# Patient Record
Sex: MALE | Race: WHITE | NOT HISPANIC OR LATINO | Employment: OTHER | URBAN - METROPOLITAN AREA
[De-identification: names, ages, dates, MRNs, and addresses within clinical notes are randomized per-mention and may not be internally consistent; named-entity substitution may affect disease eponyms.]

---

## 2023-03-23 ENCOUNTER — HOSPITAL ENCOUNTER (OUTPATIENT)
Dept: DATA CONVERSION | Facility: HOSPITAL | Age: 58
End: 2023-03-23
Attending: UROLOGY | Admitting: UROLOGY

## 2023-03-23 DIAGNOSIS — S37.10XA UNSPECIFIED INJURY OF URETER, INITIAL ENCOUNTER: ICD-10-CM

## 2023-03-23 DIAGNOSIS — Z46.6 ENCOUNTER FOR FITTING AND ADJUSTMENT OF URINARY DEVICE: ICD-10-CM

## 2023-03-30 ENCOUNTER — HOSPITAL ENCOUNTER (OUTPATIENT)
Dept: DATA CONVERSION | Facility: HOSPITAL | Age: 58
End: 2023-03-30
Attending: RADIOLOGY | Admitting: RADIOLOGY

## 2023-03-30 DIAGNOSIS — K50.90 CROHN'S DISEASE, UNSPECIFIED, WITHOUT COMPLICATIONS (MULTI): ICD-10-CM

## 2023-03-30 DIAGNOSIS — K57.92 DIVERTICULITIS OF INTESTINE, PART UNSPECIFIED, WITHOUT PERFORATION OR ABSCESS WITHOUT BLEEDING: ICD-10-CM

## 2023-03-30 DIAGNOSIS — N18.1 CHRONIC KIDNEY DISEASE, STAGE 1: ICD-10-CM

## 2023-03-30 DIAGNOSIS — D63.1 ANEMIA IN CHRONIC KIDNEY DISEASE (CODE): ICD-10-CM

## 2023-03-30 DIAGNOSIS — Z12.11 ENCOUNTER FOR SCREENING FOR MALIGNANT NEOPLASM OF COLON: ICD-10-CM

## 2023-03-30 DIAGNOSIS — S37.10XA UNSPECIFIED INJURY OF URETER, INITIAL ENCOUNTER: ICD-10-CM

## 2023-03-30 DIAGNOSIS — T83.022A: ICD-10-CM

## 2023-04-25 ENCOUNTER — HOSPITAL ENCOUNTER (OUTPATIENT)
Dept: DATA CONVERSION | Facility: HOSPITAL | Age: 58
End: 2023-04-25
Attending: RADIOLOGY | Admitting: RADIOLOGY

## 2023-04-25 DIAGNOSIS — N13.30 UNSPECIFIED HYDRONEPHROSIS: ICD-10-CM

## 2023-04-25 DIAGNOSIS — Z46.6 ENCOUNTER FOR FITTING AND ADJUSTMENT OF URINARY DEVICE: ICD-10-CM

## 2023-04-25 DIAGNOSIS — Z93.3 COLOSTOMY STATUS (MULTI): ICD-10-CM

## 2023-04-25 DIAGNOSIS — N32.1 VESICOINTESTINAL FISTULA: ICD-10-CM

## 2023-04-25 LAB
INR IN PPP BY COAGULATION ASSAY: 1 (ref 0.9–1.1)
PROTHROMBIN TIME (PT) IN PPP BY COAGULATION ASSAY: 11.5 SEC (ref 9.8–13.4)

## 2023-05-31 LAB
ANION GAP IN SER/PLAS: 13 MMOL/L (ref 10–20)
CALCIUM (MG/DL) IN SER/PLAS: 9.4 MG/DL (ref 8.6–10.3)
CARBON DIOXIDE, TOTAL (MMOL/L) IN SER/PLAS: 22 MMOL/L (ref 21–32)
CHLORIDE (MMOL/L) IN SER/PLAS: 108 MMOL/L (ref 98–107)
CREATININE (MG/DL) IN SER/PLAS: 1.38 MG/DL (ref 0.5–1.3)
GFR MALE: 60 ML/MIN/1.73M2
GLUCOSE (MG/DL) IN SER/PLAS: 91 MG/DL (ref 74–99)
POTASSIUM (MMOL/L) IN SER/PLAS: 3.9 MMOL/L (ref 3.5–5.3)
SODIUM (MMOL/L) IN SER/PLAS: 139 MMOL/L (ref 136–145)
UREA NITROGEN (MG/DL) IN SER/PLAS: 22 MG/DL (ref 6–23)

## 2023-06-08 LAB
COBALAMIN (VITAMIN B12) (PG/ML) IN SER/PLAS: 921 PG/ML (ref 211–911)
HEPATITIS B VIRUS CORE AB (PRESENCE) IN SER/PLAS BY IMM: NONREACTIVE
HEPATITIS B VIRUS SURFACE AB (MIU/ML) IN SERUM: <3.1 MIU/ML
HEPATITIS B VIRUS SURFACE AG PRESENCE IN SERUM: NONREACTIVE
HEPATITIS C VIRUS AB PRESENCE IN SERUM: NONREACTIVE

## 2023-06-11 LAB
NIL(NEG) CONTROL SPOT COUNT: NORMAL
PANEL A SPOT COUNT: 4
PANEL B SPOT COUNT: 1
POS CONTROL SPOT COUNT: NORMAL
T-SPOT. TB INTERPRETATION: NEGATIVE

## 2023-06-14 ENCOUNTER — HOSPITAL ENCOUNTER (OUTPATIENT)
Dept: DATA CONVERSION | Facility: HOSPITAL | Age: 58
End: 2023-06-14
Attending: INTERNAL MEDICINE | Admitting: INTERNAL MEDICINE

## 2023-06-14 DIAGNOSIS — K57.32 DIVERTICULITIS OF LARGE INTESTINE WITHOUT PERFORATION OR ABSCESS WITHOUT BLEEDING: ICD-10-CM

## 2023-06-14 DIAGNOSIS — K50.90 CROHN'S DISEASE, UNSPECIFIED, WITHOUT COMPLICATIONS (MULTI): ICD-10-CM

## 2023-06-14 DIAGNOSIS — K63.3 ULCER OF INTESTINE: ICD-10-CM

## 2023-06-14 DIAGNOSIS — Z12.11 ENCOUNTER FOR SCREENING FOR MALIGNANT NEOPLASM OF COLON: ICD-10-CM

## 2023-06-14 DIAGNOSIS — N18.9 CHRONIC KIDNEY DISEASE, UNSPECIFIED: ICD-10-CM

## 2023-06-14 DIAGNOSIS — D63.1 ANEMIA IN CHRONIC KIDNEY DISEASE (CODE): ICD-10-CM

## 2023-06-23 LAB
COMPLETE PATHOLOGY REPORT: NORMAL
CONVERTED CLINICAL DIAGNOSIS-HISTORY: NORMAL
CONVERTED FINAL DIAGNOSIS: NORMAL
CONVERTED FINAL REPORT PDF LINK TO COPY AND PASTE: NORMAL
CONVERTED GROSS DESCRIPTION: NORMAL

## 2023-06-30 ENCOUNTER — HOSPITAL ENCOUNTER (OUTPATIENT)
Dept: DATA CONVERSION | Facility: HOSPITAL | Age: 58
End: 2023-06-30
Attending: RADIOLOGY | Admitting: RADIOLOGY

## 2023-06-30 DIAGNOSIS — N13.30 UNSPECIFIED HYDRONEPHROSIS: ICD-10-CM

## 2023-06-30 DIAGNOSIS — Z93.3 COLOSTOMY STATUS (MULTI): ICD-10-CM

## 2023-06-30 DIAGNOSIS — N32.1 VESICOINTESTINAL FISTULA: ICD-10-CM

## 2023-08-03 ENCOUNTER — HOSPITAL ENCOUNTER (OUTPATIENT)
Dept: DATA CONVERSION | Facility: HOSPITAL | Age: 58
End: 2023-08-03
Attending: STUDENT IN AN ORGANIZED HEALTH CARE EDUCATION/TRAINING PROGRAM | Admitting: STUDENT IN AN ORGANIZED HEALTH CARE EDUCATION/TRAINING PROGRAM

## 2023-08-03 DIAGNOSIS — N32.1 VESICOINTESTINAL FISTULA: ICD-10-CM

## 2023-08-03 DIAGNOSIS — Z43.6 ENCOUNTER FOR ATTENTION TO OTHER ARTIFICIAL OPENINGS OF URINARY TRACT (MULTI): ICD-10-CM

## 2023-08-03 DIAGNOSIS — N13.30 UNSPECIFIED HYDRONEPHROSIS: ICD-10-CM

## 2023-08-25 ENCOUNTER — HOSPITAL ENCOUNTER (OUTPATIENT)
Dept: DATA CONVERSION | Facility: HOSPITAL | Age: 58
End: 2023-08-25
Attending: NURSE PRACTITIONER | Admitting: NURSE PRACTITIONER

## 2023-08-25 DIAGNOSIS — N13.30 UNSPECIFIED HYDRONEPHROSIS: ICD-10-CM

## 2023-08-25 DIAGNOSIS — N32.1 VESICOINTESTINAL FISTULA: ICD-10-CM

## 2023-09-07 VITALS — HEIGHT: 72 IN | BODY MASS INDEX: 27.14 KG/M2 | WEIGHT: 200.4 LBS

## 2023-09-08 VITALS — WEIGHT: 200.4 LBS | HEIGHT: 72 IN | BODY MASS INDEX: 27.14 KG/M2

## 2023-09-16 ENCOUNTER — HOSPITAL ENCOUNTER (OUTPATIENT)
Facility: HOSPITAL | Age: 58
Setting detail: OUTPATIENT SURGERY
End: 2023-09-16
Attending: UROLOGY | Admitting: UROLOGY

## 2023-09-26 PROBLEM — K57.32 DIVERTICULITIS, COLON: Status: ACTIVE | Noted: 2023-09-26

## 2023-09-26 PROBLEM — Z93.3 COLOSTOMY IN PLACE (MULTI): Status: ACTIVE | Noted: 2023-09-26

## 2023-09-26 PROBLEM — N18.1 STAGE 1 CHRONIC KIDNEY DISEASE: Status: ACTIVE | Noted: 2023-09-26

## 2023-09-26 PROBLEM — G47.00 INSOMNIA: Status: ACTIVE | Noted: 2023-09-26

## 2023-09-26 PROBLEM — S37.10XA URETER INJURY: Status: ACTIVE | Noted: 2023-09-26

## 2023-09-26 PROBLEM — K50.90 CROHN'S DISEASE (MULTI): Status: ACTIVE | Noted: 2023-09-26

## 2023-09-26 PROBLEM — N32.1 COLOVESICAL FISTULA: Status: ACTIVE | Noted: 2023-09-26

## 2023-09-26 PROBLEM — N32.89 BLADDER SPASMS: Status: ACTIVE | Noted: 2023-09-26

## 2023-09-26 PROBLEM — D64.9 ANEMIA: Status: ACTIVE | Noted: 2023-09-26

## 2023-09-26 PROBLEM — F41.9 ANXIETY DUE TO INVASIVE PROCEDURE: Status: ACTIVE | Noted: 2023-09-26

## 2023-09-26 RX ORDER — ACETAMINOPHEN 500 MG
2 TABLET ORAL EVERY 6 HOURS PRN
COMMUNITY
End: 2024-01-04 | Stop reason: WASHOUT

## 2023-09-26 RX ORDER — VANCOMYCIN HYDROCHLORIDE 125 MG/1
125 CAPSULE ORAL
COMMUNITY
End: 2023-10-05 | Stop reason: ALTCHOICE

## 2023-09-26 RX ORDER — OXYCODONE HYDROCHLORIDE 5 MG/1
5 TABLET ORAL SEE ADMIN INSTRUCTIONS
COMMUNITY
End: 2023-10-02 | Stop reason: SDUPTHER

## 2023-09-26 RX ORDER — LEVOFLOXACIN 750 MG/1
1 TABLET ORAL
COMMUNITY
Start: 2022-10-11 | End: 2023-10-05 | Stop reason: ALTCHOICE

## 2023-09-26 RX ORDER — DIAZEPAM 2 MG/1
2 TABLET ORAL AS NEEDED
COMMUNITY
Start: 2023-08-01 | End: 2023-10-02 | Stop reason: SDUPTHER

## 2023-09-26 RX ORDER — SULFAMETHOXAZOLE AND TRIMETHOPRIM 800; 160 MG/1; MG/1
1 TABLET ORAL 2 TIMES DAILY
Status: ON HOLD | COMMUNITY
Start: 2023-06-14 | End: 2023-10-25 | Stop reason: ALTCHOICE

## 2023-09-26 RX ORDER — OXYBUTYNIN CHLORIDE 10 MG/1
10 TABLET, EXTENDED RELEASE ORAL 2 TIMES DAILY
COMMUNITY
End: 2023-10-05 | Stop reason: SDUPTHER

## 2023-09-26 RX ORDER — ZINC OXIDE 20 G/100G
1 OINTMENT TOPICAL 3 TIMES DAILY
COMMUNITY
Start: 2022-12-02 | End: 2023-10-05 | Stop reason: HOSPADM

## 2023-09-29 VITALS — HEIGHT: 72 IN | BODY MASS INDEX: 32.55 KG/M2 | WEIGHT: 240.3 LBS

## 2023-09-29 VITALS — WEIGHT: 230.38 LBS | HEIGHT: 72 IN | BODY MASS INDEX: 31.2 KG/M2

## 2023-09-30 NOTE — H&P
History & Physical Reviewed:   I have reviewed the History and Physical dated:  08-Jun-2023   History and Physical reviewed and relevant findings noted. Patient examined to review pertinent physical  findings.: No significant changes   Home Medications Reviewed: no changes noted   Allergies Reviewed: no changes noted       ERAS (Enhanced Recovery After Surgery):  ·  ERAS Patient: no     Consent:   COVID-19 Consent:  ·  COVID-19 Risk Consent Surgeon has reviewed key risks related to the risk of florentino COVID-19 and if they contract COVID-19 what the risks are.       Electronic Signatures:  Nick Yi)  (Signed 14-Jun-2023 11:31)   Authored: History & Physical Reviewed, ERAS, Consent,  Note Completion      Last Updated: 14-Jun-2023 11:31 by Nick Yi)

## 2023-10-02 ENCOUNTER — OFFICE VISIT (OUTPATIENT)
Dept: UROLOGY | Facility: CLINIC | Age: 58
End: 2023-10-02

## 2023-10-02 VITALS
HEART RATE: 71 BPM | TEMPERATURE: 97.7 F | DIASTOLIC BLOOD PRESSURE: 75 MMHG | HEIGHT: 72 IN | WEIGHT: 250 LBS | RESPIRATION RATE: 16 BRPM | BODY MASS INDEX: 33.86 KG/M2 | SYSTOLIC BLOOD PRESSURE: 123 MMHG

## 2023-10-02 DIAGNOSIS — N32.1 COLOVESICAL FISTULA: Primary | ICD-10-CM

## 2023-10-02 DIAGNOSIS — F41.9 ANXIETY DUE TO INVASIVE PROCEDURE: ICD-10-CM

## 2023-10-02 PROCEDURE — 99212 OFFICE O/P EST SF 10 MIN: CPT | Performed by: NURSE PRACTITIONER

## 2023-10-02 PROCEDURE — 3008F BODY MASS INDEX DOCD: CPT | Performed by: NURSE PRACTITIONER

## 2023-10-02 RX ORDER — OXYCODONE HYDROCHLORIDE 5 MG/1
5 TABLET ORAL SEE ADMIN INSTRUCTIONS
Qty: 12 TABLET | Refills: 0 | Status: SHIPPED | OUTPATIENT
Start: 2023-10-02 | End: 2023-10-02 | Stop reason: SDUPTHER

## 2023-10-02 RX ORDER — DIAZEPAM 2 MG/1
2 TABLET ORAL AS NEEDED
Qty: 1 TABLET | Refills: 0 | Status: SHIPPED | OUTPATIENT
Start: 2023-10-02 | End: 2023-12-28 | Stop reason: WASHOUT

## 2023-10-02 RX ORDER — OXYCODONE HYDROCHLORIDE 5 MG/1
5 TABLET ORAL SEE ADMIN INSTRUCTIONS
Qty: 12 TABLET | Refills: 0 | Status: SHIPPED | OUTPATIENT
Start: 2023-10-02 | End: 2023-11-17 | Stop reason: HOSPADM

## 2023-10-02 ASSESSMENT — PAIN SCALES - GENERAL: PAINLEVEL: 0-NO PAIN

## 2023-10-02 NOTE — LETTER
To Whom It May Concern,    I am writing in support of the Social Security Disability application submitted by my patient, Jose Thornton. Mr. Thornton has been under the care of the Urology team since December 2022 however his illness journey began when he came to one of our emergency departments on September 26, 2022, with constipation and was found to have advanced colitis of the sigmoid colon and a 2.4 fistulous abscess between the colon and the bladder. He required drainage of the abscess by Interventional Radiology and treatment of acute rental failure and C diff diarrhea. In the past year, he has required multiple hospital admissions, surgeries, procedures, and outpatient medical visits with members of our Urology and Colorectal surgery teams for complications from this fistula. He has had nephrostomy tubes as well as an indwelling bladder catheter since May 2023, both requiring regular replacement to prevent infection. He is now scheduled for a major surgery to finally correct these issues on October 11, 2023. Given the physical sequelae alone, I am confident in my assessment that Mr. Thornton has been unable to work during this period of time and as such should qualify for disability benefits at least until such time as he has recovered from his upcoming surgery and is deemed by his medical team as capable of returning to work.    Sincerely,          UGO Xavier

## 2023-10-02 NOTE — PROGRESS NOTES
John presents today for a urine culture prior to surgery 10/11/23. Unfortunately he was not aware that this would require a catheter change. He did not premedicate, was hoping to avoid another cath change prior to surgery.     He will reschedule for tomorrow  Will coordinate ride with spouse  Prescriptions sent for diazepam ,oxycodone

## 2023-10-03 ENCOUNTER — HOSPITAL ENCOUNTER (OUTPATIENT)
Dept: RADIOLOGY | Facility: HOSPITAL | Age: 58
Discharge: HOME | End: 2023-10-03

## 2023-10-03 ENCOUNTER — OFFICE VISIT (OUTPATIENT)
Dept: UROLOGY | Facility: CLINIC | Age: 58
End: 2023-10-03

## 2023-10-03 DIAGNOSIS — K50.90 CROHN'S DISEASE, UNSPECIFIED, WITHOUT COMPLICATIONS (MULTI): ICD-10-CM

## 2023-10-03 DIAGNOSIS — N32.1 COLOVESICAL FISTULA: Primary | ICD-10-CM

## 2023-10-03 PROCEDURE — 74183 MRI ABD W/O CNTR FLWD CNTR: CPT | Performed by: RADIOLOGY

## 2023-10-03 PROCEDURE — 74183 MRI ABD W/O CNTR FLWD CNTR: CPT

## 2023-10-03 PROCEDURE — 99214 OFFICE O/P EST MOD 30 MIN: CPT | Performed by: NURSE PRACTITIONER

## 2023-10-03 PROCEDURE — 2550000001 HC RX 255 CONTRASTS: Performed by: INTERNAL MEDICINE

## 2023-10-03 PROCEDURE — 72197 MRI PELVIS W/O & W/DYE: CPT

## 2023-10-03 PROCEDURE — 72197 MRI PELVIS W/O & W/DYE: CPT | Performed by: RADIOLOGY

## 2023-10-03 PROCEDURE — 3008F BODY MASS INDEX DOCD: CPT | Performed by: NURSE PRACTITIONER

## 2023-10-03 PROCEDURE — A9575 INJ GADOTERATE MEGLUMI 0.1ML: HCPCS | Performed by: INTERNAL MEDICINE

## 2023-10-03 RX ORDER — APPLE JUICE
250 LIQUID (ML) ORAL DAILY
Qty: 1250 ML | Refills: 0 | Status: SHIPPED | OUTPATIENT
Start: 2023-10-06 | End: 2023-10-11

## 2023-10-03 RX ORDER — POLYETHYLENE GLYCOL 3350, SODIUM CHLORIDE, SODIUM BICARBONATE, POTASSIUM CHLORIDE 420; 11.2; 5.72; 1.48 G/4L; G/4L; G/4L; G/4L
4000 POWDER, FOR SOLUTION ORAL ONCE
Status: CANCELLED | OUTPATIENT
Start: 2023-10-03 | End: 2023-10-03

## 2023-10-03 RX ORDER — POLYETHYLENE GLYCOL 3350, SODIUM SULFATE ANHYDROUS, SODIUM BICARBONATE, SODIUM CHLORIDE, POTASSIUM CHLORIDE 236; 22.74; 6.74; 5.86; 2.97 G/4L; G/4L; G/4L; G/4L; G/4L
4000 POWDER, FOR SOLUTION ORAL ONCE
Qty: 4000 ML | Refills: 0 | Status: SHIPPED | OUTPATIENT
Start: 2023-10-03 | End: 2023-10-03

## 2023-10-03 RX ORDER — GADOTERATE MEGLUMINE 376.9 MG/ML
23 INJECTION INTRAVENOUS
Status: COMPLETED | OUTPATIENT
Start: 2023-10-03 | End: 2023-10-03

## 2023-10-03 RX ADMIN — GADOTERATE MEGLUMINE 23 ML: 376.9 INJECTION INTRAVENOUS at 11:05

## 2023-10-03 NOTE — RESULT ENCOUNTER NOTE
Please call me to discuss how you are doing on steroids, and your upcoming surgery.  MRI as expected.

## 2023-10-05 ENCOUNTER — PRE-ADMISSION TESTING (OUTPATIENT)
Dept: PREADMISSION TESTING | Facility: HOSPITAL | Age: 58
End: 2023-10-05

## 2023-10-05 VITALS
HEART RATE: 93 BPM | OXYGEN SATURATION: 96 % | DIASTOLIC BLOOD PRESSURE: 76 MMHG | TEMPERATURE: 98.6 F | SYSTOLIC BLOOD PRESSURE: 115 MMHG | BODY MASS INDEX: 34.31 KG/M2 | WEIGHT: 253.31 LBS | HEIGHT: 72 IN

## 2023-10-05 DIAGNOSIS — Z41.9 SURGERY, ELECTIVE: Primary | ICD-10-CM

## 2023-10-05 LAB
ABO GROUP (TYPE) IN BLOOD: NORMAL
ALBUMIN SERPL BCP-MCNC: 3.9 G/DL (ref 3.4–5)
ALP SERPL-CCNC: 72 U/L (ref 33–120)
ALT SERPL W P-5'-P-CCNC: 20 U/L (ref 10–52)
ANION GAP SERPL CALC-SCNC: 19 MMOL/L (ref 10–20)
ANTIBODY SCREEN: NORMAL
APPEARANCE UR: ABNORMAL
APTT PPP: 30 SECONDS (ref 27–38)
AST SERPL W P-5'-P-CCNC: 21 U/L (ref 9–39)
BACTERIA #/AREA URNS AUTO: ABNORMAL /HPF
BILIRUB SERPL-MCNC: 0.2 MG/DL (ref 0–1.2)
BILIRUB UR STRIP.AUTO-MCNC: NEGATIVE MG/DL
BUN SERPL-MCNC: 20 MG/DL (ref 6–23)
CALCIUM SERPL-MCNC: 9 MG/DL (ref 8.6–10.6)
CHLORIDE SERPL-SCNC: 102 MMOL/L (ref 98–107)
CO2 SERPL-SCNC: 19 MMOL/L (ref 21–32)
COLOR UR: ABNORMAL
CREAT SERPL-MCNC: 1.74 MG/DL (ref 0.5–1.3)
ERYTHROCYTE [DISTWIDTH] IN BLOOD BY AUTOMATED COUNT: 15.5 % (ref 11.5–14.5)
GFR SERPL CREATININE-BSD FRML MDRD: 46 ML/MIN/1.73M*2
GLUCOSE SERPL-MCNC: 102 MG/DL (ref 74–99)
GLUCOSE UR STRIP.AUTO-MCNC: NEGATIVE MG/DL
HCT VFR BLD AUTO: 42.8 % (ref 41–52)
HGB BLD-MCNC: 13.4 G/DL (ref 13.5–17.5)
INR PPP: 1 (ref 0.9–1.1)
KETONES UR STRIP.AUTO-MCNC: NEGATIVE MG/DL
LEUKOCYTE ESTERASE UR QL STRIP.AUTO: ABNORMAL
MCH RBC QN AUTO: 27.6 PG (ref 26–34)
MCHC RBC AUTO-ENTMCNC: 31.3 G/DL (ref 32–36)
MCV RBC AUTO: 88 FL (ref 80–100)
NITRITE UR QL STRIP.AUTO: POSITIVE
NRBC BLD-RTO: 0 /100 WBCS (ref 0–0)
PH UR STRIP.AUTO: 7 [PH]
PLATELET # BLD AUTO: 423 X10*3/UL (ref 150–450)
PMV BLD AUTO: 8.7 FL (ref 7.5–11.5)
POTASSIUM SERPL-SCNC: 4 MMOL/L (ref 3.5–5.3)
PROT SERPL-MCNC: 7.3 G/DL (ref 6.4–8.2)
PROT UR STRIP.AUTO-MCNC: ABNORMAL MG/DL
PROTHROMBIN TIME: 11.3 SECONDS (ref 9.8–12.8)
RBC # BLD AUTO: 4.85 X10*6/UL (ref 4.5–5.9)
RBC # UR STRIP.AUTO: ABNORMAL /UL
RBC #/AREA URNS AUTO: >20 /HPF
RH FACTOR (ANTIGEN D): NORMAL
SODIUM SERPL-SCNC: 136 MMOL/L (ref 136–145)
SP GR UR STRIP.AUTO: 1.02
SQUAMOUS #/AREA URNS AUTO: ABNORMAL /HPF
UROBILINOGEN UR STRIP.AUTO-MCNC: <2 MG/DL
WBC # BLD AUTO: 9.7 X10*3/UL (ref 4.4–11.3)
WBC #/AREA URNS AUTO: >50 /HPF
WBC CLUMPS #/AREA URNS AUTO: ABNORMAL /HPF

## 2023-10-05 PROCEDURE — 86900 BLOOD TYPING SEROLOGIC ABO: CPT

## 2023-10-05 PROCEDURE — 85027 COMPLETE CBC AUTOMATED: CPT

## 2023-10-05 PROCEDURE — 87186 SC STD MICRODIL/AGAR DIL: CPT

## 2023-10-05 PROCEDURE — 85610 PROTHROMBIN TIME: CPT

## 2023-10-05 PROCEDURE — 99244 OFF/OP CNSLTJ NEW/EST MOD 40: CPT | Performed by: ANESTHESIOLOGY

## 2023-10-05 PROCEDURE — 80053 COMPREHEN METABOLIC PANEL: CPT

## 2023-10-05 PROCEDURE — 81001 URINALYSIS AUTO W/SCOPE: CPT

## 2023-10-05 PROCEDURE — 36415 COLL VENOUS BLD VENIPUNCTURE: CPT

## 2023-10-05 RX ORDER — PREDNISOLONE SODIUM PHOSPHATE 10 MG/1
10 TABLET, ORALLY DISINTEGRATING ORAL DAILY
Status: ON HOLD | COMMUNITY
End: 2023-10-25 | Stop reason: ALTCHOICE

## 2023-10-05 ASSESSMENT — DUKE ACTIVITY SCORE INDEX (DASI)
CAN YOU WALK INDOORS, SUCH AS AROUND YOUR HOUSE: YES
DASI METS SCORE: 5.6
CAN YOU TAKE CARE OF YOURSELF (EAT, DRESS, BATHE, OR USE TOILET): YES
CAN YOU DO HEAVY WORK AROUND THE HOUSE LIKE SCRUBBING FLOORS OR LIFTING AND MOVING HEAVY FURNITURE: NO
CAN YOU PARTICIPATE IN MODERATE RECREATIONAL ACTIVITIES LIKE GOLF, BOWLING, DANCING, DOUBLES TENNIS OR THROWING A BASEBALL OR FOOTBALL: NO
CAN YOU HAVE SEXUAL RELATIONS: NO
CAN YOU DO YARD WORK LIKE RAKING LEAVES, WEEDING OR PUSHING A MOWER: YES
CAN YOU DO MODERATE WORK AROUND THE HOUSE LIKE VACUUMING, SWEEPING FLOORS OR CARRYING GROCERIES: YES
CAN YOU RUN A SHORT DISTANCE: NO
CAN YOU DO LIGHT WORK AROUND THE HOUSE LIKE DUSTING OR WASHING DISHES: YES
CAN YOU WALK A BLOCK OR TWO ON LEVEL GROUND: YES
TOTAL_SCORE: 23.45
CAN YOU PARTICIPATE IN STRENOUS SPORTS LIKE SWIMMING, SINGLES TENNIS, FOOTBALL, BASKETBALL, OR SKIING: NO
CAN YOU CLIMB A FLIGHT OF STAIRS OR WALK UP A HILL: YES

## 2023-10-05 NOTE — CPM/PAT H&P
CPM/PAT Evaluation       Name: Jose Thornton (Jose Thornton)  /Age: 1965/57 y.o.     In-Person       Chief Complaint: 57 yrs , M , presented to PAT clinic prior to scheduled Bilateral ureteral reimplant, rectovesical fistula repair, possible illeal conduit, possible muscle flap with dr Andersen on 10/11     HPI  57 yrs M , with Hx of Crohn's disease , perianal fistula , colovesical fistula  s/p anterior proctosigmoidectomy, colovesicular fistula takedown, end colostomy creation (22, Dr. Rodriguez) patient has a colostomy bag  , fistula between the bladder and rectal stump s/p indwelling catheter, as well as bilateral nephrostomy tubes and end colostomy     Past Medical History:   Diagnosis Date    Crohn's disease (CMS/HCC)     Diverticulosis     GERD (gastroesophageal reflux disease)     GI (gastrointestinal bleed)     Urinary tract infection        Past Surgical History:   Procedure Laterality Date    CT GUIDED PERCUTANEOUS PERITONEAL OR RETROPERITONEAL FLUID COLLECTION DRAINAGE  2022    CT GUIDED PERCUTANEOUS PERITONEAL OR RETROPERITONEAL FLUID COLLECTION DRAINAGE 2022 [facility] [place] [facility]    IR NEPHROSTOMY TUBE EXCHANGE  2023    IR NEPHROSTOMY TUBE EXCHANGE [facility]    IR NEPHROSTOMY TUBE EXCHANGE  2023    IR NEPHROSTOMY TUBE EXCHANGE 2023 [facility]    IR NEPHROSTOMY TUBE EXCHANGE  3/23/2023    IR NEPHROSTOMY TUBE EXCHANGE [facility]    IR NEPHROSTOMY TUBE EXCHANGE  3/23/2023    IR NEPHROSTOMY TUBE EXCHANGE [facility]    IR NEPHROSTOMY TUBE EXCHANGE  3/30/2023    IR NEPHROSTOMY TUBE EXCHANGE [facility]    IR NEPHROSTOMY TUBE EXCHANGE  2023    IR NEPHROSTOMY TUBE EXCHANGE [facility]    IR NEPHROSTOMY TUBE EXCHANGE  2023    IR NEPHROSTOMY TUBE EXCHANGE 2023 [facility]    IR NEPHROSTOMY TUBE EXCHANGE  2023    IR NEPHROSTOMY TUBE EXCHANGE 2023 [facility]    IR NEPHROSTOMY TUBE EXCHANGE  2023    IR NEPHROSTOMY TUBE EXCHANGE  8/25/2023 [facility]    IR NEPHROSTOMY TUBE EXCHANGE  8/25/2023    IR NEPHROSTOMY TUBE EXCHANGE 8/25/2023 [facility]           No family history on file.    No Known Allergies    Prior to Admission medications    Medication Sig Start Date End Date Taking? Authorizing Provider   acetaminophen (Tylenol) 500 mg tablet Take 2 tablets (1,000 mg) by mouth every 6 hours if needed.    Historical Provider, MD   diazePAM (Valium) 2 mg tablet Take 1 tablet (2 mg) by mouth if needed for anxiety. TAKE 1 TABLET BY MOUTH AN HOUR PRIOR TO CATHETER CHANGE. 10/2/23   TRAE Taylor   inFLIXimab (Remicade) 100 mg injection INFUSE 500MG PER PROTOCOL ON WEEKS 0,2, 6 AND THEN EVERY 8 WEEKS AS MAINTENANCE. 7/13/23 1/13/24  Nick Yi MD   nut.tx.comp. immune systm,reg (Impact Advanced Recovery) 0.1 gram-1.12 kcal/mL liquid Take 250 mL by mouth once daily for 5 days. Do not start before October 6, 2023. 10/6/23 10/11/23  TRAE Taylor   oxybutynin XL (Ditropan-XL) 10 mg 24 hr tablet TAKE 1 TABLET BY MOUTH TWO TIMES A DAY 4/3/23 4/2/24  Tomas Andersen MD   oxyCODONE (Roxicodone) 5 mg immediate release tablet Take 1 tablet (5 mg) by mouth see administration instructions. TAKE 1 TABLET BY MOUTH 1 HOUR PRIOR TO CATHETER CHANGE 10/2/23   TRAE Taylor   polyethylene glycol-electrolytes (Golytely) 420 gram solution Take 4,000 mL by mouth 1 time for 1 dose. 10/3/23 10/3/23  TRAE Taylor   prednisoLONE ODT (OrapRED ODT) 10 mg disintegrating tablet Take 1 tablet (10 mg) by mouth once daily.    Historical Provider, MD   sulfamethoxazole-trimethoprim (Bactrim DS) 800-160 mg tablet Take 1 tablet by mouth 2 times a day. 6/14/23   Historical Provider, MD   diazePAM (Valium) 2 mg tablet Take 1 tablet (2 mg) by mouth if needed.  TAKE 1 TABLET BY MOUTH AN HOUR PRIOR TO CATHETER CHANGE. 8/1/23 10/2/23  Historical Provider, MD   levoFLOXacin (Levaquin) 750 mg tablet Take 1 tablet (750 mg) by  mouth every other day. 10/11/22 10/5/23  Historical Provider, MD   oxybutynin XL (Ditropan-XL) 10 mg 24 hr tablet Take 1 tablet (10 mg) by mouth 2 times a day.  10/5/23  Historical Provider, MD   oxybutynin XL (Ditropan-XL) 10 mg 24 hr tablet TAKE 1 TABLET BY MOUTH TWO TIMES A DAY 3/8/23 10/5/23  Tomas Andersen MD   oxyCODONE (Roxicodone) 5 mg immediate release tablet Take 1 tablet (5 mg) by mouth see administration instructions.  TAKE 1 TABLET BY MOUTH 1 HOUR PRIOR TO CATHETER CHANGE  10/2/23  Historical Provider, MD   oxyCODONE (Roxicodone) 5 mg immediate release tablet Take 1 tablet (5 mg) by mouth see administration instructions.  TAKE 1 TABLET BY MOUTH 1 HOUR PRIOR TO CATHETER CHANGE 10/2/23 10/2/23  Dhiraj Baldwin, APRN-CNP   phenazopyridine 99.5 mg tablet Take 2 tablets (199 mg) by mouth 3 times a day after meals. As needed  10/5/23  Historical Provider, MD   vancomycin (Vancocin) 125 mg capsule Take 1 capsule (125 mg) by mouth.  10/5/23  Historical Provider, MD   zinc oxide 20 % ointment Apply 1 Application topically 3 times a day.  Apply topically to affected area 3 times a day 12/2/22 10/5/23  Historical Provider, MD        [unfilled]    Review of Systems   All other systems reviewed and are negative.     Physical Exam  Cardiovascular:      Rate and Rhythm: Normal rate and regular rhythm.   Pulmonary:      Effort: Pulmonary effort is normal.      Breath sounds: Normal breath sounds.   Musculoskeletal:      Cervical back: Normal range of motion.   Neurological:      Mental Status: He is alert.          PAT AIRWAY:   Airway:     Mallampati::  II    TM distance::  >3 FB    Neck ROM::  Full  normal    +ve beard     Visit Vitals  /76   Pulse 93   Temp 37 °C (98.6 °F) (Oral)       DASI Risk Score      Flowsheet Row Most Recent Value   DASI SCORE 23.45   METS Score (Will be calculated only when all the questions are answered) 5.6          Caprini DVT Assessment    No data to display       Modified  Frailty Index    No data to display       CHADS2 Stroke Risk         N/A 3 - 100%: High Risk   2 - 3%: Medium Risk   0 - 2%: Low Risk     Last Change: N/A          This score determines the patient's risk of having a stroke if the patient has atrial fibrillation.        This score is not applicable to this patient. Components are not calculated.          Revised Cardiac Risk Index    No data to display       Apfel Simplified Score    No data to display       Risk Analysis Index Results This Encounter    No data found in the last 1 encounters.       Stop Bang Score      Flowsheet Row Most Recent Value   Do you snore loudly? 0   Do you often feel tired or fatigued after your sleep? 0   Has anyone ever observed you stop breathing in your sleep? 0   Do you have or are you being treated for high blood pressure? 0   Recent BMI (Calculated) 33.9   Is BMI greater than 35 kg/m2? 0=No   Age older than 50 years old? 1=Yes   Is your neck circumference greater than 17 inches (Male) or 16 inches (Female)? 1            Assessment and Plan:   57 yrs M , with Hx of Crohn's disease , perianal fistula , colovesical fistula  s/p anterior proctosigmoidectomy, colovesicular fistula takedown, end colostomy creation (12/20/22, Dr. Rodriguez) .  Continue to have a fistula between the bladder and rectal stump s/p indwelling catheter, as well as bilateral nephrostomy tubes and end colostomy .     CVS : denies any HTN , MI , CHF , arrhythmias   MET = 5.6   RS:  none   Renal : colovesical fistula s/p b/l nephrostomy tubes and  andrade catheter .  KADE 2/2 contras vs hydronephrosis   GI/Liver: Hx of Crohn's disease , perianal fistula s/p sigmoidectomy with colostomy . On/Off Lower GI bleeding   CNS: denies Hx of CVA/TIA . +ve Hx for anxiety on PRN valium with nephrostomy / catheter changes   Endocrine : on prednisolone 10 mg OD for Rx Crohn's disease   Hematology: Agrees to blood transfusion   Anesthesia : denies  Hx of self/Family complication from  anesthesia     Social Hx:   - Smoking: Never   - Alcohol: occasional   - Drug : uses PRN Marijuana  for pain       Assessment:   MET 5.6   RCRI: 1 point /6 % 30 day risk of death , MI or cardiac arrest   ARISCAT: 41 points / intermediate risk ( 13.3%) risk of in hospital post operative pulmonary complication       Case was discussed with Dr Jericho Zaragoza MD   PGY-3/CA-2

## 2023-10-05 NOTE — PREPROCEDURE INSTRUCTIONS
Medication List            Accurate as of October 5, 2023  2:24 PM. Always use your most recent med list.                acetaminophen 500 mg tablet  Commonly known as: Tylenol  Medication Adjustments for Surgery: Take morning of surgery with sip of water, no other fluids     diazePAM 2 mg tablet  Commonly known as: Valium  Take 1 tablet (2 mg) by mouth if needed for anxiety. TAKE 1 TABLET BY MOUTH AN HOUR PRIOR TO CATHETER CHANGE.  Medication Adjustments for Surgery: Continue until night before surgery     Impact Advanced Recovery 0.1 gram-1.12 kcal/mL liquid  Generic drug: nut.tx.comp. immune systm,reg  Take 250 mL by mouth once daily for 5 days. Do not start before October 6, 2023.  Start taking on: October 6, 2023  Notes to patient: As instructed by surgery team      oxybutynin XL 10 mg 24 hr tablet  Commonly known as: Ditropan-XL  TAKE 1 TABLET BY MOUTH TWO TIMES A DAY  Medication Adjustments for Surgery: Continue until night before surgery     oxyCODONE 5 mg immediate release tablet  Commonly known as: Roxicodone  Take 1 tablet (5 mg) by mouth see administration instructions. TAKE 1 TABLET BY MOUTH 1 HOUR PRIOR TO CATHETER CHANGE  Notes to patient: Only take PRN , can take day of surgery      prednisoLONE ODT 10 mg disintegrating tablet  Commonly known as: OrapRED ODT  Medication Adjustments for Surgery: Take morning of surgery with sip of water, no other fluids     Remicade 100 mg injection  Generic drug: inFLIXimab  INFUSE 500MG PER PROTOCOL ON WEEKS 0,2, 6 AND THEN EVERY 8 WEEKS AS MAINTENANCE.  Medication Adjustments for Surgery: Other (Comment)  Notes to patient: Has not started yet .      sulfamethoxazole-trimethoprim 800-160 mg tablet  Commonly known as: Bactrim DS  Notes to patient: Continue as prescribed by surgery team                               NPO Instructions:    Do not eat any food after midnight the night before your surgery/procedure.  You may have clear liquids until TWO hours before  surgery/procedure. This includes water, black tea/coffee, (no milk or cream) apple juice and electrolyte drinks (Gatorade).  Please follow surgery instruction in regards to bowel preparation     Additional Instructions:

## 2023-10-06 LAB — BACTERIA UR CULT: ABNORMAL

## 2023-10-09 ENCOUNTER — TELEPHONE (OUTPATIENT)
Dept: UROLOGY | Facility: CLINIC | Age: 58
End: 2023-10-09

## 2023-10-09 RX ORDER — HEPARIN SODIUM 5000 [USP'U]/ML
5000 INJECTION, SOLUTION INTRAVENOUS; SUBCUTANEOUS ONCE
Status: CANCELLED | OUTPATIENT
Start: 2023-10-11

## 2023-10-09 NOTE — TELEPHONE ENCOUNTER
Discussed with patient. More severe bladders spasms with leakage around catheter, no UTI symptoms.

## 2023-10-10 ENCOUNTER — TELEPHONE (OUTPATIENT)
Dept: GASTROENTEROLOGY | Facility: CLINIC | Age: 58
End: 2023-10-10

## 2023-10-10 ENCOUNTER — PHARMACY VISIT (OUTPATIENT)
Dept: PHARMACY | Facility: CLINIC | Age: 58
End: 2023-10-10

## 2023-10-10 ENCOUNTER — PREP FOR PROCEDURE (OUTPATIENT)
Dept: UROLOGY | Facility: CLINIC | Age: 58
End: 2023-10-10

## 2023-10-10 DIAGNOSIS — N39.0 UTI (URINARY TRACT INFECTION) DUE TO ENTEROCOCCUS: Primary | ICD-10-CM

## 2023-10-10 DIAGNOSIS — B95.2 UTI (URINARY TRACT INFECTION) DUE TO ENTEROCOCCUS: Primary | ICD-10-CM

## 2023-10-10 DIAGNOSIS — N13.5 URETERAL STRICTURE: Primary | ICD-10-CM

## 2023-10-10 LAB — BACTERIA UR CULT: ABNORMAL

## 2023-10-10 RX ORDER — NITROFURANTOIN 25; 75 MG/1; MG/1
100 CAPSULE ORAL 2 TIMES DAILY
Qty: 14 CAPSULE | Refills: 0 | Status: SHIPPED | OUTPATIENT
Start: 2023-10-10 | End: 2023-10-12 | Stop reason: ALTCHOICE

## 2023-10-10 NOTE — PROGRESS NOTES
Source of specimen unclear; has nephrostomy tubes and indwelling Schultz. Likely colonization. Will treat given plan for surgery, high colony count.

## 2023-10-10 NOTE — TELEPHONE ENCOUNTER
Patient called and left message stating he was returning your call. He can be reached at [phone].  Thank you.

## 2023-10-11 ENCOUNTER — OFFICE VISIT (OUTPATIENT)
Dept: UROLOGY | Facility: CLINIC | Age: 58
End: 2023-10-11

## 2023-10-11 ENCOUNTER — LAB (OUTPATIENT)
Dept: LAB | Facility: LAB | Age: 58
End: 2023-10-11

## 2023-10-11 DIAGNOSIS — R79.89 ELEVATED SERUM CREATININE: ICD-10-CM

## 2023-10-11 DIAGNOSIS — N32.89 BLADDER SPASMS: ICD-10-CM

## 2023-10-11 DIAGNOSIS — N31.9 NEUROGENIC BLADDER: ICD-10-CM

## 2023-10-11 DIAGNOSIS — N32.1 COLOVESICAL FISTULA: Primary | ICD-10-CM

## 2023-10-11 LAB
ANION GAP SERPL CALC-SCNC: 15 MMOL/L (ref 10–20)
BUN SERPL-MCNC: 20 MG/DL (ref 6–23)
CALCIUM SERPL-MCNC: 8.9 MG/DL (ref 8.6–10.3)
CHLORIDE SERPL-SCNC: 105 MMOL/L (ref 98–107)
CO2 SERPL-SCNC: 24 MMOL/L (ref 21–32)
CREAT SERPL-MCNC: 1.6 MG/DL (ref 0.5–1.3)
GFR SERPL CREATININE-BSD FRML MDRD: 51 ML/MIN/1.73M*2
GLUCOSE SERPL-MCNC: 86 MG/DL (ref 74–99)
POTASSIUM SERPL-SCNC: 4.5 MMOL/L (ref 3.5–5.3)
SODIUM SERPL-SCNC: 139 MMOL/L (ref 136–145)

## 2023-10-11 PROCEDURE — 99215 OFFICE O/P EST HI 40 MIN: CPT | Performed by: NURSE PRACTITIONER

## 2023-10-11 PROCEDURE — 80048 BASIC METABOLIC PNL TOTAL CA: CPT

## 2023-10-11 PROCEDURE — 3008F BODY MASS INDEX DOCD: CPT | Performed by: NURSE PRACTITIONER

## 2023-10-11 PROCEDURE — 1036F TOBACCO NON-USER: CPT | Performed by: NURSE PRACTITIONER

## 2023-10-11 PROCEDURE — 36415 COLL VENOUS BLD VENIPUNCTURE: CPT

## 2023-10-11 ASSESSMENT — ENCOUNTER SYMPTOMS
HEMATURIA: 0
CHILLS: 1
APPETITE CHANGE: 0
FEVER: 0
FEVER: 1
CONSTIPATION: 0
ACTIVITY CHANGE: 0
CHILLS: 0
FLANK PAIN: 1
LIGHT-HEADEDNESS: 0
ACTIVITY CHANGE: 0
DYSPHORIC MOOD: 0
ABDOMINAL PAIN: 0
ABDOMINAL PAIN: 0
CONSTIPATION: 0
HEMATURIA: 0
FLANK PAIN: 0
NAUSEA: 0
DYSPHORIC MOOD: 0

## 2023-10-11 NOTE — PROGRESS NOTES
"UROLOGIC FOLLOW-UP VISIT     PROBLEM LIST:  1. Colovesical fistula        2. Elevated serum creatinine  Basic metabolic panel      3. Bladder spasms  Measure post void residual           HISTORY OF PRESENT ILLNESS:   Jose Thornton is a 57 y.o. male with Crohn's disease and associated colovesical fistula s/p B nephrostomy and Schultz catheter placement. Scheduled fistula repair with Dr. Andersen cancelled due to positive urine culture.    INTERVAL HISTORY:  Returns today with concern for decreased urine output from Schultz  John is seen unaccompanied  Confirms starting abx as ordered yesterday  Notes increased leakage around catheter with bladder spasms  Nephrostomy output getting \"syrupy\"  Starting to have some back pain  Subjective intermittent fever/chills    PAST MEDICAL HISTORY:  Past Medical History:   Diagnosis Date    Crohn's disease (CMS/HCC)     Diverticulosis     GERD (gastroesophageal reflux disease)     GI (gastrointestinal bleed)     Urinary tract infection        PAST SURGICAL HISTORY:  Past Surgical History:   Procedure Laterality Date    CT GUIDED PERCUTANEOUS PERITONEAL OR RETROPERITONEAL FLUID COLLECTION DRAINAGE  9/28/2022    CT GUIDED PERCUTANEOUS PERITONEAL OR RETROPERITONEAL FLUID COLLECTION DRAINAGE [place]20[place] [facility] [place] [place]    IR NEPHROSTOMY TUBE EXCHANGE  1/31/2023    IR NEPHROSTOMY TUBE EXCHANGE 1/31/2023 [facility]    IR NEPHROSTOMY TUBE EXCHANGE  1/31/2023    IR NEPHROSTOMY TUBE EXCHANGE 1/31/2023 [facility]    IR NEPHROSTOMY TUBE EXCHANGE  3/23/2023    IR NEPHROSTOMY TUBE EXCHANGE [facility]    IR NEPHROSTOMY TUBE EXCHANGE  3/23/2023    IR NEPHROSTOMY TUBE EXCHANGE [facility]    IR NEPHROSTOMY TUBE EXCHANGE  3/30/2023    IR NEPHROSTOMY TUBE EXCHANGE [facility]    IR NEPHROSTOMY TUBE EXCHANGE  4/25/2023    IR NEPHROSTOMY TUBE EXCHANGE [facility]    IR NEPHROSTOMY TUBE EXCHANGE  6/30/2023    IR NEPHROSTOMY TUBE EXCHANGE 6/30/2023 [facility]    IR NEPHROSTOMY TUBE EXCHANGE  " 6/30/2023    IR NEPHROSTOMY TUBE EXCHANGE 6/30/2023 [facility]    IR NEPHROSTOMY TUBE EXCHANGE  8/25/2023    IR NEPHROSTOMY TUBE EXCHANGE 8/25/2023 [facility]    IR NEPHROSTOMY TUBE EXCHANGE  8/25/2023    IR NEPHROSTOMY TUBE EXCHANGE 8/25/2023 [facility]        ALLERGIES:   No Known Allergies     MEDICATIONS:   Current Outpatient Medications on File Prior to Visit   Medication Sig Dispense Refill    acetaminophen (Tylenol) 500 mg tablet Take 2 tablets (1,000 mg) by mouth every 6 hours if needed.      nitrofurantoin, macrocrystal-monohydrate, (Macrobid) 100 mg capsule Take 1 capsule (100 mg) by mouth 2 times a day for 7 days. 14 capsule 0    oxyCODONE (Roxicodone) 5 mg immediate release tablet Take 1 tablet (5 mg) by mouth see administration instructions. TAKE 1 TABLET BY MOUTH 1 HOUR PRIOR TO CATHETER CHANGE 12 tablet 0    diazePAM (Valium) 2 mg tablet Take 1 tablet (2 mg) by mouth if needed for anxiety. TAKE 1 TABLET BY MOUTH AN HOUR PRIOR TO CATHETER CHANGE. (Patient not taking: Reported on 10/11/2023) 1 tablet 0    inFLIXimab (Remicade) 100 mg injection INFUSE 500MG PER PROTOCOL ON WEEKS 0,2, 6 AND THEN EVERY 8 WEEKS AS MAINTENANCE. (Patient not taking: Reported on 10/11/2023) 5 each 6    nut.tx.comp. immune systm,reg (Impact Advanced Recovery) 0.1 gram-1.12 kcal/mL liquid Take 250 mL by mouth once daily for 5 days. Do not start before October 6, 2023. (Patient not taking: Reported on 10/11/2023) 1250 mL 0    oxybutynin XL (Ditropan-XL) 10 mg 24 hr tablet TAKE 1 TABLET BY MOUTH TWO TIMES A DAY (Patient not taking: Reported on 10/11/2023) 60 tablet 5    prednisoLONE ODT (OrapRED ODT) 10 mg disintegrating tablet Take 1 tablet (10 mg) by mouth once daily.      sulfamethoxazole-trimethoprim (Bactrim DS) 800-160 mg tablet Take 1 tablet by mouth 2 times a day.      [DISCONTINUED] levoFLOXacin (Levaquin) 750 mg tablet Take 1 tablet (750 mg) by mouth every other day.      [DISCONTINUED] oxybutynin XL (Ditropan-XL) 10 mg 24  hr tablet Take 1 tablet (10 mg) by mouth 2 times a day.      [DISCONTINUED] oxybutynin XL (Ditropan-XL) 10 mg 24 hr tablet TAKE 1 TABLET BY MOUTH TWO TIMES A DAY 60 tablet 5    [DISCONTINUED] phenazopyridine 99.5 mg tablet Take 2 tablets (199 mg) by mouth 3 times a day after meals. As needed      [DISCONTINUED] vancomycin (Vancocin) 125 mg capsule Take 1 capsule (125 mg) by mouth.      [DISCONTINUED] zinc oxide 20 % ointment Apply 1 Application topically 3 times a day.  Apply topically to affected area 3 times a day       No current facility-administered medications on file prior to visit.        SOCIAL HISTORY:   reports that he has never smoked. He has never used smokeless tobacco.   Social History     Socioeconomic History    Marital status:      Spouse name: Not on file    Number of children: Not on file    Years of education: Not on file    Highest education level: Not on file   Occupational History    Not on file   Tobacco Use    Smoking status: Never    Smokeless tobacco: Never   Substance and Sexual Activity    Alcohol use: Not on file    Drug use: Not on file    Sexual activity: Not on file   Other Topics Concern    Not on file   Social History Narrative    Not on file     Social Determinants of Health     Financial Resource Strain: Not on file   Food Insecurity: Not on file   Transportation Needs: Not on file   Physical Activity: Not on file   Stress: Not on file   Social Connections: Not on file   Intimate Partner Violence: Not on file   Housing Stability: Not on file       FAMILY HISTORY:  No family history on file.    REVIEW OF SYSTEMS:  Review of Systems   Constitutional:  Positive for chills and fever. Negative for activity change.   Gastrointestinal:  Negative for abdominal pain, constipation and nausea.   Genitourinary:  Positive for flank pain. Negative for hematuria.   Neurological:  Negative for light-headedness.   Psychiatric/Behavioral:  Negative for dysphoric mood.      PHYSICAL  "EXAM:  There were no vitals taken for this visit.  Constitutional: Patient appears well-developed and well-nourished. No distress.    Head: Normocephalic and atraumatic.    Neck: Normal range of motion.    Cardiovascular: Normal rate.    Pulmonary/Chest: Effort normal. No respiratory distress.   Abdominal: Colostomy with soft stool  : See below  Musculoskeletal: Normal range of motion.    Neurological: Alert and oriented to person, place, and time.  Psychiatric: Normal mood and affect. Behavior is normal. Thought content normal.      LABORATORY REVIEW:   Lab Results   Component Value Date    BUN 20 10/05/2023    CREATININE 1.74 (H) 10/05/2023    EGFR 46 (L) 10/05/2023     10/05/2023    K 4.0 10/05/2023     10/05/2023    CO2 19 (L) 10/05/2023    CALCIUM 9.0 10/05/2023      Lab Results   Component Value Date    WBC 9.7 10/05/2023    RBC 4.85 10/05/2023    HGB 13.4 (L) 10/05/2023    HCT 42.8 10/05/2023    MCV 88 10/05/2023    MCH 27.6 10/05/2023    MCHC 31.3 (L) 10/05/2023    RDW 15.5 (H) 10/05/2023     10/05/2023    MPV 8.7 10/05/2023        No results found for: \"PSA\"        Assessment:      1. Colovesical fistula        2. Elevated serum creatinine  Basic metabolic panel      3. Bladder spasms  Measure post void residual        Jose Thornton is a 57 y.o. male with Crohn's disease and associated colovesical fistula s/p B nephrostomy and Schultz catheter placement. Scheduled fistula repair with Dr. Andersen cancelled due to positive urine culture.    Scant brown urine in Schultz bag; some resistance to flushing, same amount drained as instilled. Bladder scan 0. L nephrostomy drainage with evidence of pyuria. Elevated Scr on labs last week trending up from baseline.     Plan:   Check BMP  Log output for next ~24 hrs  Flush catheter if having suprapubic pain/discomfort, minimal output  Will discuss additional steps with Dr. Andersen  Surgery now scheduled for 10/27; RTC post-op for follow up  Encouraged to " call in the interim with any questions, concerns

## 2023-10-11 NOTE — PROGRESS NOTES
UROLOGIC FOLLOW-UP VISIT     PROBLEM LIST:  1. Colovesical fistula  Urine culture    Urine culture           HISTORY OF PRESENT ILLNESS:   Jose Thornton is a 57 y.o. male with Crohn's disease and associated colovesical fistula s/p B nephrostomy and Schultz catheter placement. Scheduled for fistula repair 10/11/23 with Dr. Andersen.    INTERVAL HISTORY:  Returns today for catheter change with urine specimen for culture  John is seen unaccompanied  Feels prepared for surgery  Had labs and imaging earlier today  Asking about recovery period  Hoping to be able to return to work in the spring    PAST MEDICAL HISTORY:  Past Medical History:   Diagnosis Date    Crohn's disease (CMS/HCC)     Diverticulosis     GERD (gastroesophageal reflux disease)     GI (gastrointestinal bleed)     Urinary tract infection        PAST SURGICAL HISTORY:  Past Surgical History:   Procedure Laterality Date    CT GUIDED PERCUTANEOUS PERITONEAL OR RETROPERITONEAL FLUID COLLECTION DRAINAGE  9/28/2022    CT GUIDED PERCUTANEOUS PERITONEAL OR RETROPERITONEAL FLUID COLLECTION DRAINAGE 9/28/2022 [facility]    IR NEPHROSTOMY TUBE EXCHANGE  1/31/2023    IR NEPHROSTOMY TUBE EXCHANGE 1/31/2023 [facility]    IR NEPHROSTOMY TUBE EXCHANGE  1/31/2023    IR NEPHROSTOMY TUBE EXCHANGE 1/31/2023 [facility]    IR NEPHROSTOMY TUBE EXCHANGE  3/23/2023    IR NEPHROSTOMY TUBE EXCHANGE [facility]    IR NEPHROSTOMY TUBE EXCHANGE  3/23/2023    IR NEPHROSTOMY TUBE EXCHANGE [facility]    IR NEPHROSTOMY TUBE EXCHANGE  3/30/2023    IR NEPHROSTOMY TUBE EXCHANGE [facility]    IR NEPHROSTOMY TUBE EXCHANGE  4/25/2023    IR NEPHROSTOMY TUBE EXCHANGE [facility]    IR NEPHROSTOMY TUBE EXCHANGE  6/30/2023    IR NEPHROSTOMY TUBE EXCHANGE 6/30/2023 [facility]    IR NEPHROSTOMY TUBE EXCHANGE  6/30/2023    IR NEPHROSTOMY TUBE EXCHANGE 6/30/2023 [facility]    IR NEPHROSTOMY TUBE EXCHANGE  8/25/2023    IR NEPHROSTOMY TUBE EXCHANGE 8/25/2023 [facility]    IR  NEPHROSTOMY TUBE EXCHANGE  8/25/2023    IR NEPHROSTOMY TUBE EXCHANGE 8/25/2023 [facility]        ALLERGIES:   No Known Allergies     MEDICATIONS:   Current Outpatient Medications on File Prior to Visit   Medication Sig Dispense Refill    acetaminophen (Tylenol) 500 mg tablet Take 2 tablets (1,000 mg) by mouth every 6 hours if needed.      diazePAM (Valium) 2 mg tablet Take 1 tablet (2 mg) by mouth if needed for anxiety. TAKE 1 TABLET BY MOUTH AN HOUR PRIOR TO CATHETER CHANGE. 1 tablet 0    inFLIXimab (Remicade) 100 mg injection INFUSE 500MG PER PROTOCOL ON WEEKS 0,2, 6 AND THEN EVERY 8 WEEKS AS MAINTENANCE. 5 each 6    oxybutynin XL (Ditropan-XL) 10 mg 24 hr tablet TAKE 1 TABLET BY MOUTH TWO TIMES A DAY 60 tablet 5    oxyCODONE (Roxicodone) 5 mg immediate release tablet Take 1 tablet (5 mg) by mouth see administration instructions. TAKE 1 TABLET BY MOUTH 1 HOUR PRIOR TO CATHETER CHANGE 12 tablet 0    sulfamethoxazole-trimethoprim (Bactrim DS) 800-160 mg tablet Take 1 tablet by mouth 2 times a day.      [DISCONTINUED] levoFLOXacin (Levaquin) 750 mg tablet Take 1 tablet (750 mg) by mouth every other day.      [DISCONTINUED] oxybutynin XL (Ditropan-XL) 10 mg 24 hr tablet Take 1 tablet (10 mg) by mouth 2 times a day.      [DISCONTINUED] oxybutynin XL (Ditropan-XL) 10 mg 24 hr tablet TAKE 1 TABLET BY MOUTH TWO TIMES A DAY 60 tablet 5    [DISCONTINUED] phenazopyridine 99.5 mg tablet Take 2 tablets (199 mg) by mouth 3 times a day after meals. As needed      [DISCONTINUED] vancomycin (Vancocin) 125 mg capsule Take 1 capsule (125 mg) by mouth.      [DISCONTINUED] zinc oxide 20 % ointment Apply 1 Application topically 3 times a day.  Apply topically to affected area 3 times a day       No current facility-administered medications on file prior to visit.        SOCIAL HISTORY:      Social History     Socioeconomic History    Marital status:      Spouse name: Not on file    Number of children: Not on file    Years of  education: Not on file    Highest education level: Not on file   Occupational History    Not on file   Tobacco Use    Smoking status: Not on file    Smokeless tobacco: Not on file   Substance and Sexual Activity    Alcohol use: Not on file    Drug use: Not on file    Sexual activity: Not on file   Other Topics Concern    Not on file   Social History Narrative    Not on file     Social Determinants of Health     Financial Resource Strain: Not on file   Food Insecurity: Not on file   Transportation Needs: Not on file   Physical Activity: Not on file   Stress: Not on file   Social Connections: Not on file   Intimate Partner Violence: Not on file   Housing Stability: Not on file       FAMILY HISTORY:  No family history on file.    REVIEW OF SYSTEMS:  Review of Systems   Constitutional:  Negative for activity change, appetite change, chills and fever.   Gastrointestinal:  Negative for abdominal pain and constipation.   Genitourinary:  Negative for flank pain and hematuria.   Psychiatric/Behavioral:  Negative for dysphoric mood.      PHYSICAL EXAM:  There were no vitals taken for this visit.  Constitutional: Patient appears well-developed and well-nourished. No distress.    Head: Normocephalic and atraumatic.    Neck: Normal range of motion.    Cardiovascular: Normal rate.    Pulmonary/Chest: Effort normal. No respiratory distress.   Abdominal: Colostomy with soft stool  : Yellow drainage in B nephrostomy bags, yellow drainage in Schlutz bag  Musculoskeletal: Normal range of motion.    Neurological: Alert and oriented to person, place, and time.  Psychiatric: Normal mood and affect. Behavior is normal. Thought content normal.      LABORATORY REVIEW:   Lab Results   Component Value Date    BUN 20 10/05/2023    CREATININE 1.74 (H) 10/05/2023    EGFR 46 (L) 10/05/2023     10/05/2023    K 4.0 10/05/2023     10/05/2023    CO2 19 (L) 10/05/2023    CALCIUM 9.0 10/05/2023      Lab Results   Component Value Date    WBC  "9.7 10/05/2023    RBC 4.85 10/05/2023    HGB 13.4 (L) 10/05/2023    HCT 42.8 10/05/2023    MCV 88 10/05/2023    MCH 27.6 10/05/2023    MCHC 31.3 (L) 10/05/2023    RDW 15.5 (H) 10/05/2023     10/05/2023    MPV 8.7 10/05/2023        No results found for: \"PSA\"        Assessment:      1. Colovesical fistula  Urine culture    Urine culture        Jose Thornton is a 57 y.o. male with Crohn's disease and associated colovesical fistula s/p B nephrostomy and Schultz catheter placement. Scheduled for fistula repair 10/11/23 with Dr. Andersen.    Schultz catheter changed without difficulty; no return of urine. Obtained urine from R nephrostomy tube for culture. Currently on abx.     Plan:   Reviewed surgical prep for next week  Urine for culture; will treat as appropriate  RTC post-op for follow up  Encouraged to call in the interim with any questions, concerns      "

## 2023-10-12 ENCOUNTER — TELEPHONE (OUTPATIENT)
Dept: UROLOGY | Facility: CLINIC | Age: 58
End: 2023-10-12

## 2023-10-12 ENCOUNTER — PHARMACY VISIT (OUTPATIENT)
Dept: PHARMACY | Facility: CLINIC | Age: 58
End: 2023-10-12

## 2023-10-12 DIAGNOSIS — N32.1 COLOVESICAL FISTULA: ICD-10-CM

## 2023-10-12 DIAGNOSIS — N32.1 COLOVESICAL FISTULA: Primary | ICD-10-CM

## 2023-10-12 RX ORDER — AMOXICILLIN AND CLAVULANATE POTASSIUM 875; 125 MG/1; MG/1
1 TABLET, FILM COATED ORAL 2 TIMES DAILY
Qty: 14 TABLET | Refills: 0 | Status: SHIPPED | OUTPATIENT
Start: 2023-10-12 | End: 2023-11-17 | Stop reason: HOSPADM

## 2023-10-12 RX ORDER — AMOXICILLIN AND CLAVULANATE POTASSIUM 875; 125 MG/1; MG/1
1 TABLET, FILM COATED ORAL 2 TIMES DAILY
Qty: 14 TABLET | Refills: 0 | Status: SHIPPED | OUTPATIENT
Start: 2023-10-12 | End: 2023-10-12 | Stop reason: SDUPTHER

## 2023-10-12 NOTE — TELEPHONE ENCOUNTER
No answer, left voicemail to advise of improvement in creatinine and plan to change nitrofurantoin to Augmentin.

## 2023-10-17 ENCOUNTER — TELEPHONE (OUTPATIENT)
Dept: UROLOGY | Facility: CLINIC | Age: 58
End: 2023-10-17

## 2023-10-17 NOTE — TELEPHONE ENCOUNTER
Patient is calling with a complaint that the antibiotics do not seem to be helping his UTI. He is asking for a different prescription of antibiotics

## 2023-10-18 ENCOUNTER — HOSPITAL ENCOUNTER (OUTPATIENT)
Dept: RADIOLOGY | Facility: HOSPITAL | Age: 58
Discharge: HOME | End: 2023-10-18

## 2023-10-18 VITALS
TEMPERATURE: 98.1 F | RESPIRATION RATE: 16 BRPM | OXYGEN SATURATION: 96 % | DIASTOLIC BLOOD PRESSURE: 77 MMHG | HEART RATE: 74 BPM | SYSTOLIC BLOOD PRESSURE: 117 MMHG

## 2023-10-18 DIAGNOSIS — N32.1 COLOVESICAL FISTULA: Primary | ICD-10-CM

## 2023-10-18 DIAGNOSIS — N13.30 UNSPECIFIED HYDRONEPHROSIS: ICD-10-CM

## 2023-10-18 PROCEDURE — 50435 EXCHANGE NEPHROSTOMY CATH: CPT | Mod: 50,RT | Performed by: RADIOLOGY

## 2023-10-18 PROCEDURE — C1769 GUIDE WIRE: HCPCS

## 2023-10-18 PROCEDURE — 2720000007 HC OR 272 NO HCPCS

## 2023-10-18 PROCEDURE — 2550000001 HC RX 255 CONTRASTS: Performed by: RADIOLOGY

## 2023-10-18 PROCEDURE — C1729 CATH, DRAINAGE: HCPCS

## 2023-10-18 PROCEDURE — 50435 EXCHANGE NEPHROSTOMY CATH: CPT | Mod: RIGHT SIDE | Performed by: RADIOLOGY

## 2023-10-18 PROCEDURE — 2500000004 HC RX 250 GENERAL PHARMACY W/ HCPCS (ALT 636 FOR OP/ED): Performed by: STUDENT IN AN ORGANIZED HEALTH CARE EDUCATION/TRAINING PROGRAM

## 2023-10-18 PROCEDURE — 2500000004 HC RX 250 GENERAL PHARMACY W/ HCPCS (ALT 636 FOR OP/ED): Performed by: RADIOLOGY

## 2023-10-18 PROCEDURE — 99152 MOD SED SAME PHYS/QHP 5/>YRS: CPT

## 2023-10-18 RX ORDER — FENTANYL CITRATE 50 UG/ML
INJECTION, SOLUTION INTRAMUSCULAR; INTRAVENOUS AS NEEDED
Status: COMPLETED | OUTPATIENT
Start: 2023-10-18 | End: 2023-10-18

## 2023-10-18 RX ORDER — CEFTRIAXONE 2 G/50ML
2 INJECTION, SOLUTION INTRAVENOUS ONCE
Status: COMPLETED | OUTPATIENT
Start: 2023-10-18 | End: 2023-10-18

## 2023-10-18 RX ORDER — MIDAZOLAM HYDROCHLORIDE 1 MG/ML
INJECTION INTRAMUSCULAR; INTRAVENOUS AS NEEDED
Status: COMPLETED | OUTPATIENT
Start: 2023-10-18 | End: 2023-10-18

## 2023-10-18 RX ADMIN — FENTANYL CITRATE 50 MCG: 50 INJECTION, SOLUTION INTRAMUSCULAR; INTRAVENOUS at 09:15

## 2023-10-18 RX ADMIN — MIDAZOLAM HYDROCHLORIDE 1 MG: 1 INJECTION, SOLUTION INTRAMUSCULAR; INTRAVENOUS at 09:15

## 2023-10-18 RX ADMIN — IOHEXOL 50 ML: 350 INJECTION, SOLUTION INTRAVENOUS at 09:41

## 2023-10-18 RX ADMIN — MIDAZOLAM HYDROCHLORIDE 1 MG: 1 INJECTION, SOLUTION INTRAMUSCULAR; INTRAVENOUS at 09:20

## 2023-10-18 RX ADMIN — CEFTRIAXONE SODIUM 2 G: 2 INJECTION, SOLUTION INTRAVENOUS at 09:15

## 2023-10-18 RX ADMIN — FENTANYL CITRATE 50 MCG: 50 INJECTION, SOLUTION INTRAMUSCULAR; INTRAVENOUS at 09:20

## 2023-10-18 NOTE — Clinical Note
Bilateral neph tubes exchanged. Dressings intact. 2mg versed, 100mcg fentanyl given total. VSS t/o procedure. Pt to RPCU, report to RPCU RN.

## 2023-10-18 NOTE — POST-PROCEDURE NOTE
Interventional Radiology Post-Procedure Note    Bilateral Nephrostomy Tube Exchange      Indication for procedure: The encounter diagnosis was Unspecified hydronephrosis.    Pre-Procedure Verification and Time Out:  · Procedure Location procedure area   · HUDDLE - Pre-procedure Verification completed   · TIME OUT - Final Verification completed immediately prior to procedure start   · DEBRIEF completed     General Information:  Date/Time of Procedure: 10/18/23 at 9:32 AM  Indication(s)/Pre - Procedure Diagnoses: Colovesical fistula  Post-Procedure Diagnosis: Colovesical fistula  Procedure Name: Bilateral Nephrostomy Tube Exchange  Findings: See PACS  Procedure performed by: Jos Beaulieu MD   Assistant(s): Dr. Onesimo Reyes MD  Estimated Blood Loss (mL): none  Specimen: No  Informed Consent: written consent obtained    Procedure Details:    Technically successful and uncomplicated bilateral nephrostomy tube exchange.  Please see PACS for full procedural details.    Patient Tolerance: good  Complications: None    Prep:  Ultrasound Guided Insertion: Yes  Large Drape, Hand Hygiene, Surgical Cap, Surgical Mask, Sterile Gown, Sterile Gloves, Glasses, and Scrubs  Patient Position: Prone  Site Prep: chlorhexidine, draped, usual sterile procedure followed    Anesthesia/Medications:  Procedural Sedation:  Fentanyl: 100 mcg  Versed: 2 mg  1% Lidocaine: 0 mL    Anu-procedural Medications:  2 g ceftriaxone IV    Attending Attestation:  I was present for the entire procedure    Jos Beaulieu MD, PGY-5  Interventional Radiology  IR pager: [phone]    NON-Urgent on call weekends and after hours weekdays (5pm - 5am) IR pager: [phone]  Urgent & emergent on call weekends and after hours weekdays (5pm-7am) IR pager: [phone]

## 2023-10-18 NOTE — PRE-PROCEDURE NOTE
INTERVENTIONAL RADIOLOGY PRE-PROCEDURE NOTE    Jose Thornton is a 57 y.o. male who presents to the interventional radiology department for colovesical fistula s/p bilateral nephrostomy tube placement.    Procedure: Bilateral Nephrostomy tube exchange    Indication for procedure: The encounter diagnosis was Unspecified hydronephrosis.    Past Medical History:   Diagnosis Date    Crohn's disease (CMS/HCC)     Diverticulosis     GERD (gastroesophageal reflux disease)     GI (gastrointestinal bleed)     Urinary tract infection       Past Surgical History:   Procedure Laterality Date    CT GUIDED PERCUTANEOUS PERITONEAL OR RETROPERITONEAL FLUID COLLECTION DRAINAGE  9/28/2022    CT GUIDED PERCUTANEOUS PERITONEAL OR RETROPERITONEAL FLUID COLLECTION DRAINAGE 9/28/2022 [facility]    IR NEPHROSTOMY TUBE EXCHANGE  1/31/2023    IR NEPHROSTOMY TUBE EXCHANGE 1/31/2023 [facility]    IR NEPHROSTOMY TUBE EXCHANGE  1/31/2023    IR NEPHROSTOMY TUBE EXCHANGE 1/31/2023 [facility]    IR NEPHROSTOMY TUBE EXCHANGE  3/23/2023    IR NEPHROSTOMY TUBE EXCHANGE [facility]    IR NEPHROSTOMY TUBE EXCHANGE  3/23/2023    IR NEPHROSTOMY TUBE EXCHANGE [facility]    IR NEPHROSTOMY TUBE EXCHANGE  3/30/2023    IR NEPHROSTOMY TUBE EXCHANGE [facility]    IR NEPHROSTOMY TUBE EXCHANGE  4/25/2023    IR NEPHROSTOMY TUBE EXCHANGE [facility]    IR NEPHROSTOMY TUBE EXCHANGE  6/30/2023    IR NEPHROSTOMY TUBE EXCHANGE 6/30/2023 [facility]    IR NEPHROSTOMY TUBE EXCHANGE  6/30/2023    IR NEPHROSTOMY TUBE EXCHANGE 6/30/2023 [facility]    IR NEPHROSTOMY TUBE EXCHANGE  8/25/2023    IR NEPHROSTOMY TUBE EXCHANGE 8/25/2023 [facility]    IR NEPHROSTOMY TUBE EXCHANGE  8/25/2023    IR NEPHROSTOMY TUBE EXCHANGE 8/25/2023 [facility]       Relevant Labs:   Lab Results   Component Value Date    CREATININE 1.60 (H) 10/11/2023    EGFR 51 (L) 10/11/2023    INR 1.0 10/05/2023    PROTIME 11.3 10/05/2023       Planned Sedation/Anesthesia: Moderate    Directed  physical examination:    General Appearance: Normal appearance, behavior, cognition and NAD  Heart: Heart regular rate and rhythm  Lungs: No increased work of breathing  Abdomen: soft and nontender  Psych exam: oriented to time, place and person      Current Outpatient Medications:     amoxicillin-pot clavulanate (Augmentin) 875-125 mg tablet, Take 1 tablet (875 mg) by mouth 2 times a day for 7 days., Disp: 14 tablet, Rfl: 0    oxyCODONE (Roxicodone) 5 mg immediate release tablet, Take 1 tablet (5 mg) by mouth see administration instructions. TAKE 1 TABLET BY MOUTH 1 HOUR PRIOR TO CATHETER CHANGE, Disp: 12 tablet, Rfl: 0    sulfamethoxazole-trimethoprim (Bactrim DS) 800-160 mg tablet, Take 1 tablet by mouth 2 times a day., Disp: , Rfl:     acetaminophen (Tylenol) 500 mg tablet, Take 2 tablets (1,000 mg) by mouth every 6 hours if needed., Disp: , Rfl:     diazePAM (Valium) 2 mg tablet, Take 1 tablet (2 mg) by mouth if needed for anxiety. TAKE 1 TABLET BY MOUTH AN HOUR PRIOR TO CATHETER CHANGE. (Patient not taking: Reported on 10/11/2023), Disp: 1 tablet, Rfl: 0    inFLIXimab (Remicade) 100 mg injection, INFUSE 500MG PER PROTOCOL ON WEEKS 0,2, 6 AND THEN EVERY 8 WEEKS AS MAINTENANCE. (Patient not taking: Reported on 10/11/2023), Disp: 5 each, Rfl: 6    oxybutynin XL (Ditropan-XL) 10 mg 24 hr tablet, TAKE 1 TABLET BY MOUTH TWO TIMES A DAY (Patient not taking: Reported on 10/11/2023), Disp: 60 tablet, Rfl: 5    prednisoLONE ODT (OrapRED ODT) 10 mg disintegrating tablet, Take 1 tablet (10 mg) by mouth once daily., Disp: , Rfl:      Mallampati: III (soft and hard palate and base of uvula visible)    ASA Score: ASA 2 - Patient with mild systemic disease with no functional limitations    Benefits, risks and alternatives of procedure and planned sedation have been discussed with the patient and/or their representative. All questions answered and they agree to proceed.     Jos Beaulieu MD, PGY-5  Vascular & Interventional  Radiology  IR pager: [phone]    NON-Urgent on call weekends and after hours weekdays (5pm - 5am) IR pager: [phone]  Urgent & emergent on call weekends and after hours weekdays (5pm-7am) IR pager: [phone]

## 2023-10-19 ENCOUNTER — PHARMACY VISIT (OUTPATIENT)
Dept: PHARMACY | Facility: CLINIC | Age: 58
End: 2023-10-19

## 2023-10-19 PROCEDURE — RXMED WILLOW AMBULATORY MEDICATION CHARGE

## 2023-10-20 DIAGNOSIS — R31.9 URINARY TRACT INFECTION WITH HEMATURIA, SITE UNSPECIFIED: Primary | ICD-10-CM

## 2023-10-20 DIAGNOSIS — N39.0 URINARY TRACT INFECTION WITH HEMATURIA, SITE UNSPECIFIED: Primary | ICD-10-CM

## 2023-10-20 RX ORDER — AMOXICILLIN AND CLAVULANATE POTASSIUM 875; 125 MG/1; MG/1
1 TABLET, FILM COATED ORAL 2 TIMES DAILY
Qty: 14 TABLET | Refills: 0 | Status: ON HOLD | OUTPATIENT
Start: 2023-10-20 | End: 2023-10-25 | Stop reason: ALTCHOICE

## 2023-10-24 ENCOUNTER — HOSPITAL ENCOUNTER (INPATIENT)
Facility: HOSPITAL | Age: 58
LOS: 24 days | Discharge: HOME | DRG: 353 | End: 2023-11-17
Attending: UROLOGY | Admitting: UROLOGY

## 2023-10-24 DIAGNOSIS — N17.9 AKI (ACUTE KIDNEY INJURY) (CMS-HCC): ICD-10-CM

## 2023-10-24 DIAGNOSIS — Z98.890 PONV (POSTOPERATIVE NAUSEA AND VOMITING): ICD-10-CM

## 2023-10-24 DIAGNOSIS — N32.1 COLOVESICAL FISTULA: Primary | ICD-10-CM

## 2023-10-24 DIAGNOSIS — R11.2 PONV (POSTOPERATIVE NAUSEA AND VOMITING): ICD-10-CM

## 2023-10-24 DIAGNOSIS — S37.10XD URETER INJURY, SUBSEQUENT ENCOUNTER: ICD-10-CM

## 2023-10-24 DIAGNOSIS — N32.89 BLADDER SPASMS: ICD-10-CM

## 2023-10-24 LAB
ALBUMIN SERPL BCP-MCNC: 3.8 G/DL (ref 3.4–5)
ALP SERPL-CCNC: 73 U/L (ref 33–120)
ALT SERPL W P-5'-P-CCNC: 17 U/L (ref 10–52)
ANION GAP SERPL CALC-SCNC: 15 MMOL/L (ref 10–20)
AST SERPL W P-5'-P-CCNC: 17 U/L (ref 9–39)
BASOPHILS # BLD AUTO: 0.06 X10*3/UL (ref 0–0.1)
BASOPHILS NFR BLD AUTO: 0.7 %
BILIRUB SERPL-MCNC: 0.2 MG/DL (ref 0–1.2)
BUN SERPL-MCNC: 17 MG/DL (ref 6–23)
CALCIUM SERPL-MCNC: 9.3 MG/DL (ref 8.6–10.6)
CHLORIDE SERPL-SCNC: 106 MMOL/L (ref 98–107)
CO2 SERPL-SCNC: 25 MMOL/L (ref 21–32)
CREAT SERPL-MCNC: 1.98 MG/DL (ref 0.5–1.3)
EOSINOPHIL # BLD AUTO: 0.35 X10*3/UL (ref 0–0.7)
EOSINOPHIL NFR BLD AUTO: 4 %
ERYTHROCYTE [DISTWIDTH] IN BLOOD BY AUTOMATED COUNT: 14.8 % (ref 11.5–14.5)
GFR SERPL CREATININE-BSD FRML MDRD: 39 ML/MIN/1.73M*2
GLUCOSE SERPL-MCNC: 96 MG/DL (ref 74–99)
HCT VFR BLD AUTO: 38.8 % (ref 41–52)
HGB BLD-MCNC: 12.7 G/DL (ref 13.5–17.5)
IMM GRANULOCYTES # BLD AUTO: 0.03 X10*3/UL (ref 0–0.7)
IMM GRANULOCYTES NFR BLD AUTO: 0.3 % (ref 0–0.9)
LYMPHOCYTES # BLD AUTO: 1.49 X10*3/UL (ref 1.2–4.8)
LYMPHOCYTES NFR BLD AUTO: 17.1 %
MCH RBC QN AUTO: 27.7 PG (ref 26–34)
MCHC RBC AUTO-ENTMCNC: 32.7 G/DL (ref 32–36)
MCV RBC AUTO: 85 FL (ref 80–100)
MONOCYTES # BLD AUTO: 0.57 X10*3/UL (ref 0.1–1)
MONOCYTES NFR BLD AUTO: 6.5 %
NEUTROPHILS # BLD AUTO: 6.21 X10*3/UL (ref 1.2–7.7)
NEUTROPHILS NFR BLD AUTO: 71.4 %
NRBC BLD-RTO: 0 /100 WBCS (ref 0–0)
PLATELET # BLD AUTO: 470 X10*3/UL (ref 150–450)
PMV BLD AUTO: 8.7 FL (ref 7.5–11.5)
POTASSIUM SERPL-SCNC: 4.5 MMOL/L (ref 3.5–5.3)
PROT SERPL-MCNC: 7.2 G/DL (ref 6.4–8.2)
RBC # BLD AUTO: 4.59 X10*6/UL (ref 4.5–5.9)
SODIUM SERPL-SCNC: 141 MMOL/L (ref 136–145)
WBC # BLD AUTO: 8.7 X10*3/UL (ref 4.4–11.3)

## 2023-10-24 PROCEDURE — 99285 EMERGENCY DEPT VISIT HI MDM: CPT | Performed by: EMERGENCY MEDICINE

## 2023-10-24 PROCEDURE — 85025 COMPLETE CBC W/AUTO DIFF WBC: CPT | Performed by: EMERGENCY MEDICINE

## 2023-10-24 PROCEDURE — 81003 URINALYSIS AUTO W/O SCOPE: CPT | Performed by: STUDENT IN AN ORGANIZED HEALTH CARE EDUCATION/TRAINING PROGRAM

## 2023-10-24 PROCEDURE — 80053 COMPREHEN METABOLIC PANEL: CPT | Performed by: EMERGENCY MEDICINE

## 2023-10-24 PROCEDURE — 85025 COMPLETE CBC W/AUTO DIFF WBC: CPT | Performed by: UROLOGY

## 2023-10-24 PROCEDURE — 1210000001 HC SEMI-PRIVATE ROOM DAILY

## 2023-10-24 PROCEDURE — 86901 BLOOD TYPING SEROLOGIC RH(D): CPT | Performed by: UROLOGY

## 2023-10-24 PROCEDURE — 99285 EMERGENCY DEPT VISIT HI MDM: CPT | Mod: 25 | Performed by: EMERGENCY MEDICINE

## 2023-10-24 PROCEDURE — 87086 URINE CULTURE/COLONY COUNT: CPT

## 2023-10-24 PROCEDURE — 80053 COMPREHEN METABOLIC PANEL: CPT | Performed by: UROLOGY

## 2023-10-24 PROCEDURE — 36415 COLL VENOUS BLD VENIPUNCTURE: CPT | Performed by: EMERGENCY MEDICINE

## 2023-10-24 RX ORDER — ACETAMINOPHEN 325 MG/1
650 TABLET ORAL EVERY 6 HOURS PRN
Status: DISCONTINUED | OUTPATIENT
Start: 2023-10-24 | End: 2023-10-27

## 2023-10-24 RX ORDER — CEFAZOLIN SODIUM 1 G/50ML
1 SOLUTION INTRAVENOUS EVERY 8 HOURS
Status: DISCONTINUED | OUTPATIENT
Start: 2023-10-24 | End: 2023-11-02

## 2023-10-24 RX ORDER — ONDANSETRON HYDROCHLORIDE 2 MG/ML
4 INJECTION, SOLUTION INTRAVENOUS EVERY 8 HOURS PRN
Status: DISCONTINUED | OUTPATIENT
Start: 2023-10-24 | End: 2023-11-03

## 2023-10-24 RX ORDER — ONDANSETRON 4 MG/1
4 TABLET, FILM COATED ORAL EVERY 8 HOURS PRN
Status: DISCONTINUED | OUTPATIENT
Start: 2023-10-24 | End: 2023-11-03

## 2023-10-24 RX ORDER — PREDNISOLONE SODIUM PHOSPHATE 10 MG/1
10 TABLET, ORALLY DISINTEGRATING ORAL DAILY
Status: DISCONTINUED | OUTPATIENT
Start: 2023-10-25 | End: 2023-10-25

## 2023-10-24 RX ORDER — HEPARIN SODIUM 5000 [USP'U]/ML
5000 INJECTION, SOLUTION INTRAVENOUS; SUBCUTANEOUS EVERY 8 HOURS
Status: DISCONTINUED | OUTPATIENT
Start: 2023-10-24 | End: 2023-10-27

## 2023-10-24 ASSESSMENT — PAIN DESCRIPTION - FREQUENCY: FREQUENCY: CONSTANT/CONTINUOUS

## 2023-10-24 ASSESSMENT — COLUMBIA-SUICIDE SEVERITY RATING SCALE - C-SSRS
1. IN THE PAST MONTH, HAVE YOU WISHED YOU WERE DEAD OR WISHED YOU COULD GO TO SLEEP AND NOT WAKE UP?: NO
2. HAVE YOU ACTUALLY HAD ANY THOUGHTS OF KILLING YOURSELF?: NO
6. HAVE YOU EVER DONE ANYTHING, STARTED TO DO ANYTHING, OR PREPARED TO DO ANYTHING TO END YOUR LIFE?: NO

## 2023-10-24 ASSESSMENT — PAIN DESCRIPTION - PAIN TYPE: TYPE: CHRONIC PAIN

## 2023-10-24 ASSESSMENT — LIFESTYLE VARIABLES
HAVE PEOPLE ANNOYED YOU BY CRITICIZING YOUR DRINKING: NO
REASON UNABLE TO ASSESS: NO
EVER FELT BAD OR GUILTY ABOUT YOUR DRINKING: NO
EVER HAD A DRINK FIRST THING IN THE MORNING TO STEADY YOUR NERVES TO GET RID OF A HANGOVER: NO
HAVE YOU EVER FELT YOU SHOULD CUT DOWN ON YOUR DRINKING: NO

## 2023-10-24 ASSESSMENT — PAIN SCALES - GENERAL: PAINLEVEL_OUTOF10: 6

## 2023-10-24 ASSESSMENT — PAIN - FUNCTIONAL ASSESSMENT: PAIN_FUNCTIONAL_ASSESSMENT: 0-10

## 2023-10-24 NOTE — LETTER
October 27, 2023     Patient: Jose Thornton   YOB: 1965   Date of Visit: 10/24/2023       To Whom It May Concern:    Jose Thornton was seen in my clinic on 10/24/2023 at . Please excuse Jose for his absence from work on this day to make the appointment.    If you have any questions or concerns, please don't hesitate to call.         Sincerely,         No name on file        CC:   No Recipients

## 2023-10-24 NOTE — Clinical Note
13fr x 20cm trialysis catheter placed RIJ. Patient received 1mg versed, 50mcg fentanyl IVP for sedation during case. No ectopy visualized during placement. New catheter aspirated, flushed, heparin loaded, capped, locked, sutured and secured with statlock and CHG tegaderm dressing. No visible bleeding or hematoma at RIJ site. VSS throughout procedure.

## 2023-10-25 LAB
ABO GROUP (TYPE) IN BLOOD: NORMAL
ANTIBODY SCREEN: NORMAL
APPEARANCE UR: ABNORMAL
BILIRUB UR STRIP.AUTO-MCNC: NEGATIVE MG/DL
COLOR UR: ABNORMAL
GLUCOSE UR STRIP.AUTO-MCNC: ABNORMAL MG/DL
KETONES UR STRIP.AUTO-MCNC: NEGATIVE MG/DL
LEUKOCYTE ESTERASE UR QL STRIP.AUTO: ABNORMAL
MUCOUS THREADS #/AREA URNS AUTO: ABNORMAL /LPF
NITRITE UR QL STRIP.AUTO: POSITIVE
PH UR STRIP.AUTO: 5 [PH]
PROT UR STRIP.AUTO-MCNC: ABNORMAL MG/DL
RBC # UR STRIP.AUTO: ABNORMAL /UL
RBC #/AREA URNS AUTO: >20 /HPF
RH FACTOR (ANTIGEN D): NORMAL
SP GR UR STRIP.AUTO: 1.02
UROBILINOGEN UR STRIP.AUTO-MCNC: 4 MG/DL
WBC #/AREA URNS AUTO: >50 /HPF

## 2023-10-25 PROCEDURE — 2500000004 HC RX 250 GENERAL PHARMACY W/ HCPCS (ALT 636 FOR OP/ED): Performed by: STUDENT IN AN ORGANIZED HEALTH CARE EDUCATION/TRAINING PROGRAM

## 2023-10-25 PROCEDURE — 2500000002 HC RX 250 W HCPCS SELF ADMINISTERED DRUGS (ALT 637 FOR MEDICARE OP, ALT 636 FOR OP/ED)

## 2023-10-25 PROCEDURE — 87184 SC STD DISK METHOD PER PLATE: CPT | Performed by: STUDENT IN AN ORGANIZED HEALTH CARE EDUCATION/TRAINING PROGRAM

## 2023-10-25 PROCEDURE — 99223 1ST HOSP IP/OBS HIGH 75: CPT | Performed by: UROLOGY

## 2023-10-25 PROCEDURE — 96372 THER/PROPH/DIAG INJ SC/IM: CPT | Performed by: STUDENT IN AN ORGANIZED HEALTH CARE EDUCATION/TRAINING PROGRAM

## 2023-10-25 PROCEDURE — 87186 SC STD MICRODIL/AGAR DIL: CPT | Performed by: STUDENT IN AN ORGANIZED HEALTH CARE EDUCATION/TRAINING PROGRAM

## 2023-10-25 PROCEDURE — 2500000004 HC RX 250 GENERAL PHARMACY W/ HCPCS (ALT 636 FOR OP/ED)

## 2023-10-25 PROCEDURE — 87086 URINE CULTURE/COLONY COUNT: CPT | Performed by: STUDENT IN AN ORGANIZED HEALTH CARE EDUCATION/TRAINING PROGRAM

## 2023-10-25 PROCEDURE — 2500000001 HC RX 250 WO HCPCS SELF ADMINISTERED DRUGS (ALT 637 FOR MEDICARE OP): Performed by: STUDENT IN AN ORGANIZED HEALTH CARE EDUCATION/TRAINING PROGRAM

## 2023-10-25 PROCEDURE — 2500000004 HC RX 250 GENERAL PHARMACY W/ HCPCS (ALT 636 FOR OP/ED): Performed by: UROLOGY

## 2023-10-25 PROCEDURE — 1170000001 HC PRIVATE ONCOLOGY ROOM DAILY

## 2023-10-25 RX ORDER — BISACODYL 5 MG
5 TABLET, DELAYED RELEASE (ENTERIC COATED) ORAL ONCE
Status: COMPLETED | OUTPATIENT
Start: 2023-10-26 | End: 2023-10-26

## 2023-10-25 RX ORDER — FLUCONAZOLE 2 MG/ML
400 INJECTION, SOLUTION INTRAVENOUS EVERY 24 HOURS
Status: DISCONTINUED | OUTPATIENT
Start: 2023-10-25 | End: 2023-10-29

## 2023-10-25 RX ORDER — FLUTICASONE PROPIONATE 50 MCG
2 SPRAY, SUSPENSION (ML) NASAL DAILY
Status: DISCONTINUED | OUTPATIENT
Start: 2023-10-25 | End: 2023-11-17 | Stop reason: HOSPADM

## 2023-10-25 RX ORDER — POLYETHYLENE GLYCOL 3350 17 G/17G
238 POWDER, FOR SOLUTION ORAL ONCE
Status: CANCELLED | OUTPATIENT
Start: 2023-10-26

## 2023-10-25 RX ORDER — POLYETHYLENE GLYCOL 3350, SODIUM CHLORIDE, SODIUM BICARBONATE, POTASSIUM CHLORIDE 420; 11.2; 5.72; 1.48 G/4L; G/4L; G/4L; G/4L
4000 POWDER, FOR SOLUTION ORAL ONCE
Status: DISCONTINUED | OUTPATIENT
Start: 2023-10-25 | End: 2023-10-25

## 2023-10-25 RX ORDER — BISACODYL 5 MG
5 TABLET, DELAYED RELEASE (ENTERIC COATED) ORAL ONCE
Status: CANCELLED | OUTPATIENT
Start: 2023-10-26

## 2023-10-25 RX ORDER — PHENAZOPYRIDINE HYDROCHLORIDE 95 MG/1
95 TABLET ORAL 3 TIMES DAILY PRN
COMMUNITY
End: 2023-11-17 | Stop reason: HOSPADM

## 2023-10-25 RX ORDER — SYRING-NEEDL,DISP,INSUL,0.3 ML 29 G X1/2"
296 SYRINGE, EMPTY DISPOSABLE MISCELLANEOUS ONCE
Status: DISCONTINUED | OUTPATIENT
Start: 2023-10-25 | End: 2023-10-25

## 2023-10-25 RX ORDER — OXYBUTYNIN CHLORIDE 5 MG/1
5 TABLET ORAL 3 TIMES DAILY
Status: DISCONTINUED | OUTPATIENT
Start: 2023-10-25 | End: 2023-10-31

## 2023-10-25 RX ORDER — POLYETHYLENE GLYCOL 3350 17 G/17G
238 POWDER, FOR SOLUTION ORAL ONCE
Status: COMPLETED | OUTPATIENT
Start: 2023-10-26 | End: 2023-10-26

## 2023-10-25 RX ORDER — TALC
3 POWDER (GRAM) TOPICAL NIGHTLY PRN
Status: DISCONTINUED | OUTPATIENT
Start: 2023-10-25 | End: 2023-10-26

## 2023-10-25 RX ADMIN — FLUTICASONE PROPIONATE 2 SPRAY: 50 SPRAY, METERED NASAL at 23:39

## 2023-10-25 RX ADMIN — Medication 3 MG: at 22:26

## 2023-10-25 RX ADMIN — HEPARIN SODIUM 5000 UNITS: 5000 INJECTION, SOLUTION INTRAVENOUS; SUBCUTANEOUS at 08:07

## 2023-10-25 RX ADMIN — CEFAZOLIN SODIUM 1 G: 1 INJECTION, SOLUTION INTRAVENOUS at 23:39

## 2023-10-25 RX ADMIN — OXYBUTYNIN CHLORIDE 5 MG: 5 TABLET ORAL at 14:29

## 2023-10-25 RX ADMIN — OXYBUTYNIN CHLORIDE 5 MG: 5 TABLET ORAL at 21:00

## 2023-10-25 RX ADMIN — CEFAZOLIN SODIUM 1 G: 1 INJECTION, SOLUTION INTRAVENOUS at 12:24

## 2023-10-25 RX ADMIN — CEFAZOLIN SODIUM 1 G: 1 INJECTION, SOLUTION INTRAVENOUS at 00:13

## 2023-10-25 RX ADMIN — FLUCONAZOLE 400 MG: 2 INJECTION, SOLUTION INTRAVENOUS at 14:29

## 2023-10-25 RX ADMIN — GENTAMICIN SULFATE 460 MG: 40 INJECTION, SOLUTION INTRAMUSCULAR; INTRAVENOUS at 13:30

## 2023-10-25 RX ADMIN — HEPARIN SODIUM 5000 UNITS: 5000 INJECTION, SOLUTION INTRAVENOUS; SUBCUTANEOUS at 14:29

## 2023-10-25 RX ADMIN — HEPARIN SODIUM 5000 UNITS: 5000 INJECTION, SOLUTION INTRAVENOUS; SUBCUTANEOUS at 00:21

## 2023-10-25 RX ADMIN — HEPARIN SODIUM 5000 UNITS: 5000 INJECTION, SOLUTION INTRAVENOUS; SUBCUTANEOUS at 22:25

## 2023-10-25 SDOH — SOCIAL STABILITY: SOCIAL INSECURITY: DOES ANYONE TRY TO KEEP YOU FROM HAVING/CONTACTING OTHER FRIENDS OR DOING THINGS OUTSIDE YOUR HOME?: NO

## 2023-10-25 SDOH — SOCIAL STABILITY: SOCIAL INSECURITY: HAS ANYONE EVER THREATENED TO HURT YOUR FAMILY OR YOUR PETS?: NO

## 2023-10-25 SDOH — SOCIAL STABILITY: SOCIAL INSECURITY: ARE YOU OR HAVE YOU BEEN THREATENED OR ABUSED PHYSICALLY, EMOTIONALLY, OR SEXUALLY BY ANYONE?: NO

## 2023-10-25 SDOH — SOCIAL STABILITY: SOCIAL INSECURITY: ABUSE: ADULT

## 2023-10-25 SDOH — SOCIAL STABILITY: SOCIAL INSECURITY: WERE YOU ABLE TO COMPLETE ALL THE BEHAVIORAL HEALTH SCREENINGS?: YES

## 2023-10-25 SDOH — SOCIAL STABILITY: SOCIAL INSECURITY: ARE THERE ANY APPARENT SIGNS OF INJURIES/BEHAVIORS THAT COULD BE RELATED TO ABUSE/NEGLECT?: NO

## 2023-10-25 SDOH — SOCIAL STABILITY: SOCIAL INSECURITY: HAVE YOU HAD THOUGHTS OF HARMING ANYONE ELSE?: NO

## 2023-10-25 SDOH — SOCIAL STABILITY: SOCIAL INSECURITY: DO YOU FEEL ANYONE HAS EXPLOITED OR TAKEN ADVANTAGE OF YOU FINANCIALLY OR OF YOUR PERSONAL PROPERTY?: NO

## 2023-10-25 SDOH — SOCIAL STABILITY: SOCIAL INSECURITY: DO YOU FEEL UNSAFE GOING BACK TO THE PLACE WHERE YOU ARE LIVING?: NO

## 2023-10-25 ASSESSMENT — ACTIVITIES OF DAILY LIVING (ADL)
ADEQUATE_TO_COMPLETE_ADL: YES
PATIENT'S MEMORY ADEQUATE TO SAFELY COMPLETE DAILY ACTIVITIES?: YES
WALKS IN HOME: INDEPENDENT
GROOMING: INDEPENDENT
LACK_OF_TRANSPORTATION: NO
HEARING - LEFT EAR: FUNCTIONAL
HEARING - RIGHT EAR: FUNCTIONAL
JUDGMENT_ADEQUATE_SAFELY_COMPLETE_DAILY_ACTIVITIES: YES
TOILETING: INDEPENDENT
BATHING: INDEPENDENT
DRESSING YOURSELF: INDEPENDENT
FEEDING YOURSELF: INDEPENDENT

## 2023-10-25 ASSESSMENT — COGNITIVE AND FUNCTIONAL STATUS - GENERAL
MOBILITY SCORE: 24
MOBILITY SCORE: 24
DAILY ACTIVITIY SCORE: 24
PATIENT BASELINE BEDBOUND: NO
MOBILITY SCORE: 24
DAILY ACTIVITIY SCORE: 24
DAILY ACTIVITIY SCORE: 24

## 2023-10-25 ASSESSMENT — PAIN - FUNCTIONAL ASSESSMENT
PAIN_FUNCTIONAL_ASSESSMENT: 0-10
PAIN_FUNCTIONAL_ASSESSMENT: 0-10

## 2023-10-25 ASSESSMENT — LIFESTYLE VARIABLES
AUDIT-C TOTAL SCORE: 1
HOW OFTEN DO YOU HAVE 6 OR MORE DRINKS ON ONE OCCASION: NEVER
HOW OFTEN DO YOU HAVE A DRINK CONTAINING ALCOHOL: MONTHLY OR LESS
SKIP TO QUESTIONS 9-10: 1
AUDIT-C TOTAL SCORE: 1
HOW MANY STANDARD DRINKS CONTAINING ALCOHOL DO YOU HAVE ON A TYPICAL DAY: 1 OR 2

## 2023-10-25 ASSESSMENT — PATIENT HEALTH QUESTIONNAIRE - PHQ9
SUM OF ALL RESPONSES TO PHQ9 QUESTIONS 1 & 2: 3
1. LITTLE INTEREST OR PLEASURE IN DOING THINGS: NEARLY EVERY DAY
2. FEELING DOWN, DEPRESSED OR HOPELESS: NOT AT ALL

## 2023-10-25 ASSESSMENT — PAIN SCALES - GENERAL
PAINLEVEL_OUTOF10: 6
PAINLEVEL_OUTOF10: 5 - MODERATE PAIN
PAINLEVEL_OUTOF10: 3

## 2023-10-25 NOTE — PROGRESS NOTES
Pharmacy Medication History Review    Jose Thornton is a 57 y.o. male admitted for Colovesical fistula. Pharmacy reviewed the patient's [id number] medications and allergies for accuracy.    The list below reflects the updated PTA list. Please review each medication in order reconciliation for additional clarification and justification.  Medications Prior to Admission   Medication Sig    acetaminophen (Tylenol) 500 mg tablet Take 2 tablets (1,000 mg) by mouth every 6 hours if needed.    oxybutynin XL (Ditropan-XL) 10 mg 24 hr tablet TAKE 1 TABLET BY MOUTH TWO TIMES A DAY    oxyCODONE (Roxicodone) 5 mg immediate release tablet Take 1 tablet (5 mg) by mouth see administration instructions. TAKE 1 TABLET BY MOUTH 1 HOUR PRIOR TO CATHETER CHANGE (Patient taking differently: Take 1 tablet (5 mg) by mouth if needed.     phenazopyridine (Urinary Pain Relief) 95 mg tablet Take 1 tablet (95 mg) by mouth 3 times a day as needed for bladder spasms.        The list below reflects the updated allergy list. Please review each documented allergy for additional clarification and justification.  Allergies  Reviewed by Thong Manuel CPhT on 10/25/2023   No Known Allergies       Sources:   Patient interviewed about current medications taking, good historian  Surescripts Pharmacy fill history reviewed   office visit from 10/11/23 medication list reviewed    Below are additional concerns with the patient's PTA list.  None    ---------------------------------  Thong Manuel CPhT  Transitions of Care Technician  Medication reconciliation complete  Please reach out via [employer] Secure Chat for questions,   or if no response call [employer] or [employer].   Meds Ambulatory and Retail Services

## 2023-10-25 NOTE — ED TRIAGE NOTES
Baseline history of Crohn's disease complicated by perianal fistula, colovesical fistula s/p bilateral nephrostomy tube placement. Enters ED per physician recommendation for IV antibiotic administration prior to planned urological surgery. Endorses chronic costovertebral tenderness and flank pain with reported wyatt/ blood tinged urine output.

## 2023-10-25 NOTE — CARE PLAN
Problem: Pain  Goal: Takes deep breaths with improved pain control throughout the shift  Outcome: Progressing  Goal: Turns in bed with improved pain control throughout the shift  Outcome: Progressing     Problem: Fall/Injury  Goal: Not fall by end of shift  Outcome: Progressing  Goal: Be free from injury by end of the shift  Outcome: Progressing  Goal: Verbalize understanding of personal risk factors for fall in the hospital  Outcome: Progressing  Goal: Verbalize understanding of risk factor reduction measures to prevent injury from fall in the home  Outcome: Progressing  Goal: Pace activities to prevent fatigue by end of the shift  Outcome: Progressing

## 2023-10-25 NOTE — PROGRESS NOTES
10/25/23 7320   Discharge Planning   Living Arrangements Spouse/significant other   Support Systems Spouse/significant other   Assistance Needed Patient is independent, does not use assistive devices.   Type of Residence Private residence   Number of Stairs to Enter Residence 4   Do you have animals or pets at home? Yes   Type of Animals or Pets 3 cats   Who is requesting discharge planning? Provider   Home or Post Acute Services In home services   Type of Home Care Services Home nursing visits   Patient expects to be discharged to: Home with HC   Does the patient need discharge transport arranged? No     Patient lives in a house with is wife. Patient is independent with adls, does not use assistive devices. Patient denies falls, feels safe at home. Wife is primary support/helper. ADOD next week.

## 2023-10-25 NOTE — ED PROVIDER NOTES
HPI   Chief Complaint   Patient presents with    Post-op Problem       HPI  57 yrs M with past medical history significant for Crohn's disease, proctosigmoidectomy, rectureteral fistula S/P indwelling catheter, bilateral nephrostomy tubes as listed below presents to the emergency department as a preadmit to urology for Bilateral ureteral reimplant for IV antibiotics.  Patient endorses chronic costovertebral tenderness, UTI, bladder spasms, hematuria.  Does not endorse acute issues currently.  Denies any chest pain, abdominal pain, flank pain, peripheral edema.     Patient had scheduled Bilateral ureteral reimplant, rectovesical fistula repair, possible illeal conduit, possible muscle flap with dr Andersen on 10/11; however, patient's procedure was rescheduled due to UTI.  Patient was informed that he should arrive 3 days before the procedure for IV antibiotics.    Past medical history also includes Crohn's disease , perianal fistula , colovesical fistula  s/p anterior proctosigmoidectomy, colovesicular fistula takedown, end colostomy creation (12/20/22, Dr. Rodriguez) patient has a colostomy bag  , fistula between the bladder and rectal stump s/p indwelling catheter, as well as bilateral nephrostomy tubes and end colostomy.                    Musa Coma Scale Score: 15                  Patient History   Past Medical History:   Diagnosis Date    Crohn's disease (CMS/HCC)     Diverticulosis     GERD (gastroesophageal reflux disease)     GI (gastrointestinal bleed)     Urinary tract infection      Past Surgical History:   Procedure Laterality Date    CT GUIDED PERCUTANEOUS PERITONEAL OR RETROPERITONEAL FLUID COLLECTION DRAINAGE  9/28/2022    CT GUIDED PERCUTANEOUS PERITONEAL OR RETROPERITONEAL FLUID COLLECTION DRAINAGE 9/28/2022 [facility] INPATIENT LEGACY    IR NEPHROSTOMY TUBE EXCHANGE  1/31/2023    IR NEPHROSTOMY TUBE EXCHANGE 1/31/2023 DOCTOR OFFICE LEGACY    IR NEPHROSTOMY TUBE EXCHANGE  1/31/2023    IR NEPHROSTOMY TUBE  EXCHANGE 1/31/2023 DOCTOR OFFICE LEGACY    IR NEPHROSTOMY TUBE EXCHANGE  3/23/2023    IR NEPHROSTOMY TUBE EXCHANGE [facility]    IR NEPHROSTOMY TUBE EXCHANGE  3/23/2023    IR NEPHROSTOMY TUBE EXCHANGE [facility]    IR NEPHROSTOMY TUBE EXCHANGE  3/30/2023    IR NEPHROSTOMY TUBE EXCHANGE [facility]    IR NEPHROSTOMY TUBE EXCHANGE  4/25/2023    IR NEPHROSTOMY TUBE EXCHANGE [facility]    IR NEPHROSTOMY TUBE EXCHANGE  6/30/2023    IR NEPHROSTOMY TUBE EXCHANGE 6/30/2023 [facility]    IR NEPHROSTOMY TUBE EXCHANGE  6/30/2023    IR NEPHROSTOMY TUBE EXCHANGE 6/30/2023 [facility]    IR NEPHROSTOMY TUBE EXCHANGE  8/25/2023    IR NEPHROSTOMY TUBE EXCHANGE 8/25/2023 [facility]    IR NEPHROSTOMY TUBE EXCHANGE  8/25/2023    IR NEPHROSTOMY TUBE EXCHANGE 8/25/2023 [facility]    IR NEPHROSTOMY TUBE EXCHANGE  10/18/2023    IR NEPHROSTOMY TUBE EXCHANGE 10/18/2023 Onesimo Reyes MD [facility]     No family history on file.  Social History     Tobacco Use    Smoking status: Never    Smokeless tobacco: Never   Substance Use Topics    Alcohol use: Not Currently    Drug use: Never       Physical Exam   ED Triage Vitals [10/24/23 2157]   Temp Heart Rate Resp BP   37.1 °C (98.8 °F) 84 19 136/82      SpO2 Temp Source Heart Rate Source Patient Position   95 % Temporal -- Sitting      BP Location FiO2 (%)     Left arm --       Physical Exam  Constitutional:       Appearance: Normal appearance.   HENT:      Head: Normocephalic.      Right Ear: External ear normal.      Left Ear: External ear normal.      Nose: Nose normal.      Mouth/Throat:      Pharynx: Oropharynx is clear.   Eyes:      Extraocular Movements: Extraocular movements intact.   Cardiovascular:      Rate and Rhythm: Normal rate and regular rhythm.      Heart sounds: Normal heart sounds.   Pulmonary:      Effort: Pulmonary effort is normal.      Breath sounds: Normal breath sounds.   Abdominal:      Palpations: Abdomen is soft.      Tenderness: There is no abdominal tenderness. There is  no guarding.      Comments: Nephrostomy bag shows possible hematuria.  colostomy bag shows no gross blood.  Tubes clean dry intact   Musculoskeletal:      Right lower leg: No edema.      Left lower leg: No edema.   Neurological:      Mental Status: He is alert and oriented to person, place, and time. Mental status is at baseline.         ED Course & MDM   Diagnoses as of 10/24/23 5367   Colovesical fistula       Medical Decision Making  57 yrs M with past medical history significant for Crohn's disease, proctosigmoidectomy, rectureteral fistula S/P indwelling catheter, bilateral nephrostomy tubes as listed below presents to the emergency department as a preadmit to urology for Bilateral ureteral reimplant for IV antibiotics.  Vitals within normal limits.  Exam unremarkable.  Tubes intact, dry, clean.  No gross blood present in colostomy bag.  Small hematuria visible in nephrostomy bag. Urine culture collected and Ancef started. Patient admitted to urology.     Procedure  Procedures     Kirsten Hall MD  10/24/23 3633        Attending Note:    I saw and evaluated the patient.  I personally obtained the key and critical portions of the history and physical exam or was physically present for key and critical portions performed by the resident/fellow/SHALINI.  I reviewed the resident/fellow/SHALINI’s documentation and discussed the patient with the resident/fellow/SHALINI.  I agree with the resident/fellow/SHALINI’s medical decision making as documented in the resident/fellow/SHALINI’s note with the exception/addition of the following:         HPI:   Jose Thornton is a 57 year old male with history of Crohn's disease, CKD, diverticulitis, perianal and colovesical fistula (s/p exploratory laparotomy, anterior proctosigmoidectomy, colovesical fistula takedown and end colostomy 12/2022) and hydronephrosis s/p bilateral nephrostomy tube placement (most recently exchanged 10/18/23) presenting to the ED for evaluation.  The patient had scheduled  bilateral ureteral re-implant, rectovesicle fistula repair, possible ileal conduit and possible muscle flap creation planned for 10/23, however, due to concern for UTI, the procedure was re-scheduled. Urine cultures from 10/5/23 demonstrated >100K CFU of E coli, resistant to ampicillin, ciprofloxacin, and Bactrim. The patient reports that he has been on outpatient PO antibiotics but this has not cleared his infection.  He was instructed to present to the ED for admission for IV antibiotics. The patient denies recent fevers, chills, night sweats, abdominal pain, nausea, vomiting, changes in ostomy output, or changes in nephrostomy/Schultz catheter output.     Additional History Obtained from: See HPI     Physical Exam:  General: Grossly well-developed, awake, alert, NAD    Head: Normocephalic, no overt external signs of trauma    ENT: Mucus membranes moist, nares patent    Neck: Supple, trachea midline    Neurologic: A&Ox4, FS, TM, EOMI, PERRL, STANLEY x 4 with grossly symmetric strength, SILT grossly intact BUE and BLE    Cardiovascular: RRR, no MRG, pulses grossly symmetric    Respiratory: CTA B/L, no wheeze, rales or rhonchi    Abdomen: Soft, not appreciably tender, no evident rebound/guarding, no pulsatile masses palpable. Negative Peng’s sign.  No pain to palpation at McBurney’s point.  Well healing ventral surgical scar, no erythema, induration or increased warmth.  Percutanous nephrostomy tube site non-tender to palpation draining transparent yellow urine, no CVAT.  Ostomy site appears pink and viable, no bloody discharge.    MSK: No gross bony deformities in BUE and BLE    Skin/Integumentary: Warm and dry    Psychiatric: Calm and cooperative. Normal mood and affect.        Differential Diagnoses Considered:  See MDM below    Chronic Medical Conditions Significantly Affecting Care:  See MDM below    External Records Reviewed:  I reviewed recent and relevant outside records as noted in HPI above and MDM below.      Independent Interpretation of Studies:    Laboratory/Imaging Studies:  Laboratory results personally reviewed and independently interpreted:  CBC without significant anemia, thrombocytopenia or leukocytosis. Hb 12.7.   Metabolic panel without HAGMA. Creatinine 1.98 (prior 1.60 10/11/23), otherwise no significant electrolyte anomalies.  UA/culture pending      Social Determinants of Health Significantly Affecting Care:  See HPI/MDM    Prescription Drug Consideration:  See MDM    Diagnostic testing considered:  See MDM and relevant sections      Medical Decision Making/Course:  57 year old male with history of Crohn's disease, CKD, diverticulitis, perianal and colovesical fistula (s/p exploratory laparotomy, anterior proctosigmoidectomy, colovesical fistula takedown and end colostomy 12/2022) and hydronephrosis s/p bilateral nephrostomy tube placement (most recently exchanged 10/18/23) presenting to the ED for evaluation. The patient was afebrile, non-toxic and HDS.  He is well appearing and denies fever or abdominal pain.  He denies obvious signs/symptoms of systemic infectious process/sepsis at this time.  We discussed case with urology who recommended administration of Ancef and admission to their service.  He remained hemodynamically stable during ED shift without signs of decompensation. An opportunity to ask questions was provided and all were addressed at that time.  The patient verbalized understanding and was in agreement with plan.     Escalation of Care:  Appropriate for: Admission    Discussion of Management with Other Providers:  We discussed the patient/results with: Urology          Jelani Rich MD  10/24/23 4061

## 2023-10-25 NOTE — PROGRESS NOTES
TCC Note:    Patient continues on IV antibiotics, Ecoli UTI. OR Friday for uretharal reimplant vs possible haynes conduit. Patient will be here through next week. May need HC. Will continue to follow for discharge needs.     Gracie Wesley RN, TCC

## 2023-10-25 NOTE — H&P
History Of Present Illness  Jose Thornton is a 57 y.o. male w/ Hx Crohn's disease complicated by colovesical fistula s/p bilateral nephrostomy tube placement + indwelling andrade catheter who was previously scheduled for fistula repair with Dr. Andersen on 10/11/23 but whose procedure was rescheduled due to a positive urine culture. He presents for admission in anticipation of 10/27/23 fistula repair, bilateral reimplant, and possible ileal conduit creation.    He declines any current symptoms or changes in the appearance of his urine recently. No fevers, chills, nausea, vomiting, hematuria, cloudy urine, suprapubic pain, flank pain, or abdominal pain.     Past Medical History  He has a past medical history of Crohn's disease (CMS/[facility]), Diverticulosis, GERD (gastroesophageal reflux disease), GI (gastrointestinal bleed), and Urinary tract infection.    Surgical History  He has a past surgical history that includes IR nephrostomy tube exchange (1/31/2023); IR nephrostomy tube exchange (1/31/2023); CT guided percutaneous peritoneal or retroperitoneal fluid collection drainage (9/28/2022); IR nephrostomy tube exchange (3/23/2023); IR nephrostomy tube exchange (3/23/2023); IR nephrostomy tube exchange (3/30/2023); IR nephrostomy tube exchange (4/25/2023); IR nephrostomy tube exchange (6/30/2023); IR nephrostomy tube exchange (6/30/2023); IR nephrostomy tube exchange (8/25/2023); IR nephrostomy tube exchange (8/25/2023); and IR nephrostomy tube exchange (10/18/2023).     Social History  He reports that he has never smoked. He has never used smokeless tobacco. He reports that he does not currently use alcohol. He reports that he does not use drugs.    Family History  No family history on file.     Allergies  Patient has no known allergies.    Review of Systems:  As noted in HPI     Physical Exam  Constitutional: awake and alert, resting comfortably in bed in no acute distress  Head/neck: normocephalic / atraumatic  Eyes:  EOMI, sclerae anicteric  ENT: moist mucous membranes  Pulm: Breathing comfortably on room air  CV: Regular rate  Abd: Soft, nontender, nondistended. Colostomy pink, well pouched.  : Bilateral PCNTs with yellow, slightly pink tinged urine and andrade in place with clear yellow urine. Mild bilateral CVA tenderness. PCNT insertion sites without erythema or drainage.  Extremities: No lower extremity edema  Psych: Appropriate mood and behavior      Last Recorded Vitals  Blood pressure 136/82, pulse 84, temperature 37.1 °C (98.8 °F), temperature source Temporal, resp. rate 19, height 1.829 m (6'), weight 113 kg (250 lb), SpO2 95 %.    Relevant Results    Results for orders placed or performed during the hospital encounter of 10/24/23 (from the past 24 hour(s))   CBC and Auto Differential   Result Value Ref Range    WBC 8.7 4.4 - 11.3 x10*3/uL    nRBC 0.0 0.0 - 0.0 /100 WBCs    RBC 4.59 4.50 - 5.90 x10*6/uL    Hemoglobin 12.7 (L) 13.5 - 17.5 g/dL    Hematocrit 38.8 (L) 41.0 - 52.0 %    MCV 85 80 - 100 fL    MCH 27.7 26.0 - 34.0 pg    MCHC 32.7 32.0 - 36.0 g/dL    RDW 14.8 (H) 11.5 - 14.5 %    Platelets 470 (H) 150 - 450 x10*3/uL    MPV 8.7 7.5 - 11.5 fL    Neutrophils % 71.4 40.0 - 80.0 %    Immature Granulocytes %, Automated 0.3 0.0 - 0.9 %    Lymphocytes % 17.1 13.0 - 44.0 %    Monocytes % 6.5 2.0 - 10.0 %    Eosinophils % 4.0 0.0 - 6.0 %    Basophils % 0.7 0.0 - 2.0 %    Neutrophils Absolute 6.21 1.20 - 7.70 x10*3/uL    Immature Granulocytes Absolute, Automated 0.03 0.00 - 0.70 x10*3/uL    Lymphocytes Absolute 1.49 1.20 - 4.80 x10*3/uL    Monocytes Absolute 0.57 0.10 - 1.00 x10*3/uL    Eosinophils Absolute 0.35 0.00 - 0.70 x10*3/uL    Basophils Absolute 0.06 0.00 - 0.10 x10*3/uL   Comprehensive metabolic panel   Result Value Ref Range    Glucose 96 74 - 99 mg/dL    Sodium 141 136 - 145 mmol/L    Potassium 4.5 3.5 - 5.3 mmol/L    Chloride 106 98 - 107 mmol/L    Bicarbonate 25 21 - 32 mmol/L    Anion Gap 15 10 - 20 mmol/L     Urea Nitrogen 17 6 - 23 mg/dL    Creatinine 1.98 (H) 0.50 - 1.30 mg/dL    eGFR 39 (L) >60 mL/min/1.73m*2    Calcium 9.3 8.6 - 10.6 mg/dL    Albumin 3.8 3.4 - 5.0 g/dL    Alkaline Phosphatase 73 33 - 120 U/L    Total Protein 7.2 6.4 - 8.2 g/dL    AST 17 9 - 39 U/L    Bilirubin, Total 0.2 0.0 - 1.2 mg/dL    ALT 17 10 - 52 U/L       IR nephrostomy tube exchange    Result Date: 10/18/2023  Interpreted By:  Onesimo Reyes and Guirguis James STUDY: IR NEPHROSTOMY TUBE EXCHANGE;  10/18/2023 9:50 am   INDICATION: Unspecified hydronephrosis  NEPH TUBE EXCHANGE. 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   COMPARISON: Nephrostogram 08/25/2000   ACCESSION NUMBER(S): [id number]   ORDERING CLINICIAN: MILDRED HUMPHREY   TECHNIQUE: INTERVENTIONALIST(S): MD Jos Bautista MD   CONSENT: The patient/patient's POA/next of kin was informed of the nature of the proposed procedure. The purposes, alternatives, risks, and benefits were explained and discussed. All questions were answered and consent was obtained.   RADIATION EXPOSURE: Fluoroscopy time: 1.6 min Dose: 27.83 mGy Dose Area Product (DAP): 5260 mGy*cm^2   SEDATION: Moderate conscious IV sedation services (supervision of administration, induction, and maintenance) were provided by the physician performing the procedure with intravenous fentanyl 100mcg and versed 2mg. The physician was assisted by an independent trained observer, an interventional radiology nurse, in the continuous monitoring of patient level of consciousness and physiologic status.   MEDICATION/CONTRAST: 2 g of ceftriaxone administered intravenously   TIME OUT: A time out was performed immediately prior to procedure start with the interventional team, correctly identifying the patient name, date of birth, MRN, procedure, anatomy (including marking of site and side), patient position, procedure consent form, relevant laboratory and imaging test results, antibiotic  administration, safety precautions, and procedure-specific equipment needs.   COMPLICATIONS: No immediate adverse events identified.   FINDINGS: Maximum sterile barrier technique was implemented.   RIGHT KIDNEY: ANTEGRADE PYELOGRAM FINDINGS: Initial  fluoroscopic spot image demonstrates an intact existing right percutaneous nephrostomy drainage catheter. Half-strength contrast was injected through the existing catheter and demonstrated patency without leak. Opacification of the central renal pelvis was observed.   PROCEDURE: Appropriate lidocaine 1% local anesthesia was instilled in the subcutaneous soft tissues.   The existing nephrostomy tube was cut at the hub releasing the inner suture. A 035 Amplatz guidewire was inserted through the existing nephrostomy catheter to secure location utilizing intermittent fluoroscopic guidance. The existing nephrostomy catheter was removed over the wire. A new 10-Faroese x 25cm nephrostomy tube was subsequently inserted over the guidewire. The guidewire and nephrostomy inner stylet were removed. The self-forming the pigtail loop was formed in the central renal pelvis. Follow-up dilute contrast injection was performed to confirm optimal placement within the central renal pelvis. The external portions of the catheter were secured in place with sterile suture and dressings.   The patient tolerated the procedure without complication.   LEFT KIDNEY: ANTEGRADE PYELOGRAM FINDINGS: Initial  fluoroscopic spot image demonstrates an intact existing right percutaneous nephrostomy drainage catheter. Half-strength contrast was injected through the existing catheter and demonstrated patency without leak. Opacification of the central renal pelvis was observed.   PROCEDURE: Appropriate lidocaine 1% local anesthesia was instilled in the subcutaneous soft tissues.   The existing nephrostomy tube was cut at the hub releasing the inner suture. A 035 Amplatz guidewire was inserted through  the existing nephrostomy catheter to secure location utilizing intermittent fluoroscopic guidance. The existing nephrostomy catheter was removed over the wire. A new 10-Rwandan x 25cm nephrostomy tube was subsequently inserted over the guidewire. The guidewire and nephrostomy inner stylet were removed. The self-forming the pigtail loop was formed in the central renal pelvis. Follow-up dilute contrast injection was performed to confirm optimal placement within the central renal pelvis. The external portions of the catheter were secured in place with sterile suture and dressings.   The patient tolerated the procedure without complication.       1. Uncomplicated and technically successful bilateral percutaneous nephrostomy tube exchange - newly placed 10-Rwandan x 25cm self-forming pigtail drainage catheter placement bilaterally connected to external gravity drainage. The patient tolerated the procedure well without immediate complication.   I was present for and/or performed the critical portions of the procedure and immediately available throughout the entire procedure.   I personally reviewed the image(s) / study and resident interpretation. I agree with the findings as stated.   Performed and dictated at [facility].   MACRO: None.   Signed by: Onesimo Reyes 10/18/2023 11:30 AM Dictation workstation:   [id number]    MR enterography    Result Date: 10/3/2023  Interpreted By:  Wili Landers, STUDY: MR ENTEROGRAPHY;  10/3/2023 11:05 am   INDICATION: Signs/Symptoms: abdominal pain  K50.90: Crohn's disease.   COMPARISON: Pelvic MRI 06/01/2023, CT urogram 06/01/2023.   ACCESSION NUMBER(S): [id number]   ORDERING CLINICIAN: KEN CALDERON   TECHNIQUE: Multiplanar magnetic resonance images of the abdomen and pelvis were obtained including the following sequences: T2-weighted SSFSE, SSFP with and without fat saturation, 3D-T1w GRE pre and dynamically post contrast. 23 ml of  Gadolinium contrast agent Dotarem were administered intravenously without immediate complication.   FINDINGS: BOWEL: There is a persistent region of tract like enhancement extending inferior to region of tethered small bowel loops within the midline lower pelvis suspicious for persistent fistulous material fascicular tract. . Stable postsurgical changes from partial colectomy with end colostomy within the left lower quadrant..There is mild enhancement and wall thickening of multiple small bowel loops within the left mid abdomen as well as mild wall thickening and enhancement of the approximate distal 12 cm of colon remnant extending to the ostomy.   LIVER: No suspicious lesions.   BILE DUCTS: No biliary dilatation.   GALLBLADDER: No obvious cholelithiasis.   PANCREAS: Unremarkable.   SPLEEN: Unremarkable.   ADRENAL GLANDS: Unremarkable.   KIDNEYS: Atrophy and regions of scarring of the left kidney. Tiny subcentimeter bilateral cysts. Bilateral nephrostomy catheters are noted. No hydronephrosis detected.   BLADDER: Decompressed via Schultz catheterization and therefore not well assessed.   REPRODUCTIVE ORGANS: No obvious pelvic mass.   LYMPH NODES: No significant adenopathy.   ABDOMINAL VESSELS: No evidence of abdominal aortic aneurysm.   PERITONEUM AND RETROPERITONEUM: No significant ascites. Laxity along the anterior abdominal wall with wide mouth hernia.   BONES AND LOWER THORAX: No acute osseous abnormality. No acute abnormality of the imaged lower thorax.       1.  Persistent appearance of enhancing tract extending inferiorly from tethered small bowel loops within the lower pelvis indicative of likely persistent entero vesicular fistula. 2.  Mild wall thickening enhancement of multiple small bowel loops as well as the distal colon remnant immediately proximal to the left lower quadrant ostomy site indicative of active component of patient's reported inflammatory bowel disease.     MACRO: None   Signed by: Wili  Anshul 10/3/2023 11:49 AM Dictation workstation:   [id number]       Assessment/Plan   Principal Problem:    Colovesical fistula    Jose Thornton is a 57 y.o. male w/ Hx Crohn's disease complicated by colovesical fistula s/p bilateral nephrostomy tube placement + indwelling andrade catheter who presents for IV antibiotics in anticipation of 10/27/23 fistula repair, bilateral reimplant, and possible ileal conduit creation with Dr. Andersen.     Plan:   Neuro:  - Tylenol 650mg q6hr prn as needed for pain     CV:  - Maintain MAP>65  - Continue to monitor BP     Pulm:  - Maintain O2 >92%     GI/Diet:  - Regular Diet  - Zofran prn for nausea / vomiting     /Renal:  - Strict I/Os  - Surgery with Dr. Chiang on 10/27/23     Heme:  - Transfuse if Hgb < 7    ID:  - Repeat UA/Ucx   - Empiric ancef based on prior Ucx (E coli positive)     Endocrine:  - No acute or chronic issues     MSK:  - Encourage ambulation  - Appreciate PT / OT recs     DVT Ppx:  - SCDs  - SQH TID     Dispo:   - Admit to Urology, RNF    Discussed with chief: Dr. Lacy.     Naren Martinez MD  Urologic Surgery PGY-2  Adult Urology: [id number]    Pediatric Urology: [id number]

## 2023-10-26 ENCOUNTER — ANESTHESIA EVENT (OUTPATIENT)
Dept: OPERATING ROOM | Facility: HOSPITAL | Age: 58
DRG: 353 | End: 2023-10-26

## 2023-10-26 LAB — BACTERIA UR CULT: NORMAL

## 2023-10-26 PROCEDURE — 96372 THER/PROPH/DIAG INJ SC/IM: CPT | Performed by: STUDENT IN AN ORGANIZED HEALTH CARE EDUCATION/TRAINING PROGRAM

## 2023-10-26 PROCEDURE — 2500000001 HC RX 250 WO HCPCS SELF ADMINISTERED DRUGS (ALT 637 FOR MEDICARE OP): Performed by: STUDENT IN AN ORGANIZED HEALTH CARE EDUCATION/TRAINING PROGRAM

## 2023-10-26 PROCEDURE — 2500000004 HC RX 250 GENERAL PHARMACY W/ HCPCS (ALT 636 FOR OP/ED): Performed by: STUDENT IN AN ORGANIZED HEALTH CARE EDUCATION/TRAINING PROGRAM

## 2023-10-26 PROCEDURE — 2500000004 HC RX 250 GENERAL PHARMACY W/ HCPCS (ALT 636 FOR OP/ED)

## 2023-10-26 PROCEDURE — 1170000001 HC PRIVATE ONCOLOGY ROOM DAILY

## 2023-10-26 PROCEDURE — 2500000004 HC RX 250 GENERAL PHARMACY W/ HCPCS (ALT 636 FOR OP/ED): Performed by: UROLOGY

## 2023-10-26 RX ORDER — TALC
6 POWDER (GRAM) TOPICAL NIGHTLY PRN
Status: DISCONTINUED | OUTPATIENT
Start: 2023-10-26 | End: 2023-10-28

## 2023-10-26 RX ADMIN — OXYBUTYNIN CHLORIDE 5 MG: 5 TABLET ORAL at 08:44

## 2023-10-26 RX ADMIN — FLUTICASONE PROPIONATE 2 SPRAY: 50 SPRAY, METERED NASAL at 08:44

## 2023-10-26 RX ADMIN — Medication 6 MG: at 23:25

## 2023-10-26 RX ADMIN — BISACODYL 5 MG: 5 TABLET, COATED ORAL at 14:55

## 2023-10-26 RX ADMIN — CEFAZOLIN SODIUM 1 G: 1 INJECTION, SOLUTION INTRAVENOUS at 17:08

## 2023-10-26 RX ADMIN — OXYBUTYNIN CHLORIDE 5 MG: 5 TABLET ORAL at 20:57

## 2023-10-26 RX ADMIN — FLUCONAZOLE 400 MG: 2 INJECTION, SOLUTION INTRAVENOUS at 08:44

## 2023-10-26 RX ADMIN — CEFAZOLIN SODIUM 1 G: 1 INJECTION, SOLUTION INTRAVENOUS at 23:25

## 2023-10-26 RX ADMIN — POLYETHYLENE GLYCOL 3350 238 G: 17 POWDER, FOR SOLUTION ORAL at 14:56

## 2023-10-26 RX ADMIN — CEFAZOLIN SODIUM 1 G: 1 INJECTION, SOLUTION INTRAVENOUS at 08:44

## 2023-10-26 RX ADMIN — HEPARIN SODIUM 5000 UNITS: 5000 INJECTION, SOLUTION INTRAVENOUS; SUBCUTANEOUS at 23:26

## 2023-10-26 RX ADMIN — GENTAMICIN SULFATE 460 MG: 40 INJECTION, SOLUTION INTRAMUSCULAR; INTRAVENOUS at 15:06

## 2023-10-26 RX ADMIN — OXYBUTYNIN CHLORIDE 5 MG: 5 TABLET ORAL at 14:56

## 2023-10-26 RX ADMIN — HEPARIN SODIUM 5000 UNITS: 5000 INJECTION, SOLUTION INTRAVENOUS; SUBCUTANEOUS at 08:44

## 2023-10-26 RX ADMIN — BISACODYL 5 MG: 5 TABLET, COATED ORAL at 20:57

## 2023-10-26 ASSESSMENT — PAIN - FUNCTIONAL ASSESSMENT: PAIN_FUNCTIONAL_ASSESSMENT: 0-10

## 2023-10-26 ASSESSMENT — PAIN SCALES - GENERAL: PAINLEVEL_OUTOF10: 0 - NO PAIN

## 2023-10-26 NOTE — PROGRESS NOTES
TCC Note:      Patient will go to OR tomorrow for urethral reimplant vs ileal conduit.   ADOD 10/30-11/1 Home needs TBD.

## 2023-10-26 NOTE — PROGRESS NOTES
UROLOGY DAILY PROGRESS NOTE      Subjective   NAEO. Tolerating diet.        Objective   Physical Exam  Gen: NAD, alert  HEENT: normocephalic, atraumatic, MMM  Pulm: nonlabored breathing  CV: RRR  GI: soft, nondistended, nontender, colostomy L abdomen w/ healthy appearance, semiformed stool  : no suprapubic or CVA tenderness, Schutlz draining clear yellow, b/l PCNT draining yellow  MSK: STANLEY  Neuro: no focal deficits  Skin: WWP  Psych: appropriate mood and behavior      Last Recorded Vitals  Heart Rate:  [66-72]   Temp:  [36.2 °C (97.2 °F)-37.1 °C (98.8 °F)]   Resp:  [18]   BP: (111-129)/(60-78)   SpO2:  [93 %-97 %]   Intake/Output last 3 Shifts:  I/O last 3 completed shifts:  In: 650 (5.7 mL/kg) [P.O.:600; IV Piggyback:50]  Out: 1655 (14.6 mL/kg) [Urine:1655 (0.4 mL/kg/hr)]  Weight: 113.4 kg     Net IO Since Admission: -2,055 mL [10/26/23 0845]    Relevant Results  No results found for this or any previous visit (from the past 24 hour(s)).    IR nephrostomy tube exchange    Result Date: 10/18/2023  Interpreted By:  Onesimo Reyes and Guirguis James STUDY: IR NEPHROSTOMY TUBE EXCHANGE;  10/18/2023 9:50 am   INDICATION: Unspecified hydronephrosis  NEPH TUBE EXCHANGE. 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   COMPARISON: Nephrostogram 08/25/2000   ACCESSION NUMBER(S): [id number]   ORDERING CLINICIAN: MILDRED HUMPHREY   TECHNIQUE: INTERVENTIONALIST(S): MD Jos Bautista MD   CONSENT: The patient/patient's POA/next of kin was informed of the nature of the proposed procedure. The purposes, alternatives, risks, and benefits were explained and discussed. All questions were answered and consent was obtained.   RADIATION EXPOSURE: Fluoroscopy time: 1.6 min Dose: 27.83 mGy Dose Area Product (DAP): 5260 mGy*cm^2   SEDATION: Moderate conscious IV sedation services (supervision of administration, induction, and maintenance) were provided by the physician performing the procedure with  intravenous fentanyl 100mcg and versed 2mg. The physician was assisted by an independent trained observer, an interventional radiology nurse, in the continuous monitoring of patient level of consciousness and physiologic status.   MEDICATION/CONTRAST: 2 g of ceftriaxone administered intravenously   TIME OUT: A time out was performed immediately prior to procedure start with the interventional team, correctly identifying the patient name, date of birth, MRN, procedure, anatomy (including marking of site and side), patient position, procedure consent form, relevant laboratory and imaging test results, antibiotic administration, safety precautions, and procedure-specific equipment needs.   COMPLICATIONS: No immediate adverse events identified.   FINDINGS: Maximum sterile barrier technique was implemented.   RIGHT KIDNEY: ANTEGRADE PYELOGRAM FINDINGS: Initial  fluoroscopic spot image demonstrates an intact existing right percutaneous nephrostomy drainage catheter. Half-strength contrast was injected through the existing catheter and demonstrated patency without leak. Opacification of the central renal pelvis was observed.   PROCEDURE: Appropriate lidocaine 1% local anesthesia was instilled in the subcutaneous soft tissues.   The existing nephrostomy tube was cut at the hub releasing the inner suture. A 035 Amplatz guidewire was inserted through the existing nephrostomy catheter to secure location utilizing intermittent fluoroscopic guidance. The existing nephrostomy catheter was removed over the wire. A new 10-Uzbek x 25cm nephrostomy tube was subsequently inserted over the guidewire. The guidewire and nephrostomy inner stylet were removed. The self-forming the pigtail loop was formed in the central renal pelvis. Follow-up dilute contrast injection was performed to confirm optimal placement within the central renal pelvis. The external portions of the catheter were secured in place with sterile suture and  dressings.   The patient tolerated the procedure without complication.   LEFT KIDNEY: ANTEGRADE PYELOGRAM FINDINGS: Initial  fluoroscopic spot image demonstrates an intact existing right percutaneous nephrostomy drainage catheter. Half-strength contrast was injected through the existing catheter and demonstrated patency without leak. Opacification of the central renal pelvis was observed.   PROCEDURE: Appropriate lidocaine 1% local anesthesia was instilled in the subcutaneous soft tissues.   The existing nephrostomy tube was cut at the hub releasing the inner suture. A 035 Amplatz guidewire was inserted through the existing nephrostomy catheter to secure location utilizing intermittent fluoroscopic guidance. The existing nephrostomy catheter was removed over the wire. A new 10-Polish x 25cm nephrostomy tube was subsequently inserted over the guidewire. The guidewire and nephrostomy inner stylet were removed. The self-forming the pigtail loop was formed in the central renal pelvis. Follow-up dilute contrast injection was performed to confirm optimal placement within the central renal pelvis. The external portions of the catheter were secured in place with sterile suture and dressings.   The patient tolerated the procedure without complication.       1. Uncomplicated and technically successful bilateral percutaneous nephrostomy tube exchange - newly placed 10-Polish x 25cm self-forming pigtail drainage catheter placement bilaterally connected to external gravity drainage. The patient tolerated the procedure well without immediate complication.   I was present for and/or performed the critical portions of the procedure and immediately available throughout the entire procedure.   I personally reviewed the image(s) / study and resident interpretation. I agree with the findings as stated.   Performed and dictated at [facility].   MACRO: None.   Signed by: Onesimo Reyes  10/18/2023 11:30 AM Dictation workstation:   [id number]    MR enterography    Result Date: 10/3/2023  Interpreted By:  Wili Landers, STUDY: MR ENTEROGRAPHY;  10/3/2023 11:05 am   INDICATION: Signs/Symptoms: abdominal pain  K50.90: Crohn's disease.   COMPARISON: Pelvic MRI 06/01/2023, CT urogram 06/01/2023.   ACCESSION NUMBER(S): [id number]   ORDERING CLINICIAN: KEN CALDERON   TECHNIQUE: Multiplanar magnetic resonance images of the abdomen and pelvis were obtained including the following sequences: T2-weighted SSFSE, SSFP with and without fat saturation, 3D-T1w GRE pre and dynamically post contrast. 23 ml of Gadolinium contrast agent Dotarem were administered intravenously without immediate complication.   FINDINGS: BOWEL: There is a persistent region of tract like enhancement extending inferior to region of tethered small bowel loops within the midline lower pelvis suspicious for persistent fistulous material fascicular tract. . Stable postsurgical changes from partial colectomy with end colostomy within the left lower quadrant..There is mild enhancement and wall thickening of multiple small bowel loops within the left mid abdomen as well as mild wall thickening and enhancement of the approximate distal 12 cm of colon remnant extending to the ostomy.   LIVER: No suspicious lesions.   BILE DUCTS: No biliary dilatation.   GALLBLADDER: No obvious cholelithiasis.   PANCREAS: Unremarkable.   SPLEEN: Unremarkable.   ADRENAL GLANDS: Unremarkable.   KIDNEYS: Atrophy and regions of scarring of the left kidney. Tiny subcentimeter bilateral cysts. Bilateral nephrostomy catheters are noted. No hydronephrosis detected.   BLADDER: Decompressed via Schultz catheterization and therefore not well assessed.   REPRODUCTIVE ORGANS: No obvious pelvic mass.   LYMPH NODES: No significant adenopathy.   ABDOMINAL VESSELS: No evidence of abdominal aortic aneurysm.   PERITONEUM AND RETROPERITONEUM: No significant ascites. Laxity along the  anterior abdominal wall with wide mouth hernia.   BONES AND LOWER THORAX: No acute osseous abnormality. No acute abnormality of the imaged lower thorax.       1.  Persistent appearance of enhancing tract extending inferiorly from tethered small bowel loops within the lower pelvis indicative of likely persistent entero vesicular fistula. 2.  Mild wall thickening enhancement of multiple small bowel loops as well as the distal colon remnant immediately proximal to the left lower quadrant ostomy site indicative of active component of patient's reported inflammatory bowel disease.     MACRO: None   Signed by: Wili Landers 10/3/2023 11:49 AM Dictation workstation:   [id number]    Susceptibility data from last 90 days.  Collected Specimen Info Organism Amoxicillin/Clavulanate Ampicillin Ampicillin/Sulbactam Cefazolin Cefazolin (uncomplicated UTIs only) Ciprofloxacin Gentamicin Nitrofurantoin Piperacillin/Tazobactam Trimethoprim/Sulfamethoxazole   10/05/23 Urine from Nephrostomy Tube Escherichia coli S R I S S R S S S R       Assessment/Plan   Jose Thornton is a 57 y.o. male with PMH of Crohn's disease and colovesical fistula with on day 2 of admission in anticipation of colovesical fistula repair, b/l ureteral reimplant on 10/27/23 with Dr. Andersen and colorectal surgery.     Principal Problem:    Colovesical fistula      Neuro: continue PO analgesia PRN  Pulm: OOB as tolerated  CV: hemodynamically stable  FEN/GI: CLD diet, start bowel prep today, NPO at midnight  /Alpesh: maintain andrade and b/l nephrostomy tubes   Heme: H/H stable / no indication for transfusion  ID: afebrile, prophy antibiotics (Ancef/gent/fluc), f/u urine cultures  Endo: no additional glycemic requirements   MSK: OOB as tolerated  Prophylaxis: SQH / SCDs    Dispo: continue care on RNF     D/w attending Dr. Nathaly Ferrer MD  Urology PGY5  Pager: Adult [phone] / Peds [phone]

## 2023-10-27 ENCOUNTER — ANESTHESIA (OUTPATIENT)
Dept: OPERATING ROOM | Facility: HOSPITAL | Age: 58
DRG: 353 | End: 2023-10-27

## 2023-10-27 ENCOUNTER — APPOINTMENT (OUTPATIENT)
Dept: RADIOLOGY | Facility: HOSPITAL | Age: 58
DRG: 353 | End: 2023-10-27

## 2023-10-27 LAB
ANION GAP BLDA CALCULATED.4IONS-SCNC: 10 MMO/L (ref 10–25)
ANION GAP BLDA CALCULATED.4IONS-SCNC: 10 MMO/L (ref 10–25)
BASE EXCESS BLDA CALC-SCNC: -2.4 MMOL/L (ref -2–3)
BASE EXCESS BLDA CALC-SCNC: -3 MMOL/L (ref -2–3)
BODY TEMPERATURE: 37 DEGREES CELSIUS
BODY TEMPERATURE: 37 DEGREES CELSIUS
CA-I BLDA-SCNC: 1.15 MMOL/L (ref 1.1–1.33)
CA-I BLDA-SCNC: 1.18 MMOL/L (ref 1.1–1.33)
CHLORIDE BLDA-SCNC: 107 MMOL/L (ref 98–107)
CHLORIDE BLDA-SCNC: 107 MMOL/L (ref 98–107)
GLUCOSE BLDA-MCNC: 152 MG/DL (ref 74–99)
GLUCOSE BLDA-MCNC: 156 MG/DL (ref 74–99)
HCO3 BLDA-SCNC: 22.7 MMOL/L (ref 22–26)
HCO3 BLDA-SCNC: 23.2 MMOL/L (ref 22–26)
HCT VFR BLD EST: 38 % (ref 41–52)
HCT VFR BLD EST: 38 % (ref 41–52)
HGB BLDA-MCNC: 12.7 G/DL (ref 13.5–17.5)
HGB BLDA-MCNC: 12.7 G/DL (ref 13.5–17.5)
INHALED O2 CONCENTRATION: 50 %
INHALED O2 CONCENTRATION: 50 %
LACTATE BLDA-SCNC: 0.6 MMOL/L (ref 0.4–2)
LACTATE BLDA-SCNC: 1.1 MMOL/L (ref 0.4–2)
OXYHGB MFR BLDA: 96.3 % (ref 94–98)
OXYHGB MFR BLDA: 97 % (ref 94–98)
PCO2 BLDA: 42 MM HG (ref 38–42)
PCO2 BLDA: 42 MM HG (ref 38–42)
PH BLDA: 7.34 PH (ref 7.38–7.42)
PH BLDA: 7.35 PH (ref 7.38–7.42)
PO2 BLDA: 125 MM HG (ref 85–95)
PO2 BLDA: 126 MM HG (ref 85–95)
POTASSIUM BLDA-SCNC: 4.4 MMOL/L (ref 3.5–5.3)
POTASSIUM BLDA-SCNC: 4.6 MMOL/L (ref 3.5–5.3)
SAO2 % BLDA: 96 % (ref 94–100)
SAO2 % BLDA: 99 % (ref 94–100)
SODIUM BLDA-SCNC: 135 MMOL/L (ref 136–145)
SODIUM BLDA-SCNC: 136 MMOL/L (ref 136–145)

## 2023-10-27 PROCEDURE — 51040 INCISE & DRAIN BLADDER: CPT | Performed by: UROLOGY

## 2023-10-27 PROCEDURE — C1765 ADHESION BARRIER: HCPCS | Performed by: UROLOGY

## 2023-10-27 PROCEDURE — 7100000002 HC RECOVERY ROOM TIME - EACH INCREMENTAL 1 MINUTE: Performed by: UROLOGY

## 2023-10-27 PROCEDURE — 2500000001 HC RX 250 WO HCPCS SELF ADMINISTERED DRUGS (ALT 637 FOR MEDICARE OP): Performed by: STUDENT IN AN ORGANIZED HEALTH CARE EDUCATION/TRAINING PROGRAM

## 2023-10-27 PROCEDURE — 64450 NJX AA&/STRD OTHER PN/BRANCH: CPT | Performed by: STUDENT IN AN ORGANIZED HEALTH CARE EDUCATION/TRAINING PROGRAM

## 2023-10-27 PROCEDURE — 82805 BLOOD GASES W/O2 SATURATION: CPT

## 2023-10-27 PROCEDURE — 0WUF07Z SUPPLEMENT ABDOMINAL WALL WITH AUTOLOGOUS TISSUE SUBSTITUTE, OPEN APPROACH: ICD-10-PCS | Performed by: UROLOGY

## 2023-10-27 PROCEDURE — 15734 MUSCLE-SKIN GRAFT TRUNK: CPT | Performed by: UROLOGY

## 2023-10-27 PROCEDURE — 2720000007 HC OR 272 NO HCPCS: Performed by: UROLOGY

## 2023-10-27 PROCEDURE — 15273 SKIN SUB GRFT T/ARM/LG CHILD: CPT | Performed by: UROLOGY

## 2023-10-27 PROCEDURE — 2780000003 HC OR 278 NO HCPCS: Performed by: UROLOGY

## 2023-10-27 PROCEDURE — 2500000004 HC RX 250 GENERAL PHARMACY W/ HCPCS (ALT 636 FOR OP/ED)

## 2023-10-27 PROCEDURE — C2625 STENT, NON-COR, TEM W/DEL SY: HCPCS | Performed by: UROLOGY

## 2023-10-27 PROCEDURE — 7100000001 HC RECOVERY ROOM TIME - INITIAL BASE CHARGE: Performed by: UROLOGY

## 2023-10-27 PROCEDURE — 2500000004 HC RX 250 GENERAL PHARMACY W/ HCPCS (ALT 636 FOR OP/ED): Performed by: ANESTHESIOLOGY

## 2023-10-27 PROCEDURE — 3700000001 HC GENERAL ANESTHESIA TIME - INITIAL BASE CHARGE: Performed by: UROLOGY

## 2023-10-27 PROCEDURE — 2500000004 HC RX 250 GENERAL PHARMACY W/ HCPCS (ALT 636 FOR OP/ED): Performed by: STUDENT IN AN ORGANIZED HEALTH CARE EDUCATION/TRAINING PROGRAM

## 2023-10-27 PROCEDURE — 2500000005 HC RX 250 GENERAL PHARMACY W/O HCPCS

## 2023-10-27 PROCEDURE — 74018 RADEX ABDOMEN 1 VIEW: CPT | Performed by: STUDENT IN AN ORGANIZED HEALTH CARE EDUCATION/TRAINING PROGRAM

## 2023-10-27 PROCEDURE — 84295 ASSAY OF SERUM SODIUM: CPT

## 2023-10-27 PROCEDURE — 44700 SUSPEND BOWEL W/PROSTHESIS: CPT | Performed by: UROLOGY

## 2023-10-27 PROCEDURE — 1170000001 HC PRIVATE ONCOLOGY ROOM DAILY

## 2023-10-27 PROCEDURE — 76942 ECHO GUIDE FOR BIOPSY: CPT | Performed by: STUDENT IN AN ORGANIZED HEALTH CARE EDUCATION/TRAINING PROGRAM

## 2023-10-27 PROCEDURE — 96372 THER/PROPH/DIAG INJ SC/IM: CPT | Performed by: STUDENT IN AN ORGANIZED HEALTH CARE EDUCATION/TRAINING PROGRAM

## 2023-10-27 PROCEDURE — 0DJD0ZZ INSPECTION OF LOWER INTESTINAL TRACT, OPEN APPROACH: ICD-10-PCS | Performed by: COLON & RECTAL SURGERY

## 2023-10-27 PROCEDURE — 2500000001 HC RX 250 WO HCPCS SELF ADMINISTERED DRUGS (ALT 637 FOR MEDICARE OP): Performed by: UROLOGY

## 2023-10-27 PROCEDURE — 2500000004 HC RX 250 GENERAL PHARMACY W/ HCPCS (ALT 636 FOR OP/ED): Performed by: UROLOGY

## 2023-10-27 PROCEDURE — 83605 ASSAY OF LACTIC ACID: CPT

## 2023-10-27 PROCEDURE — P9045 ALBUMIN (HUMAN), 5%, 250 ML: HCPCS | Mod: JZ

## 2023-10-27 PROCEDURE — A4217 STERILE WATER/SALINE, 500 ML: HCPCS | Performed by: UROLOGY

## 2023-10-27 PROCEDURE — 99223 1ST HOSP IP/OBS HIGH 75: CPT | Performed by: STUDENT IN AN ORGANIZED HEALTH CARE EDUCATION/TRAINING PROGRAM

## 2023-10-27 PROCEDURE — 0T9B00Z DRAINAGE OF BLADDER WITH DRAINAGE DEVICE, OPEN APPROACH: ICD-10-PCS | Performed by: UROLOGY

## 2023-10-27 PROCEDURE — 0T1807A BYPASS BILATERAL URETERS TO ILEUM WITH AUTOLOGOUS TISSUE SUBSTITUTE, OPEN APPROACH: ICD-10-PCS | Performed by: UROLOGY

## 2023-10-27 PROCEDURE — 94760 N-INVAS EAR/PLS OXIMETRY 1: CPT

## 2023-10-27 PROCEDURE — A51040 PR INCISE/DRAIN BLADDER: Performed by: ANESTHESIOLOGY

## 2023-10-27 PROCEDURE — 50825 CONSTRUCT BOWEL BLADDER: CPT | Performed by: UROLOGY

## 2023-10-27 PROCEDURE — 3700000002 HC GENERAL ANESTHESIA TIME - EACH INCREMENTAL 1 MINUTE: Performed by: UROLOGY

## 2023-10-27 PROCEDURE — 0TUB07Z SUPPLEMENT BLADDER WITH AUTOLOGOUS TISSUE SUBSTITUTE, OPEN APPROACH: ICD-10-PCS | Performed by: UROLOGY

## 2023-10-27 PROCEDURE — 3600000004 HC OR TIME - INITIAL BASE CHARGE - PROCEDURE LEVEL FOUR: Performed by: UROLOGY

## 2023-10-27 PROCEDURE — 36620 INSERTION CATHETER ARTERY: CPT

## 2023-10-27 PROCEDURE — 74018 RADEX ABDOMEN 1 VIEW: CPT

## 2023-10-27 PROCEDURE — 3600000009 HC OR TIME - EACH INCREMENTAL 1 MINUTE - PROCEDURE LEVEL FOUR: Performed by: UROLOGY

## 2023-10-27 PROCEDURE — 0TJB8ZZ INSPECTION OF BLADDER, VIA NATURAL OR ARTIFICIAL OPENING ENDOSCOPIC: ICD-10-PCS | Performed by: UROLOGY

## 2023-10-27 PROCEDURE — 50820 CONSTRUCT BOWEL BLADDER: CPT | Performed by: UROLOGY

## 2023-10-27 DEVICE — IMPLANTABLE DEVICE: Type: IMPLANTABLE DEVICE | Site: ABDOMEN | Status: FUNCTIONAL

## 2023-10-27 RX ORDER — HYDROMORPHONE HYDROCHLORIDE 1 MG/ML
0.5 INJECTION, SOLUTION INTRAMUSCULAR; INTRAVENOUS; SUBCUTANEOUS EVERY 5 MIN PRN
Status: DISCONTINUED | OUTPATIENT
Start: 2023-10-27 | End: 2023-10-27 | Stop reason: HOSPADM

## 2023-10-27 RX ORDER — GENTAMICIN 40 MG/ML
INJECTION, SOLUTION INTRAMUSCULAR; INTRAVENOUS AS NEEDED
Status: DISCONTINUED | OUTPATIENT
Start: 2023-10-27 | End: 2023-10-27

## 2023-10-27 RX ORDER — DEXAMETHASONE SODIUM PHOSPHATE 4 MG/ML
INJECTION, SOLUTION INTRA-ARTICULAR; INTRALESIONAL; INTRAMUSCULAR; INTRAVENOUS; SOFT TISSUE AS NEEDED
Status: DISCONTINUED | OUTPATIENT
Start: 2023-10-27 | End: 2023-10-27

## 2023-10-27 RX ORDER — HYDROMORPHONE HYDROCHLORIDE 1 MG/ML
0.2 INJECTION, SOLUTION INTRAMUSCULAR; INTRAVENOUS; SUBCUTANEOUS EVERY 2 HOUR PRN
Status: DISCONTINUED | OUTPATIENT
Start: 2023-10-27 | End: 2023-11-03

## 2023-10-27 RX ORDER — HYDROMORPHONE HYDROCHLORIDE 1 MG/ML
INJECTION, SOLUTION INTRAMUSCULAR; INTRAVENOUS; SUBCUTANEOUS AS NEEDED
Status: DISCONTINUED | OUTPATIENT
Start: 2023-10-27 | End: 2023-10-27

## 2023-10-27 RX ORDER — SODIUM CHLORIDE 0.9 G/100ML
IRRIGANT IRRIGATION AS NEEDED
Status: DISCONTINUED | OUTPATIENT
Start: 2023-10-27 | End: 2023-10-27 | Stop reason: HOSPADM

## 2023-10-27 RX ORDER — OXYCODONE HYDROCHLORIDE 5 MG/1
5 TABLET ORAL EVERY 4 HOURS PRN
Status: DISCONTINUED | OUTPATIENT
Start: 2023-10-27 | End: 2023-10-27 | Stop reason: HOSPADM

## 2023-10-27 RX ORDER — WATER 1 ML/ML
IRRIGANT IRRIGATION AS NEEDED
Status: DISCONTINUED | OUTPATIENT
Start: 2023-10-27 | End: 2023-10-27 | Stop reason: HOSPADM

## 2023-10-27 RX ORDER — ACETAMINOPHEN 325 MG/1
975 TABLET ORAL EVERY 6 HOURS
Status: DISCONTINUED | OUTPATIENT
Start: 2023-10-27 | End: 2023-11-17 | Stop reason: HOSPADM

## 2023-10-27 RX ORDER — HEPARIN SODIUM 5000 [USP'U]/ML
5000 INJECTION, SOLUTION INTRAVENOUS; SUBCUTANEOUS EVERY 8 HOURS
Status: DISCONTINUED | OUTPATIENT
Start: 2023-10-27 | End: 2023-11-17 | Stop reason: HOSPADM

## 2023-10-27 RX ORDER — ONDANSETRON 4 MG/1
4 TABLET, ORALLY DISINTEGRATING ORAL EVERY 8 HOURS PRN
Status: DISCONTINUED | OUTPATIENT
Start: 2023-10-27 | End: 2023-11-03

## 2023-10-27 RX ORDER — SENNOSIDES 8.6 MG/1
2 TABLET ORAL 2 TIMES DAILY
Status: DISCONTINUED | OUTPATIENT
Start: 2023-10-27 | End: 2023-11-17 | Stop reason: HOSPADM

## 2023-10-27 RX ORDER — ONDANSETRON HYDROCHLORIDE 2 MG/ML
4 INJECTION, SOLUTION INTRAVENOUS EVERY 8 HOURS PRN
Status: DISCONTINUED | OUTPATIENT
Start: 2023-10-27 | End: 2023-11-03

## 2023-10-27 RX ORDER — NALOXONE HYDROCHLORIDE 0.4 MG/ML
0.2 INJECTION, SOLUTION INTRAMUSCULAR; INTRAVENOUS; SUBCUTANEOUS EVERY 5 MIN PRN
Status: DISCONTINUED | OUTPATIENT
Start: 2023-10-27 | End: 2023-11-17 | Stop reason: HOSPADM

## 2023-10-27 RX ORDER — HYDROMORPHONE HYDROCHLORIDE 1 MG/ML
0.2 INJECTION, SOLUTION INTRAMUSCULAR; INTRAVENOUS; SUBCUTANEOUS EVERY 5 MIN PRN
Status: DISCONTINUED | OUTPATIENT
Start: 2023-10-27 | End: 2023-10-27 | Stop reason: HOSPADM

## 2023-10-27 RX ORDER — FENTANYL CITRATE 50 UG/ML
INJECTION, SOLUTION INTRAMUSCULAR; INTRAVENOUS AS NEEDED
Status: DISCONTINUED | OUTPATIENT
Start: 2023-10-27 | End: 2023-10-27

## 2023-10-27 RX ORDER — METHOCARBAMOL 100 MG/ML
500 INJECTION, SOLUTION INTRAMUSCULAR; INTRAVENOUS ONCE
Status: COMPLETED | OUTPATIENT
Start: 2023-10-27 | End: 2023-10-27

## 2023-10-27 RX ORDER — MAGNESIUM SULFATE HEPTAHYDRATE 500 MG/ML
INJECTION, SOLUTION INTRAMUSCULAR; INTRAVENOUS AS NEEDED
Status: DISCONTINUED | OUTPATIENT
Start: 2023-10-27 | End: 2023-10-27

## 2023-10-27 RX ORDER — PROPOFOL 10 MG/ML
INJECTION, EMULSION INTRAVENOUS AS NEEDED
Status: DISCONTINUED | OUTPATIENT
Start: 2023-10-27 | End: 2023-10-27

## 2023-10-27 RX ORDER — OXYCODONE HYDROCHLORIDE 5 MG/1
5 TABLET ORAL EVERY 6 HOURS PRN
Status: DISCONTINUED | OUTPATIENT
Start: 2023-10-27 | End: 2023-10-31

## 2023-10-27 RX ORDER — SODIUM CHLORIDE 9 MG/ML
75 INJECTION, SOLUTION INTRAVENOUS CONTINUOUS
Status: DISCONTINUED | OUTPATIENT
Start: 2023-10-27 | End: 2023-11-01

## 2023-10-27 RX ORDER — SODIUM CHLORIDE, SODIUM LACTATE, POTASSIUM CHLORIDE, CALCIUM CHLORIDE 600; 310; 30; 20 MG/100ML; MG/100ML; MG/100ML; MG/100ML
100 INJECTION, SOLUTION INTRAVENOUS CONTINUOUS
Status: DISCONTINUED | OUTPATIENT
Start: 2023-10-27 | End: 2023-10-27 | Stop reason: HOSPADM

## 2023-10-27 RX ORDER — PHENYLEPHRINE HCL IN 0.9% NACL 0.4MG/10ML
SYRINGE (ML) INTRAVENOUS AS NEEDED
Status: DISCONTINUED | OUTPATIENT
Start: 2023-10-27 | End: 2023-10-27

## 2023-10-27 RX ORDER — HEPARIN SODIUM 5000 [USP'U]/ML
5000 INJECTION, SOLUTION INTRAVENOUS; SUBCUTANEOUS ONCE
Status: COMPLETED | OUTPATIENT
Start: 2023-10-27 | End: 2023-10-27

## 2023-10-27 RX ORDER — LIDOCAINE HYDROCHLORIDE 10 MG/ML
0.1 INJECTION, SOLUTION EPIDURAL; INFILTRATION; INTRACAUDAL; PERINEURAL ONCE
Status: DISCONTINUED | OUTPATIENT
Start: 2023-10-27 | End: 2023-10-27 | Stop reason: HOSPADM

## 2023-10-27 RX ORDER — ALBUMIN HUMAN 50 G/1000ML
SOLUTION INTRAVENOUS AS NEEDED
Status: DISCONTINUED | OUTPATIENT
Start: 2023-10-27 | End: 2023-10-27

## 2023-10-27 RX ORDER — LIDOCAINE HCL/PF 100 MG/5ML
SYRINGE (ML) INTRAVENOUS AS NEEDED
Status: DISCONTINUED | OUTPATIENT
Start: 2023-10-27 | End: 2023-10-27

## 2023-10-27 RX ORDER — METOCLOPRAMIDE HYDROCHLORIDE 5 MG/ML
10 INJECTION INTRAMUSCULAR; INTRAVENOUS EVERY 6 HOURS PRN
Status: DISCONTINUED | OUTPATIENT
Start: 2023-10-27 | End: 2023-11-03

## 2023-10-27 RX ORDER — OXYCODONE HYDROCHLORIDE 5 MG/1
10 TABLET ORAL EVERY 4 HOURS PRN
Status: DISCONTINUED | OUTPATIENT
Start: 2023-10-27 | End: 2023-10-31

## 2023-10-27 RX ORDER — ACETAMINOPHEN 325 MG/1
650 TABLET ORAL EVERY 4 HOURS PRN
Status: DISCONTINUED | OUTPATIENT
Start: 2023-10-27 | End: 2023-10-27 | Stop reason: HOSPADM

## 2023-10-27 RX ORDER — MIDAZOLAM HYDROCHLORIDE 1 MG/ML
INJECTION, SOLUTION INTRAMUSCULAR; INTRAVENOUS AS NEEDED
Status: DISCONTINUED | OUTPATIENT
Start: 2023-10-27 | End: 2023-10-27

## 2023-10-27 RX ORDER — NALOXONE HYDROCHLORIDE 0.4 MG/ML
0.2 INJECTION, SOLUTION INTRAMUSCULAR; INTRAVENOUS; SUBCUTANEOUS EVERY 5 MIN PRN
Status: DISCONTINUED | OUTPATIENT
Start: 2023-10-27 | End: 2023-11-03

## 2023-10-27 RX ORDER — ROCURONIUM BROMIDE 10 MG/ML
INJECTION, SOLUTION INTRAVENOUS AS NEEDED
Status: DISCONTINUED | OUTPATIENT
Start: 2023-10-27 | End: 2023-10-27

## 2023-10-27 RX ORDER — SODIUM CHLORIDE, SODIUM LACTATE, POTASSIUM CHLORIDE, CALCIUM CHLORIDE 600; 310; 30; 20 MG/100ML; MG/100ML; MG/100ML; MG/100ML
INJECTION, SOLUTION INTRAVENOUS CONTINUOUS PRN
Status: DISCONTINUED | OUTPATIENT
Start: 2023-10-27 | End: 2023-10-27

## 2023-10-27 RX ORDER — POLYETHYLENE GLYCOL 3350 17 G/17G
17 POWDER, FOR SOLUTION ORAL DAILY
Status: DISCONTINUED | OUTPATIENT
Start: 2023-10-27 | End: 2023-10-31

## 2023-10-27 RX ORDER — CEFAZOLIN 1 G/1
INJECTION, POWDER, FOR SOLUTION INTRAVENOUS AS NEEDED
Status: DISCONTINUED | OUTPATIENT
Start: 2023-10-27 | End: 2023-10-27

## 2023-10-27 RX ORDER — ROPIVACAINE IN 0.9% SOD CHL/PF 0.2 %
6 PLASTIC BAG, INJECTION (ML) EPIDURAL CONTINUOUS
Status: DISCONTINUED | OUTPATIENT
Start: 2023-10-27 | End: 2023-10-30

## 2023-10-27 RX ORDER — METOCLOPRAMIDE 10 MG/1
10 TABLET ORAL EVERY 6 HOURS PRN
Status: DISCONTINUED | OUTPATIENT
Start: 2023-10-27 | End: 2023-11-03

## 2023-10-27 RX ORDER — OXYCODONE HYDROCHLORIDE 5 MG/1
10 TABLET ORAL EVERY 4 HOURS PRN
Status: DISCONTINUED | OUTPATIENT
Start: 2023-10-27 | End: 2023-10-27 | Stop reason: HOSPADM

## 2023-10-27 RX ORDER — BISACODYL 5 MG
10 TABLET, DELAYED RELEASE (ENTERIC COATED) ORAL DAILY PRN
Status: DISCONTINUED | OUTPATIENT
Start: 2023-10-27 | End: 2023-11-17 | Stop reason: HOSPADM

## 2023-10-27 RX ADMIN — HYDROMORPHONE HYDROCHLORIDE 0.5 MG: 1 INJECTION, SOLUTION INTRAMUSCULAR; INTRAVENOUS; SUBCUTANEOUS at 17:00

## 2023-10-27 RX ADMIN — GENTAMICIN SULFATE 460 MG: 40 INJECTION, SOLUTION INTRAMUSCULAR; INTRAVENOUS at 15:06

## 2023-10-27 RX ADMIN — FLUCONAZOLE 400 MG: 2 INJECTION, SOLUTION INTRAVENOUS at 08:50

## 2023-10-27 RX ADMIN — MIDAZOLAM 2 MG: 1 INJECTION INTRAMUSCULAR; INTRAVENOUS at 07:45

## 2023-10-27 RX ADMIN — Medication 80 MCG: at 15:50

## 2023-10-27 RX ADMIN — ROCURONIUM BROMIDE 20 MG: 50 INJECTION, SOLUTION INTRAVENOUS at 09:14

## 2023-10-27 RX ADMIN — Medication 80 MCG: at 15:26

## 2023-10-27 RX ADMIN — Medication 80 MCG: at 11:35

## 2023-10-27 RX ADMIN — Medication 80 MCG: at 15:09

## 2023-10-27 RX ADMIN — Medication 80 MCG: at 15:06

## 2023-10-27 RX ADMIN — ROCURONIUM BROMIDE 70 MG: 50 INJECTION, SOLUTION INTRAVENOUS at 08:07

## 2023-10-27 RX ADMIN — PROPOFOL 20 MG: 10 INJECTION, EMULSION INTRAVENOUS at 16:07

## 2023-10-27 RX ADMIN — SODIUM CHLORIDE, POTASSIUM CHLORIDE, SODIUM LACTATE AND CALCIUM CHLORIDE: 600; 310; 30; 20 INJECTION, SOLUTION INTRAVENOUS at 07:59

## 2023-10-27 RX ADMIN — SUGAMMADEX 100 MG: 100 INJECTION, SOLUTION INTRAVENOUS at 16:06

## 2023-10-27 RX ADMIN — SUGAMMADEX 100 MG: 100 INJECTION, SOLUTION INTRAVENOUS at 16:11

## 2023-10-27 RX ADMIN — ROCURONIUM BROMIDE 20 MG: 50 INJECTION, SOLUTION INTRAVENOUS at 10:12

## 2023-10-27 RX ADMIN — FENTANYL CITRATE 50 MCG: 50 INJECTION, SOLUTION INTRAMUSCULAR; INTRAVENOUS at 08:06

## 2023-10-27 RX ADMIN — HYDROMORPHONE HYDROCHLORIDE 0.2 MG: 1 INJECTION, SOLUTION INTRAMUSCULAR; INTRAVENOUS; SUBCUTANEOUS at 14:15

## 2023-10-27 RX ADMIN — ROCURONIUM BROMIDE 10 MG: 50 INJECTION, SOLUTION INTRAVENOUS at 10:50

## 2023-10-27 RX ADMIN — ROCURONIUM BROMIDE 20 MG: 50 INJECTION, SOLUTION INTRAVENOUS at 15:21

## 2023-10-27 RX ADMIN — HYDROMORPHONE HYDROCHLORIDE 0.4 MG: 1 INJECTION, SOLUTION INTRAMUSCULAR; INTRAVENOUS; SUBCUTANEOUS at 16:31

## 2023-10-27 RX ADMIN — HYDROMORPHONE HYDROCHLORIDE 0.5 MG: 1 INJECTION, SOLUTION INTRAMUSCULAR; INTRAVENOUS; SUBCUTANEOUS at 17:45

## 2023-10-27 RX ADMIN — Medication 3 L/MIN: at 16:35

## 2023-10-27 RX ADMIN — ACETAMINOPHEN 975 MG: 325 TABLET ORAL at 20:30

## 2023-10-27 RX ADMIN — ONDANSETRON 4 MG: 2 INJECTION INTRAMUSCULAR; INTRAVENOUS at 15:28

## 2023-10-27 RX ADMIN — PROPOFOL 20 MG: 10 INJECTION, EMULSION INTRAVENOUS at 15:15

## 2023-10-27 RX ADMIN — PROPOFOL 20 MG: 10 INJECTION, EMULSION INTRAVENOUS at 15:54

## 2023-10-27 RX ADMIN — SODIUM CHLORIDE 100 ML/HR: 9 INJECTION, SOLUTION INTRAVENOUS at 19:00

## 2023-10-27 RX ADMIN — HYDROMORPHONE HYDROCHLORIDE 0.2 MG: 1 INJECTION, SOLUTION INTRAMUSCULAR; INTRAVENOUS; SUBCUTANEOUS at 22:01

## 2023-10-27 RX ADMIN — ROCURONIUM BROMIDE 20 MG: 50 INJECTION, SOLUTION INTRAVENOUS at 13:06

## 2023-10-27 RX ADMIN — HYDROMORPHONE HYDROCHLORIDE 0.2 MG: 1 INJECTION, SOLUTION INTRAMUSCULAR; INTRAVENOUS; SUBCUTANEOUS at 15:27

## 2023-10-27 RX ADMIN — OXYCODONE HYDROCHLORIDE 10 MG: 5 TABLET ORAL at 20:29

## 2023-10-27 RX ADMIN — ROCURONIUM BROMIDE 10 MG: 50 INJECTION, SOLUTION INTRAVENOUS at 10:35

## 2023-10-27 RX ADMIN — ROCURONIUM BROMIDE 20 MG: 50 INJECTION, SOLUTION INTRAVENOUS at 12:25

## 2023-10-27 RX ADMIN — CEFAZOLIN 2 G: 1 INJECTION, POWDER, FOR SOLUTION INTRAMUSCULAR; INTRAVENOUS at 12:48

## 2023-10-27 RX ADMIN — HEPARIN SODIUM 5000 UNITS: 5000 INJECTION, SOLUTION INTRAVENOUS; SUBCUTANEOUS at 06:49

## 2023-10-27 RX ADMIN — PROPOFOL 160 MG: 10 INJECTION, EMULSION INTRAVENOUS at 08:06

## 2023-10-27 RX ADMIN — LIDOCAINE HYDROCHLORIDE 100 MG: 20 INJECTION INTRAVENOUS at 08:06

## 2023-10-27 RX ADMIN — Medication 80 MCG: at 14:24

## 2023-10-27 RX ADMIN — PROPOFOL 10 MG: 10 INJECTION, EMULSION INTRAVENOUS at 15:51

## 2023-10-27 RX ADMIN — Medication 80 MCG: at 13:18

## 2023-10-27 RX ADMIN — OXYBUTYNIN CHLORIDE 5 MG: 5 TABLET ORAL at 20:30

## 2023-10-27 RX ADMIN — HYDROMORPHONE HYDROCHLORIDE 0.2 MG: 1 INJECTION, SOLUTION INTRAMUSCULAR; INTRAVENOUS; SUBCUTANEOUS at 14:52

## 2023-10-27 RX ADMIN — METHOCARBAMOL 500 MG: 1000 INJECTION, SOLUTION INTRAMUSCULAR; INTRAVENOUS at 17:55

## 2023-10-27 RX ADMIN — PROPOFOL 10 MG: 10 INJECTION, EMULSION INTRAVENOUS at 16:02

## 2023-10-27 RX ADMIN — Medication 80 MCG: at 14:08

## 2023-10-27 RX ADMIN — ROCURONIUM BROMIDE 20 MG: 50 INJECTION, SOLUTION INTRAVENOUS at 11:28

## 2023-10-27 RX ADMIN — DEXAMETHASONE SODIUM PHOSPHATE 4 MG: 4 INJECTION, SOLUTION INTRAMUSCULAR; INTRAVENOUS at 09:51

## 2023-10-27 RX ADMIN — HYDROMORPHONE HYDROCHLORIDE 0.5 MG: 1 INJECTION, SOLUTION INTRAMUSCULAR; INTRAVENOUS; SUBCUTANEOUS at 17:30

## 2023-10-27 RX ADMIN — ALBUMIN HUMAN 250 ML: 0.05 INJECTION, SOLUTION INTRAVENOUS at 14:26

## 2023-10-27 RX ADMIN — HYDROMORPHONE HYDROCHLORIDE 0.5 MG: 1 INJECTION, SOLUTION INTRAMUSCULAR; INTRAVENOUS; SUBCUTANEOUS at 17:40

## 2023-10-27 RX ADMIN — SUGAMMADEX 100 MG: 100 INJECTION, SOLUTION INTRAVENOUS at 16:07

## 2023-10-27 RX ADMIN — Medication 3 L/MIN: at 16:42

## 2023-10-27 RX ADMIN — STANDARDIZED SENNA CONCENTRATE 17.2 MG: 8.6 TABLET ORAL at 20:30

## 2023-10-27 RX ADMIN — ALBUMIN HUMAN 250 ML: 0.05 INJECTION, SOLUTION INTRAVENOUS at 12:48

## 2023-10-27 RX ADMIN — Medication 80 MCG: at 14:51

## 2023-10-27 RX ADMIN — PROPOFOL 20 MG: 10 INJECTION, EMULSION INTRAVENOUS at 10:35

## 2023-10-27 RX ADMIN — CEFAZOLIN 2 G: 1 INJECTION, POWDER, FOR SOLUTION INTRAMUSCULAR; INTRAVENOUS at 08:49

## 2023-10-27 RX ADMIN — ROCURONIUM BROMIDE 20 MG: 50 INJECTION, SOLUTION INTRAVENOUS at 14:03

## 2023-10-27 RX ADMIN — SUGAMMADEX 100 MG: 100 INJECTION, SOLUTION INTRAVENOUS at 16:13

## 2023-10-27 RX ADMIN — Medication 80 MCG: at 15:31

## 2023-10-27 RX ADMIN — HEPARIN SODIUM 5000 UNITS: 5000 INJECTION INTRAVENOUS; SUBCUTANEOUS at 20:29

## 2023-10-27 RX ADMIN — FENTANYL CITRATE 50 MCG: 50 INJECTION, SOLUTION INTRAMUSCULAR; INTRAVENOUS at 09:03

## 2023-10-27 RX ADMIN — MAGNESIUM SULFATE HEPTAHYDRATE 1 G: 500 INJECTION, SOLUTION INTRAMUSCULAR; INTRAVENOUS at 14:59

## 2023-10-27 ASSESSMENT — PAIN SCALES - GENERAL
PAINLEVEL_OUTOF10: 7
PAINLEVEL_OUTOF10: 8
PAINLEVEL_OUTOF10: 9
PAINLEVEL_OUTOF10: 0 - NO PAIN
PAIN_LEVEL: 10
PAINLEVEL_OUTOF10: 10 - WORST POSSIBLE PAIN
PAINLEVEL_OUTOF10: 0 - NO PAIN
PAINLEVEL_OUTOF10: 9
PAINLEVEL_OUTOF10: 7
PAINLEVEL_OUTOF10: 8

## 2023-10-27 ASSESSMENT — COLUMBIA-SUICIDE SEVERITY RATING SCALE - C-SSRS
1. IN THE PAST MONTH, HAVE YOU WISHED YOU WERE DEAD OR WISHED YOU COULD GO TO SLEEP AND NOT WAKE UP?: NO
6. HAVE YOU EVER DONE ANYTHING, STARTED TO DO ANYTHING, OR PREPARED TO DO ANYTHING TO END YOUR LIFE?: NO
2. HAVE YOU ACTUALLY HAD ANY THOUGHTS OF KILLING YOURSELF?: NO

## 2023-10-27 ASSESSMENT — PAIN - FUNCTIONAL ASSESSMENT
PAIN_FUNCTIONAL_ASSESSMENT: 0-10

## 2023-10-27 NOTE — PROCEDURES
Peripheral Block    Patient location during procedure: pre-op  Start time: 10/27/2023 7:45 AM  End time: 10/27/2023 8:00 AM  Reason for block: at surgeon's request  Staffing  Performed: resident   Authorized by: Lynnette Ott MD    Performed by: Karyn Morgan DO  Preanesthetic Checklist  Completed: patient identified, IV checked, site marked, risks and benefits discussed, surgical consent, monitors and equipment checked, pre-op evaluation and timeout performed   Timeout performed at: 10/27/2023 7:38 AM  Peripheral Block  Patient position: laying flat  Prep: ChloraPrep  Patient monitoring: heart rate and continuous pulse ox  Block type: QL  Laterality: B/L  Injection technique: catheter  Guidance: ultrasound guided  Local infiltration: lidocaine  Needle  Needle type: Tuohy   Needle gauge: 26 G  Needle length: 8 cm  Needle localization: ultrasound guidance     image stored in chart  Assessment  Injection assessment: negative aspiration for heme, no paresthesia on injection, incremental injection and local visualized surrounding nerve on ultrasound  Additional Notes  QL catheters: informed consent obtained. risks and benefits discussed. ASA monitors placed, timeout performed. Pt positioned, prepped with chlorhexidine, draped with sterile towels. Ultrasound guidance used with visualization of the needle throughout duration of the procedure. Aspiration was negative. A total of 30 cc 0.5% ropivacaine, 100mcg epinephrine, and 4mg decadron injected between both sides. catheters threaded into space and secured. Patient tolerated procedure well.       Timeout by DANNI Jim

## 2023-10-27 NOTE — ANESTHESIA PREPROCEDURE EVALUATION
Patient: Jose Thornton    Procedure Information       Date/Time: 10/27/23 0715    Procedures:       Bilateral ureteral reimplant, rectovesical fistula repair, possible illeal conduit, possible muscle flap (Bilateral)      Exploration Laparotomy    Location: [facility] OR 03 / [facility] OR    Surgeons: Tomas Andersen MD            Relevant Problems   Anesthesia (within normal limits)      Cardiovascular (within normal limits)      Endocrine (within normal limits)      GI   (+) Crohn's disease (CMS/HCC)      /Renal   (+) Stage 1 chronic kidney disease      Neuro/Psych   (+) Anxiety due to invasive procedure      Pulmonary (within normal limits)      Hematology   (+) Anemia     Vitals:    10/27/23 0611   BP: 130/73   Pulse: 75   Resp: 17   Temp: 36.3 °C (97.3 °F)   SpO2: 97%       Past Surgical History:   Procedure Laterality Date    CT GUIDED PERCUTANEOUS PERITONEAL OR RETROPERITONEAL FLUID COLLECTION DRAINAGE  9/28/2022    CT GUIDED PERCUTANEOUS PERITONEAL OR RETROPERITONEAL FLUID COLLECTION DRAINAGE 9/28/2022 [facility] [place] [facility]    IR NEPHROSTOMY TUBE EXCHANGE  1/31/2023    IR NEPHROSTOMY TUBE EXCHANGE 1/31/2023 [facility]CTOR OFFICE [facility]GAC[facility]    IR NEPHROSTOMY TUBE EXCHANGE  1/31/2023    [facility]    [facility]ROSTOMY TUBE EXCHANGE  3/23/2023    IR NEPHROSTOMY TUBE EXCHANGE [facility]    IR NEPHROSTOMY TUBE EXCHANGE  3/23/2023    IR NEPHROSTOMY TUBE EXCHANGE [facility]    IR NEPHROSTOMY TUBE EXCHANGE  3/30/2023    IR NEPHROSTOMY TUBE EXCHANGE [facility]    IR NEPHROSTOMY TUBE EXCHANGE  4/25/2023    IR NEPHROSTOMY TUBE EXCHANGE [facility]    IR NEPHROSTOMY TUBE EXCHANGE  6/30/2023    IR NEPHROSTOMY TUBE EXCHANGE 6/30/2023 [facility]    IR NEPHROSTOMY TUBE EXCHANGE  6/30/2023    IR NEPHROSTOMY TUBE EXCHANGE 6/30/2023 [facility]    IR NEPHROSTOMY TUBE EXCHANGE  8/25/2023    IR NEPHROSTOMY TUBE EXCHANGE 8/25/2023 [facility]    IR NEPHROSTOMY TUBE EXCHANGE  8/25/2023    IR  NEPHROSTOMY TUBE EXCHANGE 8/25/2023 [facility] [place] [facility]    IR NEPHROSTOMY TUBE EXCHANGE  10/18/2023    IR NEPHROSTOMY TUBE EXCHANGE 10/18/2023 Onesimo Reyes MD [facility] [place] [facility]     Past Medical History:   Diagnosis Date    Crohn's disease (CMS/HCC)     Diverticulosis     GERD (gastroesophageal reflux disease)     GI (gastrointestinal bleed)     Urinary tract infection        Current Facility-Administered Medications:     acetaminophen (Tylenol) tablet 650 mg, 650 mg, oral, q6h PRN, Naren Martinez MD    ceFAZolin in dextrose (iso-os) (Ancef) IVPB 1 g, 1 g, intravenous, q8h, Tomas Andersen MD, Stopped at 10/26/23 2355    fluconazole in NaCl (iso-osm) (Diflucan) IVPB 400 mg, 400 mg, intravenous, q24h, Anel Ferrer MD, 400 mg at 10/26/23 0844    fluticasone (Flonase) nasal spray 2 spray, 2 spray, Each Nostril, Daily, Maggie Lerma MD, 2 spray at 10/26/23 0844    gentamicin (Garamycin) 460 mg in dextrose 5 % in water (D5W) 100 mL IV, 5 mg/kg (Adjusted), intravenous, q24h LAVINIA, Anel Ferrer MD, Stopped at 10/26/23 1606    heparin (porcine) injection 5,000 Units, 5,000 Units, subcutaneous, q8h, Naren Martinez MD, 5,000 Units at 10/26/23 2326    melatonin tablet 6 mg, 6 mg, oral, Nightly PRN, Naren Martinez MD, 6 mg at 10/26/23 2325    miscellaneous medical supply misc, , miscellaneous, Continuous, Naren Martinez MD    ondansetron (Zofran) tablet 4 mg, 4 mg, oral, q8h PRN **OR** ondansetron (Zofran) injection 4 mg, 4 mg, intravenous, q8h PRN, Naren Martinez MD    oxybutynin (Ditropan) tablet 5 mg, 5 mg, oral, TID, Maggie Lerma MD, 5 mg at 10/26/23 2057    Facility-Administered Medications Ordered in Other Encounters:     surgicel fibrillar 2 x 4 (Hemostat) pad  - Omnicell Override Pull, , , ,   Prior to Admission medications    Medication Sig Start Date End Date Taking? Authorizing Provider   acetaminophen (Tylenol) 500 mg tablet Take 2 tablets (1,000 mg) by mouth every 6 hours if needed.    Historical  Provider, MD   diazePAM (Valium) 2 mg tablet Take 1 tablet (2 mg) by mouth if needed for anxiety. TAKE 1 TABLET BY MOUTH AN HOUR PRIOR TO CATHETER CHANGE.  Patient not taking: Reported on 10/11/2023 10/2/23   TRAE Taylor   inFLIXimab (Remicade) 100 mg injection INFUSE 500MG PER PROTOCOL ON WEEKS 0,2, 6 AND THEN EVERY 8 WEEKS AS MAINTENANCE.  Patient not taking: Reported on 10/11/2023 7/13/23 1/13/24  Nick Yi MD   oxybutynin XL (Ditropan-XL) 10 mg 24 hr tablet TAKE 1 TABLET BY MOUTH TWO TIMES A DAY 4/3/23 4/2/24  Tomas Andersen MD   oxyCODONE (Roxicodone) 5 mg immediate release tablet Take 1 tablet (5 mg) by mouth see administration instructions. TAKE 1 TABLET BY MOUTH 1 HOUR PRIOR TO CATHETER CHANGE  Patient taking differently: Take 1 tablet (5 mg) by mouth if needed. TAKE 1 TABLET BY MOUTH 1 HOUR PRIOR TO CATHETER CHANGE 10/2/23   TRAE Taylor   phenazopyridine (Urinary Pain Relief) 95 mg tablet Take 1 tablet (95 mg) by mouth 3 times a day as needed for bladder spasms.    Historical Provider, MD   amoxicillin-pot clavulanate (Augmentin) 875-125 mg tablet Take 1 tablet (875 mg) by mouth 2 times a day for 7 days. 10/20/23 10/25/23  Emmanuel Moore MD   prednisoLONE ODT (OrapRED ODT) 10 mg disintegrating tablet Take 1 tablet (10 mg) by mouth once daily.  10/25/23  Historical Provider, MD   sulfamethoxazole-trimethoprim (Bactrim DS) 800-160 mg tablet Take 1 tablet by mouth 2 times a day. 6/14/23 10/25/23  Historical Provider, MD     No Known Allergies  Social History     Tobacco Use    Smoking status: Never    Smokeless tobacco: Never   Substance Use Topics    Alcohol use: Not Currently         Chemistry    Lab Results   Component Value Date/Time     10/24/2023 2216    K 4.5 10/24/2023 2216     10/24/2023 2216    CO2 25 10/24/2023 2216    BUN 17 10/24/2023 2216    CREATININE 1.98 (H) 10/24/2023 1747    Lab Results   Component Value Date/Time    CALCIUM 9.3  10/24/2023 2216    ALKPHOS 73 10/24/2023 2216    AST 17 10/24/2023 2216    ALT 17 10/24/2023 2216    BILITOT 0.2 10/24/2023 2216          Lab Results   Component Value Date/Time    WBC 8.7 10/24/2023 2216    HGB 12.7 (L) 10/24/2023 2216    HCT 38.8 (L) 10/24/2023 2216     (H) 10/24/2023 2216     Lab Results   Component Value Date/Time    PROTIME 11.3 10/05/2023 1447    INR 1.0 10/05/2023 1447       Clinical information reviewed:   Tobacco  Allergies  Meds   Med Hx  Surg Hx   Fam Hx  Soc Hx        NPO Detail:  NPO/Void Status  Carbonhydrate Drink Given Prior to Surgery? : N  Date of Last Liquid: 10/27/23  Time of Last Liquid: 0000  Date of Last Solid: 10/27/23  Time of Last Solid: 0000  Last Intake Type: Clear fluids         Physical Exam    Airway  Mallampati: II  TM distance: >3 FB  Neck ROM: full     Cardiovascular   Rhythm: regular     Dental - normal exam     Pulmonary - normal exam     Abdominal            Anesthesia Plan    ASA 3     general   (GETA, QL CBNB by APS; Arterial line; PIVx2)  intravenous induction   Postoperative administration of opioids is intended.  Trial extubation is planned.  Anesthetic plan and risks discussed with patient.  Use of blood products discussed with patient who consented to blood products.    Plan discussed with attending and resident.

## 2023-10-27 NOTE — OP NOTE
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]     Procedures  Exploratory laparotomy   Cystoscopy  Bilateral ileal ureter and bladder augment with right partial rectus muscle flap  Suprapubic tube insertion   Omental flap  Component separation with layered closure  Use of kerecis for abdominal wall reinforcement      Surgeons   Panel 1:     * Tomas Andersen - Primary  Panel 2:     * Warren Rodriguez - Primary    Resident/Fellow/Other Assistant:  Surgeon(s) and Role:  Panel 1:     * Sigifredo Mckeon MD - Resident - Assisting     * Anel Ferrer MD - Resident - Assisting    Procedure Summary  Anesthesia: General  ASA: III  Anesthesia Staff: Anesthesiologist: Benny Briseno DO  CRNA: FARTUN Perez-CRNA  Anesthesia Resident: Peggy Blandon MD  Estimated Blood Loss: 100mL  Intra-op Medications:   Medication Name Total Dose   sodium chloride 0.9 % irrigation solution 3,000 mL   surgical lubricant gel 1 Application   sterile water irrigation solution 3,000 mL   thrombin (Human)-fibrinogen-aprotinin-calcium (Tisseel) 10 mL 10 mL   fluconazole in NaCl (iso-osm) (Diflucan) IVPB 400 mg 400 mg              Anesthesia Record               Intraprocedure I/O Totals          Intake    lactated Ringer's 2700.00 mL    fluconazole in NaCl (iso-osm) (Diflucan) IVPB 400 mg 200.00 mL    Total Intake 2900 mL       Output    Urine 260 mL    Est. Blood Loss 100 mL    Total Output 360 mL       Net    Net Volume 2540 mL          Specimen: No specimens collected     Staff:   Circulator: Albert Cotto RN; Ronald Falk RN; Freida Lam RN  Relief Circulator: Kenyatta Coburn RN; Chanelle Whitfield RN  Relief Scrub: Lyn  Jim  Scrub Person: Bridgette Cox, DANNI         Drains and/or Catheters:   Closed/Suction Drain Right Abdomen Bulb 16 Fr. (Active)   Dressing Status Clean;Dry 10/27/23 1643       Closed/Suction Drain Left Abdomen Bulb 16 Fr. (Active)   Dressing Status Clean;Dry;Old drainage 10/27/23 1644   Drainage Appearance Bloody 10/27/23 1644   Status To bulb suction 10/27/23 1644   Sutures Removed Intact Yes 10/27/23 1644       Nephrostomy Left 10 Fr. (Active)   Site/Stoma Assessment Clean;Intact 10/27/23 0026   Dressing Status Clean;Dry 10/27/23 0611   Collection Container Standard drainage bag 10/25/23 2150   Output (mL) 250 mL 10/26/23 2328       Nephrostomy Right 10 Fr. (Active)   Site/Stoma Assessment Clean;Intact 10/27/23 0027   Dressing Status Clean;Dry 10/27/23 0611   Collection Container Standard drainage bag 10/25/23 2150   Output (mL) 50 mL 10/26/23 2328       Colostomy LUQ (Active)   Stomal Appliance Clean;Dry;Intact 10/27/23 1643   Site/Stoma Assessment Clean;Red;Dry 10/27/23 1643   Peristomal Assessment Intact;Clean 10/27/23 1643   Output (mL) 1 mL 10/26/23 1830       Urethral Catheter Non-latex 18 Fr. (Active)   Site Assessment Clean;Skin intact 10/27/23 1645   Collection Container Standard drainage bag 10/27/23 1645   Securement Method Securing device (Describe) 10/27/23 1645       Suprapubic Catheter Non-latex 20 Fr. (Active)       [REMOVED] Urethral Catheter Non-latex 16 Fr. (Removed)       Tourniquet Times:         Implants:  Implants       Type Name Action Serial No.      Mesh SURGICLOSE CM 7 X 20 Implanted [id number]              Findings: No large fistula seen in the bladder or urethra.     Indications: Jose Thornton is an 57 y.o. 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     The patient was seen in the preoperative area. The risks, benefits, complications, treatment options, non-operative alternatives, expected recovery and outcomes were discussed with the patient. The  possibilities of reaction to medication, pulmonary aspiration, injury to surrounding structures, bleeding, recurrent infection, the need for additional procedures, failure to diagnose a condition, and creating a complication requiring transfusion or operation were discussed with the patient. The patient concurred with the proposed plan, giving informed consent.  The site of surgery was properly noted/marked if necessary per policy. The patient has been actively warmed in preoperative area. Preoperative antibiotics have been ordered and given within 1 hours of incision. Venous thrombosis prophylaxis have been ordered including bilateral sequential compression devices and chemical prophylaxis    With Dr. Rodriguez the patients midline was opened along the prior scar with a scalpel and continued down to fascia with bovie cautery. A small defect was make in the fascia with a section of small bowel directly underlying. There was no clear area of injury of the bowel at was injured but the spot was tagged with a silk. The remained of the fascia was opened from 2 cm above the umbilicus to the pubic symphysis. A belfor retractor was placed. The tagged portion of the bowel was imbricated with inturrupted vicryl. Several adhesions were taken down sharply. There was one section of the small bowel that was adherent to the pelvis. This was removed with finger dissection. There was a raw surface of serosa which was over sewn with vicryl. A small hole was inspected under the bladder thought to lead to the rectal stump and was probed with a meghan. The bladder was filled with saline and there was no extravasation of irrigation our of the defect. This conclude Dr. Rodriguez's portion of the case.     With Dr. Andersen the retractor was replaced with a bookwalter. The bowel contents were packed superiorly and the posterior peritoneum was incised at the level of the pelvic inlet over the iliac vessels, exposing both ureters. This was more  difficult on the left due to increased fibrosis.  The ureters were carefully mobilized, preserving their blood supply. The ureters distal ureters were clipped, tagged and transected.     We then backfilled the patients andrade but was only able to instill about 50cc due to his poor compliance. We carefully dissected to  the bladder though a thick rind eventually reaching the bladder and creating a cystotomy. The bladder was inspected and we could not see an area suspicious for a fistula. A flexible cystoscope was used to inspected the urethra and prostate which were free from defects as well. The area behind the bladder was opened where we previously believed to the rectal stump but this was actually anterior to the stump. The tissue in the area appeared unhealthily and gray.     The bowel packing was removed, and attention was focused on isolating an appropriate ileal segment. The distal ileum was inspected and a 30 cm segment of ileum was chosen, 15-20cm from the ileocecal valve and marked with a silk suture. The longer segment was chosen to accommodate possible transition to a ileal conduit in the future if needed. With the aid the ileocolic and right colic arteries were identified. An avascular mesenteric window was opened on each side of the desired ileal segment using LigaSure to incise and ligate the mesentery on both sides, avoiding injury to the main intestinal vasculature.  Sufficient  mesenteric length was obtained to allow the distal end of the ileal conduit to reach the stomal site without tension. The isolated ileal segment was transected at its proximal and distal antimesenteric borders, using a stapler.     The continuity of the ileum was restored using a stapled technique as follows: The antimesenteric corners of the proximal and distal ileal segments were identified, and a small segment of tissue was resected off each end of the stapled suture lines. One limb of the  stapler was inserted into the  proximal and the other into the distal ileal segment with care taken not to injure the bowel mesentery. The ileal segments were rotated ensuring that the antimesenteric bowel walls faced each other prior to firing the stapler. Four small clamps were then placed on the ends of the transected bowel. The two clamps on the lines of the original bowel transection were held together, while the others were spread apart, creating a wide opening. A stapler was used to complete the functional end-to-end ileoileal anastomosis. The staple line was checked  confirming its integrity, and was additionally reinforced with interrupted 3-0 silk suture.    The left ureter was identified with its tagged suture and mobilized cephalad taking care not to injure the ureteral vessels. Both ureters were spatulated on the medial surface. The ileal segment was irrigated with sterile normal saline until the returns were clear. A 1cm seromuscular incision  Each ureteroileal anastomosis was completed using full-thickness 5-0 running PDS sutures. Additional vicryl sutures were placed in the crotch of the anastomosis initially to reinforce the anastomosis. The ureteroileal anastomosis was  completed over  stents.These were guided through the segment.  The left was anastamosed to the proximal end of the ileal segment while the right was an end to side anastomosis. After each anastomosis the ileum was irrigated and found to be watertight.      We moved our attention to developing a right rectum abdominus flap based on the right inferior epigastric artery. We dissected the muscle off the right peritoneum identifying the pedicle as it dove into the muscle. Approximately 2/3 of the muscle was dissected free, transected superior to the umbilicus using bipolar cautery and brought down to fill the space posterior to the bladder. A the time the muscle was interposed it appeared viable. The muscle was sutured to the deep cavity using 3-0 vicryl.      The distal ileum was spatulated 5 cm in order to augment the bladder. The most distal portion was sutured to the posterior bladder with PDS. Running vicryl was used to anastomose both sides of the bladder with the ilium. After the left side was complete a 20 fr silicone andrade was passed through the abdominal wall and brought through a small incision in the ilium and the balloon inflated. A chromic suture was used to secure the catheter to the ilium in the post operative period. The right side of the ilium was anastomosed to the bladder. The bladder was irrigated and was found to be water tight however the was a leak in the left ureteral anastomosis which was oversewn with vicryl.     At this time a peanut gauze was unaccounted for. An x ray was taken which did not show a clear retained object. It did show that the right  stent was no longer up in the kidney. We were able to palpate the stent across the ureteral anastomosis. We then freed up a portion of omentum and brought it over the bladder augment suturing it in place with vicryl. The omentum was mobilized based on the left gastroepigastroepiploic artery, and cephalad mobilization was done upto the greater curvature. This omental flap would now recah the pelvis and was used to prvide coverage on the naterior aspect of the bladder augment. We then freed up the peritoneum off the fascia to facilitate closure. During this step we found the lost peanut. Counts were correct.     The peritoneum was reapproximated with 3-0 interrupted vicryl. Kerecis surgiclose (7x20cm) was divided an sutured over the closed incision in the peritoneum. A second surgical drain was placed in this area. We then reapproximated fascia with inturrupted vicryl before closing with with looped PDS in a bidirectional running fashion. Subcutaneous tissue was closed with vicryl. Skin was stapled. The incision was covered with a prevena wound vac.     The patient tolerated the procedure  without difficulty and was taken to the recovery room in satisfactory condition.        Complications:  None; patient tolerated the procedure well.    Disposition: PACU - hemodynamically stable.  Condition: stable       Attending Attestation:     Tomas Andersen  Phone Number: [phone]

## 2023-10-27 NOTE — CONSULTS
Consults  Acute Pain Service    Jose Thornton is a 57 y.o. year old male patient presenting for bilateral ureteral reimplantation, rectovesicular fistula repair, exploratory laparotomy with Dr. Andersen 10/27/23. Acute pain consulted for postoperative pain management.    Past Medical History:   Diagnosis Date    Crohn's disease (CMS/HCC)     Diverticulosis     GERD (gastroesophageal reflux disease)     GI (gastrointestinal bleed)     Urinary tract infection      Past Surgical History:   Procedure Laterality Date    CT GUIDED PERCUTANEOUS PERITONEAL OR RETROPERITONEAL FLUID COLLECTION DRAINAGE  9/28/2022    CT GUIDED PERCUTANEOUS PERITONEAL OR RETROPERITONEAL FLUID COLLECTION DRAINAGE 9/28/2022 [facility] INPATIENT LEGACY    IR NEPHROSTOMY TUBE EXCHANGE  1/31/2023    IR NEPHROSTOMY TUBE EXCHANGE 1/31/2023 [facility]CTOR OFFICE LEGACY    IR NEPHROSTOMY TUBE EXCHANGE  1/31/2023    IR NEPHROSTOMY TUBE EXCHANGE 1/31/2023 DOCTOR OFFICE LEGACY    IR NEPHROSTOMY TUBE EXCHANGE  3/23/2023    IR NEPHROSTOMY TUBE EXCHANGE [facility]    IR NEPHROSTOMY TUBE EXCHANGE  3/23/2023    IR NEPHROSTOMY TUBE EXCHANGE [facility]    IR NEPHROSTOMY TUBE EXCHANGE  3/30/2023    IR NEPHROSTOMY TUBE EXCHANGE [facility]    IR NEPHROSTOMY TUBE EXCHANGE  4/25/2023    IR NEPHROSTOMY TUBE EXCHANGE [facility]    IR NEPHROSTOMY TUBE EXCHANGE  6/30/2023    IR NEPHROSTOMY TUBE EXCHANGE 6/30/2023 [facility]    IR NEPHROSTOMY TUBE EXCHANGE  6/30/2023    IR NEPHROSTOMY TUBE EXCHANGE 6/30/2023 [facility]    IR NEPHROSTOMY TUBE EXCHANGE  8/25/2023    IR NEPHROSTOMY TUBE EXCHANGE 8/25/2023 [facility]    IR NEPHROSTOMY TUBE EXCHANGE  8/25/2023    IR NEPHROSTOMY TUBE EXCHANGE 8/25/2023 [facility]    IR NEPHROSTOMY TUBE EXCHANGE  10/18/2023    IR NEPHROSTOMY TUBE EXCHANGE 10/18/2023 Onesimo Reyes MD [facility]     FamHx: HTN    Social History     Tobacco Use    Smoking status: Never    Smokeless tobacco: Never   Substance Use Topics    Alcohol use: Not Currently    Drug use:  Never     No Known Allergies      Review of Systems  Gen: No fatigue, anorexia, insomnia, fever.   Eyes: No vision loss, double vision, drainage, eye pain.   ENT: No pharyngitis, dry mouth, no hearing changes or ear discharge  Cardiac: No chest pain, palpitations, syncope, near syncope.   Pulmonary: No shortness of breath, cough, hemoptysis.   Heme/lymph: No swollen glands, fever, bleeding.   GI: No abdominal pain, change in bowel habits, melena, hematemesis, hematochezia, nausea, vomiting, diarrhea.   : No discharge, dysuria, frequency, urgency, hematuria.  Endo: No polyuria or weight loss.   Musculoskeletal: Negative for any pain or loss of ROM/weakness  Skin: No rashes or lesions  Neuro: Normal speech, no numbness or weakness. No gait difficulties  Review of systems is otherwise negative unless stated above or in history of present illness.    Physical Exam:  Constitutional:  no distress, alert and cooperative  Eyes: clear sclera  Head/Neck: No apparent injury, trachea midline  Respiratory/Thorax: Patent airways, thorax symmetric, breathing comfortably  Cardiovascular: no pitting edema  Gastrointestinal: Nondistended, colostomy, nephrostomy tube  Musculoskeletal: ROM intact  Extremities: no clubbing  Neurological: alert, lopez x4  Psychological: Appropriate affect    No results found for this or any previous visit (from the past 24 hour(s)).     Jose Thornton is a 57 y.o. year old male patient presenting for bilateral ureteral reimplantation, rectovesicular fistula repair, exploratory laparotomy with Dr. Andersen 10/27/23. Acute pain consulted for postoperative pain management.    PLAN  - Bilateral quadratus lumborum nerve blocks with catheters placed preoperatively on 10/27/23  - Ambit ball with Ropivacaine 0.2%/NaCl 0.9% 500mL, Rate 7cc/hr  - Ambit medication will not interfere with pain medication prescribed by the primary team.   - Please be aware of local anesthetic toxic dose and absorption variability  before considering lidocaine patches  - Acute pain service will follow while catheters in place  - Rest of pain management per primary team     Acute Pain Resident  pg [phone] ph [phone]

## 2023-10-27 NOTE — ANESTHESIA PROCEDURE NOTES
Peripheral IV  Date/Time: 10/27/2023 8:18 AM      Placement  Needle size: 18 G  Laterality: right  Location: hand  Site prep: alcohol  Technique: anatomical landmarks

## 2023-10-27 NOTE — ANESTHESIA PROCEDURE NOTES
Airway  Date/Time: 10/27/2023 8:10 AM  Urgency: elective    Airway not difficult    Staffing  Performed: resident   Authorized by: Benny Briseno DO    Performed by: Peggy Blandon MD  Patient location during procedure: OR    Indications and Patient Condition  Indications for airway management: anesthesia  Spontaneous Ventilation: absent  Sedation level: deep  Preoxygenated: yes  Patient position: sniffing  Mask difficulty assessment: 2 - vent by mask + OA or adjuvant +/- NMBA  Planned trial extubation    Final Airway Details  Final airway type: endotracheal airway      Successful airway: ETT  Cuffed: yes   Successful intubation technique: direct laryngoscopy  Facilitating devices/methods: intubating stylet  Endotracheal tube insertion site: oral  Blade: Anita  Blade size: #4  ETT size (mm): 7.5  Cormack-Lehane Classification: grade IIa - partial view of glottis  Placement verified by: chest auscultation and capnometry   Cuff volume (mL): 7  Measured from: teeth  ETT to teeth (cm): 22  Number of attempts at approach: 1    Additional Comments  Atraumatic  Two hand mask ventilation with oral airway

## 2023-10-27 NOTE — BRIEF OP NOTE
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]     Procedures    * Bilateral ureteral reimplant, rectovesical fistula repair    * Exploration Laparotomy    Surgeons   Panel 1:     * Tomas Andersen - Primary  Panel 2:     * Warren Rodriguez - Primary    Resident/Fellow/Other Assistant:  Surgeon(s) and Role:  Panel 1:     * Sigifredo Mckeon MD - Resident - Assisting     * Anel Ferrer MD - Resident - Assisting    Procedure Summary  Anesthesia: General  ASA: III  Anesthesia Staff: Anesthesiologist: Benny Briseno DO  CRNA: FARTUN Perez-CRNA  Anesthesia Resident: Peggy Blandon MD  Estimated Blood Loss: 100mL  Intra-op Medications:   Medication Name Total Dose   sodium chloride 0.9 % irrigation solution 3,000 mL   surgical lubricant gel 1 Application   sterile water irrigation solution 3,000 mL   thrombin (Human)-fibrinogen-aprotinin-calcium (Tisseel) 10 mL 10 mL   fluconazole in NaCl (iso-osm) (Diflucan) IVPB 400 mg 400 mg              Anesthesia Record               Intraprocedure I/O Totals          Intake    lactated Ringer's 2700.00 mL    fluconazole in NaCl (iso-osm) (Diflucan) IVPB 400 mg 200.00 mL    Total Intake 2900 mL       Output    Urine 260 mL    Est. Blood Loss 100 mL    Total Output 360 mL       Net    Net Volume 2540 mL          Specimen: No specimens collected     Staff:   Circulator: Albert Cotto RN; Ronald Falk RN; Freida Lam, DANNI  Relief Circulator: Kenyatta Coburn RN; Chanelle Whitfield RN  Relief Scrub: Lyn Fernandez  Scrub Person: Bridgette Cox RN          Findings: No clear fistula seen in the bladder or urethra. Negative leak test at end or procedures.     Complications:  None; patient tolerated the procedure well.     Disposition: PACU - hemodynamically  stable.  Condition: stable  Specimens Collected: No specimens collected  Attending Attestation:     Tomas Andersen  Phone Number: [phone]

## 2023-10-27 NOTE — ANESTHESIA POSTPROCEDURE EVALUATION
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    Anesthesia Post Evaluation    Patient location during evaluation: PACU  Patient participation: complete - patient participated  Level of consciousness: awake and alert  Pain score: 10  Pain management: inadequate  Airway patency: patent  Cardiovascular status: acceptable and blood pressure returned to baseline  Respiratory status: acceptable  Hydration status: acceptable  Comments: Postop pain in setting of QL CPNB, magnesium 1g IV/1 hr, periop opioids.  Will order Robaxin 500mg IV x1 (Lower dose in setting of CKD Stage 1).  Patient will probably need PCA        No notable events documented.

## 2023-10-27 NOTE — ANESTHESIA PROCEDURE NOTES
"  Arterial Line:    Date/Time: 10/27/2023 8:50 AM    Staffing  Performed: resident   Authorized by: Benny Briseno DO    Performed by: Peggy Blandon MD    An arterial line was placed. in the OR for the following indication(s): continuous blood pressure monitoring.    A 20 gauge (size) (length), Angiocath and 1.88\" (type) catheter was placed into the Left radial artery, secured by tape,   Seldinger technique not used.  Events:  patient tolerated procedure well with no complications.      Additional notes:  Ultrasound after two attempts            "

## 2023-10-27 NOTE — ANESTHESIA PROCEDURE NOTES
Peripheral IV  Date/Time: 10/27/2023 9:30 AM      Placement  Needle size: 18 G  Location: forearm  Site prep: alcohol  Technique: ultrasound guided  Attempts: 1

## 2023-10-28 LAB
ANION GAP SERPL CALC-SCNC: 14 MMOL/L (ref 10–20)
BACTERIA UR CULT: ABNORMAL
BACTERIA UR CULT: ABNORMAL
BUN SERPL-MCNC: 17 MG/DL (ref 6–23)
CALCIUM SERPL-MCNC: 8.7 MG/DL (ref 8.6–10.6)
CHLORIDE SERPL-SCNC: 107 MMOL/L (ref 98–107)
CO2 SERPL-SCNC: 22 MMOL/L (ref 21–32)
CREAT SERPL-MCNC: 1.55 MG/DL (ref 0.5–1.3)
ERYTHROCYTE [DISTWIDTH] IN BLOOD BY AUTOMATED COUNT: 15.5 % (ref 11.5–14.5)
GFR SERPL CREATININE-BSD FRML MDRD: 53 ML/MIN/1.73M*2
GLUCOSE SERPL-MCNC: 129 MG/DL (ref 74–99)
HCT VFR BLD AUTO: 36.5 % (ref 41–52)
HGB BLD-MCNC: 11 G/DL (ref 13.5–17.5)
MCH RBC QN AUTO: 27.1 PG (ref 26–34)
MCHC RBC AUTO-ENTMCNC: 30.1 G/DL (ref 32–36)
MCV RBC AUTO: 90 FL (ref 80–100)
NRBC BLD-RTO: 0 /100 WBCS (ref 0–0)
PLATELET # BLD AUTO: 385 X10*3/UL (ref 150–450)
PMV BLD AUTO: 9.2 FL (ref 7.5–11.5)
POTASSIUM SERPL-SCNC: 4.7 MMOL/L (ref 3.5–5.3)
RBC # BLD AUTO: 4.06 X10*6/UL (ref 4.5–5.9)
SODIUM SERPL-SCNC: 138 MMOL/L (ref 136–145)
WBC # BLD AUTO: 12.2 X10*3/UL (ref 4.4–11.3)

## 2023-10-28 PROCEDURE — 2500000004 HC RX 250 GENERAL PHARMACY W/ HCPCS (ALT 636 FOR OP/ED): Performed by: UROLOGY

## 2023-10-28 PROCEDURE — 2500000004 HC RX 250 GENERAL PHARMACY W/ HCPCS (ALT 636 FOR OP/ED): Performed by: STUDENT IN AN ORGANIZED HEALTH CARE EDUCATION/TRAINING PROGRAM

## 2023-10-28 PROCEDURE — 96372 THER/PROPH/DIAG INJ SC/IM: CPT | Performed by: STUDENT IN AN ORGANIZED HEALTH CARE EDUCATION/TRAINING PROGRAM

## 2023-10-28 PROCEDURE — 36415 COLL VENOUS BLD VENIPUNCTURE: CPT | Performed by: STUDENT IN AN ORGANIZED HEALTH CARE EDUCATION/TRAINING PROGRAM

## 2023-10-28 PROCEDURE — 2500000001 HC RX 250 WO HCPCS SELF ADMINISTERED DRUGS (ALT 637 FOR MEDICARE OP): Performed by: STUDENT IN AN ORGANIZED HEALTH CARE EDUCATION/TRAINING PROGRAM

## 2023-10-28 PROCEDURE — 2500000001 HC RX 250 WO HCPCS SELF ADMINISTERED DRUGS (ALT 637 FOR MEDICARE OP)

## 2023-10-28 PROCEDURE — 82374 ASSAY BLOOD CARBON DIOXIDE: CPT | Performed by: STUDENT IN AN ORGANIZED HEALTH CARE EDUCATION/TRAINING PROGRAM

## 2023-10-28 PROCEDURE — 85027 COMPLETE CBC AUTOMATED: CPT | Performed by: STUDENT IN AN ORGANIZED HEALTH CARE EDUCATION/TRAINING PROGRAM

## 2023-10-28 PROCEDURE — 2500000004 HC RX 250 GENERAL PHARMACY W/ HCPCS (ALT 636 FOR OP/ED)

## 2023-10-28 PROCEDURE — 1170000001 HC PRIVATE ONCOLOGY ROOM DAILY

## 2023-10-28 PROCEDURE — 99231 SBSQ HOSP IP/OBS SF/LOW 25: CPT

## 2023-10-28 RX ORDER — ACETAMINOPHEN 500 MG
10 TABLET ORAL NIGHTLY PRN
Status: DISCONTINUED | OUTPATIENT
Start: 2023-10-28 | End: 2023-11-17 | Stop reason: HOSPADM

## 2023-10-28 RX ADMIN — OXYCODONE HYDROCHLORIDE 10 MG: 5 TABLET ORAL at 10:13

## 2023-10-28 RX ADMIN — OXYCODONE HYDROCHLORIDE 10 MG: 5 TABLET ORAL at 05:19

## 2023-10-28 RX ADMIN — OXYBUTYNIN CHLORIDE 5 MG: 5 TABLET ORAL at 09:00

## 2023-10-28 RX ADMIN — HYDROMORPHONE HYDROCHLORIDE 0.2 MG: 1 INJECTION, SOLUTION INTRAMUSCULAR; INTRAVENOUS; SUBCUTANEOUS at 11:37

## 2023-10-28 RX ADMIN — OXYBUTYNIN CHLORIDE 5 MG: 5 TABLET ORAL at 20:50

## 2023-10-28 RX ADMIN — HEPARIN SODIUM 5000 UNITS: 5000 INJECTION INTRAVENOUS; SUBCUTANEOUS at 19:00

## 2023-10-28 RX ADMIN — STANDARDIZED SENNA CONCENTRATE 17.2 MG: 8.6 TABLET ORAL at 20:50

## 2023-10-28 RX ADMIN — CEFAZOLIN SODIUM 1 G: 1 INJECTION, SOLUTION INTRAVENOUS at 00:53

## 2023-10-28 RX ADMIN — GENTAMICIN SULFATE 460 MG: 40 INJECTION, SOLUTION INTRAMUSCULAR; INTRAVENOUS at 12:55

## 2023-10-28 RX ADMIN — HEPARIN SODIUM 5000 UNITS: 5000 INJECTION INTRAVENOUS; SUBCUTANEOUS at 10:11

## 2023-10-28 RX ADMIN — FLUCONAZOLE 400 MG: 2 INJECTION, SOLUTION INTRAVENOUS at 08:33

## 2023-10-28 RX ADMIN — OXYCODONE HYDROCHLORIDE 10 MG: 5 TABLET ORAL at 00:53

## 2023-10-28 RX ADMIN — CEFAZOLIN SODIUM 1 G: 1 INJECTION, SOLUTION INTRAVENOUS at 08:33

## 2023-10-28 RX ADMIN — CEFAZOLIN SODIUM 1 G: 1 INJECTION, SOLUTION INTRAVENOUS at 12:55

## 2023-10-28 RX ADMIN — SODIUM CHLORIDE 100 ML/HR: 9 INJECTION, SOLUTION INTRAVENOUS at 05:19

## 2023-10-28 RX ADMIN — OXYBUTYNIN CHLORIDE 5 MG: 5 TABLET ORAL at 16:23

## 2023-10-28 RX ADMIN — STANDARDIZED SENNA CONCENTRATE 17.2 MG: 8.6 TABLET ORAL at 08:32

## 2023-10-28 RX ADMIN — HYDROMORPHONE HYDROCHLORIDE 0.2 MG: 1 INJECTION, SOLUTION INTRAMUSCULAR; INTRAVENOUS; SUBCUTANEOUS at 23:49

## 2023-10-28 RX ADMIN — CEFAZOLIN SODIUM 1 G: 1 INJECTION, SOLUTION INTRAVENOUS at 20:50

## 2023-10-28 RX ADMIN — OXYCODONE HYDROCHLORIDE 10 MG: 5 TABLET ORAL at 20:50

## 2023-10-28 RX ADMIN — POLYETHYLENE GLYCOL 3350 17 G: 17 POWDER, FOR SOLUTION ORAL at 08:33

## 2023-10-28 RX ADMIN — Medication 10 MG: at 23:49

## 2023-10-28 RX ADMIN — HYDROMORPHONE HYDROCHLORIDE 0.2 MG: 1 INJECTION, SOLUTION INTRAMUSCULAR; INTRAVENOUS; SUBCUTANEOUS at 18:31

## 2023-10-28 RX ADMIN — ACETAMINOPHEN 975 MG: 325 TABLET ORAL at 08:32

## 2023-10-28 RX ADMIN — HEPARIN SODIUM 5000 UNITS: 5000 INJECTION INTRAVENOUS; SUBCUTANEOUS at 05:18

## 2023-10-28 RX ADMIN — SODIUM CHLORIDE 100 ML/HR: 9 INJECTION, SOLUTION INTRAVENOUS at 18:36

## 2023-10-28 RX ADMIN — HYDROMORPHONE HYDROCHLORIDE 0.2 MG: 1 INJECTION, SOLUTION INTRAMUSCULAR; INTRAVENOUS; SUBCUTANEOUS at 03:44

## 2023-10-28 RX ADMIN — OXYBUTYNIN CHLORIDE 5 MG: 5 TABLET ORAL at 08:32

## 2023-10-28 ASSESSMENT — PAIN SCALES - PAIN ASSESSMENT IN ADVANCED DEMENTIA (PAINAD)
BODYLANGUAGE: RELAXED
CONSOLABILITY: NO NEED TO CONSOLE
TOTALSCORE: 0
BREATHING: NORMAL
FACIALEXPRESSION: SMILING OR INEXPRESSIVE

## 2023-10-28 ASSESSMENT — PAIN SCALES - GENERAL
PAINLEVEL_OUTOF10: 7
PAINLEVEL_OUTOF10: 9
PAINLEVEL_OUTOF10: 8
PAINLEVEL_OUTOF10: 8
PAINLEVEL_OUTOF10: 9
PAINLEVEL_OUTOF10: 6
PAINLEVEL_OUTOF10: 9

## 2023-10-28 ASSESSMENT — PAIN SCALES - WONG BAKER: WONGBAKER_NUMERICALRESPONSE: HURTS LITTLE BIT

## 2023-10-28 NOTE — PROGRESS NOTES
"Jose Thornton is a 57 y.o. male on day 4 of admission presenting with Colovesical fistula.    Subjective   Patient doing well, just has some pain.  Otherwise no acute events overnights   No CP, SOB.     Objective     Neurological: Awake, alert, conversive  Respiratory/Thorax: even, unlabored  Genitourinary: voiding  Gastrointestinal: soft, ATTP, Incisions CDI no erythema. Drains with ss output. Ostomy pink, viable  Skin: warm, dry  Musculoskeletal: STANLEY  Eyes: non-icteric  Extremities: no edema   Psychological: appropriate mood/affect     Last Recorded Vitals  Blood pressure 100/63, pulse 75, temperature 36.1 °C (97 °F), temperature source Temporal, resp. rate 18, height 1.829 m (6' 0.01\"), weight 113 kg (249 lb 1.9 oz), SpO2 96 %.  Intake/Output last 3 Shifts:  I/O last 3 completed shifts:  In: 4317.3 (38.2 mL/kg) [I.V.:4117.3 (36.4 mL/kg); IV Piggyback:200]  Out: 2690 (23.8 mL/kg) [Urine:2465 (0.6 mL/kg/hr); Drains:125; Blood:100]  Weight: 113 kg              Assessment/Plan   Principal Problem:    Colovesical fistula    Jose Thornton is a 57 y.o. male w/ Hx Crohn's disease complicated by colovesical fistula s/p bilateral nephrostomy tube placement + indwelling andrade catheter who presents for IV antibiotics in anticipation of 10/27/23.   S/p Exploratory laparotomy   Cystoscopy  Bilateral ileal ureter and bladder augment with right partial rectus muscle flap  Suprapubic tube insertion   POD#1    Patient doing well.  Care per Urology  Will follow peripherally, please page or call with any questions     Josafat Otto, DO      "

## 2023-10-28 NOTE — PROGRESS NOTES
UROLOGY DAILY PROGRESS NOTE      Subjective   NAEO. Pain bothersome but improved with current regimen. Declines nausea or vomiting. No flatus or BM yet.        Objective   Physical Exam  Gen: NAD, alert  HEENT: normocephalic, atraumatic, MMM  Pulm: nonlabored breathing  CV: RRR  GI: soft, nondistended, moderately tender near incisions, colostomy L abdomen w/ healthy appearance, semiformed stool. Midline incision with preveena in place holding suction. Bilateral abdominal LINDSEY drains with serosanguinous output  : no suprapubic or CVA tenderness, Schultz draining clear yellow, SPT draining yellow with slight pink tinge, b/l PCNT capped  MSK: STANLEY  Neuro: no focal deficits  Skin: WWP  Psych: appropriate mood and behavior      Last Recorded Vitals  Heart Rate:  [71-84]   Temp:  [36 °C (96.8 °F)-36.1 °C (97 °F)]   Resp:  [10-20]   BP: (100-134)/(63-79)   SpO2:  [93 %-96 %]   Intake/Output last 3 Shifts:  I/O last 3 completed shifts:  In: 4317.3 (38.2 mL/kg) [I.V.:4117.3 (36.4 mL/kg); IV Piggyback:200]  Out: 2690 (23.8 mL/kg) [Urine:2465 (0.6 mL/kg/hr); Drains:125; Blood:100]  Weight: 113 kg     Net IO Since Admission: -2,472.67 mL [10/28/23 0930]    Relevant Results  Results for orders placed or performed during the hospital encounter of 10/24/23 (from the past 24 hour(s))   BLOOD GAS ARTERIAL FULL PANEL   Result Value Ref Range    POCT pH, Arterial 7.34 (L) 7.38 - 7.42 pH    POCT pCO2, Arterial 42 38 - 42 mm Hg    POCT pO2, Arterial 125 (H) 85 - 95 mm Hg    POCT SO2, Arterial 96 94 - 100 %    POCT Oxy Hemoglobin, Arterial 96.3 94.0 - 98.0 %    POCT Hematocrit Calculated, Arterial 38.0 (L) 41.0 - 52.0 %    POCT Sodium, Arterial 135 (L) 136 - 145 mmol/L    POCT Potassium, Arterial 4.4 3.5 - 5.3 mmol/L    POCT Chloride, Arterial 107 98 - 107 mmol/L    POCT Ionized Calcium, Arterial 1.18 1.10 - 1.33 mmol/L    POCT Glucose, Arterial 152 (H) 74 - 99 mg/dL    POCT Lactate, Arterial 0.6 0.4 - 2.0 mmol/L    POCT Base Excess,  Arterial -3.0 (L) -2.0 - 3.0 mmol/L    POCT HCO3 Calculated, Arterial 22.7 22.0 - 26.0 mmol/L    POCT Hemoglobin, Arterial 12.7 (L) 13.5 - 17.5 g/dL    POCT Anion Gap, Arterial 10 10 - 25 mmo/L    Patient Temperature 37.0 degrees Celsius    FiO2 50 %   BLOOD GAS ARTERIAL FULL PANEL   Result Value Ref Range    POCT pH, Arterial 7.35 (L) 7.38 - 7.42 pH    POCT pCO2, Arterial 42 38 - 42 mm Hg    POCT pO2, Arterial 126 (H) 85 - 95 mm Hg    POCT SO2, Arterial 99 94 - 100 %    POCT Oxy Hemoglobin, Arterial 97.0 94.0 - 98.0 %    POCT Hematocrit Calculated, Arterial 38.0 (L) 41.0 - 52.0 %    POCT Sodium, Arterial 136 136 - 145 mmol/L    POCT Potassium, Arterial 4.6 3.5 - 5.3 mmol/L    POCT Chloride, Arterial 107 98 - 107 mmol/L    POCT Ionized Calcium, Arterial 1.15 1.10 - 1.33 mmol/L    POCT Glucose, Arterial 156 (H) 74 - 99 mg/dL    POCT Lactate, Arterial 1.1 0.4 - 2.0 mmol/L    POCT Base Excess, Arterial -2.4 (L) -2.0 - 3.0 mmol/L    POCT HCO3 Calculated, Arterial 23.2 22.0 - 26.0 mmol/L    POCT Hemoglobin, Arterial 12.7 (L) 13.5 - 17.5 g/dL    POCT Anion Gap, Arterial 10 10 - 25 mmo/L    Patient Temperature 37.0 degrees Celsius    FiO2 50 %   CBC   Result Value Ref Range    WBC 12.2 (H) 4.4 - 11.3 x10*3/uL    nRBC 0.0 0.0 - 0.0 /100 WBCs    RBC 4.06 (L) 4.50 - 5.90 x10*6/uL    Hemoglobin 11.0 (L) 13.5 - 17.5 g/dL    Hematocrit 36.5 (L) 41.0 - 52.0 %    MCV 90 80 - 100 fL    MCH 27.1 26.0 - 34.0 pg    MCHC 30.1 (L) 32.0 - 36.0 g/dL    RDW 15.5 (H) 11.5 - 14.5 %    Platelets 385 150 - 450 x10*3/uL    MPV 9.2 7.5 - 11.5 fL   Basic metabolic panel   Result Value Ref Range    Glucose 129 (H) 74 - 99 mg/dL    Sodium 138 136 - 145 mmol/L    Potassium 4.7 3.5 - 5.3 mmol/L    Chloride 107 98 - 107 mmol/L    Bicarbonate 22 21 - 32 mmol/L    Anion Gap 14 10 - 20 mmol/L    Urea Nitrogen 17 6 - 23 mg/dL    Creatinine 1.55 (H) 0.50 - 1.30 mg/dL    eGFR 53 (L) >60 mL/min/1.73m*2    Calcium 8.7 8.6 - 10.6 mg/dL       IR nephrostomy  tube exchange    Result Date: 10/18/2023  Interpreted By:  Onesimo Reyes and Guirguis James STUDY: IR NEPHROSTOMY TUBE EXCHANGE;  10/18/2023 9:50 am   INDICATION: Unspecified hydronephrosis  NEPH TUBE EXCHANGE. 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   COMPARISON: Nephrostogram 08/25/2000   ACCESSION NUMBER(S): [id number]   ORDERING CLINICIAN: MILDRED HUMPHREY   TECHNIQUE: INTERVENTIONALIST(S): MD Jos Bautista MD   CONSENT: The patient/patient's POA/next of kin was informed of the nature of the proposed procedure. The purposes, alternatives, risks, and benefits were explained and discussed. All questions were answered and consent was obtained.   RADIATION EXPOSURE: Fluoroscopy time: 1.6 min Dose: 27.83 mGy Dose Area Product (DAP): 5260 mGy*cm^2   SEDATION: Moderate conscious IV sedation services (supervision of administration, induction, and maintenance) were provided by the physician performing the procedure with intravenous fentanyl 100mcg and versed 2mg. The physician was assisted by an independent trained observer, an interventional radiology nurse, in the continuous monitoring of patient level of consciousness and physiologic status.   MEDICATION/CONTRAST: 2 g of ceftriaxone administered intravenously   TIME OUT: A time out was performed immediately prior to procedure start with the interventional team, correctly identifying the patient name, date of birth, MRN, procedure, anatomy (including marking of site and side), patient position, procedure consent form, relevant laboratory and imaging test results, antibiotic administration, safety precautions, and procedure-specific equipment needs.   COMPLICATIONS: No immediate adverse events identified.   FINDINGS: Maximum sterile barrier technique was implemented.   RIGHT KIDNEY: ANTEGRADE PYELOGRAM FINDINGS: Initial  fluoroscopic spot image demonstrates an intact existing right percutaneous nephrostomy drainage  catheter. Half-strength contrast was injected through the existing catheter and demonstrated patency without leak. Opacification of the central renal pelvis was observed.   PROCEDURE: Appropriate lidocaine 1% local anesthesia was instilled in the subcutaneous soft tissues.   The existing nephrostomy tube was cut at the hub releasing the inner suture. A 035 Amplatz guidewire was inserted through the existing nephrostomy catheter to secure location utilizing intermittent fluoroscopic guidance. The existing nephrostomy catheter was removed over the wire. A new 10-Urdu x 25cm nephrostomy tube was subsequently inserted over the guidewire. The guidewire and nephrostomy inner stylet were removed. The self-forming the pigtail loop was formed in the central renal pelvis. Follow-up dilute contrast injection was performed to confirm optimal placement within the central renal pelvis. The external portions of the catheter were secured in place with sterile suture and dressings.   The patient tolerated the procedure without complication.   LEFT KIDNEY: ANTEGRADE PYELOGRAM FINDINGS: Initial  fluoroscopic spot image demonstrates an intact existing right percutaneous nephrostomy drainage catheter. Half-strength contrast was injected through the existing catheter and demonstrated patency without leak. Opacification of the central renal pelvis was observed.   PROCEDURE: Appropriate lidocaine 1% local anesthesia was instilled in the subcutaneous soft tissues.   The existing nephrostomy tube was cut at the hub releasing the inner suture. A 035 Amplatz guidewire was inserted through the existing nephrostomy catheter to secure location utilizing intermittent fluoroscopic guidance. The existing nephrostomy catheter was removed over the wire. A new 10-Urdu x 25cm nephrostomy tube was subsequently inserted over the guidewire. The guidewire and nephrostomy inner stylet were removed. The self-forming the pigtail loop was formed in the  central renal pelvis. Follow-up dilute contrast injection was performed to confirm optimal placement within the central renal pelvis. The external portions of the catheter were secured in place with sterile suture and dressings.   The patient tolerated the procedure without complication.       1. Uncomplicated and technically successful bilateral percutaneous nephrostomy tube exchange - newly placed 10-Bahraini x 25cm self-forming pigtail drainage catheter placement bilaterally connected to external gravity drainage. The patient tolerated the procedure well without immediate complication.   I was present for and/or performed the critical portions of the procedure and immediately available throughout the entire procedure.   I personally reviewed the image(s) / study and resident interpretation. I agree with the findings as stated.   Performed and dictated at [facility].   MACRO: None.   Signed by: Onesimo Reyes 10/18/2023 11:30 AM Dictation workstation:   [id number]    MR enterography    Result Date: 10/3/2023  Interpreted By:  Wili Landers, STUDY: MR ENTEROGRAPHY;  10/3/2023 11:05 am   INDICATION: Signs/Symptoms: abdominal pain  K50.90: Crohn's disease.   COMPARISON: Pelvic MRI 06/01/2023, CT urogram 06/01/2023.   ACCESSION NUMBER(S): [id number]   ORDERING CLINICIAN: KEN CALDERON   TECHNIQUE: Multiplanar magnetic resonance images of the abdomen and pelvis were obtained including the following sequences: T2-weighted SSFSE, SSFP with and without fat saturation, 3D-T1w GRE pre and dynamically post contrast. 23 ml of Gadolinium contrast agent Dotarem were administered intravenously without immediate complication.   FINDINGS: BOWEL: There is a persistent region of tract like enhancement extending inferior to region of tethered small bowel loops within the midline lower pelvis suspicious for persistent fistulous material fascicular tract. . Stable postsurgical changes from  partial colectomy with end colostomy within the left lower quadrant..There is mild enhancement and wall thickening of multiple small bowel loops within the left mid abdomen as well as mild wall thickening and enhancement of the approximate distal 12 cm of colon remnant extending to the ostomy.   LIVER: No suspicious lesions.   BILE DUCTS: No biliary dilatation.   GALLBLADDER: No obvious cholelithiasis.   PANCREAS: Unremarkable.   SPLEEN: Unremarkable.   ADRENAL GLANDS: Unremarkable.   KIDNEYS: Atrophy and regions of scarring of the left kidney. Tiny subcentimeter bilateral cysts. Bilateral nephrostomy catheters are noted. No hydronephrosis detected.   BLADDER: Decompressed via Schultz catheterization and therefore not well assessed.   REPRODUCTIVE ORGANS: No obvious pelvic mass.   LYMPH NODES: No significant adenopathy.   ABDOMINAL VESSELS: No evidence of abdominal aortic aneurysm.   PERITONEUM AND RETROPERITONEUM: No significant ascites. Laxity along the anterior abdominal wall with wide mouth hernia.   BONES AND LOWER THORAX: No acute osseous abnormality. No acute abnormality of the imaged lower thorax.       1.  Persistent appearance of enhancing tract extending inferiorly from tethered small bowel loops within the lower pelvis indicative of likely persistent entero vesicular fistula. 2.  Mild wall thickening enhancement of multiple small bowel loops as well as the distal colon remnant immediately proximal to the left lower quadrant ostomy site indicative of active component of patient's reported inflammatory bowel disease.     MACRO: None   Signed by: Wili Landers 10/3/2023 11:49 AM Dictation workstation:   [id number]    Susceptibility data from last 90 days.  Collected Specimen Info Organism Amoxicillin/Clavulanate Ampicillin Ampicillin/Sulbactam Aztreonam Cefazolin Cefazolin (uncomplicated UTIs only) Cefepime Ceftazidime Ciprofloxacin Gentamicin Meropenem Nitrofurantoin   10/25/23 Urine from Indwelling  (Andrade) Catheter Pseudomonas aeruginosa    R   S S R S     10/25/23 Urine from Nephrostomy Tube Pseudomonas aeruginosa    R   S S R S S    10/25/23 Urine from Nephrostomy Tube Gram Negative Bacilli               10/05/23 Urine from Nephrostomy Tube Escherichia coli S R I  S S   R S  S     Collected Specimen Info Organism Piperacillin/Tazobactam Tobramycin Trimethoprim/Sulfamethoxazole   10/25/23 Urine from Indwelling (Andrade) Catheter Pseudomonas aeruginosa S S    10/25/23 Urine from Nephrostomy Tube Pseudomonas aeruginosa I S    10/25/23 Urine from Nephrostomy Tube Gram Negative Bacilli      10/05/23 Urine from Nephrostomy Tube Escherichia coli S  R         Assessment/Plan   Jose Thornton is a 57 y.o. male with PMH of Crohn's disease and colovesical fistula who underwent colovesical fistula repair, b/l ureteral reimplant on 10/27/23 with Dr. Andersen and Dr. Rodriguez.     Principal Problem:    Colovesical fistula    Plan:  Neuro: continue tylenol, oxycodone, dilaudid, oxybutynin  Pulm: OOB as tolerated, IS 10x / hr  CV: hemodynamically stable  FEN/GI: NPO until ROBF (ok for sips with meds)  /Alpesh: maintain andrade, SPT, and b/l nephrostomy tubes   Heme: H/H stable / no indication for transfusion  ID: afebrile, prophy antibiotics (Ancef/gent/fluc), urine cultures with gentamicin sensitive Pseudomonas (in 2/3 cultures, 3rd culture yet to speciate)  Endo: no additional glycemic requirements   MSK: OOB as tolerated  Prophylaxis: SQH / SCDs    Dispo: continue care on RNF     D/w attending Dr. Nathaly Martinez MD  Urology PGY2  Pager: Adult [phone] / Peds [phone]

## 2023-10-28 NOTE — PROGRESS NOTES
Acute Pain Service    Jose Thornton is a 57 y.o. year old male patient presenting for bilateral ureteral reimplantation, rectovesicular fistula repair, exploratory laparotomy with Dr. Andersen 10/27/23     Postop Pain HPI -   Palliative: relieved with IV analgesics and regional local anesthetics  Provocative: movement  Quality:  burning and aching  Radiation:  none  Severity:  [ 8]/10  Timing: constant    24-HOUR OPIOID CONSUMPTION:  [Dilaudid 2.4 mg  Oxycodone 30 mg  ]    Scheduled medications  acetaminophen, 975 mg, oral, q6h  ceFAZolin, 1 g, intravenous, q8h  fluconazole, 400 mg, intravenous, q24h  fluticasone, 2 spray, Each Nostril, Daily  gentamicin, 5 mg/kg (Adjusted), intravenous, q24h LAVINIA  heparin (porcine), 5,000 Units, subcutaneous, q8h  oxybutynin, 5 mg, oral, TID  polyethylene glycol, 17 g, oral, Daily  sennosides, 2 tablet, oral, BID      Continuous medications  miscellaneous medical supply,   ropivacaine (PF) in NS cmpd, 6 mL/hr  sodium chloride 0.9%, 100 mL/hr, Last Rate: 100 mL/hr (10/28/23 0619)      PRN medications  PRN medications: bisacodyl, HYDROmorphone, melatonin, metoclopramide **OR** metoclopramide, naloxone, naloxone, naloxone, ondansetron **OR** ondansetron, ondansetron ODT **OR** ondansetron, oxyCODONE, oxyCODONE     Physical Exam:  Constitutional:  no distress, alert and cooperative  Eyes: clear sclera  Head/Neck: No apparent injury, trachea midline  Respiratory/Thorax: Patent airways, thorax symmetric, breathing comfortably  Cardiovascular: no pitting edema  Gastrointestinal: Nondistended  Musculoskeletal: ROM intact  Extremities: no clubbing  Neurological: alert, lopez x4  Psychological: Appropriate affect    Results for orders placed or performed during the hospital encounter of 10/24/23 (from the past 24 hour(s))   BLOOD GAS ARTERIAL FULL PANEL   Result Value Ref Range    POCT pH, Arterial 7.34 (L) 7.38 - 7.42 pH    POCT pCO2, Arterial 42 38 - 42 mm Hg    POCT pO2, Arterial 125 (H) 85 -  95 mm Hg    POCT SO2, Arterial 96 94 - 100 %    POCT Oxy Hemoglobin, Arterial 96.3 94.0 - 98.0 %    POCT Hematocrit Calculated, Arterial 38.0 (L) 41.0 - 52.0 %    POCT Sodium, Arterial 135 (L) 136 - 145 mmol/L    POCT Potassium, Arterial 4.4 3.5 - 5.3 mmol/L    POCT Chloride, Arterial 107 98 - 107 mmol/L    POCT Ionized Calcium, Arterial 1.18 1.10 - 1.33 mmol/L    POCT Glucose, Arterial 152 (H) 74 - 99 mg/dL    POCT Lactate, Arterial 0.6 0.4 - 2.0 mmol/L    POCT Base Excess, Arterial -3.0 (L) -2.0 - 3.0 mmol/L    POCT HCO3 Calculated, Arterial 22.7 22.0 - 26.0 mmol/L    POCT Hemoglobin, Arterial 12.7 (L) 13.5 - 17.5 g/dL    POCT Anion Gap, Arterial 10 10 - 25 mmo/L    Patient Temperature 37.0 degrees Celsius    FiO2 50 %   BLOOD GAS ARTERIAL FULL PANEL   Result Value Ref Range    POCT pH, Arterial 7.35 (L) 7.38 - 7.42 pH    POCT pCO2, Arterial 42 38 - 42 mm Hg    POCT pO2, Arterial 126 (H) 85 - 95 mm Hg    POCT SO2, Arterial 99 94 - 100 %    POCT Oxy Hemoglobin, Arterial 97.0 94.0 - 98.0 %    POCT Hematocrit Calculated, Arterial 38.0 (L) 41.0 - 52.0 %    POCT Sodium, Arterial 136 136 - 145 mmol/L    POCT Potassium, Arterial 4.6 3.5 - 5.3 mmol/L    POCT Chloride, Arterial 107 98 - 107 mmol/L    POCT Ionized Calcium, Arterial 1.15 1.10 - 1.33 mmol/L    POCT Glucose, Arterial 156 (H) 74 - 99 mg/dL    POCT Lactate, Arterial 1.1 0.4 - 2.0 mmol/L    POCT Base Excess, Arterial -2.4 (L) -2.0 - 3.0 mmol/L    POCT HCO3 Calculated, Arterial 23.2 22.0 - 26.0 mmol/L    POCT Hemoglobin, Arterial 12.7 (L) 13.5 - 17.5 g/dL    POCT Anion Gap, Arterial 10 10 - 25 mmo/L    Patient Temperature 37.0 degrees Celsius    FiO2 50 %   CBC   Result Value Ref Range    WBC 12.2 (H) 4.4 - 11.3 x10*3/uL    nRBC 0.0 0.0 - 0.0 /100 WBCs    RBC 4.06 (L) 4.50 - 5.90 x10*6/uL    Hemoglobin 11.0 (L) 13.5 - 17.5 g/dL    Hematocrit 36.5 (L) 41.0 - 52.0 %    MCV 90 80 - 100 fL    MCH 27.1 26.0 - 34.0 pg    MCHC 30.1 (L) 32.0 - 36.0 g/dL    RDW 15.5 (H)  11.5 - 14.5 %    Platelets 385 150 - 450 x10*3/uL    MPV 9.2 7.5 - 11.5 fL      Jose Thornton is a 57 y.o. year old male patient presenting for bilateral ureteral reimplantation, rectovesicular fistula repair, exploratory laparotomy with Dr. Andersen 10/27/23     Bilateral quadratus lumborum nerve blocks with catheters placed preoperatively on 10/27/23      PLAN  - Received bolus of 5 ml  0.5% ropivacaine to each catheter  - Continue Ambit ball with Ropivacaine 0.2%/NaCl 0.9% 500mL, Rate 7cc/hr  - Ambit medication will not interfere with pain medication prescribed by the primary team.   - Please be aware of local anesthetic toxic dose and absorption variability before considering lidocaine patches  - Acute pain service will follow while catheters in place  - Rest of pain management per primary team     Acute Pain Resident  pg [phone] ph [phone]

## 2023-10-29 LAB
ANION GAP SERPL CALC-SCNC: 13 MMOL/L (ref 10–20)
BUN SERPL-MCNC: 20 MG/DL (ref 6–23)
CALCIUM SERPL-MCNC: 8.4 MG/DL (ref 8.6–10.6)
CHLORIDE SERPL-SCNC: 108 MMOL/L (ref 98–107)
CO2 SERPL-SCNC: 23 MMOL/L (ref 21–32)
CREAT SERPL-MCNC: 1.66 MG/DL (ref 0.5–1.3)
ERYTHROCYTE [DISTWIDTH] IN BLOOD BY AUTOMATED COUNT: 15.9 % (ref 11.5–14.5)
GFR SERPL CREATININE-BSD FRML MDRD: 48 ML/MIN/1.73M*2
GLUCOSE SERPL-MCNC: 101 MG/DL (ref 74–99)
HCT VFR BLD AUTO: 35.1 % (ref 41–52)
HGB BLD-MCNC: 11 G/DL (ref 13.5–17.5)
MCH RBC QN AUTO: 27.6 PG (ref 26–34)
MCHC RBC AUTO-ENTMCNC: 31.3 G/DL (ref 32–36)
MCV RBC AUTO: 88 FL (ref 80–100)
NRBC BLD-RTO: 0 /100 WBCS (ref 0–0)
PLATELET # BLD AUTO: 329 X10*3/UL (ref 150–450)
PMV BLD AUTO: 8.8 FL (ref 7.5–11.5)
POTASSIUM SERPL-SCNC: 3.8 MMOL/L (ref 3.5–5.3)
RBC # BLD AUTO: 3.98 X10*6/UL (ref 4.5–5.9)
SODIUM SERPL-SCNC: 140 MMOL/L (ref 136–145)
WBC # BLD AUTO: 8.1 X10*3/UL (ref 4.4–11.3)

## 2023-10-29 PROCEDURE — 85027 COMPLETE CBC AUTOMATED: CPT

## 2023-10-29 PROCEDURE — 2500000001 HC RX 250 WO HCPCS SELF ADMINISTERED DRUGS (ALT 637 FOR MEDICARE OP)

## 2023-10-29 PROCEDURE — 96372 THER/PROPH/DIAG INJ SC/IM: CPT | Performed by: STUDENT IN AN ORGANIZED HEALTH CARE EDUCATION/TRAINING PROGRAM

## 2023-10-29 PROCEDURE — 2500000004 HC RX 250 GENERAL PHARMACY W/ HCPCS (ALT 636 FOR OP/ED): Performed by: UROLOGY

## 2023-10-29 PROCEDURE — 1170000001 HC PRIVATE ONCOLOGY ROOM DAILY

## 2023-10-29 PROCEDURE — 2500000004 HC RX 250 GENERAL PHARMACY W/ HCPCS (ALT 636 FOR OP/ED): Performed by: STUDENT IN AN ORGANIZED HEALTH CARE EDUCATION/TRAINING PROGRAM

## 2023-10-29 PROCEDURE — 99231 SBSQ HOSP IP/OBS SF/LOW 25: CPT

## 2023-10-29 PROCEDURE — 51702 INSERT TEMP BLADDER CATH: CPT

## 2023-10-29 PROCEDURE — 2500000001 HC RX 250 WO HCPCS SELF ADMINISTERED DRUGS (ALT 637 FOR MEDICARE OP): Performed by: STUDENT IN AN ORGANIZED HEALTH CARE EDUCATION/TRAINING PROGRAM

## 2023-10-29 PROCEDURE — 36415 COLL VENOUS BLD VENIPUNCTURE: CPT

## 2023-10-29 PROCEDURE — 2500000004 HC RX 250 GENERAL PHARMACY W/ HCPCS (ALT 636 FOR OP/ED)

## 2023-10-29 PROCEDURE — 80048 BASIC METABOLIC PNL TOTAL CA: CPT

## 2023-10-29 RX ADMIN — ACETAMINOPHEN 975 MG: 325 TABLET ORAL at 12:10

## 2023-10-29 RX ADMIN — CEFAZOLIN SODIUM 1 G: 1 INJECTION, SOLUTION INTRAVENOUS at 12:11

## 2023-10-29 RX ADMIN — OXYCODONE HYDROCHLORIDE 10 MG: 5 TABLET ORAL at 01:37

## 2023-10-29 RX ADMIN — ACETAMINOPHEN 975 MG: 325 TABLET ORAL at 07:51

## 2023-10-29 RX ADMIN — HEPARIN SODIUM 5000 UNITS: 5000 INJECTION INTRAVENOUS; SUBCUTANEOUS at 04:52

## 2023-10-29 RX ADMIN — ACETAMINOPHEN 975 MG: 325 TABLET ORAL at 18:21

## 2023-10-29 RX ADMIN — OXYBUTYNIN CHLORIDE 5 MG: 5 TABLET ORAL at 09:00

## 2023-10-29 RX ADMIN — OXYBUTYNIN CHLORIDE 5 MG: 5 TABLET ORAL at 14:50

## 2023-10-29 RX ADMIN — HYDROMORPHONE HYDROCHLORIDE 0.2 MG: 1 INJECTION, SOLUTION INTRAMUSCULAR; INTRAVENOUS; SUBCUTANEOUS at 18:22

## 2023-10-29 RX ADMIN — OXYBUTYNIN CHLORIDE 5 MG: 5 TABLET ORAL at 21:01

## 2023-10-29 RX ADMIN — CEFAZOLIN SODIUM 1 G: 1 INJECTION, SOLUTION INTRAVENOUS at 04:52

## 2023-10-29 RX ADMIN — HEPARIN SODIUM 5000 UNITS: 5000 INJECTION INTRAVENOUS; SUBCUTANEOUS at 11:04

## 2023-10-29 RX ADMIN — HEPARIN SODIUM 5000 UNITS: 5000 INJECTION INTRAVENOUS; SUBCUTANEOUS at 19:53

## 2023-10-29 RX ADMIN — CEFAZOLIN SODIUM 1 G: 1 INJECTION, SOLUTION INTRAVENOUS at 21:01

## 2023-10-29 RX ADMIN — OXYCODONE HYDROCHLORIDE 10 MG: 5 TABLET ORAL at 12:10

## 2023-10-29 RX ADMIN — Medication 10 MG: at 21:08

## 2023-10-29 RX ADMIN — ACETAMINOPHEN 975 MG: 325 TABLET ORAL at 01:37

## 2023-10-29 RX ADMIN — STANDARDIZED SENNA CONCENTRATE 17.2 MG: 8.6 TABLET ORAL at 09:00

## 2023-10-29 RX ADMIN — OXYCODONE HYDROCHLORIDE 10 MG: 5 TABLET ORAL at 16:04

## 2023-10-29 RX ADMIN — POLYETHYLENE GLYCOL 3350 17 G: 17 POWDER, FOR SOLUTION ORAL at 09:00

## 2023-10-29 RX ADMIN — OXYCODONE HYDROCHLORIDE 10 MG: 5 TABLET ORAL at 07:51

## 2023-10-29 RX ADMIN — HYDROMORPHONE HYDROCHLORIDE 0.2 MG: 1 INJECTION, SOLUTION INTRAMUSCULAR; INTRAVENOUS; SUBCUTANEOUS at 04:53

## 2023-10-29 RX ADMIN — SODIUM CHLORIDE 75 ML/HR: 9 INJECTION, SOLUTION INTRAVENOUS at 16:04

## 2023-10-29 RX ADMIN — STANDARDIZED SENNA CONCENTRATE 17.2 MG: 8.6 TABLET ORAL at 21:01

## 2023-10-29 RX ADMIN — METOCLOPRAMIDE 10 MG: 5 INJECTION, SOLUTION INTRAMUSCULAR; INTRAVENOUS at 14:01

## 2023-10-29 RX ADMIN — GENTAMICIN SULFATE 460 MG: 40 INJECTION, SOLUTION INTRAMUSCULAR; INTRAVENOUS at 11:04

## 2023-10-29 ASSESSMENT — COGNITIVE AND FUNCTIONAL STATUS - GENERAL
MOBILITY SCORE: 23
DAILY ACTIVITIY SCORE: 24
CLIMB 3 TO 5 STEPS WITH RAILING: A LITTLE

## 2023-10-29 ASSESSMENT — PAIN SCALES - GENERAL
PAINLEVEL_OUTOF10: 8
PAINLEVEL_OUTOF10: 9
PAINLEVEL_OUTOF10: 8
PAINLEVEL_OUTOF10: 8
PAINLEVEL_OUTOF10: 7
PAINLEVEL_OUTOF10: 9
PAINLEVEL_OUTOF10: 0 - NO PAIN

## 2023-10-29 NOTE — CONSULTS
Wound Care Consult     Visit Date: 10/29/2023      Patient Name: Jose Thornton         MRN: [id number]           YOB: 1965     Reason for Consult: Assessment of ostomy and assess need for any assistance.        Wound History: Jose Thornton is a 57 y.o. year old male patient presenting for bilateral ureteral reimplantation, rectovesicular fistula repair, exploratory laparotomy with Dr. Andersen 10/27/23      Pertinent Labs:   Albumin   Date Value Ref Range Status   10/24/2023 3.8 3.4 - 5.0 g/dL Final       Wound Assessment:  Wound 10/27/23 Incision Abdomen Lower;Medial (Active)   Site Assessment Unable to assess 10/29/23 1330   Drainage Description None 10/28/23 2041   Drainage Amount None 10/28/23 2041   Dressing Vacuum dressing 10/29/23 1330       Wound Team Summary Assessment: Patient supine in bed with wife at bedside. Wound vac to midline abdominal incision. Ostomy to left upper quadrant. One piece flat pouch in place. Patient states it has not been functioning. He has taken ducolax in the hopes it will start functioning soon. Patient states he is independent with pouch changes. However, he usually wears a convex pouch with a ring. He now has a flat without a ring. States he is having too much pain right now and since its not functioning, he prefers to wait. Ostomy teams plans to check on him tomorrow and change into a soft convex pouch with a ring if needed.      Wound Team Plan: Assess tomorrow and  change pouch if needed to a soft convex with a ring.      SUZANNE GERHARDT, RN, BSN, CWOCN  10/29/2023  2:11 PM

## 2023-10-29 NOTE — PROGRESS NOTES
Acute Pain Service    Jose Thornton is a 57 y.o. year old male patient presenting for bilateral ureteral reimplantation, rectovesicular fistula repair, exploratory laparotomy with Dr. Andersen 10/27/23    Postop Pain HPI -   Palliative: relieved with IV analgesics and regional local anesthetics  Provocative: movement  Quality:  burning and aching  Radiation:  none  Severity:  6/10  Timing: constant    24-HOUR OPIOID CONSUMPTION:  [ Dilaudid 0.8 mg  Oxycodone 40 mg]    Scheduled medications  acetaminophen, 975 mg, oral, q6h  ceFAZolin, 1 g, intravenous, q8h  fluticasone, 2 spray, Each Nostril, Daily  gentamicin, 5 mg/kg (Adjusted), intravenous, q24h LAVINIA  heparin (porcine), 5,000 Units, subcutaneous, q8h  oxybutynin, 5 mg, oral, TID  polyethylene glycol, 17 g, oral, Daily  sennosides, 2 tablet, oral, BID      Continuous medications  miscellaneous medical supply,   ropivacaine (PF) in NS cmpd, 6 mL/hr  sodium chloride 0.9%, 100 mL/hr, Last Rate: 100 mL/hr (10/29/23 0138)      PRN medications  PRN medications: bisacodyl, HYDROmorphone, melatonin, metoclopramide **OR** metoclopramide, naloxone, naloxone, naloxone, ondansetron **OR** ondansetron, ondansetron ODT **OR** ondansetron, oxyCODONE, oxyCODONE     Physical Exam:  Constitutional:  no distress, alert and cooperative  Eyes: clear sclera  Head/Neck: No apparent injury, trachea midline  Respiratory/Thorax: Patent airways, thorax symmetric, breathing comfortably  Cardiovascular: no pitting edema  Gastrointestinal: Nondistended  Musculoskeletal: ROM intact  Extremities: no clubbing  Neurological: alert, lopez x4  Psychological: Appropriate affect    Jose Thornton is a 57 y.o. year old male patient presenting for bilateral ureteral reimplantation, rectovesicular fistula repair, exploratory laparotomy with Dr. Andersen 10/27/23     Bilateral quadratus lumborum nerve blocks with catheters placed preoperatively on 10/27/23    PLAN  - Continue for another day, Ambit hiro with  Ropivacaine 0.2%/NaCl 0.9% 500mL, Rate 7cc/hr  - Ambit medication will not interfere with pain medication prescribed by the primary team.   - Please be aware of local anesthetic toxic dose and absorption variability before considering lidocaine patches  - Acute pain service will follow while catheters in place  - Rest of pain management per primary team     Acute Pain Resident  pg [phone] ph [phone]

## 2023-10-29 NOTE — CARE PLAN
The patient's goals for the shift include      The clinical goals for the shift include Patients pain will be controlled this shift      Problem: Fall/Injury  Goal: Be free from injury by end of the shift  Outcome: Progressing     Patient aox4. Patient ambulated the hallways x2 this shift using front wheel walker. Was medicated per MAR. Dressing to bilateral nephrostomy tubes remains intact. All drains and catheters empties and recorded. No flatus or output from ostomy.

## 2023-10-29 NOTE — PROGRESS NOTES
UROLOGY DAILY PROGRESS NOTE      Subjective   NAEO. Pain bothersome at times but tolerable. Declines nausea or vomiting. No flatus or BM yet. Walking in hallway. Chewing gum       Objective   Physical Exam  Gen: NAD, alert  HEENT: normocephalic, atraumatic, MMM  Pulm: nonlabored breathing  CV: RRR  GI: soft, nondistended, moderately tender near incisions, colostomy L abdomen w/ healthy appearance, some bowel sweat. Midline incision with preveena in place not holding suction - vac battery dead. Bilateral abdominal LINDSEY drains with serosanguinous output  : no suprapubic or CVA tenderness, Schultz draining clear yellow, SPT draining yellow with slight pink tinge, b/l PCNT capped  MSK: STANLEY  Neuro: no focal deficits  Skin: WWP  Psych: appropriate mood and behavior      Last Recorded Vitals  Heart Rate:  [65-75]   Temp:  [35.8 °C (96.5 °F)-36.5 °C (97.7 °F)]   Resp:  [16-18]   BP: (109-128)/(69-80)   SpO2:  [92 %-98 %]   Intake/Output last 3 Shifts:  I/O last 3 completed shifts:  In: 2787 (24.7 mL/kg) [I.V.:2787 (24.7 mL/kg)]  Out: 3560 (31.5 mL/kg) [Urine:3270 (0.8 mL/kg/hr); Drains:290]  Weight: 113 kg     Net IO Since Admission: -3,383 mL [10/29/23 0747]    Relevant Results  No results found for this or any previous visit (from the past 24 hour(s)).          Susceptibility data from last 90 days.  Collected Specimen Info Organism Amoxicillin/Clavulanate Ampicillin Ampicillin/Sulbactam Aztreonam Cefazolin Cefazolin (uncomplicated UTIs only) Cefepime Ceftazidime Ciprofloxacin Gentamicin Meropenem Nitrofurantoin   10/25/23 Urine from Indwelling (Schultz) Catheter Pseudomonas aeruginosa    R   S S R S     10/25/23 Urine from Nephrostomy Tube Pseudomonas aeruginosa    R   S S R S S    10/25/23 Urine from Nephrostomy Tube Gram Negative Bacilli               10/05/23 Urine from Nephrostomy Tube Escherichia coli S R I  S S   R S  S     Collected Specimen Info Organism Piperacillin/Tazobactam Tobramycin  Trimethoprim/Sulfamethoxazole   10/25/23 Urine from Indwelling (Andrade) Catheter Pseudomonas aeruginosa S S    10/25/23 Urine from Nephrostomy Tube Pseudomonas aeruginosa I S    10/25/23 Urine from Nephrostomy Tube Gram Negative Bacilli      10/05/23 Urine from Nephrostomy Tube Escherichia coli S  R       Assessment/Plan   Jose Thornton is a 57 y.o. male with PMH of Crohn's disease and colovesical fistula who underwent Exploratory laparotomy, MYA, Cystoscopy, Bilateral ileal ureter and bladder augment with right partial rectus muscle flap, and Suprapubic tube insertion  on 10/27/23 with Dr. Andersen and Dr. Rodriguez.     Principal Problem:    Colovesical fistula    Plan:  Neuro: continue tylenol, oxycodone, dilaudid, oxybutynin, continue ACS pain blocks  Pulm: OOB as tolerated, IS 10x / hr  CV: hemodynamically stable  FEN/GI: CLD   /Alpesh: maintain andrade, SPT, and b/l nephrostomy tubes; flush every day   Heme: H/H stable / no indication for transfusion  ID: afebrile, prophy antibiotics (Ancef/gent), discontinue fluc. urine cultures with gentamicin sensitive Pseudomonas (10 day course gentamicin for complicated UTI) (in 2/3 cultures, 3rd culture yet to speciate)  Endo: no additional glycemic requirements   MSK: OOB as tolerated  Prophylaxis: SQH / SCDs    Dispo: continue care on RNF     D/w attending Dr. Nathaly Siddiqi MD  Urology Resident (PGY-4)  Adult Pager [phone]  Pediatric Pager [phone]

## 2023-10-30 LAB
ANION GAP SERPL CALC-SCNC: 16 MMOL/L (ref 10–20)
BUN SERPL-MCNC: 14 MG/DL (ref 6–23)
CALCIUM SERPL-MCNC: 8.5 MG/DL (ref 8.6–10.6)
CHLORIDE SERPL-SCNC: 109 MMOL/L (ref 98–107)
CO2 SERPL-SCNC: 21 MMOL/L (ref 21–32)
CREAT SERPL-MCNC: 1.56 MG/DL (ref 0.5–1.3)
ERYTHROCYTE [DISTWIDTH] IN BLOOD BY AUTOMATED COUNT: 16.1 % (ref 11.5–14.5)
GFR SERPL CREATININE-BSD FRML MDRD: 52 ML/MIN/1.73M*2
GLUCOSE SERPL-MCNC: 99 MG/DL (ref 74–99)
HCT VFR BLD AUTO: 37.2 % (ref 41–52)
HGB BLD-MCNC: 11.3 G/DL (ref 13.5–17.5)
MCH RBC QN AUTO: 27.6 PG (ref 26–34)
MCHC RBC AUTO-ENTMCNC: 30.4 G/DL (ref 32–36)
MCV RBC AUTO: 91 FL (ref 80–100)
NRBC BLD-RTO: 0 /100 WBCS (ref 0–0)
PLATELET # BLD AUTO: 355 X10*3/UL (ref 150–450)
PMV BLD AUTO: 9.2 FL (ref 7.5–11.5)
POTASSIUM SERPL-SCNC: 4.1 MMOL/L (ref 3.5–5.3)
RBC # BLD AUTO: 4.1 X10*6/UL (ref 4.5–5.9)
SODIUM SERPL-SCNC: 142 MMOL/L (ref 136–145)
WBC # BLD AUTO: 7.3 X10*3/UL (ref 4.4–11.3)

## 2023-10-30 PROCEDURE — 2500000004 HC RX 250 GENERAL PHARMACY W/ HCPCS (ALT 636 FOR OP/ED): Performed by: UROLOGY

## 2023-10-30 PROCEDURE — 96372 THER/PROPH/DIAG INJ SC/IM: CPT | Performed by: STUDENT IN AN ORGANIZED HEALTH CARE EDUCATION/TRAINING PROGRAM

## 2023-10-30 PROCEDURE — 36415 COLL VENOUS BLD VENIPUNCTURE: CPT | Performed by: STUDENT IN AN ORGANIZED HEALTH CARE EDUCATION/TRAINING PROGRAM

## 2023-10-30 PROCEDURE — 2500000004 HC RX 250 GENERAL PHARMACY W/ HCPCS (ALT 636 FOR OP/ED): Performed by: STUDENT IN AN ORGANIZED HEALTH CARE EDUCATION/TRAINING PROGRAM

## 2023-10-30 PROCEDURE — 97116 GAIT TRAINING THERAPY: CPT | Mod: GP

## 2023-10-30 PROCEDURE — 80048 BASIC METABOLIC PNL TOTAL CA: CPT | Performed by: STUDENT IN AN ORGANIZED HEALTH CARE EDUCATION/TRAINING PROGRAM

## 2023-10-30 PROCEDURE — 99231 SBSQ HOSP IP/OBS SF/LOW 25: CPT

## 2023-10-30 PROCEDURE — 2500000001 HC RX 250 WO HCPCS SELF ADMINISTERED DRUGS (ALT 637 FOR MEDICARE OP): Performed by: STUDENT IN AN ORGANIZED HEALTH CARE EDUCATION/TRAINING PROGRAM

## 2023-10-30 PROCEDURE — 2500000005 HC RX 250 GENERAL PHARMACY W/O HCPCS

## 2023-10-30 PROCEDURE — 2500000004 HC RX 250 GENERAL PHARMACY W/ HCPCS (ALT 636 FOR OP/ED)

## 2023-10-30 PROCEDURE — 85027 COMPLETE CBC AUTOMATED: CPT | Performed by: STUDENT IN AN ORGANIZED HEALTH CARE EDUCATION/TRAINING PROGRAM

## 2023-10-30 PROCEDURE — S0119 ONDANSETRON 4 MG: HCPCS | Performed by: STUDENT IN AN ORGANIZED HEALTH CARE EDUCATION/TRAINING PROGRAM

## 2023-10-30 PROCEDURE — 1170000001 HC PRIVATE ONCOLOGY ROOM DAILY

## 2023-10-30 PROCEDURE — 97161 PT EVAL LOW COMPLEX 20 MIN: CPT | Mod: GP

## 2023-10-30 RX ORDER — ONDANSETRON HYDROCHLORIDE 2 MG/ML
4 INJECTION, SOLUTION INTRAVENOUS ONCE
Status: COMPLETED | OUTPATIENT
Start: 2023-10-30 | End: 2023-10-30

## 2023-10-30 RX ADMIN — OXYCODONE HYDROCHLORIDE 10 MG: 5 TABLET ORAL at 18:19

## 2023-10-30 RX ADMIN — HEPARIN SODIUM 5000 UNITS: 5000 INJECTION INTRAVENOUS; SUBCUTANEOUS at 03:21

## 2023-10-30 RX ADMIN — HEPARIN SODIUM 5000 UNITS: 5000 INJECTION INTRAVENOUS; SUBCUTANEOUS at 18:20

## 2023-10-30 RX ADMIN — GENTAMICIN SULFATE 460 MG: 40 INJECTION, SOLUTION INTRAMUSCULAR; INTRAVENOUS at 13:14

## 2023-10-30 RX ADMIN — SODIUM CHLORIDE 75 ML/HR: 9 INJECTION, SOLUTION INTRAVENOUS at 17:23

## 2023-10-30 RX ADMIN — OXYBUTYNIN CHLORIDE 5 MG: 5 TABLET ORAL at 21:31

## 2023-10-30 RX ADMIN — SODIUM CHLORIDE 75 ML/HR: 9 INJECTION, SOLUTION INTRAVENOUS at 05:30

## 2023-10-30 RX ADMIN — OXYBUTYNIN CHLORIDE 5 MG: 5 TABLET ORAL at 14:27

## 2023-10-30 RX ADMIN — HYDROMORPHONE HYDROCHLORIDE 0.2 MG: 1 INJECTION, SOLUTION INTRAMUSCULAR; INTRAVENOUS; SUBCUTANEOUS at 09:36

## 2023-10-30 RX ADMIN — CEFAZOLIN SODIUM 1 G: 1 INJECTION, SOLUTION INTRAVENOUS at 21:31

## 2023-10-30 RX ADMIN — STANDARDIZED SENNA CONCENTRATE 17.2 MG: 8.6 TABLET ORAL at 09:36

## 2023-10-30 RX ADMIN — HEPARIN SODIUM 5000 UNITS: 5000 INJECTION INTRAVENOUS; SUBCUTANEOUS at 11:46

## 2023-10-30 RX ADMIN — OXYCODONE HYDROCHLORIDE 10 MG: 5 TABLET ORAL at 05:30

## 2023-10-30 RX ADMIN — ONDANSETRON 4 MG: 2 INJECTION INTRAMUSCULAR; INTRAVENOUS at 05:29

## 2023-10-30 RX ADMIN — ONDANSETRON HYDROCHLORIDE 4 MG: 4 TABLET, FILM COATED ORAL at 00:54

## 2023-10-30 RX ADMIN — CEFAZOLIN SODIUM 1 G: 1 INJECTION, SOLUTION INTRAVENOUS at 14:24

## 2023-10-30 RX ADMIN — POLYETHYLENE GLYCOL 3350 17 G: 17 POWDER, FOR SOLUTION ORAL at 09:36

## 2023-10-30 RX ADMIN — CEFAZOLIN SODIUM 1 G: 1 INJECTION, SOLUTION INTRAVENOUS at 05:29

## 2023-10-30 RX ADMIN — OXYCODONE HYDROCHLORIDE 10 MG: 5 TABLET ORAL at 00:54

## 2023-10-30 RX ADMIN — ONDANSETRON HYDROCHLORIDE 4 MG: 4 TABLET, FILM COATED ORAL at 09:40

## 2023-10-30 RX ADMIN — ACETAMINOPHEN 975 MG: 325 TABLET ORAL at 19:00

## 2023-10-30 RX ADMIN — OXYBUTYNIN CHLORIDE 5 MG: 5 TABLET ORAL at 09:41

## 2023-10-30 RX ADMIN — STANDARDIZED SENNA CONCENTRATE 17.2 MG: 8.6 TABLET ORAL at 21:31

## 2023-10-30 ASSESSMENT — COGNITIVE AND FUNCTIONAL STATUS - GENERAL
TURNING FROM BACK TO SIDE WHILE IN FLAT BAD: A LITTLE
STANDING UP FROM CHAIR USING ARMS: A LITTLE
MOBILITY SCORE: 17
MOVING TO AND FROM BED TO CHAIR: A LITTLE
CLIMB 3 TO 5 STEPS WITH RAILING: A LOT
WALKING IN HOSPITAL ROOM: A LITTLE
MOVING FROM LYING ON BACK TO SITTING ON SIDE OF FLAT BED WITH BEDRAILS: A LITTLE

## 2023-10-30 ASSESSMENT — PAIN - FUNCTIONAL ASSESSMENT: PAIN_FUNCTIONAL_ASSESSMENT: 0-10

## 2023-10-30 ASSESSMENT — PAIN SCALES - GENERAL
PAINLEVEL_OUTOF10: 8
PAINLEVEL_OUTOF10: 9

## 2023-10-30 ASSESSMENT — ACTIVITIES OF DAILY LIVING (ADL): ADL_ASSISTANCE: INDEPENDENT

## 2023-10-30 NOTE — CARE PLAN
Problem: Balance  Goal: Patient to demonstrate Alexis static and dynamic standing balance without LOB with change of directions, no hesitancy, appropriate TRISTA and sequencing to complete functional task.   Outcome: Progressing     Problem: Mobility  Goal: Patient will navigate 5 steps with unilateral HR and SBA to demo ability to safely traverse PATIENCE  Outcome: Progressing  Goal: Patient will ambulate >/= 400' Alexis with LRAD  Outcome: Progressing  Goal: Patient will tolerate >/=30 minutes continuous activity with stable vital signs, RPE </=13/20, RPD </=3/10   Outcome: Progressing     Problem: Transfers  Goal: Patient will perform bed mobility Alexis with HOB flat and no rails  Outcome: Progressing  Goal: Patient will transfer sit to and from stand Alexis with LRAD  Outcome: Progressing

## 2023-10-30 NOTE — PROGRESS NOTES
Physical Therapy    Physical Therapy Evaluation & Treatment    Patient Name: Jose Thornton  MRN: [id number]  Today's Date: 10/30/2023   Time Calculation  Start Time: 0912  Stop Time: 0936  Time Calculation (min): 24 min    Assessment/Plan   PT Assessment  PT Assessment Results: Decreased strength, Decreased endurance, Impaired balance, Decreased mobility, Pain  Rehab Prognosis: Excellent  Evaluation/Treatment Tolerance: Patient tolerated treatment well  Medical Staff Made Aware: Yes  Strengths: Premorbid level of function, Housing layout, Support and attitude of living partners  End of Session Communication: Bedside nurse  Assessment Comment: Pt. is a 57 yom that presents with increased abdominal pain, decreased functional strength, mildly impaired balance, decreased activity tolerance/endurance, and increased difficulty with funcitonal mobility compared to baseline. Pt. will benefit from skilled PT intervention while inpatient to address these deficits.  End of Session Patient Position: Up in chair, Alarm off, caregiver present (RN present in room to pass meds)  IP OR SWING BED PT PLAN  Inpatient or Swing Bed: Inpatient  PT Plan  Treatment/Interventions: Bed mobility, Transfer training, Gait training, Stair training, Balance training, Strengthening, Endurance training, Therapeutic exercise, Therapeutic activity, Home exercise program  PT Plan: Skilled PT  PT Frequency: 3 times per week  PT Discharge Recommendations: Low intensity level of continued care  Equipment Recommended upon Discharge: Other (comment) (shower chair)  PT Recommended Transfer Status: Assist x1, Assistive device  PT - OK to Discharge: Yes (eval complete, refer to dispo)      Subjective     General Visit Information:  General  Reason for Referral: s/p Exploratory laparotomy, MYA, Cystoscopy, Bilateral ileal ureter and bladder augment with right partial rectus muscle flap, and Suprapubic tube insertion on 10/27/23  Past Medical History Relevant to  Rehab: Diverticulosis, GERD, GI bleed, UTI, and Crohn's disease complicated by colovesical fistula s/p bilateral nephrostomy tube placement + indwelling andrade catheter  Family/Caregiver Present: No  Prior to Session Communication: Bedside nurse  Patient Position Received: Bed, 3 rail up, Alarm off, not on at start of session  Preferred Learning Style: verbal  General Comment: Pt. received supine in bed, endorsing nausea and increased pain though pleasant and agreeable to participate in session. (with LINDSEY x2, Prevena wound vac, Ambit pain pump, andrade x2, ostomy, IV)  Home Living:  Home Living  Type of Home: House  Lives With: Spouse  Home Adaptive Equipment: Walker rolling or standard  Home Layout: Able to live on main level with bedroom/bathroom  Home Access: Stairs to enter with rails  Entrance Stairs-Rails:  (unilateral)  Entrance Stairs-Number of Steps: 3-5  Bathroom Shower/Tub: Tub/shower unit  Bathroom Equipment: None  Prior Level of Function:  Prior Function Per Pt/Caregiver Report  Level of [place]: Independent with ADLs and functional transfers  Receives Help From:  (spouse)  ADL Assistance: Independent  Homemaking Assistance: Independent  Ambulatory Assistance: Independent  Vocational: On disability (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)  Prior Function Comments: denied falls  Precautions:  Precautions  Medical Precautions: Abdominal precautions, Fall precautions  Post-Surgical Precautions: Abdominal surgery precautions  Vital Signs:    Objective   Pain:  Pain Assessment  Pain Assessment: 0-10  Pain Score: 8  Pain Type: Surgical pain  Pain Location: Abdomen  Pain Frequency: Constant/continuous  Patient's Stated Pain Goal: No pain  Pain Interventions:  (RN present to administer medications)  Cognition:  Cognition  Overall Cognitive Status: Within Functional Limits    General Assessments:  Activity Tolerance  Endurance: Endurance does not limit participation in activity  Early Mobility/Exercise  Safety Screen: Proceed with mobilization - No exclusion criteria met  Activity Tolerance Comments: Pt. tolerated ambulating increased distance this date with FWW and CGAx1.    Sensation  Light Touch: No apparent deficits    Postural Control  Postural Control: Within Functional Limits    Static Sitting Balance  Static Sitting-Comment/Number of Minutes: SBA  Dynamic Sitting Balance  Dynamic Sitting-Comments: SBA    Static Standing Balance  Static Standing-Comment/Number of Minutes: CGA with FWW  Dynamic Standing Balance  Dynamic Standing-Comments: CGA with FWW    Functional Assessments:  Bed Mobility  Bed Mobility: Yes  Bed Mobility 1  Bed Mobility 1: Rolling left  Level of Assistance 1: Contact guard  Bed Mobility Comments 1: Cues for log roll technique, HOB elevated, use of bed rail  Bed Mobility 2  Bed Mobility  2: Side lying left to sit  Level of Assistance 2: Contact guard  Bed Mobility Comments 2: HOB elevated, use of bed rails    Transfers  Transfer: Yes  Transfer 1  Transfer From 1: Bed to  Transfer to 1: Stand  Technique 1: Sit to stand  Transfer Device 1: Walker  Transfer Level of Assistance 1: Contact guard  Trials/Comments 1: Cues for proper UE placement and technique  Transfers 2  Transfer From 2: Stand to  Transfer to 2: Chair with arms  Technique 2: Stand to sit  Transfer Device 2: Walker  Transfer Level of Assistance 2: Contact guard  Trials/Comments 2: Cues to reach posteriorly to maximize safety. Pt. slow to descend, limited by pain    Ambulation/Gait Training  Ambulation/Gait Training Performed: Yes  Ambulation/Gait Training 1  Surface 1: Level tile  Device 1: Rolling walker  Assistance 1: Contact guard  Quality of Gait 1:  (dec mera, demos reciprocal gait pattern, no acute LOB noted)  Comments/Distance (ft) 1: 450', no seated rest break.    Stairs  Stairs: No    Extremity/Trunk Assessments:  RLE   RLE : Within Functional Limits  LLE   LLE : Within Functional Limits    Treatments:  Educated pt on  abdominal precautions and log roll technique for bed mobility as well as hand placement and technique during transfers. Pt completed ambulation in room and hallway x450 ft with front wheeled walker and CGAx1.  Instructed pt in proper sequencing of gait with walker. Educated patient on activity pacing and the importance of mobilizing multiple times per day to prevent deconditioning and maximize return to PLOF. Encouraged pursed-lip breathing during activity and promoted upright functional posture.     Outcome Measures:  [facility] Basic Mobility  Turning from your back to your side while in a flat bed without using bedrails: A little  Moving from lying on your back to sitting on the side of a flat bed without using bedrails: A little  Moving to and from bed to chair (including a wheelchair): A little  Standing up from a chair using your arms (e.g. wheelchair or bedside chair): A little  To walk in hospital room: A little  Climbing 3-5 steps with railing: A lot  Basic Mobility - Total Score: 17    Encounter Problems       Encounter Problems (Active)       Balance       Patient to demonstrate Alexis static and dynamic standing balance without LOB with change of directions, no hesitancy, appropriate TRISTA and sequencing to complete functional task.  (Progressing)       Start:  10/30/23    Expected End:  11/13/23               Mobility       Patient will navigate 5 steps with unilateral HR and SBA to demo ability to safely traverse PATIENCE (Progressing)       Start:  10/30/23    Expected End:  11/13/23            Patient will ambulate >/= 400' Alexis with LRAD (Progressing)       Start:  10/30/23    Expected End:  11/13/23            Patient will tolerate >/=30 minutes continuous activity with stable vital signs, RPE </=13/20, RPD </=3/10  (Progressing)       Start:  10/30/23    Expected End:  11/13/23               Transfers       Patient will perform bed mobility Aleixs with HOB flat and no rails (Progressing)       Start:  10/30/23     Expected End:  11/13/23            Patient will transfer sit to and from stand Alexis with LRAD (Progressing)       Start:  10/30/23    Expected End:  11/13/23                   Education Documentation  Precautions, taught by Areli Brasher, PT at 10/30/2023 10:37 AM.  Learner: Patient  Readiness: Eager  Method: Explanation, Demonstration  Response: Verbalizes Understanding    Body Mechanics, taught by Areli Brasher, PT at 10/30/2023 10:37 AM.  Learner: Patient  Readiness: Eager  Method: Explanation, Demonstration  Response: Verbalizes Understanding    Home Exercise Program, taught by Areli Brasher, PT at 10/30/2023 10:37 AM.  Learner: Patient  Readiness: Eager  Method: Explanation, Demonstration  Response: Verbalizes Understanding    Mobility Training, taught by Areli Brasher PT at 10/30/2023 10:37 AM.  Learner: Patient  Readiness: Eager  Method: Explanation, Demonstration  Response: Verbalizes Understanding    Education Comments  No comments found.

## 2023-10-30 NOTE — OP NOTE
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]     Procedures    * Bilateral ureteral reimplant, rectovesical fistula repair    * Exploration Laparotomy    Surgeons   Panel 1:     * Tomas Andersen - Primary  Panel 2:     * Warren Rodriguez - Primary    Resident/Fellow/Other Assistant:  Surgeon(s) and Role:  Panel 1:     * Sigifredo Mckeon MD - Resident - Assisting     * Anel Frerer MD - Resident - Assisting    Procedure Summary  Anesthesia: General  ASA: III  Anesthesia Staff: Anesthesiologist: Benny Briseno DO  CRNA: FARTUN Perez-CRNA  Anesthesia Resident: Peggy Blandon MD  Estimated Blood Loss: 5mL  Intra-op Medications:   Medication Name Total Dose   sodium chloride 0.9 % irrigation solution 3,000 mL   surgical lubricant gel 1 Application   sterile water irrigation solution 3,000 mL   thrombin (Human)-fibrinogen-aprotinin-calcium (Tisseel) 10 mL 10 mL   fluconazole in NaCl (iso-osm) (Diflucan) IVPB 400 mg 400 mg              Anesthesia Record               Intraprocedure I/O Totals          Intake    lactated Ringer's 2700.00 mL    fluconazole in NaCl (iso-osm) (Diflucan) IVPB 400 mg 200.00 mL    Total Intake 2900 mL       Output    Urine 260 mL    Est. Blood Loss 100 mL    Total Output 360 mL       Net    Net Volume 2540 mL          Specimen: No specimens collected     Staff:   Circulator: Albert Cotto RN; Ronald Falk RN; Freida Lam, DANNI  Relief Circulator: Kenyatta Coburn RN; Chanelle Whitfield RN  Relief Scrub: Lyn Fernandez  Scrub Person: Bridgette Cox RN         Drains and/or Catheters:   Closed/Suction Drain Right Abdomen Bulb 16 Fr. (Active)   Site Description Other  (Comment) 10/30/23 0822   Dressing Status Clean;Dry 10/30/23 0822   Drainage Appearance Serosanguineous 10/29/23 0740   Status To bulb suction 10/29/23 0740   Sutures Removed Intact Yes 10/29/23 0740   Output (mL) 5 mL 10/30/23 1400       Closed/Suction Drain Left Abdomen Bulb 16 Fr. (Active)   Site Description Other (Comment) 10/30/23 0822   Dressing Status Clean;Dry 10/30/23 0822   Drainage Appearance Serosanguineous 10/29/23 0740   Status To bulb suction 10/29/23 0740   Sutures Removed Intact Yes 10/29/23 0740   Output (mL) 40 mL 10/30/23 1400       Nephrostomy Left 10 Fr. (Active)   Site/Stoma Assessment Other (Comment) 10/30/23 0826   Dressing Status Clean;Dry 10/30/23 0826   Collection Container Standard drainage bag 10/25/23 2150   Output (mL) 250 mL 10/26/23 2328       Nephrostomy Right 10 Fr. (Active)   Site/Stoma Assessment Other (Comment) 10/30/23 0827   Dressing Status Clean;Dry 10/30/23 0827   Collection Container Standard drainage bag 10/25/23 2150   Output (mL) 50 mL 10/26/23 2328       Colostomy LUQ (Active)   Stomal Appliance Clean;Dry;Intact 10/30/23 1150   Site/Stoma Assessment Clean;Intact 10/30/23 1255   Peristomal Assessment Clean;Intact 10/30/23 1255   Treatment Placement checked 10/29/23 1330   Drainage Characteristics Green;Brown 10/30/23 1150   Output (mL) 150 mL 10/30/23 1150       Urethral Catheter Non-latex 18 Fr. (Active)   Site Assessment Clean;Skin intact 10/30/23 1428   Collection Container Standard drainage bag 10/30/23 1220   Securement Method Securing device (Describe) 10/30/23 1220   Reason for Continuing Urinary Catheterization surgical procedures: urological/gynecological, pelvic oncology, anal, prolonged surgical procedure 10/30/23 1220   Output (mL) 150 mL 10/30/23 1220   $ Urethral Catheter Charge Indwelling cath 10/29/23 0740       Suprapubic Catheter Non-latex 20 Fr. (Active)   Site/Stoma Assessment Intact;Clean 10/30/23 1220   Dressing Status Removed;Clean;Dry 10/30/23  1220   Dressing Type Split gauze 10/30/23 1220   Collection Container Standard drainage bag 10/29/23 1028   Reason for Continuing Urinary Catheterization surgical procedures: urological/gynecological, pelvic oncology, anal, prolonged surgical procedure 10/30/23 1220   Output (mL) 350 mL 10/30/23 0109       [REMOVED] Urethral Catheter Other (Comment) (Removed)   Site Assessment Clean;Skin intact 10/27/23 0611   Collection Container Standard drainage bag 10/25/23 2121   Securement Method Securing device (Describe) 10/25/23 2121   Reason for Continuing Urinary Catheterization surgical procedures: urological/gynecological, pelvic oncology, anal, prolonged surgical procedure 10/25/23 2121   Output (mL) 300 mL 10/27/23 2201       [REMOVED] Urethral Catheter Non-latex 16 Fr. (Removed)       Tourniquet Times:         Implants:  Implants       Type Name Action Serial No.      Mesh SURGICLOSE CM 7 X 20 Implanted [id number]              Findings: minimal adhesions, loop of distal small bowel adherent to apex of rectal stump    Indications: Jose Thornton is an 57 y.o. 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 History of Crohn's colitis complicated by colovesical fistula s/p anterior proctosigmoidectomy and colostomy. Complicated by left ureter leak and stricture. Brought to OR today for urinary reconstruction.    The patient was seen in the preoperative area. The risks, benefits, complications, treatment options, non-operative alternatives, expected recovery and outcomes were discussed with the patient. The possibilities of reaction to medication, pulmonary aspiration, injury to surrounding structures, bleeding, recurrent infection, the need for additional procedures, failure to diagnose a condition, and creating a complication requiring transfusion or operation were discussed with the patient. The patient concurred with the proposed plan, giving informed consent.  The site of surgery was properly  noted/marked if necessary per policy. The patient has been actively warmed in preoperative area. Preoperative antibiotics have been ordered and given within 1 hours of incision. Venous thrombosis prophylaxis have been ordered including bilateral sequential compression devices and chemical prophylaxis    Procedure Details: Patient was brought to the operating room and placed on the operating table in the supine position.  After induction of general endotracheal anesthesia the legs were elevated into the modified lithotomy position using yellowfin stirrups.  The skin of the abdomen was prepped and draped in usual sterile fashion.  Surgical timeout was performed.  The peritoneal cavity was entered through the patient's previous midline incision.  There were minimal adhesions present and these were divided so that the small bowel was freed from the abdominal wall.  There was a single loop of distal small bowel which was adherent in the deep pelvis this was mobilized sharply. The apex of this loop of bowel was adherent to the apex of the rectal stump.  This was likely the source of the patient's enterorectal fistula although no obvious enterotomy on the small bowel side was seen.  Nonetheless, we imbricated this area of the small bowel with 3-0 Vicryl Lembert sutures.  The remaining small bowel was then mobilized off of the retroperitoneum to the root of the small bowel mesentery.  At that point, I turned the case over to Dr. Andersen for the urinary reconstruction.  The remainder of the case will be dictated in his separate operative note.  Complications:  None; patient tolerated the procedure well.    Disposition:  Case turned over to Dr. Andersen  Condition: stable         Additional Details: none    Attending Attestation:     Tomas Andersen  Phone Number: [phone]

## 2023-10-30 NOTE — PROGRESS NOTES
Wound Care Progress Note     Visit Date: 10/30/2023      Patient Name: Jose Thornton         MRN: [id number]                Reason for Visit: Assess ostomy and the need for a pouch change     Wound History: Jose Thorntno is a 57 y.o. male with PMH of Crohn's disease and colovesical fistula who underwent Exploratory laparotomy, MYA, Cystoscopy, Bilateral ileal ureter and bladder augment with right partial rectus muscle flap, and Suprapubic tube insertion  on 10/27/23 with Dr. Andersen and Dr. Rodriguez.         Ostomy Assessment: Patient in bed. States he is still having pain which is now complicated by nausea. States his stoma is still sluggish to function. States he does not think he needs to change it now. States he has his own convex pouches with him and that he can change it as needed. Informed him we are available to help with anything if needed.       Wound Team Plan: Ostomy team will continue to check in with patient to check if any services are needed.     SUZANNE GERHARDT, RN, BSN, CWOCN  10/30/2023  4:26 PM

## 2023-10-30 NOTE — PROGRESS NOTES
UROLOGY DAILY PROGRESS NOTE      Subjective   - Patient with some nausea but no vomiting  - Has sludge from colostomy, but not passing significant flatus  - Mildly distended  - Pain 8/10, medications help sufficiently for ambulation       Objective   Physical Exam  Gen: NAD, alert  HEENT: normocephalic, atraumatic, MMM  Pulm: nonlabored breathing  CV: regular rate  GI: soft, mildly distended, moderately tender near incisions, colostomy L abdomen w/ healthy appearance, some dark sludge. Midline incision with preveena in place, no output. Bilateral abdominal LINDSEY drains with serosanguinous output  : no suprapubic or CVA tenderness, Schultz draining clear yellow, SPT draining yellow with slight pink tinge, b/l PCNT capped  MSK: STANLEY  Neuro: no focal deficits  Skin: WWP  Psych: appropriate mood and behavior      Last Recorded Vitals  Heart Rate:  [63-86]   Temp:  [36.2 °C (97.2 °F)-36.7 °C (98.1 °F)]   Resp:  [17-18]   BP: (113-134)/(74-88)   SpO2:  [94 %-96 %]   Intake/Output last 3 Shifts:  I/O last 3 completed shifts:  In: 5738.7 (50.8 mL/kg) [I.V.:5738.7 (50.8 mL/kg)]  Out: 3870 (34.2 mL/kg) [Urine:3580 (0.9 mL/kg/hr); Drains:265; Stool:25]  Weight: 113 kg     Net IO Since Admission: -1,669 mL [10/30/23 1007]    Relevant Results  Results for orders placed or performed during the hospital encounter of 10/24/23 (from the past 24 hour(s))   Basic metabolic panel   Result Value Ref Range    Glucose 101 (H) 74 - 99 mg/dL    Sodium 140 136 - 145 mmol/L    Potassium 3.8 3.5 - 5.3 mmol/L    Chloride 108 (H) 98 - 107 mmol/L    Bicarbonate 23 21 - 32 mmol/L    Anion Gap 13 10 - 20 mmol/L    Urea Nitrogen 20 6 - 23 mg/dL    Creatinine 1.66 (H) 0.50 - 1.30 mg/dL    eGFR 48 (L) >60 mL/min/1.73m*2    Calcium 8.4 (L) 8.6 - 10.6 mg/dL   CBC   Result Value Ref Range    WBC 8.1 4.4 - 11.3 x10*3/uL    nRBC 0.0 0.0 - 0.0 /100 WBCs    RBC 3.98 (L) 4.50 - 5.90 x10*6/uL    Hemoglobin 11.0 (L) 13.5 - 17.5 g/dL    Hematocrit 35.1 (L) 41.0 -  52.0 %    MCV 88 80 - 100 fL    MCH 27.6 26.0 - 34.0 pg    MCHC 31.3 (L) 32.0 - 36.0 g/dL    RDW 15.9 (H) 11.5 - 14.5 %    Platelets 329 150 - 450 x10*3/uL    MPV 8.8 7.5 - 11.5 fL             Susceptibility data from last 90 days.  Collected Specimen Info Organism Amoxicillin/Clavulanate Ampicillin Ampicillin/Sulbactam Aztreonam Cefazolin Cefazolin (uncomplicated UTIs only) Cefepime Ceftazidime Ciprofloxacin Gentamicin Meropenem Nitrofurantoin   10/25/23 Urine from Indwelling (Andrade) Catheter Pseudomonas aeruginosa    R   S S R S     10/25/23 Urine from Nephrostomy Tube Pseudomonas aeruginosa    R   S S R S S    10/25/23 Urine from Nephrostomy Tube Gram Negative Bacilli               10/05/23 Urine from Nephrostomy Tube Escherichia coli S R I  S S   R S  S     Collected Specimen Info Organism Piperacillin/Tazobactam Tobramycin Trimethoprim/Sulfamethoxazole   10/25/23 Urine from Indwelling (Andrade) Catheter Pseudomonas aeruginosa S S    10/25/23 Urine from Nephrostomy Tube Pseudomonas aeruginosa I S    10/25/23 Urine from Nephrostomy Tube Gram Negative Bacilli      10/05/23 Urine from Nephrostomy Tube Escherichia coli S  R       Assessment/Plan   Jose Thornton is a 57 y.o. male with PMH of Crohn's disease and colovesical fistula who underwent Exploratory laparotomy, MYA, Cystoscopy, Bilateral ileal ureter and bladder augment with right partial rectus muscle flap, and Suprapubic tube insertion  on 10/27/23 with Dr. Andersen and Dr. Rodriguez.     He is ambulating and tolerating a clear liquid diet without vomiting. He is mildly distended and starting to experience some nausea, not yet passing consistent flatus. Pain is still bothersome but helped by medications.    Principal Problem:    Colovesical fistula    Plan:  Neuro: continue tylenol, oxycodone, dilaudid, oxybutynin, continue ACS pain blocks  Pulm: OOB as tolerated, IS 10x / hr  CV: hemodynamically stable  FEN/GI: continue on CLD   /Alpesh: maintain andrade, SPT, and  b/l nephrostomy tubes; flush every day   Heme: H/H stable / no indication for transfusion  ID: afebrile, continue Ancef/Gent, discontinue fluc. urine cultures with gentamicin sensitive Pseudomonas (10 day course gentamicin for complicated UTI) (in 2/3 cultures, 3rd culture yet to speciate)  Endo: no additional glycemic requirements   MSK: OOB as tolerated  Prophylaxis: SQH / SCDs    Dispo: continue care on RNF    Seen with chief resident Dr. Mckeon, to be discussed with attending Dr. Nathaly Lerma MD  PGY-1 Urology [place]  Pager: [phone]

## 2023-10-30 NOTE — PROGRESS NOTES
Acute Pain Service    .Postop Pain HPI -   Palliative: relieved with IV analgesics and regional local anesthetics  Provocative: movement  Quality:  burning and aching  Radiation:  none  Severity:  7/10  Timing: constant    24-HOUR OPIOID CONSUMPTION:  Dilaudid 0.2 mg  Oxycodone 40 mg    Scheduled medications  acetaminophen, 975 mg, oral, q6h  ceFAZolin, 1 g, intravenous, q8h  fluticasone, 2 spray, Each Nostril, Daily  gentamicin, 5 mg/kg (Adjusted), intravenous, q24h LAVINIA  heparin (porcine), 5,000 Units, subcutaneous, q8h  oxybutynin, 5 mg, oral, TID  polyethylene glycol, 17 g, oral, Daily  sennosides, 2 tablet, oral, BID      Continuous medications  miscellaneous medical supply,   ropivacaine (PF) in NS cmpd, 6 mL/hr  sodium chloride 0.9%, 75 mL/hr, Last Rate: 75 mL/hr (10/30/23 0530)      PRN medications  PRN medications: bisacodyl, HYDROmorphone, melatonin, metoclopramide **OR** metoclopramide, naloxone, naloxone, naloxone, ondansetron **OR** ondansetron, ondansetron ODT **OR** ondansetron, oxyCODONE, oxyCODONE     Physical Exam:  Constitutional:  no distress, alert and cooperative  Eyes: clear sclera  Head/Neck: No apparent injury, trachea midline  Respiratory/Thorax: Patent airways, thorax symmetric, breathing comfortably  Cardiovascular: no pitting edema  Gastrointestinal: Nondistended  Musculoskeletal: ROM intact  Extremities: no clubbing  Neurological: alert, lopez x4  Psychological: Appropriate affect    Results for orders placed or performed during the hospital encounter of 10/24/23 (from the past 24 hour(s))   Basic metabolic panel   Result Value Ref Range    Glucose 101 (H) 74 - 99 mg/dL    Sodium 140 136 - 145 mmol/L    Potassium 3.8 3.5 - 5.3 mmol/L    Chloride 108 (H) 98 - 107 mmol/L    Bicarbonate 23 21 - 32 mmol/L    Anion Gap 13 10 - 20 mmol/L    Urea Nitrogen 20 6 - 23 mg/dL    Creatinine 1.66 (H) 0.50 - 1.30 mg/dL    eGFR 48 (L) >60 mL/min/1.73m*2    Calcium 8.4 (L) 8.6 - 10.6 mg/dL   CBC   Result  Value Ref Range    WBC 8.1 4.4 - 11.3 x10*3/uL    nRBC 0.0 0.0 - 0.0 /100 WBCs    RBC 3.98 (L) 4.50 - 5.90 x10*6/uL    Hemoglobin 11.0 (L) 13.5 - 17.5 g/dL    Hematocrit 35.1 (L) 41.0 - 52.0 %    MCV 88 80 - 100 fL    MCH 27.6 26.0 - 34.0 pg    MCHC 31.3 (L) 32.0 - 36.0 g/dL    RDW 15.9 (H) 11.5 - 14.5 %    Platelets 329 150 - 450 x10*3/uL    MPV 8.8 7.5 - 11.5 fL       Jose Thornton is a 57 y.o. year old male patient presenting for bilateral ureteral reimplantation, rectovesicular fistula repair, exploratory laparotomy with Dr. Andersen 10/27/23      Bilateral quadratus lumborum nerve blocks with catheters placed preoperatively on 10/27/23     PLAN:  - Catheters removed today with tips intact and no signs of erythema, drainage or induration at catheter site   - Discontinue Ambit ball with Ropivacaine 0.2%/NaCl 0.9% 500mL  - Rest of pain management per primary team, we recommend scheduled tylenol, robaxin  500 mg and toradol at the discretion  of the primary team  - Acute pain service will sign off

## 2023-10-30 NOTE — PROGRESS NOTES
"Jose Thornton is a 57 y.o. male on day 6 of admission presenting with Colovesical fistula.    Subjective   NAEON. Patient complained of mild abdominal pain. Mildly distended on exam. AROBF. Encouraged ambulation    Objective     Neurological: Awake, alert, conversive  Respiratory/Thorax: even, unlabored  Genitourinary: voiding  Gastrointestinal: soft, ATTP, Incisions CDI no erythema. Drains with ss output. Ostomy pink, viable  Skin: warm, dry  Musculoskeletal: STANLEY  Eyes: non-icteric  Extremities: no edema   Psychological: appropriate mood/affect     Last Recorded Vitals  Blood pressure 134/83, pulse 71, temperature 36.2 °C (97.2 °F), resp. rate 18, height 1.829 m (6' 0.01\"), weight 113 kg (249 lb 1.9 oz), SpO2 94 %.  Intake/Output last 3 Shifts:  I/O last 3 completed shifts:  In: 5738.7 (50.8 mL/kg) [I.V.:5738.7 (50.8 mL/kg)]  Out: 3870 (34.2 mL/kg) [Urine:3580 (0.9 mL/kg/hr); Drains:265; Stool:25]  Weight: 113 kg     Assessment/Plan   Principal Problem:    Colovesical fistula    Jose Thornton is a 57 y.o. male w/ Hx Crohn's disease here for cystoscopy, bilateral ileal ureter and bladder augment with right partial rectus muscle flap and SBT placement on 10/27/23. Patient progressing well- AROBF (colostomy OP 25)    -AROBF  -Care per primary    Will follow peripherally, please page or call with any questions     Nicole Castle MD      "

## 2023-10-31 PROCEDURE — 2500000001 HC RX 250 WO HCPCS SELF ADMINISTERED DRUGS (ALT 637 FOR MEDICARE OP): Performed by: STUDENT IN AN ORGANIZED HEALTH CARE EDUCATION/TRAINING PROGRAM

## 2023-10-31 PROCEDURE — 2500000004 HC RX 250 GENERAL PHARMACY W/ HCPCS (ALT 636 FOR OP/ED)

## 2023-10-31 PROCEDURE — 2500000004 HC RX 250 GENERAL PHARMACY W/ HCPCS (ALT 636 FOR OP/ED): Performed by: UROLOGY

## 2023-10-31 PROCEDURE — 2500000004 HC RX 250 GENERAL PHARMACY W/ HCPCS (ALT 636 FOR OP/ED): Performed by: STUDENT IN AN ORGANIZED HEALTH CARE EDUCATION/TRAINING PROGRAM

## 2023-10-31 PROCEDURE — 2500000005 HC RX 250 GENERAL PHARMACY W/O HCPCS

## 2023-10-31 PROCEDURE — 1170000001 HC PRIVATE ONCOLOGY ROOM DAILY

## 2023-10-31 PROCEDURE — 96372 THER/PROPH/DIAG INJ SC/IM: CPT | Performed by: STUDENT IN AN ORGANIZED HEALTH CARE EDUCATION/TRAINING PROGRAM

## 2023-10-31 PROCEDURE — 2500000001 HC RX 250 WO HCPCS SELF ADMINISTERED DRUGS (ALT 637 FOR MEDICARE OP): Performed by: PHYSICIAN ASSISTANT

## 2023-10-31 RX ORDER — POLYETHYLENE GLYCOL 3350 17 G/17G
17 POWDER, FOR SOLUTION ORAL DAILY PRN
Status: DISCONTINUED | OUTPATIENT
Start: 2023-10-31 | End: 2023-11-17 | Stop reason: HOSPADM

## 2023-10-31 RX ORDER — OXYCODONE HYDROCHLORIDE 5 MG/1
5 TABLET ORAL EVERY 6 HOURS PRN
Status: DISCONTINUED | OUTPATIENT
Start: 2023-10-31 | End: 2023-11-03

## 2023-10-31 RX ORDER — TRAMADOL HYDROCHLORIDE 50 MG/1
50 TABLET ORAL EVERY 6 HOURS PRN
Status: DISCONTINUED | OUTPATIENT
Start: 2023-10-31 | End: 2023-11-03

## 2023-10-31 RX ORDER — OXYBUTYNIN CHLORIDE 5 MG/1
5 TABLET ORAL 2 TIMES DAILY
Status: DISCONTINUED | OUTPATIENT
Start: 2023-10-31 | End: 2023-11-03

## 2023-10-31 RX ADMIN — STANDARDIZED SENNA CONCENTRATE 17.2 MG: 8.6 TABLET ORAL at 09:00

## 2023-10-31 RX ADMIN — POLYETHYLENE GLYCOL 3350 17 G: 17 POWDER, FOR SOLUTION ORAL at 09:00

## 2023-10-31 RX ADMIN — HEPARIN SODIUM 5000 UNITS: 5000 INJECTION INTRAVENOUS; SUBCUTANEOUS at 03:21

## 2023-10-31 RX ADMIN — HEPARIN SODIUM 5000 UNITS: 5000 INJECTION INTRAVENOUS; SUBCUTANEOUS at 11:00

## 2023-10-31 RX ADMIN — HYDROMORPHONE HYDROCHLORIDE 0.2 MG: 1 INJECTION, SOLUTION INTRAMUSCULAR; INTRAVENOUS; SUBCUTANEOUS at 15:12

## 2023-10-31 RX ADMIN — CEFAZOLIN SODIUM 1 G: 1 INJECTION, SOLUTION INTRAVENOUS at 05:38

## 2023-10-31 RX ADMIN — ONDANSETRON HYDROCHLORIDE 4 MG: 4 TABLET, FILM COATED ORAL at 20:54

## 2023-10-31 RX ADMIN — OXYCODONE HYDROCHLORIDE 10 MG: 5 TABLET ORAL at 13:35

## 2023-10-31 RX ADMIN — BISACODYL 10 MG: 5 TABLET ORAL at 10:03

## 2023-10-31 RX ADMIN — OXYCODONE HYDROCHLORIDE 10 MG: 5 TABLET ORAL at 03:21

## 2023-10-31 RX ADMIN — CEFAZOLIN SODIUM 1 G: 1 INJECTION, SOLUTION INTRAVENOUS at 20:49

## 2023-10-31 RX ADMIN — CEFAZOLIN SODIUM 1 G: 1 INJECTION, SOLUTION INTRAVENOUS at 13:23

## 2023-10-31 RX ADMIN — ACETAMINOPHEN 975 MG: 325 TABLET ORAL at 20:49

## 2023-10-31 RX ADMIN — SODIUM CHLORIDE 75 ML/HR: 9 INJECTION, SOLUTION INTRAVENOUS at 20:49

## 2023-10-31 RX ADMIN — OXYBUTYNIN CHLORIDE 5 MG: 5 TABLET ORAL at 20:48

## 2023-10-31 RX ADMIN — OXYBUTYNIN CHLORIDE 5 MG: 5 TABLET ORAL at 09:00

## 2023-10-31 RX ADMIN — ACETAMINOPHEN 975 MG: 325 TABLET ORAL at 03:21

## 2023-10-31 RX ADMIN — SODIUM CHLORIDE 75 ML/HR: 9 INJECTION, SOLUTION INTRAVENOUS at 05:41

## 2023-10-31 RX ADMIN — OXYCODONE HYDROCHLORIDE 5 MG: 5 TABLET ORAL at 20:48

## 2023-10-31 RX ADMIN — GENTAMICIN SULFATE 460 MG: 40 INJECTION, SOLUTION INTRAMUSCULAR; INTRAVENOUS at 11:47

## 2023-10-31 RX ADMIN — ONDANSETRON 4 MG: 2 INJECTION INTRAMUSCULAR; INTRAVENOUS at 15:11

## 2023-10-31 RX ADMIN — STANDARDIZED SENNA CONCENTRATE 17.2 MG: 8.6 TABLET ORAL at 20:48

## 2023-10-31 RX ADMIN — ACETAMINOPHEN 975 MG: 325 TABLET ORAL at 07:00

## 2023-10-31 ASSESSMENT — COGNITIVE AND FUNCTIONAL STATUS - GENERAL
PERSONAL GROOMING: A LITTLE
HELP NEEDED FOR BATHING: A LITTLE
DRESSING REGULAR UPPER BODY CLOTHING: A LITTLE
CLIMB 3 TO 5 STEPS WITH RAILING: A LITTLE
DAILY ACTIVITIY SCORE: 19
DRESSING REGULAR LOWER BODY CLOTHING: A LITTLE
MOBILITY SCORE: 23
TOILETING: A LITTLE

## 2023-10-31 ASSESSMENT — PAIN SCALES - GENERAL
PAINLEVEL_OUTOF10: 8
PAINLEVEL_OUTOF10: 8
PAINLEVEL_OUTOF10: 7
PAINLEVEL_OUTOF10: 8
PAINLEVEL_OUTOF10: 8
PAINLEVEL_OUTOF10: 7

## 2023-10-31 ASSESSMENT — PAIN - FUNCTIONAL ASSESSMENT
PAIN_FUNCTIONAL_ASSESSMENT: 0-10

## 2023-10-31 NOTE — PROGRESS NOTES
UROLOGY DAILY PROGRESS NOTE      Subjective   NAEO. Patient states he feels better today. Pain is well controlled. Tolerating CLD, denies nausea or vomiting. Now passing flatus. Ambulating well.      Objective   Physical Exam  Gen: NAD, alert  HEENT: normocephalic, atraumatic, MMM  Pulm: nonlabored breathing  CV: regular rate  GI: soft, mildly distended, mildly tender near incisions, colostomy L abdomen w/ healthy appearance, some dark liquid sludge. Midline incision with preveena in place, no output. Bilateral abdominal LINDSEY drains with serosanguinous output  : no suprapubic or CVA tenderness, Schultz draining clear yellow, SPT draining yellow with slight pink tinge, b/l PCNT capped  MSK: STANLEY  Neuro: no focal deficits  Skin: WWP  Psych: appropriate mood and behavior    Last Recorded Vitals  Heart Rate:  [65-77]   Temp:  [36.1 °C (97 °F)-37 °C (98.6 °F)]   Resp:  [16-17]   BP: (121-135)/(76-86)   SpO2:  [93 %-96 %]   Intake/Output last 3 Shifts:  I/O last 3 completed shifts:  In: 5412.3 (47.9 mL/kg) [P.O.:240; I.V.:5172.3 (45.8 mL/kg)]  Out: 3475 (30.8 mL/kg) [Urine:2870 (0.7 mL/kg/hr); Drains:205; Stool:400]  Weight: 113 kg     Net IO Since Admission: -2,547.75 mL [10/31/23 0833]    Relevant Results    Scheduled medications  acetaminophen, 975 mg, oral, q6h  ceFAZolin, 1 g, intravenous, q8h  fluticasone, 2 spray, Each Nostril, Daily  gentamicin, 5 mg/kg (Adjusted), intravenous, q24h LAVINIA  heparin (porcine), 5,000 Units, subcutaneous, q8h  oxybutynin, 5 mg, oral, BID  polyethylene glycol, 17 g, oral, Daily  sennosides, 2 tablet, oral, BID      Continuous medications  sodium chloride 0.9%, 75 mL/hr, Last Rate: 75 mL/hr (10/31/23 0600)      PRN medications  PRN medications: bisacodyl, HYDROmorphone, melatonin, metoclopramide **OR** metoclopramide, naloxone, naloxone, naloxone, ondansetron **OR** ondansetron, ondansetron ODT **OR** ondansetron, oxyCODONE, oxyCODONE    Results for orders placed or performed during the  hospital encounter of 10/24/23 (from the past 24 hour(s))   Basic metabolic panel   Result Value Ref Range    Glucose 99 74 - 99 mg/dL    Sodium 142 136 - 145 mmol/L    Potassium 4.1 3.5 - 5.3 mmol/L    Chloride 109 (H) 98 - 107 mmol/L    Bicarbonate 21 21 - 32 mmol/L    Anion Gap 16 10 - 20 mmol/L    Urea Nitrogen 14 6 - 23 mg/dL    Creatinine 1.56 (H) 0.50 - 1.30 mg/dL    eGFR 52 (L) >60 mL/min/1.73m*2    Calcium 8.5 (L) 8.6 - 10.6 mg/dL   CBC   Result Value Ref Range    WBC 7.3 4.4 - 11.3 x10*3/uL    nRBC 0.0 0.0 - 0.0 /100 WBCs    RBC 4.10 (L) 4.50 - 5.90 x10*6/uL    Hemoglobin 11.3 (L) 13.5 - 17.5 g/dL    Hematocrit 37.2 (L) 41.0 - 52.0 %    MCV 91 80 - 100 fL    MCH 27.6 26.0 - 34.0 pg    MCHC 30.4 (L) 32.0 - 36.0 g/dL    RDW 16.1 (H) 11.5 - 14.5 %    Platelets 355 150 - 450 x10*3/uL    MPV 9.2 7.5 - 11.5 fL       Assessment/Plan   Jose Thornton is a 57 y.o. male with PMH of Crohn's disease and colovesical fistula who underwent Exploratory laparotomy, MYA, Cystoscopy, Bilateral ileal ureter and bladder augment with right partial rectus muscle flap, and Suprapubic tube insertion on 10/27/23 with Dr. Andersen and Dr. Rodriguez.     He is ambulating and tolerating a clear liquid diet. He is mildly distended and now passing consistent flatus. Pain is controlled with medications.    Principal Problem:    Colovesical fistula    Plan:  Neuro: continue tylenol, oxycodone, dilaudid, oxybutynin, continue ACS pain blocks  Pulm: OOB as tolerated, IS 10x / hr  CV: hemodynamically stable  FEN/GI: Advance to regular diet  /Alpesh: maintain andrade, SPT, and b/l nephrostomy tubes; flush every day   Heme: H/H stable / no indication for transfusion  ID: afebrile, continue Ancef/Gent, discontinue fluc. urine cultures with gentamicin sensitive Pseudomonas (10 day course gentamicin for complicated UTI) (in 2/3 cultures, 3rd culture yet to speciate)  Endo: no additional glycemic requirements   MSK: OOB as tolerated  Prophylaxis: SQH /  SCDs    Dispo: continue care on RNF    Seen with chief resident Dr. Mckeon    --------------------------------------------  Bonita Gould PA-C  Urology  Consult Uro: [id number]  After 5pm and Weekends: [id number]

## 2023-11-01 PROBLEM — N17.9 AKI (ACUTE KIDNEY INJURY) (CMS-HCC): Status: ACTIVE | Noted: 2023-11-01

## 2023-11-01 PROBLEM — N17.9 AKI (ACUTE KIDNEY INJURY) (CMS-HCC): Status: RESOLVED | Noted: 2023-11-01 | Resolved: 2023-11-01

## 2023-11-01 LAB
BACTERIA UR CULT: ABNORMAL
CREAT FLD-MCNC: 1.73 MG/DL
GLUCOSE BLD MANUAL STRIP-MCNC: 90 MG/DL (ref 74–99)
GLUCOSE BLD MANUAL STRIP-MCNC: 99 MG/DL (ref 74–99)

## 2023-11-01 PROCEDURE — 2500000001 HC RX 250 WO HCPCS SELF ADMINISTERED DRUGS (ALT 637 FOR MEDICARE OP): Performed by: PHYSICIAN ASSISTANT

## 2023-11-01 PROCEDURE — 2500000001 HC RX 250 WO HCPCS SELF ADMINISTERED DRUGS (ALT 637 FOR MEDICARE OP): Performed by: STUDENT IN AN ORGANIZED HEALTH CARE EDUCATION/TRAINING PROGRAM

## 2023-11-01 PROCEDURE — 2500000004 HC RX 250 GENERAL PHARMACY W/ HCPCS (ALT 636 FOR OP/ED): Performed by: STUDENT IN AN ORGANIZED HEALTH CARE EDUCATION/TRAINING PROGRAM

## 2023-11-01 PROCEDURE — 2500000004 HC RX 250 GENERAL PHARMACY W/ HCPCS (ALT 636 FOR OP/ED)

## 2023-11-01 PROCEDURE — 1170000001 HC PRIVATE ONCOLOGY ROOM DAILY

## 2023-11-01 PROCEDURE — 2500000005 HC RX 250 GENERAL PHARMACY W/O HCPCS

## 2023-11-01 PROCEDURE — 82570 ASSAY OF URINE CREATININE: CPT | Performed by: STUDENT IN AN ORGANIZED HEALTH CARE EDUCATION/TRAINING PROGRAM

## 2023-11-01 PROCEDURE — 82947 ASSAY GLUCOSE BLOOD QUANT: CPT

## 2023-11-01 PROCEDURE — 96372 THER/PROPH/DIAG INJ SC/IM: CPT | Performed by: STUDENT IN AN ORGANIZED HEALTH CARE EDUCATION/TRAINING PROGRAM

## 2023-11-01 RX ADMIN — HEPARIN SODIUM 5000 UNITS: 5000 INJECTION INTRAVENOUS; SUBCUTANEOUS at 18:22

## 2023-11-01 RX ADMIN — CEFAZOLIN SODIUM 1 G: 1 INJECTION, SOLUTION INTRAVENOUS at 20:49

## 2023-11-01 RX ADMIN — GENTAMICIN SULFATE 460 MG: 40 INJECTION, SOLUTION INTRAMUSCULAR; INTRAVENOUS at 11:10

## 2023-11-01 RX ADMIN — STANDARDIZED SENNA CONCENTRATE 17.2 MG: 8.6 TABLET ORAL at 08:31

## 2023-11-01 RX ADMIN — CEFAZOLIN SODIUM 1 G: 1 INJECTION, SOLUTION INTRAVENOUS at 05:09

## 2023-11-01 RX ADMIN — METOCLOPRAMIDE 10 MG: 5 INJECTION, SOLUTION INTRAMUSCULAR; INTRAVENOUS at 12:20

## 2023-11-01 RX ADMIN — ONDANSETRON HYDROCHLORIDE 4 MG: 4 TABLET, FILM COATED ORAL at 20:48

## 2023-11-01 RX ADMIN — TRAMADOL HYDROCHLORIDE 50 MG: 50 TABLET, COATED ORAL at 20:48

## 2023-11-01 RX ADMIN — TRAMADOL HYDROCHLORIDE 50 MG: 50 TABLET, COATED ORAL at 08:31

## 2023-11-01 RX ADMIN — OXYBUTYNIN CHLORIDE 5 MG: 5 TABLET ORAL at 20:48

## 2023-11-01 RX ADMIN — ACETAMINOPHEN 975 MG: 325 TABLET ORAL at 18:23

## 2023-11-01 RX ADMIN — OXYCODONE HYDROCHLORIDE 5 MG: 5 TABLET ORAL at 18:24

## 2023-11-01 RX ADMIN — OXYBUTYNIN CHLORIDE 5 MG: 5 TABLET ORAL at 08:31

## 2023-11-01 RX ADMIN — OXYCODONE HYDROCHLORIDE 5 MG: 5 TABLET ORAL at 02:48

## 2023-11-01 RX ADMIN — HEPARIN SODIUM 5000 UNITS: 5000 INJECTION INTRAVENOUS; SUBCUTANEOUS at 11:10

## 2023-11-01 RX ADMIN — CEFAZOLIN SODIUM 1 G: 1 INJECTION, SOLUTION INTRAVENOUS at 12:20

## 2023-11-01 RX ADMIN — HEPARIN SODIUM 5000 UNITS: 5000 INJECTION INTRAVENOUS; SUBCUTANEOUS at 02:48

## 2023-11-01 RX ADMIN — ONDANSETRON 4 MG: 2 INJECTION INTRAMUSCULAR; INTRAVENOUS at 08:31

## 2023-11-01 RX ADMIN — ACETAMINOPHEN 325 MG: 325 TABLET ORAL at 08:31

## 2023-11-01 RX ADMIN — ACETAMINOPHEN 325 MG: 325 TABLET ORAL at 02:48

## 2023-11-01 RX ADMIN — OXYCODONE HYDROCHLORIDE 5 MG: 5 TABLET ORAL at 12:20

## 2023-11-01 RX ADMIN — STANDARDIZED SENNA CONCENTRATE 17.2 MG: 8.6 TABLET ORAL at 20:48

## 2023-11-01 ASSESSMENT — COGNITIVE AND FUNCTIONAL STATUS - GENERAL
CLIMB 3 TO 5 STEPS WITH RAILING: A LITTLE
DAILY ACTIVITIY SCORE: 20
TURNING FROM BACK TO SIDE WHILE IN FLAT BAD: A LITTLE
STANDING UP FROM CHAIR USING ARMS: A LITTLE
MOVING TO AND FROM BED TO CHAIR: A LITTLE
DRESSING REGULAR LOWER BODY CLOTHING: A LITTLE
STANDING UP FROM CHAIR USING ARMS: A LITTLE
MOVING FROM LYING ON BACK TO SITTING ON SIDE OF FLAT BED WITH BEDRAILS: A LITTLE
HELP NEEDED FOR BATHING: A LITTLE
DRESSING REGULAR UPPER BODY CLOTHING: A LITTLE
CLIMB 3 TO 5 STEPS WITH RAILING: A LITTLE
HELP NEEDED FOR BATHING: A LITTLE
WALKING IN HOSPITAL ROOM: A LITTLE
DRESSING REGULAR LOWER BODY CLOTHING: A LITTLE
MOVING TO AND FROM BED TO CHAIR: A LITTLE
TOILETING: A LITTLE
MOBILITY SCORE: 18
WALKING IN HOSPITAL ROOM: A LITTLE
MOBILITY SCORE: 20
DAILY ACTIVITIY SCORE: 22

## 2023-11-01 ASSESSMENT — PAIN - FUNCTIONAL ASSESSMENT
PAIN_FUNCTIONAL_ASSESSMENT: 0-10

## 2023-11-01 ASSESSMENT — PAIN SCALES - GENERAL
PAINLEVEL_OUTOF10: 5 - MODERATE PAIN
PAINLEVEL_OUTOF10: 5 - MODERATE PAIN
PAINLEVEL_OUTOF10: 8
PAINLEVEL_OUTOF10: 8
PAINLEVEL_OUTOF10: 6
PAINLEVEL_OUTOF10: 7
PAINLEVEL_OUTOF10: 4

## 2023-11-01 ASSESSMENT — ACTIVITIES OF DAILY LIVING (ADL): LACK_OF_TRANSPORTATION: NO

## 2023-11-01 NOTE — PROGRESS NOTES
The patient does not have medical insurance and referral had been initiated to [facility]. Patient is now on SSD and his wife works and will need to determine if he is eligible for medicaid. Patient will be going home with  home care and [employer] Supervisor has approved three home care visits under the Early Discharge Program.

## 2023-11-01 NOTE — CARE PLAN
The patient's goals for the shift include      The clinical goals for the shift include Pt will ambulated three times this shift      Problem: Pain  Goal: Takes deep breaths with improved pain control throughout the shift  Outcome: Progressing  Goal: Turns in bed with improved pain control throughout the shift  Outcome: Progressing     Problem: Fall/Injury  Goal: Not fall by end of shift  Outcome: Progressing  Goal: Be free from injury by end of the shift  Outcome: Progressing  Goal: Verbalize understanding of personal risk factors for fall in the hospital  Outcome: Progressing  Goal: Verbalize understanding of risk factor reduction measures to prevent injury from fall in the home  Outcome: Progressing  Goal: Pace activities to prevent fatigue by end of the shift  Outcome: Progressing

## 2023-11-01 NOTE — PROGRESS NOTES
UROLOGY DAILY PROGRESS NOTE      Subjective   NAEO. Patient states his pain is improving. Pain is well controlled. Tolerating CLD, denies nausea or vomiting. Now passing flatus. Ambulating in halls. Patient states he would like to go home on Friday.      Objective   Physical Exam  Gen: NAD, alert  HEENT: normocephalic, atraumatic, MMM  Pulm: nonlabored breathing  CV: regular rate  GI: soft, mildly distended, mildly tender near incisions, colostomy L abdomen w/ healthy appearance, some dark liquid sludge. Midline incision c/di/, covered with Preveena (removed at bedside).  Bilateral abdominal LINDSEY drains with serosanguinous output  : no suprapubic or CVA tenderness, Schultz draining clear yellow, SPT draining yellow urine, b/l PCNT capped  MSK: STANLEY  Neuro: no focal deficits  Skin: WWP  Psych: appropriate mood and behavior    Last Recorded Vitals  Heart Rate:  [76-85]   Temp:  [35.8 °C (96.4 °F)-37 °C (98.6 °F)]   Resp:  [18-19]   BP: (121-154)/(75-94)   SpO2:  [93 %-96 %]   Intake/Output last 3 Shifts:  I/O last 3 completed shifts:  In: 3506.3 (31 mL/kg) [P.O.:240; I.V.:1916.3 (17 mL/kg); IV Piggyback:1350]  Out: 3285 (29.1 mL/kg) [Urine:2460 (0.6 mL/kg/hr); Drains:200; Stool:625]  Weight: 113 kg     Net IO Since Admission: -2,085.75 mL [11/01/23 0645]    Relevant Results    Scheduled medications  acetaminophen, 975 mg, oral, q6h  ceFAZolin, 1 g, intravenous, q8h  fluticasone, 2 spray, Each Nostril, Daily  gentamicin, 5 mg/kg (Adjusted), intravenous, q24h LAVINIA  heparin (porcine), 5,000 Units, subcutaneous, q8h  oxybutynin, 5 mg, oral, BID  sennosides, 2 tablet, oral, BID      Continuous medications     PRN medications  PRN medications: bisacodyl, HYDROmorphone, melatonin, metoclopramide **OR** metoclopramide, naloxone, naloxone, naloxone, ondansetron **OR** ondansetron, ondansetron ODT **OR** ondansetron, oxyCODONE, polyethylene glycol, traMADol      Results for orders placed or performed during the hospital encounter  of 10/24/23 (from the past 24 hour(s))   Basic metabolic panel   Result Value Ref Range    Glucose 99 74 - 99 mg/dL    Sodium 142 136 - 145 mmol/L    Potassium 4.1 3.5 - 5.3 mmol/L    Chloride 109 (H) 98 - 107 mmol/L    Bicarbonate 21 21 - 32 mmol/L    Anion Gap 16 10 - 20 mmol/L    Urea Nitrogen 14 6 - 23 mg/dL    Creatinine 1.56 (H) 0.50 - 1.30 mg/dL    eGFR 52 (L) >60 mL/min/1.73m*2    Calcium 8.5 (L) 8.6 - 10.6 mg/dL   CBC   Result Value Ref Range    WBC 7.3 4.4 - 11.3 x10*3/uL    nRBC 0.0 0.0 - 0.0 /100 WBCs    RBC 4.10 (L) 4.50 - 5.90 x10*6/uL    Hemoglobin 11.3 (L) 13.5 - 17.5 g/dL    Hematocrit 37.2 (L) 41.0 - 52.0 %    MCV 91 80 - 100 fL    MCH 27.6 26.0 - 34.0 pg    MCHC 30.4 (L) 32.0 - 36.0 g/dL    RDW 16.1 (H) 11.5 - 14.5 %    Platelets 355 150 - 450 x10*3/uL    MPV 9.2 7.5 - 11.5 fL       Assessment/Plan   Jose Thornton is a 57 y.o. male with PMH of Crohn's disease and colovesical fistula who underwent Exploratory laparotomy, MYA, Cystoscopy, Bilateral ileal ureter and bladder augment with right partial rectus muscle flap, and Suprapubic tube insertion on 10/27/23 with Dr. Andersen and Dr. Rodriguez.     He continues to ambulate well and tolerating a clear liquid diet. He is mildly distended and now passing consistent flatus. Pain is controlled with medications.  Preevena removed this morning. Will advance to regular diet and discontinue IVF.    Principal Problem:    Colovesical fistula    Plan:  -Continue Tylenol, Oxycodone, Tramadol, oxybutynin, continue ACS pain blocks  Pulm: IS 10x / hr  CV: hemodynamically stable  FEN/GI: Advance to regular diet  /Alpesh: maintain andrade, SPT, and b/l nephrostomy tubes; flush every day   Heme: H/H stable / no indication for transfusion  ID: afebrile, continue Ancef/Gent, discontinue fluc. urine cultures with gentamicin sensitive Pseudomonas (10 day course gentamicin for complicated UTI) (in 2/3 cultures, 3rd culture yet to speciate)  Endo: no additional glycemic  requirements   MSK: OOB as tolerated  Prophylaxis: SQH / SCDs  Plan for discharge on Friday, 11/3/23    Dispo: continue care on RNF    Seen with chief resident Dr. Mckeon    --------------------------------------------  Bonita Gould PA-C  Urology  Consult Uro: [id number]  After 5pm and Weekends: [id number]

## 2023-11-02 PROCEDURE — 2500000004 HC RX 250 GENERAL PHARMACY W/ HCPCS (ALT 636 FOR OP/ED)

## 2023-11-02 PROCEDURE — 96372 THER/PROPH/DIAG INJ SC/IM: CPT | Performed by: STUDENT IN AN ORGANIZED HEALTH CARE EDUCATION/TRAINING PROGRAM

## 2023-11-02 PROCEDURE — 2500000001 HC RX 250 WO HCPCS SELF ADMINISTERED DRUGS (ALT 637 FOR MEDICARE OP): Performed by: STUDENT IN AN ORGANIZED HEALTH CARE EDUCATION/TRAINING PROGRAM

## 2023-11-02 PROCEDURE — 2500000001 HC RX 250 WO HCPCS SELF ADMINISTERED DRUGS (ALT 637 FOR MEDICARE OP): Performed by: PHYSICIAN ASSISTANT

## 2023-11-02 PROCEDURE — 1170000001 HC PRIVATE ONCOLOGY ROOM DAILY

## 2023-11-02 PROCEDURE — 2500000001 HC RX 250 WO HCPCS SELF ADMINISTERED DRUGS (ALT 637 FOR MEDICARE OP)

## 2023-11-02 PROCEDURE — 2500000005 HC RX 250 GENERAL PHARMACY W/O HCPCS: Performed by: STUDENT IN AN ORGANIZED HEALTH CARE EDUCATION/TRAINING PROGRAM

## 2023-11-02 PROCEDURE — 97116 GAIT TRAINING THERAPY: CPT | Mod: GP

## 2023-11-02 PROCEDURE — 2500000004 HC RX 250 GENERAL PHARMACY W/ HCPCS (ALT 636 FOR OP/ED): Performed by: STUDENT IN AN ORGANIZED HEALTH CARE EDUCATION/TRAINING PROGRAM

## 2023-11-02 RX ADMIN — HEPARIN SODIUM 5000 UNITS: 5000 INJECTION INTRAVENOUS; SUBCUTANEOUS at 11:43

## 2023-11-02 RX ADMIN — BENZOCAINE AND MENTHOL 1 LOZENGE: 15; 3.6 LOZENGE ORAL at 11:58

## 2023-11-02 RX ADMIN — STANDARDIZED SENNA CONCENTRATE 17.2 MG: 8.6 TABLET ORAL at 09:05

## 2023-11-02 RX ADMIN — OXYCODONE HYDROCHLORIDE 5 MG: 5 TABLET ORAL at 01:08

## 2023-11-02 RX ADMIN — METOCLOPRAMIDE 10 MG: 5 INJECTION, SOLUTION INTRAMUSCULAR; INTRAVENOUS at 18:09

## 2023-11-02 RX ADMIN — ONDANSETRON 4 MG: 4 TABLET, ORALLY DISINTEGRATING ORAL at 20:47

## 2023-11-02 RX ADMIN — METOCLOPRAMIDE 10 MG: 5 INJECTION, SOLUTION INTRAMUSCULAR; INTRAVENOUS at 12:57

## 2023-11-02 RX ADMIN — OXYCODONE HYDROCHLORIDE 5 MG: 5 TABLET ORAL at 09:09

## 2023-11-02 RX ADMIN — OXYBUTYNIN CHLORIDE 5 MG: 5 TABLET ORAL at 09:05

## 2023-11-02 RX ADMIN — GENTAMICIN SULFATE 460 MG: 40 INJECTION, SOLUTION INTRAMUSCULAR; INTRAVENOUS at 11:43

## 2023-11-02 RX ADMIN — CEFAZOLIN SODIUM 1 G: 1 INJECTION, SOLUTION INTRAVENOUS at 05:13

## 2023-11-02 RX ADMIN — BENZOCAINE AND MENTHOL 1 LOZENGE: 15; 3.6 LOZENGE ORAL at 09:05

## 2023-11-02 RX ADMIN — ACETAMINOPHEN 975 MG: 325 TABLET ORAL at 01:08

## 2023-11-02 RX ADMIN — ONDANSETRON 4 MG: 2 INJECTION INTRAMUSCULAR; INTRAVENOUS at 11:43

## 2023-11-02 RX ADMIN — HEPARIN SODIUM 5000 UNITS: 5000 INJECTION INTRAVENOUS; SUBCUTANEOUS at 03:10

## 2023-11-02 RX ADMIN — HEPARIN SODIUM 5000 UNITS: 5000 INJECTION INTRAVENOUS; SUBCUTANEOUS at 18:09

## 2023-11-02 ASSESSMENT — COGNITIVE AND FUNCTIONAL STATUS - GENERAL
WALKING IN HOSPITAL ROOM: A LITTLE
STANDING UP FROM CHAIR USING ARMS: A LITTLE
CLIMB 3 TO 5 STEPS WITH RAILING: A LITTLE
CLIMB 3 TO 5 STEPS WITH RAILING: A LITTLE
STANDING UP FROM CHAIR USING ARMS: A LITTLE
MOVING FROM LYING ON BACK TO SITTING ON SIDE OF FLAT BED WITH BEDRAILS: A LITTLE
WALKING IN HOSPITAL ROOM: A LITTLE
TURNING FROM BACK TO SIDE WHILE IN FLAT BAD: A LITTLE
MOBILITY SCORE: 18
MOVING TO AND FROM BED TO CHAIR: A LITTLE
DAILY ACTIVITIY SCORE: 24
MOBILITY SCORE: 21

## 2023-11-02 ASSESSMENT — PAIN SCALES - GENERAL
PAINLEVEL_OUTOF10: 7
PAINLEVEL_OUTOF10: 7
PAINLEVEL_OUTOF10: 6

## 2023-11-02 ASSESSMENT — PAIN - FUNCTIONAL ASSESSMENT: PAIN_FUNCTIONAL_ASSESSMENT: 0-10

## 2023-11-02 NOTE — PROGRESS NOTES
Physical Therapy    Physical Therapy Treatment    Patient Name: Jose Thornton  MRN: [id number]  Today's Date: 11/2/2023  Time Calculation  Start Time: 1013  Stop Time: 1036  Time Calculation (min): 23 min       Assessment/Plan   PT Assessment  PT Assessment Results: Decreased strength, Decreased endurance, Impaired balance, Decreased mobility, Pain  Rehab Prognosis: Excellent  Evaluation/Treatment Tolerance: Patient tolerated treatment well  Medical Staff Made Aware: Yes  Strengths: Premorbid level of function, Housing layout, Support and attitude of living partners  End of Session Communication: Bedside nurse  Assessment Comment: Pt. is a 57 yom that presents with increased abdominal pain, decreased functional strength, mildly impaired balance, decreased activity tolerance/endurance, and increased difficulty with funcitonal mobility compared to baseline. Pt. will benefit from skilled PT intervention while inpatient to address these deficits.  End of Session Patient Position: Bed, 3 rail up, Alarm off, not on at start of session     PT Plan  Treatment/Interventions: Bed mobility, Transfer training, Gait training, Stair training, Balance training, Strengthening, Endurance training, Therapeutic exercise, Therapeutic activity, Home exercise program  PT Plan: Skilled PT  PT Frequency: 3 times per week  PT Discharge Recommendations: Low intensity level of continued care  Equipment Recommended upon Discharge: Other (comment) (shower chair)  PT Recommended Transfer Status: Stand by assist, Assistive device  PT - OK to Discharge: Yes (eval complete, refer to dispo)      General Visit Information:   PT  Visit  PT Received On: 11/02/23  General  Reason for Referral: s/p Exploratory laparotomy, MYA, Cystoscopy, Bilateral ileal ureter and bladder augment with right partial rectus muscle flap, and Suprapubic tube insertion on 10/27/23 (Ostomy, IV heplocked, LINDSEY x2, Schultz x2)  Past Medical History Relevant to Rehab:  Diverticulosis, GERD, GI bleed, UTI, and Crohn's disease complicated by colovesical fistula s/p bilateral nephrostomy tube placement + indwelling andrade catheter  Family/Caregiver Present: No  Prior to Session Communication: Bedside nurse  Patient Position Received: Bed, 3 rail up, Alarm off, not on at start of session  Preferred Learning Style: verbal  General Comment: Pt. received supine in bed, pleasant and agreeable to participate in session. Pt. expressed feeling that he is not progressing in regards to pain and nause.    Subjective   Precautions:  Precautions  Medical Precautions: Abdominal precautions, Fall precautions  Post-Surgical Precautions: Abdominal surgery precautions  Vital Signs:    Objective   Pain:  Pain Assessment  Pain Assessment: 0-10  Pain Score: 7  Pain Type: Surgical pain  Pain Location: Abdomen  Pain Frequency: Constant/continuous  Patient's Stated Pain Goal: No pain  Pain Interventions: Ambulation/increased activity    Cognition:  Cognition  Overall Cognitive Status: Within Functional Limits  Orientation Level: Oriented X4    Postural Control:  Postural Control  Postural Control: Within Functional Limits    Activity Tolerance:  Activity Tolerance  Endurance: Endurance does not limit participation in activity  Early Mobility/Exercise Safety Screen: Proceed with mobilization - No exclusion criteria met  Activity Tolerance Comments: Pt. tolerated ambulating increased distance this date with FWW and CGAx1.    Treatments:  Therapeutic Exercise  Therapeutic Exercise Performed: Yes  Therapeutic Exercise Activity 1: EOB: [place] AP x20  Therapeutic Exercise Activity 2: EOB: [place] LAQ x15    Balance/Neuromuscular Re-Education  Balance/Neuromuscular Re-Education Activity Performed: Yes  Balance/Neuromuscular Re-Education Activity 1: Pt. demo LOB 1x with static standing as pt. removed [place] UE support of FWW. CGAx1 to regain balance. Educated pt. on importance of maintaining [place] UE support on FWW to maximize  stability and safety.    Bed Mobility  Bed Mobility: Yes  Bed Mobility 1  Bed Mobility 1: Supine to sitting  Level of Assistance 1: Contact guard  Bed Mobility Comments 1: Log roll technique, HOB elevated significantly, pt. utilizing bed rails  Bed Mobility 2  Bed Mobility  2: Sitting to supine  Level of Assistance 2: Contact guard  Bed Mobility Comments 2: HOB elevated, pt. required increased time to complete 2/2 pain    Ambulation/Gait Training  Ambulation/Gait Training Performed: Yes  Ambulation/Gait Training 1  Surface 1: Level tile  Device 1: Rolling walker  Assistance 1: Close supervision  Quality of Gait 1:  (Pt. demos improved mera this date and decreased reliance on FWW)  Comments/Distance (ft) 1: 200' x2, completed stair training between trials, pt. declined seated rest break    Transfers  Transfer: Yes  Transfer 1  Transfer From 1: Bed to  Transfer to 1: Stand  Technique 1: Sit to stand  Transfer Device 1: Walker  Transfer Level of Assistance 1: Close supervision  Trials/Comments 1: Cues for proper UE placement and technique though pt. non-compliant. Pt. pulling on walker  Transfers 2  Transfer From 2: Stand to  Transfer to 2: Bed  Technique 2: Stand to sit  Transfer Device 2: Walker  Transfer Level of Assistance 2: Close supervision  Trials/Comments 2: Cues to reach posteriorly to maximize safety. Pt. slow to descend, limited by pain    Stairs  Stairs: Yes  Stairs  Rails 1: Left  Curb Step 1: No  Device 1: Railing  Assistance 1: Contact guard  Comment/Number of Steps 1: ascend/descend 4 stairs with step to pattern    Outcome Measures:  [facility] Basic Mobility  Turning from your back to your side while in a flat bed without using bedrails: A little  Moving from lying on your back to sitting on the side of a flat bed without using bedrails: A little  Moving to and from bed to chair (including a wheelchair): A little  Standing up from a chair using your arms (e.g. wheelchair or bedside chair): A little  To  walk in hospital room: A little  Climbing 3-5 steps with railing: A little  Basic Mobility - Total Score: 18    Education Documentation  Precautions, taught by Areli Brasher PT at 11/2/2023 11:13 AM.  Learner: Patient  Readiness: Acceptance  Method: Explanation, Demonstration  Response: Verbalizes Understanding, Demonstrated Understanding    Body Mechanics, taught by Areli Brasher PT at 11/2/2023 11:13 AM.  Learner: Patient  Readiness: Acceptance  Method: Explanation, Demonstration  Response: Verbalizes Understanding, Demonstrated Understanding    Home Exercise Program, taught by Areli Brasher, PT at 11/2/2023 11:13 AM.  Learner: Patient  Readiness: Acceptance  Method: Explanation, Demonstration  Response: Verbalizes Understanding, Demonstrated Understanding    Mobility Training, taught by Areli Brasher PT at 11/2/2023 11:13 AM.  Learner: Patient  Readiness: Acceptance  Method: Explanation, Demonstration  Response: Verbalizes Understanding, Demonstrated Understanding    Education Comments  No comments found.        OP EDUCATION:       Encounter Problems       Encounter Problems (Active)       Balance       Patient to demonstrate Alexis static and dynamic standing balance without LOB with change of directions, no hesitancy, appropriate TRISTA and sequencing to complete functional task.  (Progressing)       Start:  10/30/23    Expected End:  11/13/23               Mobility       Patient will navigate 5 steps with unilateral HR and SBA to demo ability to safely traverse PATIENCE (Progressing)       Start:  10/30/23    Expected End:  11/13/23            Patient will ambulate >/= 400' Alexis with LRAD (Progressing)       Start:  10/30/23    Expected End:  11/13/23            Patient will tolerate >/=30 minutes continuous activity with stable vital signs, RPE </=13/20, RPD </=3/10  (Progressing)       Start:  10/30/23    Expected End:  11/13/23               Transfers       Patient will perform bed mobility Alexis with  HOB flat and no rails (Progressing)       Start:  10/30/23    Expected End:  11/13/23            Patient will transfer sit to and from stand Alexis with LRAD (Progressing)       Start:  10/30/23    Expected End:  11/13/23

## 2023-11-02 NOTE — PROGRESS NOTES
UROLOGY DAILY PROGRESS NOTE      Subjective   - NAEO  - Pain well controlled on tylenol, tramadol, and oxycodone  - Tolerating diet without nausea/vomiting, patient prefers Boost/smoothies to solid food but feels he is getting adequate nutrition intake  - Passing gas and liquid output into ostomy pouch   - Ambulating    Objective   Physical Exam  Gen: NAD, alert  HEENT: normocephalic, atraumatic, MMM  Pulm: nonlabored breathing  CV: regular rate  GI: soft, mildly distended, mildly tender near incisions, colostomy L abdomen w/ healthy appearance, some dark liquid sludge. Midline incision c/d/I closed with staples, now open to air.  Bilateral abdominal LINDSEY drains with serosanguinous output  : no suprapubic or CVA tenderness, Schultz draining clear yellow, SPT draining clear yellow, b/l PCNT capped  MSK: STANLEY  Neuro: no focal deficits  Skin: WWP  Psych: appropriate mood and behavior    Last Recorded Vitals  Heart Rate:  [73-91]   Temp:  [36 °C (96.8 °F)-36.8 °C (98.2 °F)]   Resp:  [18]   BP: (116-138)/(77-88)   SpO2:  [91 %-98 %]   Intake/Output last 3 Shifts:  I/O last 3 completed shifts:  In: 1150 (10.2 mL/kg) [P.O.:1000; IV Piggyback:150]  Out: 3103 (27.5 mL/kg) [Urine:2275 (0.6 mL/kg/hr); Drains:63; Stool:765]  Weight: 113 kg     Net IO Since Admission: -3,300.75 mL [11/02/23 0729]    Relevant Results    Scheduled medications  acetaminophen, 975 mg, oral, q6h  fluticasone, 2 spray, Each Nostril, Daily  gentamicin, 5 mg/kg (Adjusted), intravenous, q24h LAVINIA  heparin (porcine), 5,000 Units, subcutaneous, q8h  oxybutynin, 5 mg, oral, BID  sennosides, 2 tablet, oral, BID      Continuous medications     PRN medications  PRN medications: bisacodyl, HYDROmorphone, melatonin, metoclopramide **OR** metoclopramide, naloxone, naloxone, naloxone, ondansetron **OR** ondansetron, ondansetron ODT **OR** ondansetron, oxyCODONE, polyethylene glycol, traMADol      Results for orders placed or performed during the hospital encounter of  10/24/23 (from the past 24 hour(s))   Basic metabolic panel   Result Value Ref Range    Glucose 99 74 - 99 mg/dL    Sodium 142 136 - 145 mmol/L    Potassium 4.1 3.5 - 5.3 mmol/L    Chloride 109 (H) 98 - 107 mmol/L    Bicarbonate 21 21 - 32 mmol/L    Anion Gap 16 10 - 20 mmol/L    Urea Nitrogen 14 6 - 23 mg/dL    Creatinine 1.56 (H) 0.50 - 1.30 mg/dL    eGFR 52 (L) >60 mL/min/1.73m*2    Calcium 8.5 (L) 8.6 - 10.6 mg/dL   CBC   Result Value Ref Range    WBC 7.3 4.4 - 11.3 x10*3/uL    nRBC 0.0 0.0 - 0.0 /100 WBCs    RBC 4.10 (L) 4.50 - 5.90 x10*6/uL    Hemoglobin 11.3 (L) 13.5 - 17.5 g/dL    Hematocrit 37.2 (L) 41.0 - 52.0 %    MCV 91 80 - 100 fL    MCH 27.6 26.0 - 34.0 pg    MCHC 30.4 (L) 32.0 - 36.0 g/dL    RDW 16.1 (H) 11.5 - 14.5 %    Platelets 355 150 - 450 x10*3/uL    MPV 9.2 7.5 - 11.5 fL       Assessment/Plan   Jose Thornton is a 57 y.o. male with PMH of Crohn's disease and colovesical fistula who underwent Exploratory laparotomy, MYA, Cystoscopy, Bilateral ileal ureter and bladder augment with right partial rectus muscle flap, and Suprapubic tube insertion on 10/27/23 with Dr. Andersen and Dr. Rodriguez.     He continues to ambulate well and is tolerating a regular diet. He is self-regulating with food and prefers thick liquids right now. He is consistently passing flatus and his colostomy output has picked up. Pain is controlled with PO medications.     Principal Problem:    Colovesical fistula    Plan:  Neuro: continue Tylenol, Oxycodone, Tramadol, oxybutynin  Pulm: IS 10x / hr  CV: hemodynamically stable, VS Q8h  FEN/GI: regular diet, encourage PO intake  /Alpesh: maintain andrade and SPT to drainage, flush daily, maintain b/l nephrostomy tubes capped  Heme: no indication for transfusion  ID: discontinue Ancef, continue Gentamicin  -urine cultures with gentamicin sensitive Pseudomonas (10 day course gentamicin for complicated UTI, stop 11/3)   Endo: no additional glycemic requirements   MSK: OOB as  tolerated  Prophylaxis: SQH / SCDs    Dispo: continue care on RNF, planned discharge to home 11/3 with plans for daily SPT flushing and cystogram followed by urethral catheter removal 3 weeks after surgery    Seen with chief resident Dr. Mckeon and discussed with attending Dr. Nathaly Lerma MD  PGY-1 Urology [place]  Pager: [phone]

## 2023-11-02 NOTE — PROGRESS NOTES
"Jose Thornton is a 57 y.o. male on day 9 of admission presenting with Colovesical fistula.    Subjective   NAEON. Patient complained of mild abdominal pain. Mildly distended on exam. Havinf ostomy output. Encouraged ambulation    Objective     Neurological: Awake, alert, conversive  Respiratory/Thorax: even, unlabored  Genitourinary: voiding  Gastrointestinal: soft, ATTP, Incisions CDI no erythema. Drains with ss output. Ostomy pink, viable  Skin: warm, dry  Musculoskeletal: STANLEY  Eyes: non-icteric  Extremities: no edema   Psychological: appropriate mood/affect     Last Recorded Vitals  Blood pressure 120/77, pulse 78, temperature 36 °C (96.8 °F), temperature source Temporal, resp. rate 18, height 1.829 m (6' 0.01\"), weight 113 kg (249 lb 1.9 oz), SpO2 96 %.  Intake/Output last 3 Shifts:  I/O last 3 completed shifts:  In: 1150 (10.2 mL/kg) [P.O.:1000; IV Piggyback:150]  Out: 3103 (27.5 mL/kg) [Urine:2275 (0.6 mL/kg/hr); Drains:63; Stool:765]  Weight: 113 kg     Assessment/Plan   Principal Problem:    Colovesical fistula    Jose Thornton is a 57 y.o. male w/ Hx Crohn's disease here for cystoscopy, bilateral ileal ureter and bladder augment with right partial rectus muscle flap and SBT placement on 10/27/23. Patient progressing well    -Colorectal will sign off   -Care per primary     please page or call with any questions     Josafat Otto, DO      "

## 2023-11-03 ENCOUNTER — APPOINTMENT (OUTPATIENT)
Dept: RADIOLOGY | Facility: HOSPITAL | Age: 58
DRG: 353 | End: 2023-11-03

## 2023-11-03 LAB
ANION GAP SERPL CALC-SCNC: 23 MMOL/L (ref 10–20)
BUN SERPL-MCNC: 38 MG/DL (ref 6–23)
CALCIUM SERPL-MCNC: 10.1 MG/DL (ref 8.6–10.6)
CHLORIDE SERPL-SCNC: 105 MMOL/L (ref 98–107)
CO2 SERPL-SCNC: 14 MMOL/L (ref 21–32)
CREAT SERPL-MCNC: 3.66 MG/DL (ref 0.5–1.3)
ERYTHROCYTE [DISTWIDTH] IN BLOOD BY AUTOMATED COUNT: 16.6 % (ref 11.5–14.5)
GFR SERPL CREATININE-BSD FRML MDRD: 19 ML/MIN/1.73M*2
GLUCOSE SERPL-MCNC: 123 MG/DL (ref 74–99)
HCT VFR BLD AUTO: 45.8 % (ref 41–52)
HGB BLD-MCNC: 14.4 G/DL (ref 13.5–17.5)
MCH RBC QN AUTO: 27.6 PG (ref 26–34)
MCHC RBC AUTO-ENTMCNC: 31.4 G/DL (ref 32–36)
MCV RBC AUTO: 88 FL (ref 80–100)
NRBC BLD-RTO: 0 /100 WBCS (ref 0–0)
PLATELET # BLD AUTO: 519 X10*3/UL (ref 150–450)
POTASSIUM SERPL-SCNC: 4.2 MMOL/L (ref 3.5–5.3)
RBC # BLD AUTO: 5.21 X10*6/UL (ref 4.5–5.9)
SODIUM SERPL-SCNC: 138 MMOL/L (ref 136–145)
WBC # BLD AUTO: 8.1 X10*3/UL (ref 4.4–11.3)

## 2023-11-03 PROCEDURE — 74018 RADEX ABDOMEN 1 VIEW: CPT | Mod: FY

## 2023-11-03 PROCEDURE — 74018 RADEX ABDOMEN 1 VIEW: CPT

## 2023-11-03 PROCEDURE — 36415 COLL VENOUS BLD VENIPUNCTURE: CPT

## 2023-11-03 PROCEDURE — 74018 RADEX ABDOMEN 1 VIEW: CPT | Performed by: RADIOLOGY

## 2023-11-03 PROCEDURE — 85027 COMPLETE CBC AUTOMATED: CPT

## 2023-11-03 PROCEDURE — 2500000004 HC RX 250 GENERAL PHARMACY W/ HCPCS (ALT 636 FOR OP/ED)

## 2023-11-03 PROCEDURE — 1170000001 HC PRIVATE ONCOLOGY ROOM DAILY

## 2023-11-03 PROCEDURE — 2500000001 HC RX 250 WO HCPCS SELF ADMINISTERED DRUGS (ALT 637 FOR MEDICARE OP): Performed by: STUDENT IN AN ORGANIZED HEALTH CARE EDUCATION/TRAINING PROGRAM

## 2023-11-03 PROCEDURE — 2500000001 HC RX 250 WO HCPCS SELF ADMINISTERED DRUGS (ALT 637 FOR MEDICARE OP)

## 2023-11-03 PROCEDURE — 96372 THER/PROPH/DIAG INJ SC/IM: CPT | Performed by: STUDENT IN AN ORGANIZED HEALTH CARE EDUCATION/TRAINING PROGRAM

## 2023-11-03 PROCEDURE — 80048 BASIC METABOLIC PNL TOTAL CA: CPT

## 2023-11-03 PROCEDURE — 2500000004 HC RX 250 GENERAL PHARMACY W/ HCPCS (ALT 636 FOR OP/ED): Performed by: STUDENT IN AN ORGANIZED HEALTH CARE EDUCATION/TRAINING PROGRAM

## 2023-11-03 RX ORDER — GUAIFENESIN 600 MG/1
600 TABLET, EXTENDED RELEASE ORAL 2 TIMES DAILY PRN
Status: DISCONTINUED | OUTPATIENT
Start: 2023-11-03 | End: 2023-11-17 | Stop reason: HOSPADM

## 2023-11-03 RX ORDER — ONDANSETRON HYDROCHLORIDE 2 MG/ML
4 INJECTION, SOLUTION INTRAVENOUS EVERY 8 HOURS
Status: DISCONTINUED | OUTPATIENT
Start: 2023-11-03 | End: 2023-11-17 | Stop reason: HOSPADM

## 2023-11-03 RX ORDER — TRAMADOL HYDROCHLORIDE 50 MG/1
50 TABLET ORAL EVERY 6 HOURS PRN
Status: DISCONTINUED | OUTPATIENT
Start: 2023-11-03 | End: 2023-11-04

## 2023-11-03 RX ORDER — TRAMADOL HYDROCHLORIDE 50 MG/1
25 TABLET ORAL EVERY 6 HOURS PRN
Status: DISCONTINUED | OUTPATIENT
Start: 2023-11-03 | End: 2023-11-04

## 2023-11-03 RX ORDER — METOCLOPRAMIDE HYDROCHLORIDE 5 MG/ML
10 INJECTION INTRAMUSCULAR; INTRAVENOUS EVERY 6 HOURS
Status: DISCONTINUED | OUTPATIENT
Start: 2023-11-03 | End: 2023-11-05

## 2023-11-03 RX ORDER — ONDANSETRON 4 MG/1
4 TABLET, FILM COATED ORAL EVERY 8 HOURS
Status: DISCONTINUED | OUTPATIENT
Start: 2023-11-03 | End: 2023-11-17 | Stop reason: HOSPADM

## 2023-11-03 RX ORDER — METOCLOPRAMIDE 10 MG/1
10 TABLET ORAL EVERY 6 HOURS
Status: DISCONTINUED | OUTPATIENT
Start: 2023-11-03 | End: 2023-11-05

## 2023-11-03 RX ORDER — OXYBUTYNIN CHLORIDE 5 MG/1
5 TABLET ORAL 2 TIMES DAILY PRN
Status: DISCONTINUED | OUTPATIENT
Start: 2023-11-03 | End: 2023-11-07

## 2023-11-03 RX ADMIN — ONDANSETRON 4 MG: 2 INJECTION INTRAMUSCULAR; INTRAVENOUS at 16:19

## 2023-11-03 RX ADMIN — METOCLOPRAMIDE 10 MG: 5 INJECTION, SOLUTION INTRAMUSCULAR; INTRAVENOUS at 18:45

## 2023-11-03 RX ADMIN — ACETAMINOPHEN 975 MG: 325 TABLET ORAL at 08:30

## 2023-11-03 RX ADMIN — GENTAMICIN SULFATE 460 MG: 40 INJECTION, SOLUTION INTRAMUSCULAR; INTRAVENOUS at 11:50

## 2023-11-03 RX ADMIN — STANDARDIZED SENNA CONCENTRATE 17.2 MG: 8.6 TABLET ORAL at 08:30

## 2023-11-03 RX ADMIN — METOCLOPRAMIDE 10 MG: 5 INJECTION, SOLUTION INTRAMUSCULAR; INTRAVENOUS at 13:35

## 2023-11-03 RX ADMIN — HEPARIN SODIUM 5000 UNITS: 5000 INJECTION INTRAVENOUS; SUBCUTANEOUS at 11:50

## 2023-11-03 RX ADMIN — METOCLOPRAMIDE 10 MG: 5 INJECTION, SOLUTION INTRAMUSCULAR; INTRAVENOUS at 08:30

## 2023-11-03 RX ADMIN — ONDANSETRON 4 MG: 2 INJECTION INTRAMUSCULAR; INTRAVENOUS at 23:56

## 2023-11-03 RX ADMIN — ONDANSETRON 4 MG: 2 INJECTION INTRAMUSCULAR; INTRAVENOUS at 08:31

## 2023-11-03 RX ADMIN — OXYBUTYNIN CHLORIDE 5 MG: 5 TABLET ORAL at 08:30

## 2023-11-03 RX ADMIN — TRAMADOL HYDROCHLORIDE 50 MG: 50 TABLET, COATED ORAL at 08:28

## 2023-11-03 RX ADMIN — HEPARIN SODIUM 5000 UNITS: 5000 INJECTION INTRAVENOUS; SUBCUTANEOUS at 18:40

## 2023-11-03 RX ADMIN — HEPARIN SODIUM 5000 UNITS: 5000 INJECTION INTRAVENOUS; SUBCUTANEOUS at 03:26

## 2023-11-03 RX ADMIN — METOCLOPRAMIDE 10 MG: 5 INJECTION, SOLUTION INTRAMUSCULAR; INTRAVENOUS at 23:58

## 2023-11-03 ASSESSMENT — COGNITIVE AND FUNCTIONAL STATUS - GENERAL
MOBILITY SCORE: 23
DAILY ACTIVITIY SCORE: 24
EATING MEALS: A LITTLE
HELP NEEDED FOR BATHING: A LITTLE
MOBILITY SCORE: 18
STANDING UP FROM CHAIR USING ARMS: A LITTLE
PERSONAL GROOMING: A LITTLE
TOILETING: A LITTLE
TURNING FROM BACK TO SIDE WHILE IN FLAT BAD: A LITTLE
DAILY ACTIVITIY SCORE: 18
MOVING TO AND FROM BED TO CHAIR: A LITTLE
MOVING FROM LYING ON BACK TO SITTING ON SIDE OF FLAT BED WITH BEDRAILS: A LITTLE
WALKING IN HOSPITAL ROOM: A LITTLE
DRESSING REGULAR UPPER BODY CLOTHING: A LITTLE
DRESSING REGULAR LOWER BODY CLOTHING: A LITTLE
CLIMB 3 TO 5 STEPS WITH RAILING: A LITTLE
CLIMB 3 TO 5 STEPS WITH RAILING: A LITTLE

## 2023-11-03 ASSESSMENT — PAIN - FUNCTIONAL ASSESSMENT
PAIN_FUNCTIONAL_ASSESSMENT: 0-10
PAIN_FUNCTIONAL_ASSESSMENT: 0-10

## 2023-11-03 ASSESSMENT — PAIN SCALES - GENERAL
PAINLEVEL_OUTOF10: 7
PAINLEVEL_OUTOF10: 2
PAINLEVEL_OUTOF10: 4
PAINLEVEL_OUTOF10: 7
PAINLEVEL_OUTOF10: 0 - NO PAIN

## 2023-11-03 NOTE — CARE PLAN
The patient's goals for the shift include  no n/v    The clinical goals for the shift include  Gi motility    Over the shift, the patient experienced  unmeasured  green emesis in trash can. during night shift. Pt sts woke him up. Ostomy producing brownish green stool and flatus.

## 2023-11-03 NOTE — DISCHARGE INSTRUCTIONS
DEPARTMENT OF Urology  DISCHARGE INSTRUCTIONS -- Ileal Ureter Creation and Reimplant with Bladder Augmentation  Inpatient Surgery    C O N F I D E N T I A L   I N F O R M A T I O N      Jose Thornton      Call [phone] during regular daytime business hours (8:00 am - 5:00 pm) and after 5:00 pm and ask for the Urology resident with any questions or concerns.    If it is a life-threatening situation, proceed to the nearest emergency department.        Next follow-up appointment:  Tuesday, November 21 at 9:00am at [facility], [address], [address], [address], [place] [address] with Dhiraj Baldwin CNP      Thank you for the opportunity to care for you today.  Your health and healing are very important to us.  We hope we made you feel as comfortable as possible and are committed to your recovery and continued well-being.      The following is a brief overview of your ileal ureter creation and reimplant procedure. Some of the information contained on this summary may be confidential.  This information should be kept in your records and should be shared with your regular doctor.    Physicians:   Dr. Tomas Andersen    Procedure performed: removal of the bad part of your ureter, creation of ureter from portion of bowel, and bladder augmentation  Pending Results: Outpatient kidney ultrasound to assess for swelling around your kidney. This should be performed the day before your follow up on November 28 so results can be reviewed at that appointment.    What to Expect During your Recovery and Home Care      Activity and Recovery    No heavy lifting greater than 10 pounds for the next 4 weeks. No driving until mobility has returned to normal. Do not drive or operate heavy machinery while taking narcotic pain medications as these medications can alter perception, impair judgement, and slow reaction times.    Pain Control  Unfortunately, you may experience pain after your procedure. Frequency and  urgency to urinate and mild discomfort are expected. Adequate pain management can include alternative measures to help ease your pain and that can include over the counter Tylenol/ibuprofen or oxycodone which can be taken as prescribed as needed for breakthrough pain. Do not take more than 4,000mg of Tylenol in a 24-hour period.        Nausea/Vomiting   Avoid spicy, greasy, heavy foods at first. Also, you may feel nauseous or like you need to vomit if you take any type of medication on an empty stomach.  Call your physician if you are unable to eat or drink, are not passing gas and have persistent vomiting.    Signs of Bleeding   You could have some blood in your urine off and on over the next several weeks. Your urine will be light pink to yellow. Minor bleeding or drainage may occur from the surgical sites; however, excessive or consistent bleeding should be reported to your surgeon. Excessive bleeding is defined as blood that is dripping from wound, soaking you bandages, and is ketchup colored, thick with possible blood clots.  Consistent is defined as bleeding that does not stop.      Treatment/wound care:   Keep area(s) clean and dry.   It is okay to shower 24 hours after time of surgery.    Do not scrub wound(s), pat dry.    Do not submerge wound(s) in standing water until seen for follow up appointment (no tub bathing, swimming, or hot tubs).    Clean with mild soap, gentle washing, pat dry, cover with bandage as needed.    Avoid waterproof bandages.  No oils or lotions on incisions.    Please visually inspect your wound(s) at least once daily.  If the wound(s) are in a difficult to see location, please use a mirror or have someone else assist with visual inspection.    Signs of Infection  Signs of infection can include fever, chills, redness around surgical incisions, green/yellow drainage from incisions, burning sensation with urination or severe abdominal pain.  If you see any of these occur, please  contact your doctor's office at [phone].  Any fever higher than 100.4, especially if associated with an ill feeling, abdominal pain, chills, or nausea should be reported to your surgeon.      Assist in bowel movements/urination  Take daily stool softener you were prescribed  Increase fiber in diet  Increase water (6 to 8 glasses)  Increase walking   Can try adding over the counter MiraLAX or in extreme cases milk of magnesia.    Additional Instructions:   Use a small pillow to put pressure on your belly. This can make you more comfortable when you cough, laugh or do other actions.     You had a stent placed from your kidney down into your bladder. This helps keep the urinary tract open and prevents blockages of urine flow. The stent can be irritating and can cause some frequency, urgency and blood in your urine when you go to the bathroom. It is important to drink plenty of water and take medications as prescribed. You may be sent home with Pyridium which turns your urine bright orange. Applying a heating pad to your back will also help with this kind of pain. It will be determined at your follow up appointment when you can have the stent removed.     DRAIN CARE   If you have a nephrostomy drain in place it may have been “capped” so it does not drain after surgery. If you go home and develop lower back/flank pain/pressure where the nephrostomy drain goes into your kidney while it is capped, please notify your provider. Otherwise continue with dressing changes every 72 hours or as needed.

## 2023-11-03 NOTE — PROGRESS NOTES
UROLOGY DAILY PROGRESS NOTE      Subjective   - Patient with several small episodes of emesis yesterday afternoon/overnight, appears to be gastric contents  - Nausea relieved with episodes of emesis   - Patient continues to have gas and output in the bag  - Pain well controlled  - Ambulating    Objective   Physical Exam  Gen: NAD, alert  HEENT: normocephalic, atraumatic, MMM  Pulm: nonlabored breathing  CV: regular rate  GI: soft, mildly distended, mildly tender, colostomy L abdomen w/ healthy appearance, some dark liquid sludge. Midline incision c/d/I closed with staples, now open to air, LINDSEY drains removed with dressings in place  : no suprapubic or CVA tenderness, Schultz draining clear yellow, SPT draining clear yellow, b/l PCNT capped  MSK: STANLEY  Neuro: no focal deficits  Skin: WWP  Psych: appropriate mood and behavior    Last Recorded Vitals  Heart Rate:  []   Temp:  [36.2 °C (97.2 °F)-37.2 °C (99 °F)]   Resp:  [18]   BP: (105-122)/(74-82)   SpO2:  [93 %-97 %]   Intake/Output last 3 Shifts:  I/O last 3 completed shifts:  In: 100 (0.9 mL/kg) [IV Piggyback:100]  Out: 3700 (32.7 mL/kg) [Urine:2525 (0.6 mL/kg/hr); Drains:50; Stool:1125]  Weight: 113 kg     Net IO Since Admission: -5,960.75 mL [11/03/23 0757]    Relevant Results  Results for orders placed or performed during the hospital encounter of 10/24/23 (from the past 96 hour(s))   Basic metabolic panel   Result Value Ref Range    Glucose 99 74 - 99 mg/dL    Sodium 142 136 - 145 mmol/L    Potassium 4.1 3.5 - 5.3 mmol/L    Chloride 109 (H) 98 - 107 mmol/L    Bicarbonate 21 21 - 32 mmol/L    Anion Gap 16 10 - 20 mmol/L    Urea Nitrogen 14 6 - 23 mg/dL    Creatinine 1.56 (H) 0.50 - 1.30 mg/dL    eGFR 52 (L) >60 mL/min/1.73m*2    Calcium 8.5 (L) 8.6 - 10.6 mg/dL   CBC   Result Value Ref Range    WBC 7.3 4.4 - 11.3 x10*3/uL    nRBC 0.0 0.0 - 0.0 /100 WBCs    RBC 4.10 (L) 4.50 - 5.90 x10*6/uL    Hemoglobin 11.3 (L) 13.5 - 17.5 g/dL    Hematocrit 37.2 (L) 41.0 -  52.0 %    MCV 91 80 - 100 fL    MCH 27.6 26.0 - 34.0 pg    MCHC 30.4 (L) 32.0 - 36.0 g/dL    RDW 16.1 (H) 11.5 - 14.5 %    Platelets 355 150 - 450 x10*3/uL    MPV 9.2 7.5 - 11.5 fL   Creatinine, Fluid   Result Value Ref Range    Creatinine,  Fluid 1.73 Not established mg/dL   POCT GLUCOSE   Result Value Ref Range    POCT Glucose 99 74 - 99 mg/dL   POCT GLUCOSE   Result Value Ref Range    POCT Glucose 90 74 - 99 mg/dL       Scheduled medications  acetaminophen, 975 mg, oral, q6h  fluticasone, 2 spray, Each Nostril, Daily  gentamicin, 5 mg/kg (Adjusted), intravenous, q24h LAVINIA  heparin (porcine), 5,000 Units, subcutaneous, q8h  metoclopramide, 10 mg, oral, q6h   Or  metoclopramide, 10 mg, intravenous, q6h  oxybutynin, 5 mg, oral, BID  sennosides, 2 tablet, oral, BID      Continuous medications     PRN medications  PRN medications: benzocaine-menthol, bisacodyl, HYDROmorphone, melatonin, naloxone, naloxone, naloxone, ondansetron **OR** ondansetron, ondansetron ODT **OR** ondansetron, oxyCODONE, polyethylene glycol, traMADol      Assessment/Plan   Jose Thornton is a 57 y.o. male with PMH of Crohn's disease and colovesical fistula who underwent Exploratory laparotomy, MYA, Cystoscopy, Bilateral ileal ureter and bladder augment with right partial rectus muscle flap, and Suprapubic tube insertion on 10/27/23 with Dr. Andersen and Dr. Rodriguez.     Patient has recovered well and has had return of bowel function with consistent ostomy output. However, he is now experiencing nausea with some small volume episodes of emesis. He is otherwise ambulating and with good pain control.     Principal Problem:    Colovesical fistula    Plan:  Neuro: discontinue oxycodone, dilaudid, continue Tylenol, Tramadol for moderate/severe pain, oxybutynin  Pulm: IS 10x / hr  CV: hemodynamically stable, VS Q8h  FEN/GI: continue scheduled zofran, scheduled reglan, regular diet with self-regulation, encourage PO intake as desired  /Alpesh: maintain andrade  and SPT to drainage, flush daily with teaching for home flushing, maintain b/l nephrostomy tubes capped  Heme: no indication for transfusion  ID: last day of Gentamicin-- will discontinue after today's dose  -urine cultures with gentamicin sensitive Pseudomonas (10 day course gentamicin for complicated UTI, stop 11/3)   Endo: no additional glycemic requirements   MSK: OOB as tolerated  Prophylaxis: SQH / SCDs    Dispo: continue care on RNF, planned discharge to home when nausea is resolved with plans for daily SPT flushing and cystogram followed by urethral catheter removal 3 weeks after surgery    Seen with chief resident Dr. Mckeon and discussed with attending Dr. Nathaly Lerma MD  PGY-1 Urology [place]  Pager: [phone]

## 2023-11-04 ENCOUNTER — APPOINTMENT (OUTPATIENT)
Dept: RADIOLOGY | Facility: HOSPITAL | Age: 58
DRG: 353 | End: 2023-11-04

## 2023-11-04 LAB
ANION GAP SERPL CALC-SCNC: 27 MMOL/L (ref 10–20)
BUN SERPL-MCNC: 56 MG/DL (ref 6–23)
CALCIUM SERPL-MCNC: 9.8 MG/DL (ref 8.6–10.6)
CHLORIDE SERPL-SCNC: 103 MMOL/L (ref 98–107)
CO2 SERPL-SCNC: 13 MMOL/L (ref 21–32)
CREAT SERPL-MCNC: 6.93 MG/DL (ref 0.5–1.3)
ERYTHROCYTE [DISTWIDTH] IN BLOOD BY AUTOMATED COUNT: 16.4 % (ref 11.5–14.5)
GFR SERPL CREATININE-BSD FRML MDRD: 9 ML/MIN/1.73M*2
GLUCOSE BLD MANUAL STRIP-MCNC: 123 MG/DL (ref 74–99)
GLUCOSE SERPL-MCNC: 130 MG/DL (ref 74–99)
HCT VFR BLD AUTO: 45.9 % (ref 41–52)
HGB BLD-MCNC: 14.3 G/DL (ref 13.5–17.5)
MCH RBC QN AUTO: 27.6 PG (ref 26–34)
MCHC RBC AUTO-ENTMCNC: 31.2 G/DL (ref 32–36)
MCV RBC AUTO: 89 FL (ref 80–100)
NRBC BLD-RTO: 0 /100 WBCS (ref 0–0)
PLATELET # BLD AUTO: 591 X10*3/UL (ref 150–450)
POTASSIUM SERPL-SCNC: 5.1 MMOL/L (ref 3.5–5.3)
RBC # BLD AUTO: 5.18 X10*6/UL (ref 4.5–5.9)
SODIUM SERPL-SCNC: 138 MMOL/L (ref 136–145)
WBC # BLD AUTO: 8.8 X10*3/UL (ref 4.4–11.3)

## 2023-11-04 PROCEDURE — 84100 ASSAY OF PHOSPHORUS: CPT | Performed by: STUDENT IN AN ORGANIZED HEALTH CARE EDUCATION/TRAINING PROGRAM

## 2023-11-04 PROCEDURE — 1170000001 HC PRIVATE ONCOLOGY ROOM DAILY

## 2023-11-04 PROCEDURE — 2500000004 HC RX 250 GENERAL PHARMACY W/ HCPCS (ALT 636 FOR OP/ED): Performed by: STUDENT IN AN ORGANIZED HEALTH CARE EDUCATION/TRAINING PROGRAM

## 2023-11-04 PROCEDURE — 80048 BASIC METABOLIC PNL TOTAL CA: CPT | Performed by: STUDENT IN AN ORGANIZED HEALTH CARE EDUCATION/TRAINING PROGRAM

## 2023-11-04 PROCEDURE — 85027 COMPLETE CBC AUTOMATED: CPT | Performed by: STUDENT IN AN ORGANIZED HEALTH CARE EDUCATION/TRAINING PROGRAM

## 2023-11-04 PROCEDURE — 2500000004 HC RX 250 GENERAL PHARMACY W/ HCPCS (ALT 636 FOR OP/ED)

## 2023-11-04 PROCEDURE — 76770 US EXAM ABDO BACK WALL COMP: CPT | Performed by: RADIOLOGY

## 2023-11-04 PROCEDURE — 0D9670Z DRAINAGE OF STOMACH WITH DRAINAGE DEVICE, VIA NATURAL OR ARTIFICIAL OPENING: ICD-10-PCS

## 2023-11-04 PROCEDURE — 2500000001 HC RX 250 WO HCPCS SELF ADMINISTERED DRUGS (ALT 637 FOR MEDICARE OP)

## 2023-11-04 PROCEDURE — 82947 ASSAY GLUCOSE BLOOD QUANT: CPT

## 2023-11-04 PROCEDURE — 82374 ASSAY BLOOD CARBON DIOXIDE: CPT | Performed by: STUDENT IN AN ORGANIZED HEALTH CARE EDUCATION/TRAINING PROGRAM

## 2023-11-04 PROCEDURE — 76770 US EXAM ABDO BACK WALL COMP: CPT

## 2023-11-04 PROCEDURE — 96372 THER/PROPH/DIAG INJ SC/IM: CPT | Performed by: STUDENT IN AN ORGANIZED HEALTH CARE EDUCATION/TRAINING PROGRAM

## 2023-11-04 PROCEDURE — 36415 COLL VENOUS BLD VENIPUNCTURE: CPT | Performed by: STUDENT IN AN ORGANIZED HEALTH CARE EDUCATION/TRAINING PROGRAM

## 2023-11-04 RX ORDER — HYDROMORPHONE HYDROCHLORIDE 1 MG/ML
0.1 INJECTION, SOLUTION INTRAMUSCULAR; INTRAVENOUS; SUBCUTANEOUS EVERY 4 HOURS PRN
Status: DISCONTINUED | OUTPATIENT
Start: 2023-11-04 | End: 2023-11-07

## 2023-11-04 RX ORDER — SODIUM CHLORIDE 9 MG/ML
125 INJECTION, SOLUTION INTRAVENOUS CONTINUOUS
Status: DISCONTINUED | OUTPATIENT
Start: 2023-11-04 | End: 2023-11-05

## 2023-11-04 RX ORDER — HYDROMORPHONE HYDROCHLORIDE 1 MG/ML
0.2 INJECTION, SOLUTION INTRAMUSCULAR; INTRAVENOUS; SUBCUTANEOUS EVERY 4 HOURS PRN
Status: DISCONTINUED | OUTPATIENT
Start: 2023-11-04 | End: 2023-11-07

## 2023-11-04 RX ADMIN — HYDROMORPHONE HYDROCHLORIDE 0.2 MG: 1 INJECTION, SOLUTION INTRAMUSCULAR; INTRAVENOUS; SUBCUTANEOUS at 18:25

## 2023-11-04 RX ADMIN — TRAMADOL HYDROCHLORIDE 50 MG: 50 TABLET, COATED ORAL at 12:45

## 2023-11-04 RX ADMIN — HEPARIN SODIUM 5000 UNITS: 5000 INJECTION INTRAVENOUS; SUBCUTANEOUS at 11:39

## 2023-11-04 RX ADMIN — ONDANSETRON 4 MG: 2 INJECTION INTRAMUSCULAR; INTRAVENOUS at 08:22

## 2023-11-04 RX ADMIN — SODIUM CHLORIDE, POTASSIUM CHLORIDE, SODIUM LACTATE AND CALCIUM CHLORIDE 1000 ML: 600; 310; 30; 20 INJECTION, SOLUTION INTRAVENOUS at 06:35

## 2023-11-04 RX ADMIN — METOCLOPRAMIDE 10 MG: 5 INJECTION, SOLUTION INTRAMUSCULAR; INTRAVENOUS at 13:40

## 2023-11-04 RX ADMIN — SODIUM CHLORIDE 100 ML/HR: 9 INJECTION, SOLUTION INTRAVENOUS at 21:28

## 2023-11-04 RX ADMIN — ONDANSETRON 4 MG: 2 INJECTION INTRAMUSCULAR; INTRAVENOUS at 16:02

## 2023-11-04 RX ADMIN — METOCLOPRAMIDE 10 MG: 5 INJECTION, SOLUTION INTRAMUSCULAR; INTRAVENOUS at 21:28

## 2023-11-04 RX ADMIN — HEPARIN SODIUM 5000 UNITS: 5000 INJECTION INTRAVENOUS; SUBCUTANEOUS at 02:48

## 2023-11-04 RX ADMIN — SODIUM CHLORIDE 100 ML/HR: 9 INJECTION, SOLUTION INTRAVENOUS at 11:27

## 2023-11-04 RX ADMIN — HEPARIN SODIUM 5000 UNITS: 5000 INJECTION INTRAVENOUS; SUBCUTANEOUS at 18:25

## 2023-11-04 RX ADMIN — METOCLOPRAMIDE 10 MG: 5 INJECTION, SOLUTION INTRAMUSCULAR; INTRAVENOUS at 08:21

## 2023-11-04 RX ADMIN — HYDROMORPHONE HYDROCHLORIDE 0.2 MG: 1 INJECTION, SOLUTION INTRAMUSCULAR; INTRAVENOUS; SUBCUTANEOUS at 13:41

## 2023-11-04 ASSESSMENT — COGNITIVE AND FUNCTIONAL STATUS - GENERAL
MOBILITY SCORE: 23
DAILY ACTIVITIY SCORE: 22
PERSONAL GROOMING: A LITTLE
CLIMB 3 TO 5 STEPS WITH RAILING: A LITTLE
EATING MEALS: A LITTLE

## 2023-11-04 ASSESSMENT — PAIN SCALES - GENERAL
PAINLEVEL_OUTOF10: 8
PAINLEVEL_OUTOF10: 0 - NO PAIN
PAINLEVEL_OUTOF10: 7

## 2023-11-04 ASSESSMENT — PAIN - FUNCTIONAL ASSESSMENT: PAIN_FUNCTIONAL_ASSESSMENT: 0-10

## 2023-11-04 NOTE — SIGNIFICANT EVENT
"Jose Thornton is a 57 y.o. male on day 11 of admission presenting with Colovesical fistula.    Subjective   Pt having ostomy output. Was notified by team that pt was vomitting.    Objective     Neurological: Awake, alert, conversive  Respiratory/Thorax: even, unlabored  Genitourinary: voiding  Gastrointestinal: soft, ATTP, Incisions CDI no erythema. Drains with ss output. Ostomy pink, viable with ostomy output, distended  Skin: warm, dry  Musculoskeletal: STANLEY  Eyes: non-icteric  Extremities: no edema   Psychological: appropriate mood/affect     Last Recorded Vitals  Blood pressure 118/77, pulse 103, temperature 37.1 °C (98.8 °F), temperature source Temporal, resp. rate 18, height 1.829 m (6' 0.01\"), weight 113 kg (249 lb 1.9 oz), SpO2 96 %.  Intake/Output last 3 Shifts:  I/O last 3 completed shifts:  In: 340 (3 mL/kg) [P.O.:240; IV Piggyback:100]  Out: 3150 (27.9 mL/kg) [Urine:875 (0.2 mL/kg/hr); Emesis/NG output:400; Stool:1875]  Weight: 113 kg     Assessment/Plan   Principal Problem:    Colovesical fistula    Jose Thornton is a 57 y.o. male w/ Hx Crohn's disease here for cystoscopy, bilateral ileal ureter and bladder augment with right partial rectus muscle flap and SBT placement on 10/27/23. We were re-engaged due to emesis.    - Uncapping nephrostomy tubes by urology appears to have helped patient's nausea  - pt is having ostomy output but KUB demonstrates dilatation of multiple small bowel loops  - pt does not appear amenable to Ngtube when I spoke to him    Reccs  - Ngtube for decompression of illeus vs partial SBO if Nausea/emesis does not improve  - If pt refuses Ngtube, repeat KUB   - Rest of care per primary  - Please page or call with any questions.     Plan discussed with fellow, Dr. Marmolejo.     Carlos Villanueva MD, PGY1      "

## 2023-11-04 NOTE — PROGRESS NOTES
UROLOGY DAILY PROGRESS NOTE      Subjective   - Patient with several episodes of emesis yesterday afternoon/overnight. Endorses hiccups and dry heaving.  - Otherwise feels slightly improved from yesterday  - Continues to have gas and output in the bag  - Pain well controlled on current regimen  - Ambulating  - UOP low overnight. Received 1L LR bolus.    Objective   Physical Exam  Gen: NAD, alert  HEENT: normocephalic, atraumatic, MMM  Pulm: nonlabored breathing  CV: regular rate  GI: soft, mildly distended, mildly tender, colostomy L abdomen w/ healthy appearance, green and liquid with gas. Midline incision c/d/I closed with staples, now open to air, mild erythema immediately surrounding, LINDSEY drains removed with dressings in place.  : no suprapubic or CVA tenderness, Schultz draining clear dark yellow, SPT draining clear dark yellow, b/l PCNT capped.  MSK: STANLEY  Neuro: no focal deficits  Skin: WWP  Psych: appropriate mood and behavior    Last Recorded Vitals  Heart Rate:  [101-108]   Temp:  [35.9 °C (96.6 °F)-36.5 °C (97.7 °F)]   Resp:  [16-18]   BP: (112-124)/(78-87)   SpO2:  [93 %-95 %]   Intake/Output last 3 Shifts:  I/O last 3 completed shifts:  In: 340 (3 mL/kg) [P.O.:240; IV Piggyback:100]  Out: 3150 (27.9 mL/kg) [Urine:875 (0.2 mL/kg/hr); Emesis/NG output:400; Stool:1875]  Weight: 113 kg     Net IO Since Admission: -7,670.75 mL [11/04/23 0914]    Relevant Results  Results for orders placed or performed during the hospital encounter of 10/24/23 (from the past 96 hour(s))   Creatinine, Fluid   Result Value Ref Range    Creatinine,  Fluid 1.73 Not established mg/dL   POCT GLUCOSE   Result Value Ref Range    POCT Glucose 99 74 - 99 mg/dL   POCT GLUCOSE   Result Value Ref Range    POCT Glucose 90 74 - 99 mg/dL   CBC   Result Value Ref Range    WBC 8.1 4.4 - 11.3 x10*3/uL    nRBC 0.0 0.0 - 0.0 /100 WBCs    RBC 5.21 4.50 - 5.90 x10*6/uL    Hemoglobin 14.4 13.5 - 17.5 g/dL    Hematocrit 45.8 41.0 - 52.0 %    MCV 88 80  - 100 fL    MCH 27.6 26.0 - 34.0 pg    MCHC 31.4 (L) 32.0 - 36.0 g/dL    RDW 16.6 (H) 11.5 - 14.5 %    Platelets 519 (H) 150 - 450 x10*3/uL   Basic metabolic panel   Result Value Ref Range    Glucose 123 (H) 74 - 99 mg/dL    Sodium 138 136 - 145 mmol/L    Potassium 4.2 3.5 - 5.3 mmol/L    Chloride 105 98 - 107 mmol/L    Bicarbonate 14 (L) 21 - 32 mmol/L    Anion Gap 23 (H) 10 - 20 mmol/L    Urea Nitrogen 38 (H) 6 - 23 mg/dL    Creatinine 3.66 (H) 0.50 - 1.30 mg/dL    eGFR 19 (L) >60 mL/min/1.73m*2    Calcium 10.1 8.6 - 10.6 mg/dL   CBC   Result Value Ref Range    WBC 8.8 4.4 - 11.3 x10*3/uL    nRBC 0.0 0.0 - 0.0 /100 WBCs    RBC 5.18 4.50 - 5.90 x10*6/uL    Hemoglobin 14.3 13.5 - 17.5 g/dL    Hematocrit 45.9 41.0 - 52.0 %    MCV 89 80 - 100 fL    MCH 27.6 26.0 - 34.0 pg    MCHC 31.2 (L) 32.0 - 36.0 g/dL    RDW 16.4 (H) 11.5 - 14.5 %    Platelets 591 (H) 150 - 450 x10*3/uL   Basic Metabolic Panel   Result Value Ref Range    Glucose 130 (H) 74 - 99 mg/dL    Sodium 138 136 - 145 mmol/L    Potassium 5.1 3.5 - 5.3 mmol/L    Chloride 103 98 - 107 mmol/L    Bicarbonate 13 (L) 21 - 32 mmol/L    Anion Gap 27 (H) 10 - 20 mmol/L    Urea Nitrogen 56 (H) 6 - 23 mg/dL    Creatinine 6.93 (H) 0.50 - 1.30 mg/dL    eGFR 9 (L) >60 mL/min/1.73m*2    Calcium 9.8 8.6 - 10.6 mg/dL         Assessment/Plan   Jose Thornton is a 57 y.o. male with PMH of Crohn's disease and colovesical fistula who underwent Exploratory laparotomy, MYA, Cystoscopy, Bilateral ileal ureter and bladder augment with right partial rectus muscle flap, and Suprapubic tube insertion on 10/27/23 with Dr. Andersen and Dr. Rodriguez.     Patient has recovered well and has had return of bowel function with consistent ostomy output. However, now with nausea and small volume episodes of emesis. He is otherwise ambulating and with good pain control. Cr newly increased to 6.9 from 3.66 on 11/4.    Principal Problem:    Colovesical fistula    Plan:  Neuro: Tylenol, Tramadol for  moderate/severe pain, oxybutynin  Pulm: IS 10x / hr  CV: hemodynamically stable, VS Q8h  FEN/GI: continue scheduled zofran, scheduled reglan, regular diet with self-regulation, encourage PO intake as desired  /Alpesh: maintain andrade and SPT to drainage, flush daily with teaching for home flushing. Uncap b/l nephrostomy tubes today. RBUS to assess for hydronephrosis. IVF NS at 100 ml/hr.  Heme: no indication for transfusion  ID: urine cultures with gentamicin sensitive Pseudomonas (10 day course gentamicin for complicated UTI, course completed 11/3)  Endo: no additional glycemic requirements   MSK: OOB as tolerated  Prophylaxis: SQH / SCDs    Dispo:  Continue care on RNF, planned discharge to home when nausea is resolved with plans for daily SPT flushing and cystogram followed by urethral catheter removal 3 weeks after surgery    Seen with chief resident Dr. Mckeon and discussed with attending Dr. Andersen.    Sandra Corral MD PhD  Urology PGY4  [id number] (Adult)  [phone] (Peds)

## 2023-11-04 NOTE — CARE PLAN
Problem: Pain  Goal: Takes deep breaths with improved pain control throughout the shift  Outcome: Progressing  Goal: Turns in bed with improved pain control throughout the shift  Outcome: Progressing  Goal: Walks with improved pain control throughout the shift  Outcome: Progressing  Goal: Performs ADL's with improved pain control throughout shift  Outcome: Progressing  Goal: Participates in PT with improved pain control throughout the shift  Outcome: Progressing  Goal: Free from opioid side effects throughout the shift  Outcome: Progressing  Goal: Free from acute confusion related to pain meds throughout the shift  Outcome: Progressing     Problem: Fall/Injury  Goal: Not fall by end of shift  Outcome: Progressing  Goal: Be free from injury by end of the shift  Outcome: Progressing  Goal: Verbalize understanding of personal risk factors for fall in the hospital  Outcome: Progressing  Goal: Verbalize understanding of risk factor reduction measures to prevent injury from fall in the home  Outcome: Progressing  Goal: Use assistive devices by end of the shift  Outcome: Progressing  Goal: Pace activities to prevent fatigue by end of the shift  Outcome: Progressing

## 2023-11-05 ENCOUNTER — APPOINTMENT (OUTPATIENT)
Dept: RADIOLOGY | Facility: HOSPITAL | Age: 58
DRG: 353 | End: 2023-11-05

## 2023-11-05 LAB
ANION GAP SERPL CALC-SCNC: 21 MMOL/L (ref 10–20)
APPEARANCE UR: ABNORMAL
BACTERIA #/AREA URNS AUTO: ABNORMAL /HPF
BILIRUB UR STRIP.AUTO-MCNC: NEGATIVE MG/DL
BUN SERPL-MCNC: 77 MG/DL (ref 6–23)
CALCIUM SERPL-MCNC: 8.5 MG/DL (ref 8.6–10.6)
CHLORIDE SERPL-SCNC: 107 MMOL/L (ref 98–107)
CHLORIDE UR-SCNC: 65 MMOL/L
CHLORIDE/CREATININE (MMOL/G) IN URINE: 51 MMOL/G CREAT (ref 23–275)
CO2 SERPL-SCNC: 15 MMOL/L (ref 21–32)
COLOR UR: ABNORMAL
CREAT SERPL-MCNC: 9.61 MG/DL (ref 0.5–1.3)
CREAT UR-MCNC: 127.1 MG/DL (ref 20–370)
ERYTHROCYTE [DISTWIDTH] IN BLOOD BY AUTOMATED COUNT: 16.8 % (ref 11.5–14.5)
GFR SERPL CREATININE-BSD FRML MDRD: 6 ML/MIN/1.73M*2
GLUCOSE BLD MANUAL STRIP-MCNC: 94 MG/DL (ref 74–99)
GLUCOSE SERPL-MCNC: 96 MG/DL (ref 74–99)
GLUCOSE UR STRIP.AUTO-MCNC: NEGATIVE MG/DL
HCT VFR BLD AUTO: 36.9 % (ref 41–52)
HGB BLD-MCNC: 12.2 G/DL (ref 13.5–17.5)
KETONES UR STRIP.AUTO-MCNC: NEGATIVE MG/DL
LEUKOCYTE ESTERASE UR QL STRIP.AUTO: ABNORMAL
MCH RBC QN AUTO: 28 PG (ref 26–34)
MCHC RBC AUTO-ENTMCNC: 33.1 G/DL (ref 32–36)
MCV RBC AUTO: 85 FL (ref 80–100)
NITRITE UR QL STRIP.AUTO: NEGATIVE
NRBC BLD-RTO: 0 /100 WBCS (ref 0–0)
PH UR STRIP.AUTO: 6 [PH]
PLATELET # BLD AUTO: 382 X10*3/UL (ref 150–450)
POTASSIUM SERPL-SCNC: 4.3 MMOL/L (ref 3.5–5.3)
POTASSIUM UR-SCNC: 26 MMOL/L
POTASSIUM/CREAT UR-RTO: 20 MMOL/G CREAT
PROT UR STRIP.AUTO-MCNC: ABNORMAL MG/DL
RBC # BLD AUTO: 4.36 X10*6/UL (ref 4.5–5.9)
RBC # UR STRIP.AUTO: ABNORMAL /UL
RBC #/AREA URNS AUTO: >20 /HPF
SODIUM SERPL-SCNC: 139 MMOL/L (ref 136–145)
SODIUM UR-SCNC: 87 MMOL/L
SODIUM/CREAT UR-RTO: 68 MMOL/G CREAT
SP GR UR STRIP.AUTO: 1.01
SQUAMOUS #/AREA URNS AUTO: ABNORMAL /HPF
UROBILINOGEN UR STRIP.AUTO-MCNC: <2 MG/DL
WBC # BLD AUTO: 8.6 X10*3/UL (ref 4.4–11.3)
WBC #/AREA URNS AUTO: >50 /HPF
WBC CLUMPS #/AREA URNS AUTO: ABNORMAL /HPF

## 2023-11-05 PROCEDURE — 99222 1ST HOSP IP/OBS MODERATE 55: CPT | Performed by: INTERNAL MEDICINE

## 2023-11-05 PROCEDURE — 80048 BASIC METABOLIC PNL TOTAL CA: CPT | Performed by: STUDENT IN AN ORGANIZED HEALTH CARE EDUCATION/TRAINING PROGRAM

## 2023-11-05 PROCEDURE — 1200000002 HC GENERAL ROOM WITH TELEMETRY DAILY

## 2023-11-05 PROCEDURE — 74176 CT ABD & PELVIS W/O CONTRAST: CPT | Performed by: RADIOLOGY

## 2023-11-05 PROCEDURE — 2500000004 HC RX 250 GENERAL PHARMACY W/ HCPCS (ALT 636 FOR OP/ED)

## 2023-11-05 PROCEDURE — 02H633Z INSERTION OF INFUSION DEVICE INTO RIGHT ATRIUM, PERCUTANEOUS APPROACH: ICD-10-PCS | Performed by: RADIOLOGY

## 2023-11-05 PROCEDURE — 2500000004 HC RX 250 GENERAL PHARMACY W/ HCPCS (ALT 636 FOR OP/ED): Performed by: STUDENT IN AN ORGANIZED HEALTH CARE EDUCATION/TRAINING PROGRAM

## 2023-11-05 PROCEDURE — 81001 URINALYSIS AUTO W/SCOPE: CPT

## 2023-11-05 PROCEDURE — 82947 ASSAY GLUCOSE BLOOD QUANT: CPT

## 2023-11-05 PROCEDURE — 86706 HEP B SURFACE ANTIBODY: CPT | Performed by: INTERNAL MEDICINE

## 2023-11-05 PROCEDURE — 36415 COLL VENOUS BLD VENIPUNCTURE: CPT | Performed by: STUDENT IN AN ORGANIZED HEALTH CARE EDUCATION/TRAINING PROGRAM

## 2023-11-05 PROCEDURE — 82436 ASSAY OF URINE CHLORIDE: CPT | Performed by: STUDENT IN AN ORGANIZED HEALTH CARE EDUCATION/TRAINING PROGRAM

## 2023-11-05 PROCEDURE — 85027 COMPLETE CBC AUTOMATED: CPT | Performed by: STUDENT IN AN ORGANIZED HEALTH CARE EDUCATION/TRAINING PROGRAM

## 2023-11-05 PROCEDURE — 74018 RADEX ABDOMEN 1 VIEW: CPT | Mod: FY

## 2023-11-05 PROCEDURE — 74176 CT ABD & PELVIS W/O CONTRAST: CPT

## 2023-11-05 PROCEDURE — 74018 RADEX ABDOMEN 1 VIEW: CPT | Performed by: RADIOLOGY

## 2023-11-05 PROCEDURE — 96372 THER/PROPH/DIAG INJ SC/IM: CPT | Performed by: STUDENT IN AN ORGANIZED HEALTH CARE EDUCATION/TRAINING PROGRAM

## 2023-11-05 RX ORDER — SODIUM BICARBONATE 650 MG/1
650 TABLET ORAL 3 TIMES DAILY
Status: DISCONTINUED | OUTPATIENT
Start: 2023-11-05 | End: 2023-11-06

## 2023-11-05 RX ADMIN — HEPARIN SODIUM 5000 UNITS: 5000 INJECTION INTRAVENOUS; SUBCUTANEOUS at 18:13

## 2023-11-05 RX ADMIN — HYDROMORPHONE HYDROCHLORIDE 0.2 MG: 1 INJECTION, SOLUTION INTRAMUSCULAR; INTRAVENOUS; SUBCUTANEOUS at 03:06

## 2023-11-05 RX ADMIN — METOCLOPRAMIDE 10 MG: 5 INJECTION, SOLUTION INTRAMUSCULAR; INTRAVENOUS at 01:49

## 2023-11-05 RX ADMIN — ONDANSETRON 4 MG: 2 INJECTION INTRAMUSCULAR; INTRAVENOUS at 09:01

## 2023-11-05 RX ADMIN — HEPARIN SODIUM 5000 UNITS: 5000 INJECTION INTRAVENOUS; SUBCUTANEOUS at 11:54

## 2023-11-05 RX ADMIN — SODIUM BICARBONATE 650 MG: 650 TABLET ORAL at 15:34

## 2023-11-05 RX ADMIN — SODIUM CHLORIDE 125 ML/HR: 9 INJECTION, SOLUTION INTRAVENOUS at 05:09

## 2023-11-05 RX ADMIN — SODIUM BICARBONATE 650 MG: 650 TABLET ORAL at 21:02

## 2023-11-05 RX ADMIN — METOCLOPRAMIDE 10 MG: 5 INJECTION, SOLUTION INTRAMUSCULAR; INTRAVENOUS at 07:09

## 2023-11-05 RX ADMIN — HEPARIN SODIUM 5000 UNITS: 5000 INJECTION INTRAVENOUS; SUBCUTANEOUS at 03:05

## 2023-11-05 RX ADMIN — ONDANSETRON 4 MG: 2 INJECTION INTRAMUSCULAR; INTRAVENOUS at 00:22

## 2023-11-05 RX ADMIN — ONDANSETRON 4 MG: 2 INJECTION INTRAMUSCULAR; INTRAVENOUS at 15:35

## 2023-11-05 ASSESSMENT — PAIN - FUNCTIONAL ASSESSMENT
PAIN_FUNCTIONAL_ASSESSMENT: 0-10
PAIN_FUNCTIONAL_ASSESSMENT: 0-10

## 2023-11-05 ASSESSMENT — COGNITIVE AND FUNCTIONAL STATUS - GENERAL
CLIMB 3 TO 5 STEPS WITH RAILING: A LITTLE
HELP NEEDED FOR BATHING: A LITTLE
DRESSING REGULAR LOWER BODY CLOTHING: A LITTLE
DAILY ACTIVITIY SCORE: 19
MOBILITY SCORE: 23
TOILETING: A LITTLE
PERSONAL GROOMING: A LITTLE
DRESSING REGULAR UPPER BODY CLOTHING: A LITTLE

## 2023-11-05 ASSESSMENT — PAIN SCALES - GENERAL
PAINLEVEL_OUTOF10: 1
PAINLEVEL_OUTOF10: 7
PAINLEVEL_OUTOF10: 0 - NO PAIN
PAINLEVEL_OUTOF10: 3
PAINLEVEL_OUTOF10: 0 - NO PAIN

## 2023-11-05 NOTE — PROGRESS NOTES
UROLOGY DAILY PROGRESS NOTE      Subjective   - Patient feels that nausea is resolving, did admit to drinking gatorade yesterday evening without emesis  - Continues to have gas and output in ostomy bag, feels hungry  - Nephrostomy tubes uncapped, with drainage of clear yellow urine bilaterally, UOP inaccurate due to urine leakage from unscrewed PCNT drainage bags  - Ambulating    Objective   Physical Exam  Gen: NAD, alert  HEENT: normocephalic, atraumatic, MMM  Pulm: nonlabored breathing  CV: regular rate  GI: soft, minimally distended, mildly tender, colostomy L abdomen w/ healthy appearance, green liquid with gas. Midline incision c/d/I closed with staples, open to air, decreasing erythema immediately surrounding, LINDSEY drains removed with dressings in place.  : no suprapubic or CVA tenderness, Schultz draining straw yellow, SPT minimal drainage, straw yellow, b/l PCNT uncapped with clear yellow urine  MSK: STANLEY  Neuro: no focal deficits  Skin: WWP  Psych: appropriate mood and behavior    Last Recorded Vitals  Heart Rate:  []   Temp:  [36.2 °C (97.2 °F)-37.1 °C (98.8 °F)]   Resp:  [18]   BP: (102-130)/(63-80)   SpO2:  [94 %-97 %]   Intake/Output last 3 Shifts:  I/O last 3 completed shifts:  In: 1721.7 (15.2 mL/kg) [P.O.:240; I.V.:1481.7 (13.1 mL/kg)]  Out: 2680 (23.7 mL/kg) [Urine:255 (0.1 mL/kg/hr); Emesis/NG output:650; Stool:1775]  Weight: 113 kg     Net IO Since Admission: -6,648.25 mL [11/05/23 0924]    Relevant Results  Results for orders placed or performed during the hospital encounter of 10/24/23 (from the past 96 hour(s))   CBC   Result Value Ref Range    WBC 8.1 4.4 - 11.3 x10*3/uL    nRBC 0.0 0.0 - 0.0 /100 WBCs    RBC 5.21 4.50 - 5.90 x10*6/uL    Hemoglobin 14.4 13.5 - 17.5 g/dL    Hematocrit 45.8 41.0 - 52.0 %    MCV 88 80 - 100 fL    MCH 27.6 26.0 - 34.0 pg    MCHC 31.4 (L) 32.0 - 36.0 g/dL    RDW 16.6 (H) 11.5 - 14.5 %    Platelets 519 (H) 150 - 450 x10*3/uL   Basic metabolic panel   Result Value  Ref Range    Glucose 123 (H) 74 - 99 mg/dL    Sodium 138 136 - 145 mmol/L    Potassium 4.2 3.5 - 5.3 mmol/L    Chloride 105 98 - 107 mmol/L    Bicarbonate 14 (L) 21 - 32 mmol/L    Anion Gap 23 (H) 10 - 20 mmol/L    Urea Nitrogen 38 (H) 6 - 23 mg/dL    Creatinine 3.66 (H) 0.50 - 1.30 mg/dL    eGFR 19 (L) >60 mL/min/1.73m*2    Calcium 10.1 8.6 - 10.6 mg/dL   CBC   Result Value Ref Range    WBC 8.8 4.4 - 11.3 x10*3/uL    nRBC 0.0 0.0 - 0.0 /100 WBCs    RBC 5.18 4.50 - 5.90 x10*6/uL    Hemoglobin 14.3 13.5 - 17.5 g/dL    Hematocrit 45.9 41.0 - 52.0 %    MCV 89 80 - 100 fL    MCH 27.6 26.0 - 34.0 pg    MCHC 31.2 (L) 32.0 - 36.0 g/dL    RDW 16.4 (H) 11.5 - 14.5 %    Platelets 591 (H) 150 - 450 x10*3/uL   Basic Metabolic Panel   Result Value Ref Range    Glucose 130 (H) 74 - 99 mg/dL    Sodium 138 136 - 145 mmol/L    Potassium 5.1 3.5 - 5.3 mmol/L    Chloride 103 98 - 107 mmol/L    Bicarbonate 13 (L) 21 - 32 mmol/L    Anion Gap 27 (H) 10 - 20 mmol/L    Urea Nitrogen 56 (H) 6 - 23 mg/dL    Creatinine 6.93 (H) 0.50 - 1.30 mg/dL    eGFR 9 (L) >60 mL/min/1.73m*2    Calcium 9.8 8.6 - 10.6 mg/dL   POCT GLUCOSE   Result Value Ref Range    POCT Glucose 123 (H) 74 - 99 mg/dL     Imaging  RBUS 11/5  IMPRESSION:  1. No evidence of hydronephrosis.  2. Normal-sized kidneys with increased renal cortical echogenicity  bilaterally and mild left renal cortical thinning, suggestive of  medical renal disease.    KUB 11/3  IMPRESSION:  1.  Persistent moderate dilatation of the multiple small bowel loops,  measures up to 5.7 cm in the left quadrant.  2. Adynamic ileus versus partial obstruction. Correlation follow-up  recommended. Postop changes of the lower abdomen and pelvis with  multiple surgical clips and surgical incision.  3. Bilateral nephrostomy tube placement, left ureter stent placement.  4. No gross free air    Assessment/Plan   Jose Thornton is a 57 y.o. male with PMH of Crohn's disease and colovesical fistula who underwent  Exploratory laparotomy, MYA, Cystoscopy, Bilateral ileal ureter and bladder augment with right partial rectus muscle flap, and Suprapubic tube insertion on 10/27/23 with Dr. Andersen and Dr. Rodriguez.     Patient has recovered well and has had return of bowel function with consistent ostomy output. However, now with nausea and episodes of emesis. He is otherwise ambulating and with good pain control. Cr newly increased to 6.9 from 3.66 on 11/4. PCNTs uncapped yesterday, nausea improving, AM labs pending    Principal Problem:    Colovesical fistula    Plan:  Neuro: Tylenol, Tramadol for moderate/severe pain, oxybutynin, transition to PO meds when taking reliable PO  Pulm: IS 10x / hr  CV: hemodynamically stable, VS Q8h  FEN/GI: continue scheduled zofran, scheduled reglan, CLD, if patient has another episode of emesis plan to place NG tube, follow up KUB  /Alpesh: maintain andrade and SPT to drainage, teaching for home flushing, maintain b/l nephrostomy tubes uncapped today, IVF NS at 125 ml/hr  Heme: no indication for transfusion  ID: urine cultures with gentamicin sensitive Pseudomonas (10 day course gentamicin for complicated UTI, course completed 11/3)  Endo: no additional glycemic requirements   MSK: OOB as tolerated  Prophylaxis: SQH / SCDs    Dispo:  Continue care on RNF, planned discharge to home when nausea/KADE are resolved with plans for daily SPT flushing and cystogram followed by urethral catheter removal 3 weeks after surgery    Seen with chief resident Dr. Mckeon, to be discussed with attending Dr. Andersen.    Maggie Lerma MD  PGY-1 Urology [place]  Pager: [phone]

## 2023-11-05 NOTE — CONSULTS
"NEPHROLOGY NEW CONSULT NOTE   Jose Thornton   57 y.o.    @WT@  MRN/Room: [id number]    Reason for consult: KADE    HPI:  Jose Thornton is a 57 y.o. male w/ Hx Crohn's disease complicated by colovesical fistula s/p bilateral nephrostomy tube placement (initially Jan 2023) + indwelling andrade catheter who presented to  for fistula repair.     He had rectovaginal fistular repair and b/l ureteral reimplant on 10/27. Patient had some CKD in the past likely due to obstructive uropathy with creatinine ~1-1.3 baseline. Post op creatinine was 1.55. His creatinine on 10/30 was 1.56 however labs were not checked until 11/3 with repeat 3.66 and continued rise to 9.6 today. Urine output has decreased. Prior to injury patient hemodynamic insults including hypotension (sBP 100s), anemia post op.   He was being treated for complicated UTI with gentamicin starting 10/28 - 11/2. No contrast given. Renal US on 11/4 with no hydronephrosis and per urology team no concern for obstruction.     Pt states he is having nausea and overall weakness that has been present since the surgery. Decreased appetite. No diarrhea or vomiting. No family history of renal disease. No rash, joint pain. No abdominal pain currently. States his breathing feels a bit more labored.     In The ER: /63   Pulse 87   Temp 36.2 °C (97.2 °F) (Temporal)   Resp 18   Ht 1.829 m (6' 0.01\")   Wt 113 kg (249 lb 1.9 oz)   SpO2 94%   BMI 33.78 kg/m²      Past Medical History:   Diagnosis Date    Crohn's disease (CMS/HCC)     Diverticulosis     GERD (gastroesophageal reflux disease)     GI (gastrointestinal bleed)     Urinary tract infection       Past Surgical History:   Procedure Laterality Date    CT GUIDED PERCUTANEOUS PERITONEAL OR RETROPERITONEAL FLUID COLLECTION DRAINAGE  9/28/2022    CT GUIDED PERCUTANEOUS PERITONEAL OR RETROPERITONEAL FLUID COLLECTION DRAINAGE 9/28/2022 [facility] [place] [facility]    IR NEPHROSTOMY TUBE EXCHANGE  1/31/2023    IR " NEPHROSTOMY TUBE EXCHANGE 1/31/2023 [facility]    IR NEPHROSTOMY TUBE EXCHANGE  1/31/2023    IR NEPHROSTOMY TUBE EXCHANGE 1/31/2023 [facility]    IR NEPHROSTOMY TUBE EXCHANGE  3/23/2023    IR NEPHROSTOMY TUBE EXCHANGE [facility]    IR NEPHROSTOMY TUBE EXCHANGE  3/23/2023    IR NEPHROSTOMY TUBE EXCHANGE [facility]    IR NEPHROSTOMY TUBE EXCHANGE  3/30/2023    IR NEPHROSTOMY TUBE EXCHANGE [facility]    IR NEPHROSTOMY TUBE EXCHANGE  4/25/2023    IR NEPHROSTOMY TUBE EXCHANGE [facility]    IR NEPHROSTOMY TUBE EXCHANGE  6/30/2023    IR NEPHROSTOMY TUBE EXCHANGE 6/30/2023 [facility]    IR NEPHROSTOMY TUBE EXCHANGE  6/30/2023    IR NEPHROSTOMY TUBE EXCHANGE 6/30/2023 [facility]    IR NEPHROSTOMY TUBE EXCHANGE  8/25/2023    IR NEPHROSTOMY TUBE EXCHANGE 8/25/2023 [facility]    IR NEPHROSTOMY TUBE EXCHANGE  8/25/2023    IR NEPHROSTOMY TUBE EXCHANGE 8/25/2023 [facility]    IR NEPHROSTOMY TUBE EXCHANGE  10/18/2023    IR NEPHROSTOMY TUBE EXCHANGE 10/18/2023 Onesimo Reyes MD [facility]      No family history on file.  Social History     Socioeconomic History    Marital status:      Spouse name: Not on file    Number of children: Not on file    Years of education: Not on file    Highest education level: Not on file   Occupational History    Not on file   Tobacco Use    Smoking status: Never    Smokeless tobacco: Never   Substance and Sexual Activity    Alcohol use: Not Currently    Drug use: Never    Sexual activity: Not on file   Other Topics Concern    Not on file   Social History Narrative    Not on file     Social Determinants of Health     Financial Resource Strain: Medium Risk (11/1/2023)    Overall Financial Resource Strain (CARDIA)     Difficulty of Paying Living Expenses: Somewhat hard   Food Insecurity: Not on file   Transportation Needs: No Transportation Needs (11/1/2023)    PRAPARE - Transportation     Lack of Transportation (Medical): No     Lack of Transportation (Non-Medical): No   Physical  Activity: Not on file   Stress: Not on file   Social Connections: Not on file   Intimate Partner Violence: Not on file   Housing Stability: Low Risk  (2023)    Housing Stability Vital Sign     Unable to Pay for Housing in the Last Year: No     Number of Places Lived in the Last Year: 1     Unstable Housing in the Last Year: No       No Known Allergies     Medications Prior to Admission   Medication Sig Dispense Refill Last Dose    acetaminophen (Tylenol) 500 mg tablet Take 2 tablets (1,000 mg) by mouth every 6 hours if needed.       [] amoxicillin-pot clavulanate (Augmentin) 875-125 mg tablet Take 1 tablet (875 mg) by mouth 2 times a day for 7 days. 14 tablet 0     diazePAM (Valium) 2 mg tablet Take 1 tablet (2 mg) by mouth if needed for anxiety. TAKE 1 TABLET BY MOUTH AN HOUR PRIOR TO CATHETER CHANGE. (Patient not taking: Reported on 10/11/2023) 1 tablet 0     inFLIXimab (Remicade) 100 mg injection INFUSE 500MG PER PROTOCOL ON WEEKS 0,2, 6 AND THEN EVERY 8 WEEKS AS MAINTENANCE. (Patient not taking: Reported on 10/11/2023) 5 each 6     oxybutynin XL (Ditropan-XL) 10 mg 24 hr tablet TAKE 1 TABLET BY MOUTH TWO TIMES A DAY 60 tablet 5     oxyCODONE (Roxicodone) 5 mg immediate release tablet Take 1 tablet (5 mg) by mouth see administration instructions. TAKE 1 TABLET BY MOUTH 1 HOUR PRIOR TO CATHETER CHANGE (Patient taking differently: Take 1 tablet (5 mg) by mouth if needed. TAKE 1 TABLET BY MOUTH 1 HOUR PRIOR TO CATHETER CHANGE) 12 tablet 0     phenazopyridine (Urinary Pain Relief) 95 mg tablet Take 1 tablet (95 mg) by mouth 3 times a day as needed for bladder spasms.           Meds:   acetaminophen, 975 mg, q6h  fluticasone, 2 spray, Daily  heparin (porcine), 5,000 Units, q8h  ondansetron, 4 mg, q8h   Or  ondansetron, 4 mg, q8h  sennosides, 2 tablet, BID      [Held by provider] sodium chloride 0.9%, Last Rate: 125 mL/hr (23 1352)      benzocaine-menthol, 1 lozenge, q2h PRN  bisacodyl, 10 mg, Daily  PRN  guaiFENesin, 600 mg, BID PRN  HYDROmorphone, 0.2 mg, q4h PRN   Or  HYDROmorphone, 0.1 mg, q4h PRN  melatonin, 10 mg, Nightly PRN  naloxone, 0.2 mg, q5 min PRN  oxybutynin, 5 mg, BID PRN  polyethylene glycol, 17 g, Daily PRN        Vitals:    11/05/23 0900   BP: 102/63   Pulse: 87   Resp: 18   Temp: 36.2 °C (97.2 °F)   SpO2: 94%        11/03 1900 - 11/05 0659  In: 1721.7 [P.O.:240; I.V.:1481.7]  Out: 2680 [Urine:255]   Weight change:      General appearance: AAOx3. No distress  Eyes: non-icteric  Skin: no apparent rash  Heart: RRR  Lungs: CTA bilat.  , on room air   Abdomen: soft, nt/nd  Extremities: Trace edema bilat  :  Andrade  Neuro: No FND, No asterixis       Blood Labs:  Results for orders placed or performed during the hospital encounter of 10/24/23 (from the past 24 hour(s))   CBC   Result Value Ref Range    WBC 8.6 4.4 - 11.3 x10*3/uL    nRBC 0.0 0.0 - 0.0 /100 WBCs    RBC 4.36 (L) 4.50 - 5.90 x10*6/uL    Hemoglobin 12.2 (L) 13.5 - 17.5 g/dL    Hematocrit 36.9 (L) 41.0 - 52.0 %    MCV 85 80 - 100 fL    MCH 28.0 26.0 - 34.0 pg    MCHC 33.1 32.0 - 36.0 g/dL    RDW 16.8 (H) 11.5 - 14.5 %    Platelets 382 150 - 450 x10*3/uL   Basic Metabolic Panel   Result Value Ref Range    Glucose 96 74 - 99 mg/dL    Sodium 139 136 - 145 mmol/L    Potassium 4.3 3.5 - 5.3 mmol/L    Chloride 107 98 - 107 mmol/L    Bicarbonate 15 (L) 21 - 32 mmol/L    Anion Gap 21 (H) 10 - 20 mmol/L    Urea Nitrogen 77 (H) 6 - 23 mg/dL    Creatinine 9.61 (H) 0.50 - 1.30 mg/dL    eGFR 6 (L) >60 mL/min/1.73m*2    Calcium 8.5 (L) 8.6 - 10.6 mg/dL         ASSESSMENT:  Jose Thornton is a 57 y.o. male w/ Hx Crohn's disease complicated by colovesical fistula s/p bilateral nephrostomy tube placement (initially Jan 2023) + indwelling andrade catheter who presented to  for fistula repair. Developed worsening KADE for which nephrology is consulted.       #KADE, oliguric on CKD3a (BL 1-1.3) - stage 3  -likely due to multiple insults including  hemodynamic injury (hypotension, anemia post op), with medication toxicity (gentamicin).   -no contrast given  -renal US 11/4: no hydronephrosis, urine draining well from nephrostomy tubes    #NAGMA likely due to ARF      Recommendations:  - please obtain UA, urine electrolytes from nephrostomy tubes  - follow up CT AP  - given pt is oliguric and not responding to IVF would discontinue at this time  - start sodium bicarbonate 650mg TID  - no urgent need for dialysis today however given his severe azotemia, oliguria and acidosis with some uremic symptoms (nausea, poor appetite) anticipate patient will need dialysis so please place temporary HD catheter by IR and will plan for HD after line placement. Pt agreeable to dialysis.   - Avoid nephrotoxins, contrast if possible, strict Is/Os  - Renal dosing for medications for latest eGFR, follow medication trough as appropriate  - Avoid hypotension/rapid fluctuations in Bps      Arthur Rubio DO  Nephrology Resident  24 hour Renal Pager - [phone]    Discussed with attending nephrologist

## 2023-11-05 NOTE — PROGRESS NOTES
"Jose Thornton is a 57 y.o. male on day 12 of admission presenting with Colovesical fistula.    Subjective   Pt having ostomy output. Was notified by team that pt was vomitting yesterday but he has since not had emesis today and denies nausea.     Objective     Neurological: Awake, alert, conversive  Respiratory/Thorax: even, unlabored  Genitourinary: voiding  Gastrointestinal: soft, ATTP, Incisions CDI no erythema. Drains with ss output. Ostomy pink, viable with billious ostomy output, distended  Skin: warm, dry  Musculoskeletal: STANLEY  Eyes: non-icteric  Extremities: no edema   Psychological: appropriate mood/affect     Last Recorded Vitals  Blood pressure 102/63, pulse 87, temperature 36.2 °C (97.2 °F), temperature source Temporal, resp. rate 18, height 1.829 m (6' 0.01\"), weight 113 kg (249 lb 1.9 oz), SpO2 94 %.  Intake/Output last 3 Shifts:  I/O last 3 completed shifts:  In: 1721.7 (15.2 mL/kg) [P.O.:240; I.V.:1481.7 (13.1 mL/kg)]  Out: 2680 (23.7 mL/kg) [Urine:255 (0.1 mL/kg/hr); Emesis/NG output:650; Stool:1775]  Weight: 113 kg     Assessment/Plan   Principal Problem:    Colovesical fistula    Jose Thornton is a 57 y.o. male w/ Hx Crohn's disease here for cystoscopy, bilateral ileal ureter and bladder augment with right partial rectus muscle flap and SBT placement on 10/27/23. We were re-engaged due to emesis 11/4.    Reccs  - Ngtube for decompression of illeus vs partial SBO if further emesis  - agree with CLD and advance as tolerated  - Rest of care per primary  - Please page or call with any questions.     Plan discussed with attending, Dr. Giles.     Carlos Villanueva MD, PGY1      "

## 2023-11-06 ENCOUNTER — APPOINTMENT (OUTPATIENT)
Dept: RADIOLOGY | Facility: HOSPITAL | Age: 58
DRG: 353 | End: 2023-11-06

## 2023-11-06 ENCOUNTER — APPOINTMENT (OUTPATIENT)
Dept: DIALYSIS | Facility: HOSPITAL | Age: 58
End: 2023-11-06

## 2023-11-06 LAB
ANION GAP SERPL CALC-SCNC: 26 MMOL/L (ref 10–20)
BUN SERPL-MCNC: 86 MG/DL (ref 6–23)
CALCIUM SERPL-MCNC: 8.6 MG/DL (ref 8.6–10.6)
CHLORIDE SERPL-SCNC: 106 MMOL/L (ref 98–107)
CO2 SERPL-SCNC: 11 MMOL/L (ref 21–32)
CREAT SERPL-MCNC: 10.84 MG/DL (ref 0.5–1.3)
ERYTHROCYTE [DISTWIDTH] IN BLOOD BY AUTOMATED COUNT: 16.8 % (ref 11.5–14.5)
GFR SERPL CREATININE-BSD FRML MDRD: 5 ML/MIN/1.73M*2
GLUCOSE SERPL-MCNC: 87 MG/DL (ref 74–99)
HBV SURFACE AB SER-ACNC: <3.1 MIU/ML
HBV SURFACE AG SERPL QL IA: NONREACTIVE
HCT VFR BLD AUTO: 40.3 % (ref 41–52)
HGB BLD-MCNC: 12 G/DL (ref 13.5–17.5)
MCH RBC QN AUTO: 27 PG (ref 26–34)
MCHC RBC AUTO-ENTMCNC: 29.8 G/DL (ref 32–36)
MCV RBC AUTO: 91 FL (ref 80–100)
NRBC BLD-RTO: 0 /100 WBCS (ref 0–0)
PHOSPHATE SERPL-MCNC: 7 MG/DL (ref 2.5–4.9)
PLATELET # BLD AUTO: 349 X10*3/UL (ref 150–450)
POTASSIUM SERPL-SCNC: 4.5 MMOL/L (ref 3.5–5.3)
RBC # BLD AUTO: 4.45 X10*6/UL (ref 4.5–5.9)
SODIUM SERPL-SCNC: 138 MMOL/L (ref 136–145)
WBC # BLD AUTO: 8.3 X10*3/UL (ref 4.4–11.3)

## 2023-11-06 PROCEDURE — 99232 SBSQ HOSP IP/OBS MODERATE 35: CPT

## 2023-11-06 PROCEDURE — 1200000002 HC GENERAL ROOM WITH TELEMETRY DAILY

## 2023-11-06 PROCEDURE — 2780000003 HC OR 278 NO HCPCS

## 2023-11-06 PROCEDURE — 87340 HEPATITIS B SURFACE AG IA: CPT | Performed by: INTERNAL MEDICINE

## 2023-11-06 PROCEDURE — 99153 MOD SED SAME PHYS/QHP EA: CPT | Performed by: RADIOLOGY

## 2023-11-06 PROCEDURE — 5A1D70Z PERFORMANCE OF URINARY FILTRATION, INTERMITTENT, LESS THAN 6 HOURS PER DAY: ICD-10-PCS | Performed by: UROLOGY

## 2023-11-06 PROCEDURE — 36556 INSERT NON-TUNNEL CV CATH: CPT | Performed by: RADIOLOGY

## 2023-11-06 PROCEDURE — C1752 CATH,HEMODIALYSIS,SHORT-TERM: HCPCS

## 2023-11-06 PROCEDURE — 84100 ASSAY OF PHOSPHORUS: CPT

## 2023-11-06 PROCEDURE — 2500000004 HC RX 250 GENERAL PHARMACY W/ HCPCS (ALT 636 FOR OP/ED)

## 2023-11-06 PROCEDURE — 2500000004 HC RX 250 GENERAL PHARMACY W/ HCPCS (ALT 636 FOR OP/ED): Performed by: STUDENT IN AN ORGANIZED HEALTH CARE EDUCATION/TRAINING PROGRAM

## 2023-11-06 PROCEDURE — 96372 THER/PROPH/DIAG INJ SC/IM: CPT | Performed by: STUDENT IN AN ORGANIZED HEALTH CARE EDUCATION/TRAINING PROGRAM

## 2023-11-06 PROCEDURE — 77001 FLUOROGUIDE FOR VEIN DEVICE: CPT | Performed by: RADIOLOGY

## 2023-11-06 PROCEDURE — 36415 COLL VENOUS BLD VENIPUNCTURE: CPT | Performed by: STUDENT IN AN ORGANIZED HEALTH CARE EDUCATION/TRAINING PROGRAM

## 2023-11-06 PROCEDURE — 99152 MOD SED SAME PHYS/QHP 5/>YRS: CPT

## 2023-11-06 PROCEDURE — 80048 BASIC METABOLIC PNL TOTAL CA: CPT | Performed by: STUDENT IN AN ORGANIZED HEALTH CARE EDUCATION/TRAINING PROGRAM

## 2023-11-06 PROCEDURE — 76937 US GUIDE VASCULAR ACCESS: CPT | Performed by: RADIOLOGY

## 2023-11-06 PROCEDURE — 2500000004 HC RX 250 GENERAL PHARMACY W/ HCPCS (ALT 636 FOR OP/ED): Performed by: RADIOLOGY

## 2023-11-06 PROCEDURE — 85027 COMPLETE CBC AUTOMATED: CPT | Performed by: STUDENT IN AN ORGANIZED HEALTH CARE EDUCATION/TRAINING PROGRAM

## 2023-11-06 PROCEDURE — 99152 MOD SED SAME PHYS/QHP 5/>YRS: CPT | Performed by: RADIOLOGY

## 2023-11-06 RX ORDER — MIDAZOLAM HYDROCHLORIDE 1 MG/ML
INJECTION INTRAMUSCULAR; INTRAVENOUS AS NEEDED
Status: COMPLETED | OUTPATIENT
Start: 2023-11-06 | End: 2023-11-06

## 2023-11-06 RX ORDER — FENTANYL CITRATE 50 UG/ML
INJECTION, SOLUTION INTRAMUSCULAR; INTRAVENOUS AS NEEDED
Status: COMPLETED | OUTPATIENT
Start: 2023-11-06 | End: 2023-11-06

## 2023-11-06 RX ADMIN — ONDANSETRON 4 MG: 2 INJECTION INTRAMUSCULAR; INTRAVENOUS at 00:18

## 2023-11-06 RX ADMIN — FENTANYL CITRATE 50 MCG: 50 INJECTION, SOLUTION INTRAMUSCULAR; INTRAVENOUS at 14:00

## 2023-11-06 RX ADMIN — ONDANSETRON 4 MG: 2 INJECTION INTRAMUSCULAR; INTRAVENOUS at 16:27

## 2023-11-06 RX ADMIN — ONDANSETRON 4 MG: 2 INJECTION INTRAMUSCULAR; INTRAVENOUS at 08:38

## 2023-11-06 RX ADMIN — OXYBUTYNIN CHLORIDE 5 MG: 5 TABLET ORAL at 08:40

## 2023-11-06 RX ADMIN — HYDROMORPHONE HYDROCHLORIDE 0.2 MG: 1 INJECTION, SOLUTION INTRAMUSCULAR; INTRAVENOUS; SUBCUTANEOUS at 08:39

## 2023-11-06 RX ADMIN — HYDROMORPHONE HYDROCHLORIDE 0.2 MG: 1 INJECTION, SOLUTION INTRAMUSCULAR; INTRAVENOUS; SUBCUTANEOUS at 21:16

## 2023-11-06 RX ADMIN — HYDROMORPHONE HYDROCHLORIDE 0.2 MG: 1 INJECTION, SOLUTION INTRAMUSCULAR; INTRAVENOUS; SUBCUTANEOUS at 16:27

## 2023-11-06 RX ADMIN — MIDAZOLAM HYDROCHLORIDE 1 MG: 1 INJECTION, SOLUTION INTRAMUSCULAR; INTRAVENOUS at 14:00

## 2023-11-06 RX ADMIN — HEPARIN SODIUM 5000 UNITS: 5000 INJECTION INTRAVENOUS; SUBCUTANEOUS at 18:37

## 2023-11-06 RX ADMIN — SODIUM BICARBONATE 650 MG: 650 TABLET ORAL at 08:40

## 2023-11-06 ASSESSMENT — PAIN - FUNCTIONAL ASSESSMENT
PAIN_FUNCTIONAL_ASSESSMENT: 0-10

## 2023-11-06 ASSESSMENT — PAIN SCALES - GENERAL
PAINLEVEL_OUTOF10: 7
PAINLEVEL_OUTOF10: 7
PAINLEVEL_OUTOF10: 0 - NO PAIN
PAINLEVEL_OUTOF10: 7
PAINLEVEL_OUTOF10: 7
PAINLEVEL_OUTOF10: 0 - NO PAIN
PAINLEVEL_OUTOF10: 7
PAINLEVEL_OUTOF10: 7
PAINLEVEL_OUTOF10: 0 - NO PAIN
PAINLEVEL_OUTOF10: 0 - NO PAIN
PAINLEVEL_OUTOF10: 7

## 2023-11-06 ASSESSMENT — PAIN DESCRIPTION - LOCATION: LOCATION: ABDOMEN

## 2023-11-06 NOTE — NURSING NOTE
Report from Sending RN:    Report From: Pinky  Recent Surgery of Procedure: Yes, KADE from surgery  Baseline Level of Consciousness (LOC): A and O  Oxygen Use: No  Type:   Diabetic: No  Last BP Med Given Day of Dialysis: na  Last Pain Med Given: 1615  Lab Tests to be Obtained with Dialysis: No  Blood Transfusion to be Given During Dialysis: No  Available IV Access: Yes  Medications to be Administered During Dialysis: No  Continuous IV Infusion Running: No  Restraints on Currently or in the Last 24 Hours: N/A  Hand-Off Communication: Pt is an KADE,

## 2023-11-06 NOTE — POST-PROCEDURE NOTE
Interventional Radiology Brief Postprocedure Note    Attending: Dr. 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 Please refer to full radiology report for further details.     Anesthesia:  conscious sedation    Complications: None    Estimated Blood Loss: minimal    Medications  As of 11/06/23 1535      heparin (porcine) injection 5,000 Units (Units) Total dose:  165,000 Units* Dosing weight:  113   *Administration not included in total     Date/Time Rate/Dose/Volume Action       10/25/23  0021 5,000 Units Given      0807 5,000 Units Given      1429 5,000 Units Given      2225 5,000 Units Given     10/26/23  0844 5,000 Units Given      1500 *5,000 Units Missed      2326 5,000 Units Given     10/27/23  0700 *5,000 Units Missed      1500 *5,000 Units Missed      2029 5,000 Units Given     10/28/23  0518 5,000 Units Given      1011 5,000 Units Given      1900 5,000 Units Given     10/29/23  0452 5,000 Units Given      1104 5,000 Units Given      1953 5,000 Units Given     10/30/23  0321 5,000 Units Given      1146 5,000 Units Given      1820 5,000 Units Given     10/31/23  0321 5,000 Units Given      1100 5,000 Units Given      1900 *5,000 Units Missed     11/01/23  0248 5,000 Units Given      1110 5,000 Units Given      1822 5,000 Units Given     11/02/23  0310 5,000 Units Given      1143 5,000 Units Given      1809 5,000 Units Given     11/03/23  0326 5,000 Units Given      1150 5,000 Units Given      1840 5,000 Units Given     11/04/23  0248 5,000 Units Given      1139 5,000 Units Given      1825 5,000 Units Given     11/05/23  0305 5,000 Units Given      1154 5,000 Units Given      1813 5,000 Units Given     11/06/23  0300 *5,000 Units Missed      1100 *5,000 Units Missed               heparin (porcine) injection 5,000  Units (Units) Total dose:  5,000 Units Dosing weight:  115      Date/Time Rate/Dose/Volume Action       10/27/23  0649 5,000 Units Given               ondansetron (Zofran) tablet 4 mg (mg) Total dose:  16 mg Dosing weight:  113      Date/Time Rate/Dose/Volume Action       10/30/23  0054 4 mg Given      0940 4 mg Given     10/31/23  2054 4 mg Given     11/01/23  0831 *Not included in total See Alternative      2048 4 mg Given     11/02/23  1143 *Not included in total See Alternative               ondansetron (Zofran) tablet 4 mg (mg) Total dose:  Cannot be calculated* Dosing weight:  113   *Administration dose not documented     Date/Time Rate/Dose/Volume Action       11/03/23  0831 *Not included in total See Alternative      1619 *Not included in total See Alternative      2356 *Not included in total See Alternative     11/04/23  0822 *Not included in total See Alternative      1602 *Not included in total See Alternative     11/05/23  0022 *Not included in total See Alternative      0901 *Not included in total See Alternative      1535 *Not included in total See Alternative     11/06/23  0018 *Not included in total See Alternative      0838 *Not included in total See Alternative               ondansetron (Zofran) injection 4 mg (mg) Total dose:  8 mg Dosing weight:  113      Date/Time Rate/Dose/Volume Action       10/30/23  0054 *Not included in total See Alternative      0940 *Not included in total See Alternative     10/31/23  2054 *Not included in total See Alternative     11/01/23  0831 4 mg Given      2048 *Not included in total See Alternative     11/02/23  1143 4 mg Given               ondansetron (Zofran) injection 4 mg (mg) Total dose:  48 mg* Dosing weight:  113   *Administration not included in total     Date/Time Rate/Dose/Volume Action       10/30/23  0529 4 mg Given      0927 *4 mg Missed     10/31/23  1511 4 mg Given     11/01/23  0828 *4 mg Missed     11/02/23  2047 *Not included in total See  Alternative     11/03/23  0814 *4 mg Missed      0831 4 mg Given      1619 4 mg Given      2356 4 mg Given     11/04/23  0822 4 mg Given      1602 4 mg Given     11/05/23  0022 4 mg Given      0901 4 mg Given      1535 4 mg Given     11/06/23  0018 4 mg Given      0838 4 mg Given               ceFAZolin in dextrose (iso-os) (Ancef) IVPB 1 g (mL/hr) Total dose:  17 g* Dosing weight:  113   *From user-documented volume     Date/Time Rate/Dose/Volume Action       10/25/23  0013 1 g - 100 mL/hr (over 30 min) New Bag      0043  (over 30 min) Stopped      1224 1 g - 100 mL/hr (over 30 min) New Bag      1254  (over 30 min) Stopped      2339 1 g - 100 mL/hr (over 30 min) New Bag     10/26/23  0009 50 mL Stopped      0844 1 g - 100 mL/hr (over 30 min) New Bag      0914  (over 30 min) Stopped      1708 1 g - 100 mL/hr (over 30 min) New Bag      1738  (over 30 min) Stopped      2325 1 g - 100 mL/hr (over 30 min) New Bag      2355  (over 30 min) Stopped     10/27/23  1600 *1 g - 100 mL/hr (over 30 min) Missed     10/28/23  0053 1 g - 100 mL/hr (over 30 min) New Bag      0123  (over 30 min) Stopped      0833 1 g - 100 mL/hr (over 30 min) New Bag      0903  (over 30 min) Stopped      1255 1 g - 100 mL/hr (over 30 min) New Bag      1325  (over 30 min) Stopped      2050 1 g - 100 mL/hr (over 30 min) New Bag      2120  (over 30 min) Stopped     10/29/23  0452 1 g - 100 mL/hr (over 30 min) New Bag      0522  (over 30 min) Stopped      1211 1 g - 100 mL/hr (over 30 min) New Bag      1241  (over 30 min) Stopped      2101 1 g - 100 mL/hr (over 30 min) New Bag      2131  (over 30 min) Stopped     10/30/23  0529 1 g - 100 mL/hr (over 30 min) New Bag      0559  (over 30 min) Stopped      1424 1 g - 100 mL/hr (over 30 min) New Bag      1454  (over 30 min) Stopped      2131 1 g - 100 mL/hr (over 30 min) New Bag      2201  (over 30 min) Stopped     10/31/23  0538 1 g - 100 mL/hr (over 30 min) New Bag      0608  (over 30 min) Stopped      1323  1 g - 100 mL/hr (over 30 min) New Bag      1353  (over 30 min) Stopped      1400 750 mL       2049 1 g - 100 mL/hr (over 30 min) New Bag      2119  (over 30 min) Stopped     11/01/23  0509 1 g - 100 mL/hr (over 30 min) New Bag      0539  (over 30 min) Stopped      1220 1 g - 100 mL/hr (over 30 min) New Bag      1250 50 mL Stopped      2049 1 g - 100 mL/hr (over 30 min) New Bag      2119  (over 30 min) Stopped     11/02/23  0513 1 g - 100 mL/hr (over 30 min) New Bag      0543  (over 30 min) Stopped               fluconazole in NaCl (iso-osm) (Diflucan) IVPB 400 mg (mg) Total dose:  400 mg* Dosing weight:  113   *From user-documented volume     Date/Time Rate/Dose/Volume Action       10/25/23  1429 400 mg New Bag     10/26/23  0844 400 mg New Bag     10/27/23  0850 200 mL      10/28/23  0833 400 mg New Bag     10/29/23  0745 *400 mg Missed      0752  Stopped               gentamicin (Garamycin) 340 mg in dextrose 5 % in water (D5W) 100 mL IV (mg/kg) Total dose:  0 mg* Dosing weight:  113   *Administration not included in total     Date/Time Rate/Dose/Volume Action       10/25/23  1100 *340 mg - 100 mL/hr (over 60 min) Missed               gentamicin (Garamycin) 460 mg in dextrose 5 % in water (D5W) 100 mL IV (mL/hr) Total dose:  4,140 mg* Dosing weight:  91.8   *From user-documented volume     Date/Time Rate/Dose/Volume Action       10/25/23  1330 460 mg - 100 mL/hr (over 60 min) New Bag      1430  (over 60 min) Stopped     10/26/23  1506 460 mg - 100 mL/hr (over 60 min) New Bag      1606  (over 60 min) Stopped     10/28/23  1255 460 mg - 100 mL/hr (over 60 min) New Bag      1355  (over 60 min) Stopped     10/29/23  1104 460 mg - 100 mL/hr (over 60 min) New Bag      1159  (over 60 min) Stopped      1200 *460 mg - 100 mL/hr (over 60 min) Missed     10/30/23  1314 460 mg - 100 mL/hr (over 60 min) New Bag      1414  (over 60 min) Stopped     10/31/23  1147 460 mg - 100 mL/hr (over 60 min) New Bag      1247  (over 60 min)  Stopped      1400 600 mL [vol]      11/01/23  1110 460 mg - 100 mL/hr (over 60 min) New Bag      1210 100 mL [vol] Stopped     11/02/23  1143 460 mg - 100 mL/hr (over 60 min) New Bag      1243 100 mL [vol] Stopped     11/03/23  1150 460 mg - 100 mL/hr (over 60 min) New Bag      1250 100 mL [vol] Stopped               fluticasone (Flonase) nasal spray 2 spray (spray) Total dose:  4 spray* Dosing weight:  113   *Administration not included in total     Date/Time Rate/Dose/Volume Action       10/25/23  0915 *2 spray Missed      2339 2 spray Given     10/26/23  0844 2 spray Given     10/28/23  0900 *2 spray Missed     10/29/23  0900 *2 spray Missed     10/30/23  0900 *2 spray Missed     10/31/23  0900 *2 spray Missed     11/01/23  0900 *2 spray Missed     11/02/23  0900 *2 spray Missed     11/03/23  0900 *2 spray Missed     11/04/23  0900 *2 spray Missed     11/05/23  0900 *2 spray Missed     11/06/23  0900 *2 spray Missed               oxybutynin (Ditropan) tablet 5 mg (mg) Total dose:  115 mg Dosing weight:  113      Date/Time Rate/Dose/Volume Action       10/25/23  1429 5 mg Given      2100 5 mg Given     10/26/23  0844 5 mg Given      1456 5 mg Given      2057 5 mg Given     10/27/23  2030 5 mg Given     10/28/23  0832 5 mg Given      0900 5 mg Given      1623 5 mg Given      2050 5 mg Given     10/29/23  0900 5 mg Given      1450 5 mg Given      2101 5 mg Given     10/30/23  0941 5 mg Given      1427 5 mg Given      2131 5 mg Given     10/31/23  0900 5 mg Given      2048 5 mg Given     11/01/23  0831 5 mg Given      2048 5 mg Given     11/02/23  0905 5 mg Given      2100 *5 mg Missed     11/03/23  0830 5 mg Given     11/06/23  0840 5 mg Given               polyethylene glycol (Glycolax, Miralax) packet 238 g (g) Total dose:  238 g Dosing weight:  113      Date/Time Rate/Dose/Volume Action       10/26/23  1456 238 g Given               polyethylene glycol (Glycolax, Miralax) packet 17 g (g) Total dose:  68 g* Dosing  weight:  113   *Administration not included in total     Date/Time Rate/Dose/Volume Action       10/27/23  1900 *17 g Missed     10/28/23  0833 17 g Given     10/29/23  0900 17 g Given     10/30/23  0936 17 g Given     10/31/23  0900 17 g Given               bisacodyl (Dulcolax) EC tablet 5 mg (mg) Total dose:  10 mg Dosing weight:  113      Date/Time Rate/Dose/Volume Action       10/26/23  1455 5 mg Given      2057 5 mg Given               bisacodyl (Dulcolax) EC tablet 10 mg (mg) Total dose:  10 mg Dosing weight:  113      Date/Time Rate/Dose/Volume Action       10/31/23  1003 10 mg Given               magnesium citrate solution 296 mL (mL) Total volume:  0 mL* Dosing weight:  113   *Administration not included in total     Date/Time Rate/Dose/Volume Action       10/25/23  2015 *296 mL Missed               melatonin tablet 3 mg (mg) Total dose:  3 mg Dosing weight:  113      Date/Time Rate/Dose/Volume Action       10/25/23  2226 3 mg Given               melatonin tablet 6 mg (mg) Total dose:  6 mg Dosing weight:  113      Date/Time Rate/Dose/Volume Action       10/26/23  2325 6 mg Given               polyethylene glycol-electrolytes (Nulytely) solution 4,000 mL (mL) Total volume:  0 mL* Dosing weight:  113   *Administration not included in total     Date/Time Rate/Dose/Volume Action       10/25/23  2030 *4,000 mL Missed               lactated Ringer's infusion (mL/hr) Total volume:  300 mL* Dosing weight:  113   *From user-documented volume     Date/Time Rate/Dose/Volume Action       10/27/23  1645 *100 mL/hr Missed      1812 300 mL                HYDROmorphone (Dilaudid) injection 0.5 mg (mg) Total dose:  2 mg Dosing weight:  113      Date/Time Rate/Dose/Volume Action       10/27/23  1700 0.5 mg Given      1730 0.5 mg Given      1740 0.5 mg Given      1745 0.5 mg Given               HYDROmorphone (Dilaudid) injection 0.2 mg (mg) Total dose:  1.8 mg Dosing weight:  113      Date/Time Rate/Dose/Volume Action        10/27/23  2201 0.2 mg Given     10/28/23  0344 0.2 mg Given      1137 0.2 mg Given      1831 0.2 mg Given      2349 0.2 mg Given     10/29/23  0453 0.2 mg Given      1822 0.2 mg Given     10/30/23  0936 0.2 mg Given     10/31/23  1512 0.2 mg Given               HYDROmorphone (Dilaudid) injection 0.2 mg (mg) Total dose:  0.8 mg Dosing weight:  113      Date/Time Rate/Dose/Volume Action       11/04/23  1341 0.2 mg Given      1825 0.2 mg Given     11/05/23  0306 0.2 mg Given     11/06/23  0839 0.2 mg Given               HYDROmorphone (Dilaudid) injection 0.1 mg (mg) Total dose:  Cannot be calculated* Dosing weight:  113   *Administration dose not documented     Date/Time Rate/Dose/Volume Action       11/04/23  1341 *Not included in total See Alternative      1825 *Not included in total See Alternative     11/05/23  0306 *Not included in total See Alternative     11/06/23  0839 *Not included in total See Alternative               oxygen (O2) therapy (L/min) Total volume:  Not documented* Dosing weight:  113   *Total volume has not been documented. View each administration to see the amount administered.     Date/Time Rate/Dose/Volume Action       10/27/23  1635 3 L/min Start      1642 3 L/min Start               acetaminophen (Tylenol) tablet 975 mg (mg) Total dose:  13,325 mg* Dosing weight:  113   *Administration not included in total     Date/Time Rate/Dose/Volume Action       10/27/23  2030 975 mg Given     10/28/23  0100 *975 mg Missed      0832 975 mg Given      1300 *975 mg Missed      1900 *975 mg Missed     10/29/23  0137 975 mg Given      0751 975 mg Given      1210 975 mg Given      1821 975 mg Given     10/30/23  0100 *975 mg Missed      0700 *975 mg Missed      1300 *975 mg Missed      1900 975 mg Given     10/31/23  0321 975 mg Given      0700 975 mg Given      1300 *975 mg Missed      2049 975 mg Given     11/01/23  Canceled Entry      0248 325 mg Given      Canceled Entry      0831 325 mg Given      1300  *975 mg Missed      1823 975 mg Given     11/02/23  0108 975 mg Given      0700 *975 mg Missed      1300 *975 mg Missed      1900 *975 mg Missed     11/03/23  0100 *975 mg Missed      0830 975 mg Given      1300 *975 mg Missed      1900 *975 mg Missed     11/04/23  0100 *975 mg Missed      0800 *975 mg Missed      1300 *975 mg Missed      1900 *975 mg Missed     11/05/23  0100 *975 mg Missed      0700 *975 mg Missed      1300 *975 mg Missed      1900 *975 mg Missed     11/06/23  0100 *975 mg Missed      0700 *975 mg Missed      1300 *975 mg Missed               sodium chloride 0.9% infusion (mL/hr) Total volume:  11,889.34 mL* Dosing weight:  113   *From user-documented volume     Date/Time Rate/Dose/Volume Action       10/27/23  1900 100 mL/hr New Bag     10/28/23  0517 1,014 mL       0519 100 mL/hr New Bag      0619 100 mL/hr - 103.33 mL Rate Verify      1836 100 mL/hr New Bag      2101 1,208 mL      10/29/23  0138 100 mL/hr - 461.67 mL Rate Verify      1444 100 mL/hr - 213 mL Rate Verify      1531 75 mL/hr Rate Change      1604 75 mL/hr New Bag      2100 1,619 mL       2228 75 mL/hr - 1,721 mL Rate Verify     10/30/23  0529 516 mL       0530 75 mL/hr New Bag      1200 491 mL       1723 75 mL/hr New Bag      1900 75 mL/hr Rate Verify      2200 75 mL/hr - 225 mL Rate Verify     10/31/23  0541 75 mL/hr - 576.25 mL New Bag      0600 75 mL/hr - 24 mL Rate Verify      1400 600 mL       2049 75 mL/hr New Bag     11/01/23  0622  Stopped     11/04/23  1127 100 mL/hr New Bag      1327 100 mL/hr - 200 mL Rate Verify      1556 100 mL/hr - 248.33 mL Rate Verify      1803 100 mL/hr - 211.67 mL Rate Verify      2128 100 mL/hr New Bag      2131 125 mL/hr Rate Change     11/05/23  0119 125 mL/hr - 821.67 mL Rate Verify      0509 125 mL/hr New Bag      0853 125 mL/hr - 945.83 mL Rate Verify      1138 125 mL/hr - 343.75 mL Rate Verify      1352 125 mL/hr - 279.17 mL Rate Verify      1402 *Not included in total Held by provider       1424  Stopped      1424 *Not included in total Unheld by provider      1645 66.67 mL                oxyCODONE (Roxicodone) immediate release tablet 10 mg (mg) Total dose:  140 mg Dosing weight:  113      Date/Time Rate/Dose/Volume Action       10/27/23  2029 10 mg Given     10/28/23  0053 10 mg Given      0519 10 mg Given      1013 10 mg Given      2050 10 mg Given     10/29/23  0137 10 mg Given      0751 10 mg Given      1210 10 mg Given      1604 10 mg Given     10/30/23  0054 10 mg Given      0530 10 mg Given      1819 10 mg Given     10/31/23  0321 10 mg Given      1335 10 mg Given               oxyCODONE (Roxicodone) immediate release tablet 5 mg (mg) Total dose:  30 mg* Dosing weight:  113   *Administration not included in total     Date/Time Rate/Dose/Volume Action       10/31/23  2048 5 mg Given     11/01/23  0248 5 mg Given      1220 5 mg Given      1824 5 mg Given     11/02/23  0108 5 mg Given      0909 5 mg Given     11/03/23  0756 *5 mg Missed               sennosides (Senokot) tablet 17.2 mg (mg) Total dose:  26 tablet* Dosing weight:  113   *Administration not included in total     Date/Time Rate/Dose/Volume Action       10/27/23  2030 17.2 mg Given     10/28/23  0832 17.2 mg Given      2050 17.2 mg Given     10/29/23  0900 17.2 mg Given      2101 17.2 mg Given     10/30/23  0936 17.2 mg Given      2131 17.2 mg Given     10/31/23  0900 17.2 mg Given      2048 17.2 mg Given     11/01/23  0831 17.2 mg Given      2048 17.2 mg Given     11/02/23  0905 17.2 mg Given      2100 *17.2 mg Missed     11/03/23  0830 17.2 mg Given      2100 *17.2 mg Missed     11/04/23  0900 *17.2 mg Missed      2100 *17.2 mg Missed     11/05/23  0900 *17.2 mg Missed      2100 *17.2 mg Missed     11/06/23  0900 *17.2 mg Missed               ondansetron ODT (Zofran-ODT) disintegrating tablet 4 mg (mg) Total dose:  4 mg Dosing weight:  113      Date/Time Rate/Dose/Volume Action       10/30/23  0932 *Not included in total See  Alternative     10/31/23  1511 *Not included in total See Alternative     11/01/23  0828 *Not included in total See Alternative     11/02/23  2047 4 mg Given     11/03/23  0814 *Not included in total See Alternative               metoclopramide (Reglan) tablet 10 mg (mg) Total dose:  Cannot be calculated* Dosing weight:  113   *Administration dose not documented     Date/Time Rate/Dose/Volume Action       10/29/23  1401 *Not included in total See Alternative     11/01/23  1220 *Not included in total See Alternative     11/02/23  1257 *Not included in total See Alternative      1809 *Not included in total See Alternative               metoclopramide (Reglan) tablet 10 mg (mg) Total dose:  Cannot be calculated* Dosing weight:  113   *Administration dose not documented     Date/Time Rate/Dose/Volume Action       11/03/23  0830 *Not included in total See Alternative      1335 *Not included in total See Alternative      1845 *Not included in total See Alternative      2358 *Not included in total See Alternative     11/04/23  0821 *Not included in total See Alternative      1340 *Not included in total See Alternative      2128 *Not included in total See Alternative     11/05/23  0149 *Not included in total See Alternative      0709 *Not included in total See Alternative      Canceled Entry               metoclopramide (Reglan) injection 10 mg (mg) Total dose:  130 mg Dosing weight:  113      Date/Time Rate/Dose/Volume Action       10/29/23  1401 10 mg Given     11/01/23  1220 10 mg Given     11/02/23  1257 10 mg Given      1809 10 mg Given     11/03/23  0830 10 mg Given      1335 10 mg Given      1845 10 mg Given      2358 10 mg Given     11/04/23  0821 10 mg Given      1340 10 mg Given      2128 10 mg Given     11/05/23  0149 10 mg Given      0709 10 mg Given               methocarbamol (Robaxin) injection 500 mg (mg) Total dose:  500 mg Dosing weight:  113      Date/Time Rate/Dose/Volume Action       10/27/23  1755 500  mg (over 4 min) Given               melatonin tablet 10 mg (mg) Total dose:  20 mg Dosing weight:  113      Date/Time Rate/Dose/Volume Action       10/28/23  2349 10 mg Given     10/29/23  2108 10 mg Given               traMADol (Ultram) tablet 50 mg (mg) Total dose:  200 mg Dosing weight:  113      Date/Time Rate/Dose/Volume Action       11/01/23  0831 50 mg Given      2048 50 mg Given     11/03/23  0828 50 mg Given     11/04/23  1245 50 mg Given               benzocaine-menthol (Cepastat Sore Throat) 15-3.6 mg lozenge 1 lozenge (lozenge) Total dose:  2 lozenge Dosing weight:  113      Date/Time Rate/Dose/Volume Action       11/02/23  0905 1 lozenge Given      1158 1 lozenge Given               lactated Ringer's bolus 1,000 mL (mL/hr) Total volume:  Not documented* Dosing weight:  113   *Total volume has not been documented. View each administration to see the amount administered.     Date/Time Rate/Dose/Volume Action       11/04/23  0635 1,000 mL - 1,000 mL/hr (over 60 min) New Bag      0735  (over 60 min) Stopped               sodium bicarbonate tablet 650 mg (mg) Total dose:  1,950 mg Dosing weight:  113      Date/Time Rate/Dose/Volume Action       11/05/23  1534 650 mg Given      2102 650 mg Given     11/06/23  0840 650 mg Given               fentaNYL PF (Sublimaze) injection (mcg) Total dose:  50 mcg      Date/Time Rate/Dose/Volume Action       11/06/23  1400 50 mcg Given               midazolam (Versed) injection (mg) Total dose:  1 mg      Date/Time Rate/Dose/Volume Action       11/06/23  1400 1 mg Given                   No specimens collected      See detailed result report with images in PACS.    The patient tolerated the procedure well without incident or complication and is in stable condition.

## 2023-11-06 NOTE — PRE-PROCEDURE NOTE
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 A diagnosis of Ureter injury, subsequent encounter was also pertinent to this visit. Acute Kidney injury requiring trialysis for hemodialysis.      Relevant Labs:   Lab Results   Component Value Date    CREATININE 10.84 (H) 11/06/2023    EGFR 5 (L) 11/06/2023    INR 1.0 10/05/2023    PROTIME 11.3 10/05/2023       Planned Sedation/Anesthesia: conscious sedation    Airway assessment: normal    Directed physical examination:    General: Patient is awake, alert, oriented and in no acute distress, resting comfortably in bed  Heart: regular rate   Lungs: normal respiratory effort  GI: Abdomen soft, non tender  Skin: Warm, Dry  Psych: normal affect     Mallampati: III (soft and hard palate and base of uvula visible)    ASA Score: ASA 3 - Patient with moderate systemic disease with functional limitations    Benefits, risks and alternatives of procedure and planned sedation have been discussed with the patient and/or their representative. All questions answered and they agree to proceed.

## 2023-11-06 NOTE — PROGRESS NOTES
UROLOGY DAILY PROGRESS NOTE      Subjective     -Seen by nephrology yesterday, patient in acute renal failure and will likely need dialysis today vs tomorrow  -Urine output slightly increased from yesterday  -Etiology of ARF undetermined, being worked up  -Patient asymptomatic other than mild general malaise and early satiety, denies vomiting, fever, chills, palpitations, shortness of breath, changes in mental status  -No significant electrolyte abnormalities noted  - Ambulating    Objective   Physical Exam  Gen: NAD, alert  HEENT: normocephalic, atraumatic, MMM  Pulm: nonlabored breathing  CV: regular rate  GI: soft, minimally distended, mildly tender, colostomy L abdomen w/ healthy appearance, green liquid with gas. Midline incision c/d/I closed with staples, open to air, LINDSEY drains removed with dressings in place.  : no suprapubic or CVA tenderness, SPT draining straw yellow, urethral andrade minimal drainage, straw yellow, b/l PCNT uncapped with clear yellow urine  MSK: STANLEY  Neuro: no focal deficits  Skin: WWP  Psych: appropriate mood and behavior    Last Recorded Vitals  Heart Rate:  [76-87]   Temp:  [36.2 °C (97.2 °F)-36.8 °C (98.2 °F)]   Resp:  [18]   BP: (102-129)/(63-76)   SpO2:  [93 %-96 %]   Intake/Output last 3 Shifts:  I/O last 3 completed shifts:  In: 2457.1 (21.7 mL/kg) [I.V.:2457.1 (21.7 mL/kg)]  Out: 2479 (21.9 mL/kg) [Urine:754 (0.2 mL/kg/hr); Stool:1725]  Weight: 113 kg     Net IO Since Admission: -7,832.66 mL [11/06/23 0818]    Relevant Results  Results for orders placed or performed during the hospital encounter of 10/24/23 (from the past 96 hour(s))   CBC   Result Value Ref Range    WBC 8.1 4.4 - 11.3 x10*3/uL    nRBC 0.0 0.0 - 0.0 /100 WBCs    RBC 5.21 4.50 - 5.90 x10*6/uL    Hemoglobin 14.4 13.5 - 17.5 g/dL    Hematocrit 45.8 41.0 - 52.0 %    MCV 88 80 - 100 fL    MCH 27.6 26.0 - 34.0 pg    MCHC 31.4 (L) 32.0 - 36.0 g/dL    RDW 16.6 (H) 11.5 - 14.5 %    Platelets 519 (H) 150 - 450 x10*3/uL    Basic metabolic panel   Result Value Ref Range    Glucose 123 (H) 74 - 99 mg/dL    Sodium 138 136 - 145 mmol/L    Potassium 4.2 3.5 - 5.3 mmol/L    Chloride 105 98 - 107 mmol/L    Bicarbonate 14 (L) 21 - 32 mmol/L    Anion Gap 23 (H) 10 - 20 mmol/L    Urea Nitrogen 38 (H) 6 - 23 mg/dL    Creatinine 3.66 (H) 0.50 - 1.30 mg/dL    eGFR 19 (L) >60 mL/min/1.73m*2    Calcium 10.1 8.6 - 10.6 mg/dL   CBC   Result Value Ref Range    WBC 8.8 4.4 - 11.3 x10*3/uL    nRBC 0.0 0.0 - 0.0 /100 WBCs    RBC 5.18 4.50 - 5.90 x10*6/uL    Hemoglobin 14.3 13.5 - 17.5 g/dL    Hematocrit 45.9 41.0 - 52.0 %    MCV 89 80 - 100 fL    MCH 27.6 26.0 - 34.0 pg    MCHC 31.2 (L) 32.0 - 36.0 g/dL    RDW 16.4 (H) 11.5 - 14.5 %    Platelets 591 (H) 150 - 450 x10*3/uL   Basic Metabolic Panel   Result Value Ref Range    Glucose 130 (H) 74 - 99 mg/dL    Sodium 138 136 - 145 mmol/L    Potassium 5.1 3.5 - 5.3 mmol/L    Chloride 103 98 - 107 mmol/L    Bicarbonate 13 (L) 21 - 32 mmol/L    Anion Gap 27 (H) 10 - 20 mmol/L    Urea Nitrogen 56 (H) 6 - 23 mg/dL    Creatinine 6.93 (H) 0.50 - 1.30 mg/dL    eGFR 9 (L) >60 mL/min/1.73m*2    Calcium 9.8 8.6 - 10.6 mg/dL   POCT GLUCOSE   Result Value Ref Range    POCT Glucose 123 (H) 74 - 99 mg/dL   CBC   Result Value Ref Range    WBC 8.6 4.4 - 11.3 x10*3/uL    nRBC 0.0 0.0 - 0.0 /100 WBCs    RBC 4.36 (L) 4.50 - 5.90 x10*6/uL    Hemoglobin 12.2 (L) 13.5 - 17.5 g/dL    Hematocrit 36.9 (L) 41.0 - 52.0 %    MCV 85 80 - 100 fL    MCH 28.0 26.0 - 34.0 pg    MCHC 33.1 32.0 - 36.0 g/dL    RDW 16.8 (H) 11.5 - 14.5 %    Platelets 382 150 - 450 x10*3/uL   Basic Metabolic Panel   Result Value Ref Range    Glucose 96 74 - 99 mg/dL    Sodium 139 136 - 145 mmol/L    Potassium 4.3 3.5 - 5.3 mmol/L    Chloride 107 98 - 107 mmol/L    Bicarbonate 15 (L) 21 - 32 mmol/L    Anion Gap 21 (H) 10 - 20 mmol/L    Urea Nitrogen 77 (H) 6 - 23 mg/dL    Creatinine 9.61 (H) 0.50 - 1.30 mg/dL    eGFR 6 (L) >60 mL/min/1.73m*2    Calcium 8.5 (L) 8.6  - 10.6 mg/dL   Urine electrolytes   Result Value Ref Range    Sodium, Urine Random 87 mmol/L    Sodium/Creatinine Ratio 68 Not established. mmol/g Creat    Potassium, Urine Random 26 mmol/L    Potassium/Creatinine Ratio 20 Not established mmol/g Creat    Chloride, Urine Random 65 mmol/L    Chloride/Creatinine Ratio 51 23 - 275 mmol/g creat    Creatinine, Urine Random 127.1 20.0 - 370.0 mg/dL   Urinalysis with Reflex Microscopic   Result Value Ref Range    Color, Urine Zarina (N) Straw, Yellow    Appearance, Urine Hazy (N) Clear    Specific Gravity, Urine 1.013 1.005 - 1.035    pH, Urine 6.0 5.0, 5.5, 6.0, 6.5, 7.0, 7.5, 8.0    Protein, Urine >=500 (3+) (N) NEGATIVE mg/dL    Glucose, Urine NEGATIVE NEGATIVE mg/dL    Blood, Urine MODERATE (2+) (A) NEGATIVE    Ketones, Urine NEGATIVE NEGATIVE mg/dL    Bilirubin, Urine NEGATIVE NEGATIVE    Urobilinogen, Urine <2.0 <2.0 mg/dL    Nitrite, Urine NEGATIVE NEGATIVE    Leukocyte Esterase, Urine LARGE (3+) (A) NEGATIVE   Microscopic Only, Urine   Result Value Ref Range    WBC, Urine >50 (A) 1-5, NONE /HPF    WBC Clumps, Urine OCCASIONAL Reference range not established. /HPF    RBC, Urine >20 (A) NONE, 1-2, 3-5 /HPF    Squamous Epithelial Cells, Urine 1-9 (SPARSE) Reference range not established. /HPF    Bacteria, Urine 1+ (A) NONE SEEN /HPF   POCT GLUCOSE   Result Value Ref Range    POCT Glucose 94 74 - 99 mg/dL   CBC   Result Value Ref Range    WBC 8.3 4.4 - 11.3 x10*3/uL    nRBC 0.0 0.0 - 0.0 /100 WBCs    RBC 4.45 (L) 4.50 - 5.90 x10*6/uL    Hemoglobin 12.0 (L) 13.5 - 17.5 g/dL    Hematocrit 40.3 (L) 41.0 - 52.0 %    MCV 91 80 - 100 fL    MCH 27.0 26.0 - 34.0 pg    MCHC 29.8 (L) 32.0 - 36.0 g/dL    RDW 16.8 (H) 11.5 - 14.5 %    Platelets 349 150 - 450 x10*3/uL     Imaging  CT AP non-con 11/5  Impression   1.  Postsurgical changes as described above with likely postoperative  ventral abdominal seroma.  2. Bilateral percutaneous nephrostomy tubes and bilateral ureteral  stents  coursing through the bilateral ileal ureter and terminating  within the augmented urinary bladder. Air within the left renal  collecting system is likely postprocedural.  3. Mild diffuse fluid-filled small bowel dilatation with air-fluid  levels with the no clear transition point likely representing ileus.  4. Other chronic and incidental findings as described above.       RBUS 11/5  IMPRESSION:  1. No evidence of hydronephrosis.  2. Normal-sized kidneys with increased renal cortical echogenicity  bilaterally and mild left renal cortical thinning, suggestive of  medical renal disease.    KUB 11/3  IMPRESSION:  1.  Persistent moderate dilatation of the multiple small bowel loops,  measures up to 5.7 cm in the left quadrant.  2. Adynamic ileus versus partial obstruction. Correlation follow-up  recommended. Postop changes of the lower abdomen and pelvis with  multiple surgical clips and surgical incision.  3. Bilateral nephrostomy tube placement, left ureter stent placement.  4. No gross free air    Assessment/Plan   Jose Thornton is a 57 y.o. male with PMH of Crohn's disease and colovesical fistula who underwent Exploratory laparotomy, MYA, Cystoscopy, Bilateral ileal ureter and bladder augment with right partial rectus muscle flap, and Suprapubic tube insertion on 10/27/23 with Dr. Andersen and Dr. Rodriguez.     Patient initially recovered well and had quick return of bowel function with consistent ostomy output. However, he then developed symptoms and imaging findings c/w ileus. Nausea is slowly resolving and he has not had recent emesis. Antibiotics were discontinued after courses were complete 11/3. Labs were significant for quickly rising creatinine, up to 9.61 yesterday. No significant electrolyte abnormalities have been associated with the new acute renal failure. Etiology unclear and being worked up with the assistance of the nephrology team.    Principal Problem:    Colovesical fistula    Plan:  Neuro: Tylenol,  breakthrough dilaudid, oxybutynin, transition to PO meds when taking reliable PO  Pulm: IS 10x / hr  CV: hemodynamically stable, VS Q8h  FEN/GI: continue scheduled zofran, currently NPO for IR HD catheter placement, resume CLD following this procedure  /Alpesh: maintain andrade and SPT to drainage, teaching for home flushing, maintain b/l nephrostomy tubes uncapped,   Heme: no indication for transfusion  ID: urine cultures with gentamicin sensitive Pseudomonas (10 day course gentamicin for complicated UTI, course completed 11/3), no current indication for antibiotics  Endo: no additional glycemic requirements   MSK: OOB as tolerated  Prophylaxis: SQH / SCDs    Dispo:  Continue care on RNF    Seen with chief resident Dr. Ferrer, to be discussed with attending Dr. Andersen.    Maggie Lerma MD  PGY-1 Urology [place]  Pager: [phone]

## 2023-11-06 NOTE — PROGRESS NOTES
NEPHROLOGY FOLLOW UP NOTE    Jose Thornton   57 y.o.    @WT@  N/Room: [id number]/6003-A      Subjective:   This morning, he complains of increased abdominal pain, bladder spasms and fatigue. Denies any nausea or vomiting. Does not appear confused or have any chest pain.     Meds:   acetaminophen, 975 mg, q6h  fluticasone, 2 spray, Daily  heparin (porcine), 5,000 Units, q8h  ondansetron, 4 mg, q8h   Or  ondansetron, 4 mg, q8h  sennosides, 2 tablet, BID  sodium bicarbonate, 650 mg, TID         benzocaine-menthol, 1 lozenge, q2h PRN  bisacodyl, 10 mg, Daily PRN  guaiFENesin, 600 mg, BID PRN  HYDROmorphone, 0.2 mg, q4h PRN   Or  HYDROmorphone, 0.1 mg, q4h PRN  melatonin, 10 mg, Nightly PRN  naloxone, 0.2 mg, q5 min PRN  oxybutynin, 5 mg, BID PRN  polyethylene glycol, 17 g, Daily PRN      Objective:   Vitals:    11/06/23 1052   BP: 121/74   Pulse: 79   Resp: 15   Temp: 36 °C (96.8 °F)   SpO2: 95%        Wt Readings from Last 10 Encounters:   10/27/23 113 kg (249 lb 1.9 oz)   10/05/23 115 kg (253 lb 4.9 oz)   10/02/23 113 kg (250 lb)   07/25/23 111 kg (244 lb)   07/17/23 109 kg (240 lb)   07/11/23 109 kg (240 lb)   06/19/23 105 kg (231 lb)   06/08/23 108 kg (237 lb)   05/15/23 94.3 kg (208 lb)   04/21/23 94.5 kg (208 lb 6 oz)         Intake/Output Summary (Last 24 hours) at 11/6/2023 1236  Last data filed at 11/6/2023 0847  Gross per 24 hour   Intake 345.84 ml   Output 1904 ml   Net -1558.16 ml       Net IO Since Admission: -8,082.66 mL [11/06/23 1236]    Physical Exam  General: Patient is awake, sick appearing, normal body habitus  Pulm: Normal WOB at rest, no crackles or rhonchi  Cardiac: Regular rate and rhythm, normal S1/S2, IVC of 0.98 by ultrasound  Abdomen: Non-tender to palpation, non-distended  Extremities: No peripheral edema  Neuro: Patient alert and oriented, cranial nerves grossly intact      Blood Labs:  Lab Results   Component Value Date    CREATININE 10.84 (H) 11/06/2023    BUN 86 (H) 11/06/2023      11/06/2023    K 4.5 11/06/2023     11/06/2023    CO2 11 (L) 11/06/2023       Lab Results   Component Value Date    WBC 8.3 11/06/2023    HGB 12.0 (L) 11/06/2023    HCT 40.3 (L) 11/06/2023    MCV 91 11/06/2023     11/06/2023         Hemoglobin   Date Value Ref Range Status   11/06/2023 12.0 (L) 13.5 - 17.5 g/dL Final   11/05/2023 12.2 (L) 13.5 - 17.5 g/dL Final   11/04/2023 14.3 13.5 - 17.5 g/dL Final     WBC   Date Value Ref Range Status   11/06/2023 8.3 4.4 - 11.3 x10*3/uL Final   11/05/2023 8.6 4.4 - 11.3 x10*3/uL Final   11/04/2023 8.8 4.4 - 11.3 x10*3/uL Final     Platelets   Date Value Ref Range Status   11/06/2023 349 150 - 450 x10*3/uL Final   11/05/2023 382 150 - 450 x10*3/uL Final   11/04/2023 591 (H) 150 - 450 x10*3/uL Final       Sodium   Date Value Ref Range Status   11/06/2023 138 136 - 145 mmol/L Final   11/05/2023 139 136 - 145 mmol/L Final   11/04/2023 138 136 - 145 mmol/L Final     Potassium   Date Value Ref Range Status   11/06/2023 4.5 3.5 - 5.3 mmol/L Final     Comment:     MILD HEMOLYSIS DETECTED. The result may be falsely elevated due to hemolysis or other interferents. Clinical correlation is recommended. Repeat testing may be considered.   11/05/2023 4.3 3.5 - 5.3 mmol/L Final   11/04/2023 5.1 3.5 - 5.3 mmol/L Final     Bicarbonate   Date Value Ref Range Status   11/06/2023 11 (L) 21 - 32 mmol/L Final   11/05/2023 15 (L) 21 - 32 mmol/L Final   11/04/2023 13 (L) 21 - 32 mmol/L Final     Phosphorus   Date Value Ref Range Status   11/06/2023 7.0 (H) 2.5 - 4.9 mg/dL Final     Comment:     MILD HEMOLYSIS DETECTED. The result may be falsely elevated due to hemolysis or other interferents. Clinical correlation is recommended. Repeat testing may be considered.  The performance characteristics of phosphorus testing in heparinized plasma have been validated by the individual  laboratory site where testing is performed. Testing on heparinized plasma is not approved by the FDA;  however, such approval is not necessary.   02/01/2023 4.5 2.5 - 4.9 mg/dL Final     Comment:      The performance characteristics of phosphorus testing in   heparinized plasma have been validated by the individual     laboratory site where testing is performed. Testing    on heparinized plasma is not approved by the FDA;    however, such approval is not necessary.     01/30/2023 5.3 (H) 2.5 - 4.9 mg/dL Final     Comment:      The performance characteristics of phosphorus testing in   heparinized plasma have been validated by the individual     laboratory site where testing is performed. Testing    on heparinized plasma is not approved by the FDA;    however, such approval is not necessary.       Calcium   Date Value Ref Range Status   11/06/2023 8.6 8.6 - 10.6 mg/dL Final   11/05/2023 8.5 (L) 8.6 - 10.6 mg/dL Final   11/04/2023 9.8 8.6 - 10.6 mg/dL Final     Urea Nitrogen   Date Value Ref Range Status   11/06/2023 86 (H) 6 - 23 mg/dL Final   11/05/2023 77 (H) 6 - 23 mg/dL Final   11/04/2023 56 (H) 6 - 23 mg/dL Final     Creatinine   Date Value Ref Range Status   11/06/2023 10.84 (H) 0.50 - 1.30 mg/dL Final   11/05/2023 9.61 (H) 0.50 - 1.30 mg/dL Final   11/04/2023 6.93 (H) 0.50 - 1.30 mg/dL Final       POCT pH, Arterial   Date Value Ref Range Status   10/27/2023 7.35 (L) 7.38 - 7.42 pH Final   10/27/2023 7.34 (L) 7.38 - 7.42 pH Final     POCT pCO2, Arterial   Date Value Ref Range Status   10/27/2023 42 38 - 42 mm Hg Final   10/27/2023 42 38 - 42 mm Hg Final     POCT pO2, Arterial   Date Value Ref Range Status   10/27/2023 126 (H) 85 - 95 mm Hg Final   10/27/2023 125 (H) 85 - 95 mm Hg Final     pH, Venous   Date Value Ref Range Status   03/30/2023 7.37 7.33 - 7.43 Final     pCO2, Venous   Date Value Ref Range Status   03/30/2023 49 41 - 51 mmHg Final     POCT Lactate, Arterial   Date Value Ref Range Status   10/27/2023 1.1 0.4 - 2.0 mmol/L Final   10/27/2023 0.6 0.4 - 2.0 mmol/L Final     Lactate, Venous   Date  Value Ref Range Status   03/30/2023 1.6 0.4 - 2.0 mmol/L Final       FeNa Values: Serum Creatinine/Serum Sodium * Urine sodium to creatinine ratio (FeNa in percent)    Lab Results   Component Value Date    GLUCOSE 87 11/06/2023    CALCIUM 8.6 11/06/2023     11/06/2023    K 4.5 11/06/2023    CO2 11 (L) 11/06/2023     11/06/2023    BUN 86 (H) 11/06/2023    CREATININE 10.84 (H) 11/06/2023       Sodium/Creatinine Ratio   Date Value Ref Range Status   11/05/2023 68 Not established. mmol/g Creat Final       ASSESSMENT:  Jose Thornton is a 57 y.o. male w/ Hx Crohn's disease complicated by colovesical fistula s/p bilateral nephrostomy tube placement (initially Jan 2023) + indwelling andrade catheter who presented to  for fistula repair. Developed worsening KADE for which nephrology is consulted.     Creatinine increased from 1.56 on 10/30 to 9.61 on 11/5.  Gentamicin 10/25-11/3. Today, his creatinine is continuing to increase. He is planned to get an IR CVC non-tunneled line today at 11 pm. Will be started on hemodialysis once he gets back with his line.     Pathological assessment  - Hemodynamics:  HR: 76-87, BP: 102//75  - Sodium, potassium, calcium normal. Phosphate elevated to 7.0  -Acidosis worsening, bicarb dropped from 15 ->11.  -Volume status: Urine output of 649 (0.2 mL/kg/hr), Neph1: 195 mL, Neph2: 365 mL, About 8 lbs over June weight. However IVC diameter was 0.98.  - Nothing on CT A/P yesterday suggesting obstruction     #KADE, oliguric on CKD3a (BL 1-1.3) - stage 3  -likely due to multiple insults including hemodynamic injury (hypotension, anemia post op), with medication toxicity (gentamicin). No sign of obstruction  -FeNa of 4.7% is closer to obstructive but also close ATN numbers (1-4)  - UA from PCN tubes shows 3+ proteinuria, moderate blood (2+) and large leukocyte esterase (3+). Keep an eye on reflex urine culture  -no contrast given  -renal US 11/4: no hydronephrosis, urine draining  well from nephrostomy tubes     #HAGMA   - Anion gap of 21 today  - Bicarbonate of 11 -> deficit of 13   - 13* 113/2 =  734.5 mEq of bicarb deficit       Recommendations:  - IR CVC tunneled line today. Will get dialysis after session  - Daily renal function profiles (or BMP+phos) -> phosphorus can sometimes get low with dialysis, so important to see it.  - Can stop sodium bicarb as he is getting dialysis.  - Avoid nephrotoxins, contrast if possible, strict Is/Os  - Renal dosing for medications for latest eGFR, follow medication trough as appropriate  - Avoid hypotension/rapid fluctuations in Bps    John Ferreira MD, PhD  Nephrology Resident  24 hour Renal Pager - [phone]    Discussed with attending nephrologist Dr. Lydia Thomson

## 2023-11-07 ENCOUNTER — APPOINTMENT (OUTPATIENT)
Dept: DIALYSIS | Facility: HOSPITAL | Age: 58
End: 2023-11-07

## 2023-11-07 LAB
ANION GAP SERPL CALC-SCNC: 23 MMOL/L (ref 10–20)
BUN SERPL-MCNC: 97 MG/DL (ref 6–23)
CALCIUM SERPL-MCNC: 8.4 MG/DL (ref 8.6–10.6)
CHLORIDE SERPL-SCNC: 105 MMOL/L (ref 98–107)
CO2 SERPL-SCNC: 12 MMOL/L (ref 21–32)
CREAT SERPL-MCNC: 11.73 MG/DL (ref 0.5–1.3)
ERYTHROCYTE [DISTWIDTH] IN BLOOD BY AUTOMATED COUNT: 16.4 % (ref 11.5–14.5)
GFR SERPL CREATININE-BSD FRML MDRD: 5 ML/MIN/1.73M*2
GLUCOSE SERPL-MCNC: 96 MG/DL (ref 74–99)
HCT VFR BLD AUTO: 35.6 % (ref 41–52)
HGB BLD-MCNC: 11.1 G/DL (ref 13.5–17.5)
MAGNESIUM SERPL-MCNC: 2.41 MG/DL (ref 1.6–2.4)
MCH RBC QN AUTO: 27.1 PG (ref 26–34)
MCHC RBC AUTO-ENTMCNC: 31.2 G/DL (ref 32–36)
MCV RBC AUTO: 87 FL (ref 80–100)
NRBC BLD-RTO: 0 /100 WBCS (ref 0–0)
PHOSPHATE SERPL-MCNC: 6.3 MG/DL (ref 2.5–4.9)
PLATELET # BLD AUTO: 370 X10*3/UL (ref 150–450)
POTASSIUM SERPL-SCNC: 3.9 MMOL/L (ref 3.5–5.3)
RBC # BLD AUTO: 4.09 X10*6/UL (ref 4.5–5.9)
SODIUM SERPL-SCNC: 136 MMOL/L (ref 136–145)
WBC # BLD AUTO: 9 X10*3/UL (ref 4.4–11.3)

## 2023-11-07 PROCEDURE — 85027 COMPLETE CBC AUTOMATED: CPT | Performed by: STUDENT IN AN ORGANIZED HEALTH CARE EDUCATION/TRAINING PROGRAM

## 2023-11-07 PROCEDURE — 90935 HEMODIALYSIS ONE EVALUATION: CPT | Performed by: INTERNAL MEDICINE

## 2023-11-07 PROCEDURE — 8010000001 HC DIALYSIS - HEMODIALYSIS PER DAY

## 2023-11-07 PROCEDURE — 2500000004 HC RX 250 GENERAL PHARMACY W/ HCPCS (ALT 636 FOR OP/ED): Performed by: STUDENT IN AN ORGANIZED HEALTH CARE EDUCATION/TRAINING PROGRAM

## 2023-11-07 PROCEDURE — 83735 ASSAY OF MAGNESIUM: CPT

## 2023-11-07 PROCEDURE — 2500000004 HC RX 250 GENERAL PHARMACY W/ HCPCS (ALT 636 FOR OP/ED)

## 2023-11-07 PROCEDURE — 80048 BASIC METABOLIC PNL TOTAL CA: CPT | Performed by: STUDENT IN AN ORGANIZED HEALTH CARE EDUCATION/TRAINING PROGRAM

## 2023-11-07 PROCEDURE — 2500000001 HC RX 250 WO HCPCS SELF ADMINISTERED DRUGS (ALT 637 FOR MEDICARE OP): Performed by: NURSE PRACTITIONER

## 2023-11-07 PROCEDURE — 96372 THER/PROPH/DIAG INJ SC/IM: CPT | Performed by: STUDENT IN AN ORGANIZED HEALTH CARE EDUCATION/TRAINING PROGRAM

## 2023-11-07 PROCEDURE — 2500000004 HC RX 250 GENERAL PHARMACY W/ HCPCS (ALT 636 FOR OP/ED): Performed by: NURSE PRACTITIONER

## 2023-11-07 PROCEDURE — 1200000002 HC GENERAL ROOM WITH TELEMETRY DAILY

## 2023-11-07 RX ORDER — HYDROMORPHONE HYDROCHLORIDE 1 MG/ML
0.1 INJECTION, SOLUTION INTRAMUSCULAR; INTRAVENOUS; SUBCUTANEOUS EVERY 4 HOURS PRN
Status: DISCONTINUED | OUTPATIENT
Start: 2023-11-07 | End: 2023-11-07

## 2023-11-07 RX ORDER — OXYCODONE HYDROCHLORIDE 5 MG/1
10 TABLET ORAL EVERY 6 HOURS PRN
Status: DISCONTINUED | OUTPATIENT
Start: 2023-11-07 | End: 2023-11-17 | Stop reason: HOSPADM

## 2023-11-07 RX ORDER — SODIUM BICARBONATE 650 MG/1
650 TABLET ORAL 3 TIMES DAILY
Status: DISCONTINUED | OUTPATIENT
Start: 2023-11-07 | End: 2023-11-15

## 2023-11-07 RX ORDER — OXYCODONE HYDROCHLORIDE 5 MG/1
5 TABLET ORAL EVERY 4 HOURS PRN
Status: DISCONTINUED | OUTPATIENT
Start: 2023-11-07 | End: 2023-11-17 | Stop reason: HOSPADM

## 2023-11-07 RX ORDER — HYDROMORPHONE HYDROCHLORIDE 1 MG/ML
0.2 INJECTION, SOLUTION INTRAMUSCULAR; INTRAVENOUS; SUBCUTANEOUS EVERY 4 HOURS PRN
Status: DISCONTINUED | OUTPATIENT
Start: 2023-11-07 | End: 2023-11-17 | Stop reason: HOSPADM

## 2023-11-07 RX ORDER — CYCLOBENZAPRINE HCL 10 MG
5 TABLET ORAL 3 TIMES DAILY PRN
Status: DISCONTINUED | OUTPATIENT
Start: 2023-11-07 | End: 2023-11-17 | Stop reason: HOSPADM

## 2023-11-07 RX ORDER — OXYBUTYNIN CHLORIDE 5 MG/1
5 TABLET ORAL 3 TIMES DAILY
Status: DISCONTINUED | OUTPATIENT
Start: 2023-11-07 | End: 2023-11-17 | Stop reason: HOSPADM

## 2023-11-07 RX ADMIN — SODIUM BICARBONATE 650 MG: 650 TABLET ORAL at 15:06

## 2023-11-07 RX ADMIN — ACETAMINOPHEN 975 MG: 325 TABLET ORAL at 20:31

## 2023-11-07 RX ADMIN — ONDANSETRON 4 MG: 2 INJECTION INTRAMUSCULAR; INTRAVENOUS at 18:34

## 2023-11-07 RX ADMIN — HYDROMORPHONE HYDROCHLORIDE 0.2 MG: 1 INJECTION, SOLUTION INTRAMUSCULAR; INTRAVENOUS; SUBCUTANEOUS at 04:47

## 2023-11-07 RX ADMIN — OXYBUTYNIN CHLORIDE 5 MG: 5 TABLET ORAL at 14:46

## 2023-11-07 RX ADMIN — HEPARIN SODIUM 5000 UNITS: 5000 INJECTION INTRAVENOUS; SUBCUTANEOUS at 18:35

## 2023-11-07 RX ADMIN — ACETAMINOPHEN 975 MG: 325 TABLET ORAL at 14:46

## 2023-11-07 RX ADMIN — OXYBUTYNIN CHLORIDE 5 MG: 5 TABLET ORAL at 10:38

## 2023-11-07 RX ADMIN — HEPARIN SODIUM 5000 UNITS: 5000 INJECTION INTRAVENOUS; SUBCUTANEOUS at 04:17

## 2023-11-07 RX ADMIN — HYDROMORPHONE HYDROCHLORIDE 0.2 MG: 1 INJECTION, SOLUTION INTRAMUSCULAR; INTRAVENOUS; SUBCUTANEOUS at 10:33

## 2023-11-07 RX ADMIN — OXYBUTYNIN CHLORIDE 5 MG: 5 TABLET ORAL at 20:32

## 2023-11-07 RX ADMIN — SODIUM BICARBONATE 650 MG: 650 TABLET ORAL at 20:31

## 2023-11-07 RX ADMIN — OXYCODONE HYDROCHLORIDE 10 MG: 5 TABLET ORAL at 20:32

## 2023-11-07 RX ADMIN — HEPARIN SODIUM 5000 UNITS: 5000 INJECTION INTRAVENOUS; SUBCUTANEOUS at 10:24

## 2023-11-07 RX ADMIN — ONDANSETRON 4 MG: 2 INJECTION INTRAMUSCULAR; INTRAVENOUS at 10:24

## 2023-11-07 RX ADMIN — ONDANSETRON 4 MG: 2 INJECTION INTRAMUSCULAR; INTRAVENOUS at 00:51

## 2023-11-07 ASSESSMENT — PAIN SCALES - GENERAL
PAINLEVEL_OUTOF10: 8
PAINLEVEL_OUTOF10: 5 - MODERATE PAIN
PAINLEVEL_OUTOF10: 7
PAINLEVEL_OUTOF10: 7
PAINLEVEL_OUTOF10: 6
PAINLEVEL_OUTOF10: 0 - NO PAIN
PAINLEVEL_OUTOF10: 7
PAINLEVEL_OUTOF10: 7

## 2023-11-07 ASSESSMENT — COGNITIVE AND FUNCTIONAL STATUS - GENERAL
DAILY ACTIVITIY SCORE: 21
TOILETING: A LITTLE
STANDING UP FROM CHAIR USING ARMS: A LITTLE
CLIMB 3 TO 5 STEPS WITH RAILING: A LITTLE
MOBILITY SCORE: 22
DRESSING REGULAR LOWER BODY CLOTHING: A LITTLE
HELP NEEDED FOR BATHING: A LITTLE

## 2023-11-07 ASSESSMENT — PAIN - FUNCTIONAL ASSESSMENT
PAIN_FUNCTIONAL_ASSESSMENT: 0-10

## 2023-11-07 ASSESSMENT — PAIN DESCRIPTION - ORIENTATION: ORIENTATION: LEFT

## 2023-11-07 ASSESSMENT — PAIN DESCRIPTION - LOCATION: LOCATION: ABDOMEN

## 2023-11-07 NOTE — PROGRESS NOTES
Renal Staff HD Note    Patient seen, examined on dialysis   Tolerating treatment without event  97/65 71  R Int jug temp      Continue treatment per submitted orders   No fluid removal  Please continue bicarb tabs until serum bicarb improved  Will reeval for dialytic needs tomorrow

## 2023-11-07 NOTE — SIGNIFICANT EVENT
RAPID RESPONSE RN NOTE:       11/07/23 1043   Onset Documentation   Rapid Response Initiated By Radar auto page   Location/Room [facility]  ([id number])   Pager Time [phone]   Arrival Time 1148   Event End Time 1155   Level II Called No   Primary Reason for Call Radar auto page  (RADAR score = 6)     RADAR alert received at 10:43. Temp 36, HR 79, RR 18, BP 97/68, pulse ox 95%. Phone triage completed with bedside RN.  States primary team is aware and no interventions prescribed at this time. Pt is post hemodialysis this AM.  Please page rapid response for any concerns for deterioration.

## 2023-11-07 NOTE — NURSING NOTE
.Report from Sending RN:    Report From: DANNI Leggett  Recent Surgery of Procedure: No  Baseline Level of Consciousness (LOC): A&OX 3  Oxygen Use: No  Type: ROOM AIR  Diabetic: No  Last BP Med Given Day of Dialysis: NONE  Last Pain Med Given: Diluadid 0.2mg @0447  Lab Tests to be Obtained with Dialysis: No  Blood Transfusion to be Given During Dialysis: No  Available IV Access: Yes  Medications to be Administered During Dialysis: No  Continuous IV Infusion Running: No  Restraints on Currently or in the Last 24 Hours: No  Hand-Off Communication: Pt had no acute event overnight, vital signs stable. Not on precaution, no HD consent per floor nurse and will check back later to see if its signed

## 2023-11-07 NOTE — CARE PLAN
CONSENT FOR RENAL REPLACEMENT THERAPY- CONTINUOUS RENAL REPLACEMENT THERAPY/ HEMODIALYSIS    PATIENT NAME: Jose Thornton  MRN: [id number]    PROCEDURE: hemodialysis  The patient understands that the following will be done:     The patient understands that this procedure has to be performed on an on-going basis with no definite end date. By giving consent to this procedure, the patient understands that consent for this procedure is good until there is a change in the patient's condition that warrants a new consent, and covers each time they have the procedure done, as ordered by the medical physician or other practitioner.     Benefits: removal of waste products from the blood, removal of excess fluid from the body, correction of abnormal mineral levels in the blood, administration of medications to improve anemia and bone disease.     Risk/common: Low blood pressure, nausea, vomiting, muscle cramps, loss of blood in the circuit, bleeding from access site after needles are removed.   Risk/rare: allergic reaction to dialyzer or to medications, severe blood loss due to disconnection of dialysis circuit.       The patient understands that Dr. Thomson is the nephrologist ordering the dialysis treatments. These treatments will be performed by members of the [facility] dialysis team, consisting of nurses and technicians.     The patient was informed of and understands the nature of his/her medical condition, the benefits and risks of the therapeutic procedure described above, the alternatives to the procedure, and the possible consequences to their health if the procedure is not done. The patient recognizes that there may be complications and that different or additional procedures may be performed depending on findings or events that may happen during the procedure.       Consent was obtained from the patient  DATE: 11/6/2023  TIME: 14:00    Nephrology fellow: Arthur Rubio DO

## 2023-11-07 NOTE — NURSING NOTE
Report to Receiving RN:    Report to: DANNI Dumas  Time Report Called: 1008  Hand-Off Communication: HD completed and tolerated well. No UF removed. VSS. Pt stable in no distress post treatment.   Complications During Treatment: No  Ultrafiltration Treatment: Yes  Medications Administered During Dialysis: No  Blood Products Administered During Dialysis: N/A  Labs Sent During Dialysis: No  Heparin Drip Rate Changes: N/A

## 2023-11-07 NOTE — PROGRESS NOTES
UROLOGY DAILY PROGRESS NOTE      Subjective     - Patient got temporary dialysis line yesterday with IR  - Tolerating CLD, nausea continues to decrease, no episodes of emesis  - Patient appeared more drowsy than usual this AM, but engaged in normal conversation and was fully oriented without confusion  - No new significant electrolyte abnormalities  - Stable urine output  - Ambulating    Objective   Physical Exam  Gen: NAD, alert  HEENT: normocephalic, atraumatic, MMM  Pulm: nonlabored breathing  CV: regular rate per chart  GI: soft, non-tender, non-distended, colostomy L abdomen w/ healthy appearance, green liquid with gas. Midline incision c/d/I closed with staples, open to air, LINDSEY drains removed with dressings in place  : no suprapubic or CVA tenderness, SPT draining straw yellow, urethral andrade minimal drainage, straw yellow, b/l PCNT uncapped with clear yellow urine  MSK: STANLEY  Neuro: no focal deficits  Skin: WWP  Psych: appropriate mood and behavior    Last Recorded Vitals  Heart Rate:  []   Temp:  [36 °C (96.8 °F)-36.7 °C (98.1 °F)]   Resp:  [12-19]   BP: (108-145)/(59-81)   SpO2:  [92 %-99 %]   Intake/Output last 3 Shifts:  I/O last 3 completed shifts:  In: - (0 mL/kg)   Out: 2869 (25.4 mL/kg) [Urine:1044 (0.3 mL/kg/hr); Stool:1825]  Weight: 113 kg     Net IO Since Admission: -9,607.66 mL [11/07/23 0804]    Relevant Results  Results for orders placed or performed during the hospital encounter of 10/24/23 (from the past 24 hour(s))   Hepatitis B Surface Antigen   Result Value Ref Range    Hepatitis B Surface AG Nonreactive Nonreactive   CBC   Result Value Ref Range    WBC 9.0 4.4 - 11.3 x10*3/uL    nRBC 0.0 0.0 - 0.0 /100 WBCs    RBC 4.09 (L) 4.50 - 5.90 x10*6/uL    Hemoglobin 11.1 (L) 13.5 - 17.5 g/dL    Hematocrit 35.6 (L) 41.0 - 52.0 %    MCV 87 80 - 100 fL    MCH 27.1 26.0 - 34.0 pg    MCHC 31.2 (L) 32.0 - 36.0 g/dL    RDW 16.4 (H) 11.5 - 14.5 %    Platelets 370 150 - 450 x10*3/uL   Basic  Metabolic Panel   Result Value Ref Range    Glucose 96 74 - 99 mg/dL    Sodium 136 136 - 145 mmol/L    Potassium 3.9 3.5 - 5.3 mmol/L    Chloride 105 98 - 107 mmol/L    Bicarbonate 12 (L) 21 - 32 mmol/L    Anion Gap 23 (H) 10 - 20 mmol/L    Urea Nitrogen 97 (HH) 6 - 23 mg/dL    Creatinine 11.73 (H) 0.50 - 1.30 mg/dL    eGFR 5 (L) >60 mL/min/1.73m*2    Calcium 8.4 (L) 8.6 - 10.6 mg/dL   Magnesium   Result Value Ref Range    Magnesium 2.41 (H) 1.60 - 2.40 mg/dL         Imaging  CT AP non-con 11/5  Impression   1.  Postsurgical changes as described above with likely postoperative  ventral abdominal seroma.  2. Bilateral percutaneous nephrostomy tubes and bilateral ureteral  stents coursing through the bilateral ileal ureter and terminating  within the augmented urinary bladder. Air within the left renal  collecting system is likely postprocedural.  3. Mild diffuse fluid-filled small bowel dilatation with air-fluid  levels with the no clear transition point likely representing ileus.  4. Other chronic and incidental findings as described above.       RBUS 11/5  IMPRESSION:  1. No evidence of hydronephrosis.  2. Normal-sized kidneys with increased renal cortical echogenicity  bilaterally and mild left renal cortical thinning, suggestive of  medical renal disease.    KUB 11/3  IMPRESSION:  1.  Persistent moderate dilatation of the multiple small bowel loops,  measures up to 5.7 cm in the left quadrant.  2. Adynamic ileus versus partial obstruction. Correlation follow-up  recommended. Postop changes of the lower abdomen and pelvis with  multiple surgical clips and surgical incision.  3. Bilateral nephrostomy tube placement, left ureter stent placement.  4. No gross free air    Assessment/Plan   Jose Thornton is a 57 y.o. male with PMH of Crohn's disease and colovesical fistula who underwent Exploratory laparotomy, MYA, Cystoscopy, Bilateral ileal ureter and bladder augment with right partial rectus muscle flap, and  Suprapubic tube insertion on 10/27/23 with Dr. Andersen and Dr. Rodriguez.     Patient initially recovered well and had quick return of bowel function with consistent ostomy output. However, he then developed symptoms and imaging findings c/w ileus. Nausea is slowly resolving and he has not had recent emesis. Antibiotics were discontinued after courses were complete 11/3. Labs were significant for quickly rising creatinine, now up to 11.73. No significant electrolyte abnormalities have been associated with the new acute renal failure. Etiology unclear and being worked up with the assistance of the nephrology team.    Principal Problem:    Colovesical fistula    Plan:  Neuro: Tylenol, breakthrough dilaudid, oxybutynin, transition to PO meds when taking reliable PO  Pulm: IS 10x / hr  CV: hemodynamically stable, VS Q8h  FEN/GI: continue scheduled zofran, continue CLD  /Alpesh: maintain andrade and SPT to drainage, teaching for home flushing, maintain b/l nephrostomy tubes uncapped, hemodialysis today  Heme: no indication for transfusion  ID: urine cultures with gentamicin sensitive Pseudomonas (10 day course gentamicin for complicated UTI, course completed 11/3), no current indication for antibiotics  Endo: no additional glycemic requirements   MSK: OOB as tolerated  Prophylaxis: SQH / SCDs    Dispo:  Continue care on RNF    Seen with chief resident Dr. Ferrer, to be discussed with attending Dr. Andersen.    Maggie Lerma MD  PGY-1 Urology [facility]  Pager: [phone]

## 2023-11-07 NOTE — NURSING NOTE
Nurse Oleksandr called with report from EMELINA Hankins RN, dialysis. Pt has no signed consent in his chart or in EPIC. Dr. Rubio thought a written note would be sufficient but a signed consent is required for anyone who has never had dialysis.  Dr. Rubio has confirmed she will get signed consent in early AM and pt will come for dialysis early AM.

## 2023-11-08 ENCOUNTER — APPOINTMENT (OUTPATIENT)
Dept: DIALYSIS | Facility: HOSPITAL | Age: 58
End: 2023-11-08

## 2023-11-08 ENCOUNTER — APPOINTMENT (OUTPATIENT)
Dept: RADIOLOGY | Facility: HOSPITAL | Age: 58
DRG: 353 | End: 2023-11-08

## 2023-11-08 LAB
ANION GAP SERPL CALC-SCNC: 21 MMOL/L (ref 10–20)
BUN SERPL-MCNC: 80 MG/DL (ref 6–23)
CALCIUM SERPL-MCNC: 8.5 MG/DL (ref 8.6–10.6)
CHLORIDE SERPL-SCNC: 102 MMOL/L (ref 98–107)
CO2 SERPL-SCNC: 18 MMOL/L (ref 21–32)
CREAT SERPL-MCNC: 10.68 MG/DL (ref 0.5–1.3)
ERYTHROCYTE [DISTWIDTH] IN BLOOD BY AUTOMATED COUNT: 16.2 % (ref 11.5–14.5)
GFR SERPL CREATININE-BSD FRML MDRD: 5 ML/MIN/1.73M*2
GLUCOSE SERPL-MCNC: 95 MG/DL (ref 74–99)
HCT VFR BLD AUTO: 36.5 % (ref 41–52)
HGB BLD-MCNC: 11.8 G/DL (ref 13.5–17.5)
MCH RBC QN AUTO: 27.4 PG (ref 26–34)
MCHC RBC AUTO-ENTMCNC: 32.3 G/DL (ref 32–36)
MCV RBC AUTO: 85 FL (ref 80–100)
NRBC BLD-RTO: 0 /100 WBCS (ref 0–0)
PHOSPHATE SERPL-MCNC: 8.3 MG/DL (ref 2.5–4.9)
PLATELET # BLD AUTO: 323 X10*3/UL (ref 150–450)
POTASSIUM SERPL-SCNC: 3.6 MMOL/L (ref 3.5–5.3)
RBC # BLD AUTO: 4.31 X10*6/UL (ref 4.5–5.9)
SODIUM SERPL-SCNC: 137 MMOL/L (ref 136–145)
WBC # BLD AUTO: 8.8 X10*3/UL (ref 4.4–11.3)

## 2023-11-08 PROCEDURE — 2500000005 HC RX 250 GENERAL PHARMACY W/O HCPCS

## 2023-11-08 PROCEDURE — 1200000002 HC GENERAL ROOM WITH TELEMETRY DAILY

## 2023-11-08 PROCEDURE — 2500000004 HC RX 250 GENERAL PHARMACY W/ HCPCS (ALT 636 FOR OP/ED): Performed by: NURSE PRACTITIONER

## 2023-11-08 PROCEDURE — 80048 BASIC METABOLIC PNL TOTAL CA: CPT | Performed by: STUDENT IN AN ORGANIZED HEALTH CARE EDUCATION/TRAINING PROGRAM

## 2023-11-08 PROCEDURE — 96372 THER/PROPH/DIAG INJ SC/IM: CPT | Performed by: STUDENT IN AN ORGANIZED HEALTH CARE EDUCATION/TRAINING PROGRAM

## 2023-11-08 PROCEDURE — 85027 COMPLETE CBC AUTOMATED: CPT | Performed by: STUDENT IN AN ORGANIZED HEALTH CARE EDUCATION/TRAINING PROGRAM

## 2023-11-08 PROCEDURE — 2500000004 HC RX 250 GENERAL PHARMACY W/ HCPCS (ALT 636 FOR OP/ED)

## 2023-11-08 PROCEDURE — 8010000001 HC DIALYSIS - HEMODIALYSIS PER DAY

## 2023-11-08 PROCEDURE — 74018 RADEX ABDOMEN 1 VIEW: CPT

## 2023-11-08 PROCEDURE — 84100 ASSAY OF PHOSPHORUS: CPT | Performed by: STUDENT IN AN ORGANIZED HEALTH CARE EDUCATION/TRAINING PROGRAM

## 2023-11-08 PROCEDURE — 90935 HEMODIALYSIS ONE EVALUATION: CPT | Performed by: INTERNAL MEDICINE

## 2023-11-08 PROCEDURE — 2500000004 HC RX 250 GENERAL PHARMACY W/ HCPCS (ALT 636 FOR OP/ED): Performed by: STUDENT IN AN ORGANIZED HEALTH CARE EDUCATION/TRAINING PROGRAM

## 2023-11-08 PROCEDURE — 2500000001 HC RX 250 WO HCPCS SELF ADMINISTERED DRUGS (ALT 637 FOR MEDICARE OP): Performed by: NURSE PRACTITIONER

## 2023-11-08 PROCEDURE — 74018 RADEX ABDOMEN 1 VIEW: CPT | Performed by: RADIOLOGY

## 2023-11-08 PROCEDURE — 2500000004 HC RX 250 GENERAL PHARMACY W/ HCPCS (ALT 636 FOR OP/ED): Mod: JZ

## 2023-11-08 RX ADMIN — OXYCODONE HYDROCHLORIDE 10 MG: 5 TABLET ORAL at 05:45

## 2023-11-08 RX ADMIN — ACETAMINOPHEN 975 MG: 325 TABLET ORAL at 13:25

## 2023-11-08 RX ADMIN — OXYBUTYNIN CHLORIDE 5 MG: 5 TABLET ORAL at 21:26

## 2023-11-08 RX ADMIN — ALTEPLASE 2 MG: 2.2 INJECTION, POWDER, LYOPHILIZED, FOR SOLUTION INTRAVENOUS at 16:19

## 2023-11-08 RX ADMIN — OXYCODONE HYDROCHLORIDE 10 MG: 5 TABLET ORAL at 15:12

## 2023-11-08 RX ADMIN — ALTEPLASE 2 MG: 2.2 INJECTION, POWDER, LYOPHILIZED, FOR SOLUTION INTRAVENOUS at 16:15

## 2023-11-08 RX ADMIN — ONDANSETRON HYDROCHLORIDE 4 MG: 4 TABLET, FILM COATED ORAL at 13:25

## 2023-11-08 RX ADMIN — SODIUM BICARBONATE 650 MG: 650 TABLET ORAL at 15:12

## 2023-11-08 RX ADMIN — OXYBUTYNIN CHLORIDE 5 MG: 5 TABLET ORAL at 15:12

## 2023-11-08 RX ADMIN — HEPARIN SODIUM 5000 UNITS: 5000 INJECTION INTRAVENOUS; SUBCUTANEOUS at 02:53

## 2023-11-08 RX ADMIN — OXYCODONE HYDROCHLORIDE 10 MG: 5 TABLET ORAL at 21:25

## 2023-11-08 RX ADMIN — ONDANSETRON HYDROCHLORIDE 4 MG: 4 TABLET, FILM COATED ORAL at 02:53

## 2023-11-08 RX ADMIN — ONDANSETRON HYDROCHLORIDE 4 MG: 4 TABLET, FILM COATED ORAL at 21:25

## 2023-11-08 RX ADMIN — ACETAMINOPHEN 975 MG: 325 TABLET ORAL at 21:25

## 2023-11-08 RX ADMIN — HEPARIN SODIUM 5000 UNITS: 5000 INJECTION INTRAVENOUS; SUBCUTANEOUS at 23:12

## 2023-11-08 RX ADMIN — SODIUM BICARBONATE 650 MG: 650 TABLET ORAL at 21:25

## 2023-11-08 RX ADMIN — ACETAMINOPHEN 975 MG: 325 TABLET ORAL at 05:44

## 2023-11-08 ASSESSMENT — PAIN - FUNCTIONAL ASSESSMENT
PAIN_FUNCTIONAL_ASSESSMENT: NO/DENIES PAIN
PAIN_FUNCTIONAL_ASSESSMENT: NO/DENIES PAIN

## 2023-11-08 ASSESSMENT — PAIN SCALES - GENERAL
PAINLEVEL_OUTOF10: 9
PAINLEVEL_OUTOF10: 7
PAINLEVEL_OUTOF10: 5 - MODERATE PAIN
PAINLEVEL_OUTOF10: 0 - NO PAIN

## 2023-11-08 ASSESSMENT — COGNITIVE AND FUNCTIONAL STATUS - GENERAL
STANDING UP FROM CHAIR USING ARMS: A LITTLE
TOILETING: A LITTLE
DAILY ACTIVITIY SCORE: 21
MOBILITY SCORE: 22
CLIMB 3 TO 5 STEPS WITH RAILING: A LITTLE
HELP NEEDED FOR BATHING: A LITTLE
DRESSING REGULAR LOWER BODY CLOTHING: A LITTLE

## 2023-11-08 ASSESSMENT — PAIN SCALES - WONG BAKER: WONGBAKER_NUMERICALRESPONSE: HURTS LITTLE BIT

## 2023-11-08 NOTE — NURSING NOTE
Report to Receiving RN:    Report To: DANNI Rodriguez  Time Report Called: 12:55  Hand-Off Communication: No acute change from RN to RN report.  Patient tolerated treatment well with no fluid removal.  Last BP was 111/65, Hr 73.  Patient complaint no pain or discomfort.  Patient is transporting back to his room.    Complications During Treatment: No  Ultrafiltration Treatment: Yes  Medications Administered During Dialysis: No  Blood Products Administered During Dialysis: No  Labs Sent During Dialysis: No  Heparin Drip Rate Changes: No    Electronic Signatures:   (Signed )   Authored: Todd Tiwari RN   (Signed )   Authored:     Last Updated: 12:55 PM by TODD TIWARI

## 2023-11-08 NOTE — PROGRESS NOTES
Spiritual Care Visit     Long term pts are visited to provide support if desired.  Pt sleeping

## 2023-11-08 NOTE — NURSING NOTE
Report from Sending RN:    Report From: Oleksandr ( DANNI)  Recent Surgery of Procedure: Yes, right catheter 11/6  Baseline Level of Consciousness (LOC): A/Ox 4  Oxygen Use: No  Type: none  Diabetic: No  Last BP Med Given Day of Dialysis: none  Last Pain Med Given: oxycodone 10 mg 0545  Lab Tests to be Obtained with Dialysis: No  Blood Transfusion to be Given During Dialysis: No  Available IV Access: Yes  Medications to be Administered During Dialysis: No  Continuous IV Infusion Running: No  Restraints on Currently or in the Last 24 Hours: No  Hand-Off Communication: No acute overnight or morning events; vss; Pt did take morning medications; no labs need to be drawn; Pt may go off unit without telemetry; Pt is a full code; Fang Arana RN.

## 2023-11-08 NOTE — PROGRESS NOTES
"  UROLOGY DAILY PROGRESS NOTE      Subjective     Underwent dialysis yesterday with no issues. Patient had mild nausea overnight, no episodes of emesis.  Tolerating CLD. He states he doesn't feel as \"foggy\". Continues to ambulate.       Objective   Physical Exam  Gen: NAD, alert  HEENT: normocephalic, atraumatic, MMM  Pulm: nonlabored breathing  CV: regular rate per chart  GI: soft, non-tender, mildly distended, colostomy L abdomen w/ healthy appearance, green liquid with gas. Midline incision c/d/I closed with staples, open to air, LINDSEY drains removed with dressings in place  : no suprapubic or CVA tenderness, SPT draining straw yellow, urethral andrade minimal drainage, straw yellow, b/l PCNT uncapped with straw yellow urine, flushed at bedside during AM rounds  MSK: STANLEY  Neuro: no focal deficits  Skin: WWP  Psych: appropriate mood and behavior    Last Recorded Vitals  Heart Rate:  [73-87]   Temp:  [36 °C (96.8 °F)-36.6 °C (97.9 °F)]   Resp:  [18]   BP: ()/(67-79)   SpO2:  [94 %-97 %]   Intake/Output last 3 Shifts:  I/O last 3 completed shifts:  In: 600 (5.3 mL/kg) [I.V.:600 (5.3 mL/kg)]  Out: 2375 (21 mL/kg) [Urine:1200 (0.3 mL/kg/hr); Stool:1175]  Weight: 113 kg     Net IO Since Admission: -10,682.66 mL [11/08/23 0721]    Relevant Results  Results for orders placed or performed during the hospital encounter of 10/24/23 (from the past 24 hour(s))   Phosphorus   Result Value Ref Range    Phosphorus 8.3 (H) 2.5 - 4.9 mg/dL   Basic Metabolic Panel   Result Value Ref Range    Glucose 95 74 - 99 mg/dL    Sodium 137 136 - 145 mmol/L    Potassium 3.6 3.5 - 5.3 mmol/L    Chloride 102 98 - 107 mmol/L    Bicarbonate 18 (L) 21 - 32 mmol/L    Anion Gap 21 (H) 10 - 20 mmol/L    Urea Nitrogen 80 (H) 6 - 23 mg/dL    Creatinine 10.68 (H) 0.50 - 1.30 mg/dL    eGFR 5 (L) >60 mL/min/1.73m*2    Calcium 8.5 (L) 8.6 - 10.6 mg/dL   CBC   Result Value Ref Range    WBC 8.8 4.4 - 11.3 x10*3/uL    nRBC 0.0 0.0 - 0.0 /100 WBCs    RBC " 4.31 (L) 4.50 - 5.90 x10*6/uL    Hemoglobin 11.8 (L) 13.5 - 17.5 g/dL    Hematocrit 36.5 (L) 41.0 - 52.0 %    MCV 85 80 - 100 fL    MCH 27.4 26.0 - 34.0 pg    MCHC 32.3 32.0 - 36.0 g/dL    RDW 16.2 (H) 11.5 - 14.5 %    Platelets 323 150 - 450 x10*3/uL         Imaging  CT AP non-con 11/5  Impression   1.  Postsurgical changes as described above with likely postoperative  ventral abdominal seroma.  2. Bilateral percutaneous nephrostomy tubes and bilateral ureteral  stents coursing through the bilateral ileal ureter and terminating  within the augmented urinary bladder. Air within the left renal  collecting system is likely postprocedural.  3. Mild diffuse fluid-filled small bowel dilatation with air-fluid  levels with the no clear transition point likely representing ileus.  4. Other chronic and incidental findings as described above.       RBUS 11/5  IMPRESSION:  1. No evidence of hydronephrosis.  2. Normal-sized kidneys with increased renal cortical echogenicity  bilaterally and mild left renal cortical thinning, suggestive of  medical renal disease.    KUB 11/3  IMPRESSION:  1.  Persistent moderate dilatation of the multiple small bowel loops,  measures up to 5.7 cm in the left quadrant.  2. Adynamic ileus versus partial obstruction. Correlation follow-up  recommended. Postop changes of the lower abdomen and pelvis with  multiple surgical clips and surgical incision.  3. Bilateral nephrostomy tube placement, left ureter stent placement.  4. No gross free air    Assessment/Plan   Jose Thornton is a 57 y.o. male with PMH of Crohn's disease and colovesical fistula who underwent Exploratory laparotomy, MYA, Cystoscopy, Bilateral ileal ureter and bladder augment with right partial rectus muscle flap, and Suprapubic tube insertion on 10/27/23 with Dr. Andersen and Dr. Rodriguez.     Patient initially recovered well and had quick return of bowel function with consistent ostomy output. However, he then developed symptoms and  imaging findings c/w ileus. Nausea is slowly resolving and he has not had recent emesis. Antibiotics were discontinued after courses were complete 11/3. Labs were significant for quickly rising creatinine, now up to 11.73.   No significant electrolyte abnormalities have been associated with the new acute renal failure. Nephrology consulted on 11/5, KADE likely due to multiple insults including hemodynamic injury (hypotension, anemia post op), with medication toxicity (gentamicin).  Patient tolerated dialysis on 11/7.    Patient feeling less foggy today with decreased nausea. Creatinine slightly improved from 11.73 to 10.68. Bicarb 18, Phosphorus uptrending to 8.3. Will reassess persistent nausea with follow up KUB today.      Principal Problem:    Colovesical fistula    Plan:  Neuro: Tylenol, breakthrough dilaudid, oxybutynin, transition to PO meds when taking reliable PO  Pulm: IS 10x / hr  CV: hemodynamically stable, VS Q8h  FEN/GI: continue scheduled zofran, continue CLD, order KUB today  /Alpesh: maintain andrade and SPT to drainage, teaching for home flushing, maintain b/l nephrostomy tubes uncapped. Continue flushing of SPT and nephrostomy tubes BID.  Nephrology will reevaluate for dialytic needs today  Heme: no indication for transfusion  ID: urine cultures with gentamicin sensitive Pseudomonas (10 day course gentamicin for complicated UTI, course completed 11/3), no current indication for antibiotics  Endo: no additional glycemic requirements   MSK: OOB as tolerated  Prophylaxis: SQH / SCDs    Dispo:  Continue care on RNF    Seen with chief resident Dr. Ferrer    --------------------------------------------  Bonita Gould PA-C  Urology  Consult Uro: [id number]  After 5pm and Weekends: [id number]

## 2023-11-08 NOTE — PROGRESS NOTES
Renal Staff HD Note    Patient seen, examined on dialysis   Tolerating treatment without event  94/62 74 R int jug bfr 300    Continue treatment per submitted orders   Run even

## 2023-11-09 ENCOUNTER — APPOINTMENT (OUTPATIENT)
Dept: DIALYSIS | Facility: HOSPITAL | Age: 58
End: 2023-11-09

## 2023-11-09 LAB
ANION GAP SERPL CALC-SCNC: 23 MMOL/L (ref 10–20)
BUN SERPL-MCNC: 58 MG/DL (ref 6–23)
CALCIUM SERPL-MCNC: 8.6 MG/DL (ref 8.6–10.6)
CHLORIDE SERPL-SCNC: 98 MMOL/L (ref 98–107)
CO2 SERPL-SCNC: 21 MMOL/L (ref 21–32)
CREAT SERPL-MCNC: 8.57 MG/DL (ref 0.5–1.3)
CREAT UR-MCNC: 142.9 MG/DL (ref 20–370)
ERYTHROCYTE [DISTWIDTH] IN BLOOD BY AUTOMATED COUNT: 16 % (ref 11.5–14.5)
EST. AVERAGE GLUCOSE BLD GHB EST-MCNC: 100 MG/DL
GFR SERPL CREATININE-BSD FRML MDRD: 7 ML/MIN/1.73M*2
GLUCOSE SERPL-MCNC: 78 MG/DL (ref 74–99)
HBA1C MFR BLD: 5.1 %
HCT VFR BLD AUTO: 35.9 % (ref 41–52)
HGB BLD-MCNC: 11.2 G/DL (ref 13.5–17.5)
MCH RBC QN AUTO: 26.7 PG (ref 26–34)
MCHC RBC AUTO-ENTMCNC: 31.2 G/DL (ref 32–36)
MCV RBC AUTO: 86 FL (ref 80–100)
NRBC BLD-RTO: 0 /100 WBCS (ref 0–0)
PHOSPHATE SERPL-MCNC: 8.3 MG/DL (ref 2.5–4.9)
PLATELET # BLD AUTO: 314 X10*3/UL (ref 150–450)
POTASSIUM SERPL-SCNC: 3.8 MMOL/L (ref 3.5–5.3)
PROT UR-ACNC: >1000 MG/DL (ref 5–25)
PROT/CREAT UR: ABNORMAL MG/G{CREAT}
RBC # BLD AUTO: 4.2 X10*6/UL (ref 4.5–5.9)
SODIUM SERPL-SCNC: 138 MMOL/L (ref 136–145)
WBC # BLD AUTO: 8.1 X10*3/UL (ref 4.4–11.3)

## 2023-11-09 PROCEDURE — 85027 COMPLETE CBC AUTOMATED: CPT | Performed by: STUDENT IN AN ORGANIZED HEALTH CARE EDUCATION/TRAINING PROGRAM

## 2023-11-09 PROCEDURE — 84156 ASSAY OF PROTEIN URINE: CPT | Performed by: INTERNAL MEDICINE

## 2023-11-09 PROCEDURE — 2500000004 HC RX 250 GENERAL PHARMACY W/ HCPCS (ALT 636 FOR OP/ED): Mod: JZ

## 2023-11-09 PROCEDURE — 2500000001 HC RX 250 WO HCPCS SELF ADMINISTERED DRUGS (ALT 637 FOR MEDICARE OP): Performed by: NURSE PRACTITIONER

## 2023-11-09 PROCEDURE — 80048 BASIC METABOLIC PNL TOTAL CA: CPT | Performed by: STUDENT IN AN ORGANIZED HEALTH CARE EDUCATION/TRAINING PROGRAM

## 2023-11-09 PROCEDURE — 82570 ASSAY OF URINE CREATININE: CPT | Performed by: INTERNAL MEDICINE

## 2023-11-09 PROCEDURE — 2500000001 HC RX 250 WO HCPCS SELF ADMINISTERED DRUGS (ALT 637 FOR MEDICARE OP)

## 2023-11-09 PROCEDURE — 1170000001 HC PRIVATE ONCOLOGY ROOM DAILY

## 2023-11-09 PROCEDURE — 81001 URINALYSIS AUTO W/SCOPE: CPT | Performed by: INTERNAL MEDICINE

## 2023-11-09 PROCEDURE — 84100 ASSAY OF PHOSPHORUS: CPT | Performed by: STUDENT IN AN ORGANIZED HEALTH CARE EDUCATION/TRAINING PROGRAM

## 2023-11-09 PROCEDURE — 96372 THER/PROPH/DIAG INJ SC/IM: CPT | Performed by: STUDENT IN AN ORGANIZED HEALTH CARE EDUCATION/TRAINING PROGRAM

## 2023-11-09 PROCEDURE — 2500000004 HC RX 250 GENERAL PHARMACY W/ HCPCS (ALT 636 FOR OP/ED): Performed by: NURSE PRACTITIONER

## 2023-11-09 PROCEDURE — 8010000001 HC DIALYSIS - HEMODIALYSIS PER DAY

## 2023-11-09 PROCEDURE — 2500000004 HC RX 250 GENERAL PHARMACY W/ HCPCS (ALT 636 FOR OP/ED)

## 2023-11-09 PROCEDURE — 2500000004 HC RX 250 GENERAL PHARMACY W/ HCPCS (ALT 636 FOR OP/ED): Performed by: STUDENT IN AN ORGANIZED HEALTH CARE EDUCATION/TRAINING PROGRAM

## 2023-11-09 PROCEDURE — 83036 HEMOGLOBIN GLYCOSYLATED A1C: CPT | Performed by: STUDENT IN AN ORGANIZED HEALTH CARE EDUCATION/TRAINING PROGRAM

## 2023-11-09 PROCEDURE — 2500000005 HC RX 250 GENERAL PHARMACY W/O HCPCS

## 2023-11-09 PROCEDURE — 99232 SBSQ HOSP IP/OBS MODERATE 35: CPT

## 2023-11-09 RX ORDER — CALCIUM CARBONATE 200(500)MG
500 TABLET,CHEWABLE ORAL 4 TIMES DAILY PRN
Status: DISCONTINUED | OUTPATIENT
Start: 2023-11-09 | End: 2023-11-17 | Stop reason: HOSPADM

## 2023-11-09 RX ADMIN — SODIUM BICARBONATE 650 MG: 650 TABLET ORAL at 09:32

## 2023-11-09 RX ADMIN — OXYCODONE HYDROCHLORIDE 10 MG: 5 TABLET ORAL at 09:31

## 2023-11-09 RX ADMIN — SODIUM BICARBONATE 650 MG: 650 TABLET ORAL at 20:46

## 2023-11-09 RX ADMIN — OXYBUTYNIN CHLORIDE 5 MG: 5 TABLET ORAL at 16:55

## 2023-11-09 RX ADMIN — ONDANSETRON HYDROCHLORIDE 4 MG: 4 TABLET, FILM COATED ORAL at 05:37

## 2023-11-09 RX ADMIN — HEPARIN SODIUM 5000 UNITS: 5000 INJECTION INTRAVENOUS; SUBCUTANEOUS at 20:45

## 2023-11-09 RX ADMIN — OXYBUTYNIN CHLORIDE 5 MG: 5 TABLET ORAL at 09:32

## 2023-11-09 RX ADMIN — CALCIUM CARBONATE (ANTACID) CHEW TAB 500 MG 500 MG: 500 CHEW TAB at 17:22

## 2023-11-09 RX ADMIN — SODIUM BICARBONATE 650 MG: 650 TABLET ORAL at 16:55

## 2023-11-09 RX ADMIN — ACETAMINOPHEN 975 MG: 325 TABLET ORAL at 16:55

## 2023-11-09 RX ADMIN — ACETAMINOPHEN 975 MG: 325 TABLET ORAL at 09:00

## 2023-11-09 RX ADMIN — HEPARIN SODIUM 5000 UNITS: 5000 INJECTION INTRAVENOUS; SUBCUTANEOUS at 05:36

## 2023-11-09 RX ADMIN — HEPARIN SODIUM 5000 UNITS: 5000 INJECTION INTRAVENOUS; SUBCUTANEOUS at 16:56

## 2023-11-09 RX ADMIN — ONDANSETRON HYDROCHLORIDE 4 MG: 4 TABLET, FILM COATED ORAL at 20:46

## 2023-11-09 RX ADMIN — ACETAMINOPHEN 975 MG: 325 TABLET ORAL at 20:46

## 2023-11-09 RX ADMIN — OXYBUTYNIN CHLORIDE 5 MG: 5 TABLET ORAL at 20:46

## 2023-11-09 RX ADMIN — ONDANSETRON 4 MG: 2 INJECTION INTRAMUSCULAR; INTRAVENOUS at 16:55

## 2023-11-09 ASSESSMENT — COGNITIVE AND FUNCTIONAL STATUS - GENERAL
CLIMB 3 TO 5 STEPS WITH RAILING: A LITTLE
WALKING IN HOSPITAL ROOM: A LITTLE
DAILY ACTIVITIY SCORE: 23
MOVING TO AND FROM BED TO CHAIR: A LITTLE
TOILETING: A LITTLE
STANDING UP FROM CHAIR USING ARMS: A LITTLE
MOBILITY SCORE: 20

## 2023-11-09 ASSESSMENT — PAIN SCALES - PAIN ASSESSMENT IN ADVANCED DEMENTIA (PAINAD)
BODYLANGUAGE: RELAXED
CONSOLABILITY: NO NEED TO CONSOLE
BODYLANGUAGE: NORMAL

## 2023-11-09 ASSESSMENT — PAIN - FUNCTIONAL ASSESSMENT
PAIN_FUNCTIONAL_ASSESSMENT: 0-10
PAIN_FUNCTIONAL_ASSESSMENT: NO/DENIES PAIN
PAIN_FUNCTIONAL_ASSESSMENT: NO/DENIES PAIN

## 2023-11-09 ASSESSMENT — PAIN SCALES - GENERAL
PAINLEVEL_OUTOF10: 8
PAINLEVEL_OUTOF10: 0 - NO PAIN
PAINLEVEL_OUTOF10: 3
PAINLEVEL_OUTOF10: 8

## 2023-11-09 ASSESSMENT — PAIN DESCRIPTION - DESCRIPTORS: DESCRIPTORS: CRAMPING;SORE

## 2023-11-09 NOTE — NURSING NOTE
Report to Receiving RN:    Report To: DANNI Vance  Time Report Called: 16:24  Hand-Off Communication: No acute change from RN to RN report.  Patient tolerated treatment well with no fluid removal.    Complications During Treatment: No  Ultrafiltration Treatment: No  Medications Administered During Dialysis: No  Blood Products Administered During Dialysis: No  Labs Sent During Dialysis: No  Heparin Drip Rate Changes: No    Electronic Signatures:   (Signed )   Authored: Todd Tiwari RN   (Signed )   Authored:     Last Updated: 4:24 PM by TODD TIWARI

## 2023-11-09 NOTE — NURSING NOTE
Report from Sending RN:    Report From: Pinky bedside nurse  Recent Surgery of Procedure: No  Baseline Level of Consciousness (LOC): A/O X 4  Oxygen Use: No  Type: N/A  Diabetic: No  Last BP Med Given Day of Dialysis: none  Last Pain Med Given: yes see EMR  Lab Tests to be Obtained with Dialysis: No  Blood Transfusion to be Given During Dialysis: No  Available IV Access: Yes  Medications to be Administered During Dialysis: No  Continuous IV Infusion Running: No  Restraints on Currently or in the Last 24 Hours: No  Hand-Off Communication: full code, not on isolation precautions, can stand for weight

## 2023-11-09 NOTE — CONSULTS
"Nutrition Initial Assessment:   Nutrition Assessment    Reason for Assessment: Provider consult order    Patient is a 57 y.o. male with hx of Crohn's Disease presenting for colovesical fistula. Taken to OR (10/27) now s/p ex lap, MYA, cystoscopy, bilateral ileal ureter and bladder augment with right partial rectus muscle flap, and suprapubic tube insertion.     Advanced to a CLD on (10/29) & further to a regular diet (10/31). Pt with ROBF on (11/1). However, developed N/V (11/3) so was made NPO due to presence of post-op ileus.     Nephrology consulted for an KADE, which pt required IR HD catheter placement (11/6). Received HD on (11/7, 11/8, & 11/9).     Pt advanced by urology to an easy to chew, 2gm potassium, & 2-3gm sodium diet earlier today.    PMH: Crohn's disease, Diverticulosis, GERD, GI bleed, and UTI    Nutrition History:  Pt resting in bed at time of visit. Reports that prior to admission he was eating well with a good appetite. States his baseline consists of only consuming x1 meal/day & that he doesn't require large amounts of food d/t not being as active as he once was. Pt placed an emphasis on listening to internal cues & notes he stops eating once he feels full. In addition to his daily meal, pt does report consuming a [place] protein shake every morning for breakfast. Denies use of any supplements like Boost or Ensure at home.    Energy intake: Energy Intake: Good > 75 %  Food Allergies/Intolerances:  None  GI Symptoms: None  Oral Problems: None    Anthropometrics:  Height: 182.9 cm (6' 0.01\")   Weight: 113 kg (249 lb 1.9 oz)   BMI (Calculated): 33.78  IBW/kg (Dietitian Calculated): 80.9 kg  Percent of IBW: 140 %     Weight History:     Weight Change %:  Weight History / % Weight Change:   Pt reports UBW of 113.6 kg. No changes noted.    Nutrition Focused Physical Exam Findings:  Subcutaneous Fat Loss:   Orbital Fat Pads: Well nourshed (slightly bulging fat pads)   Buccal Fat Pads: Well nourished " (full, rounded cheeks)   Triceps: Well nourished (ample fat tissue)     Muscle Wasting:  Temporalis: Mild-Moderate (slight depression)  Pectoralis (Clavicular Region): Well nourished (clavicle not visible)  Deltoid/Trapezius: Well nourished (rounded appearance at arm, shoulder, neck)  Interosseous: Well nourished (muscle bulges)  Quadriceps: Well nourished (well developed, well rounded)  Gastrocnemius: Well nourished (well developed bulbous muscle)    Edema:  Edema: none     Physical Findings:  Hair: Negative  Eyes: Negative  Mouth: Negative  Nails: Negative  Skin:  (Medial lower abdominal incision)      Nutrition Significant Labs:  Results for orders placed or performed during the hospital encounter of 10/24/23 (from the past 24 hour(s))   Phosphorus   Result Value Ref Range    Phosphorus 8.3 (H) 2.5 - 4.9 mg/dL   Basic Metabolic Panel   Result Value Ref Range    Glucose 78 74 - 99 mg/dL    Sodium 138 136 - 145 mmol/L    Potassium 3.8 3.5 - 5.3 mmol/L    Chloride 98 98 - 107 mmol/L    Bicarbonate 21 21 - 32 mmol/L    Anion Gap 23 (H) 10 - 20 mmol/L    Urea Nitrogen 58 (H) 6 - 23 mg/dL    Creatinine 8.57 (H) 0.50 - 1.30 mg/dL    eGFR 7 (L) >60 mL/min/1.73m*2    Calcium 8.6 8.6 - 10.6 mg/dL       Nutrition Specific Medications:  Zofran, Senokot    I/O:   +Colostomy: (11/8) 450mL output recorded      Dietary Orders (From admission, onward)       Start     Ordered    11/09/23 0820  Adult diet Easy to chew; Potassium restricted 2 gm (50mEq), 2 - 3 gm Sodium  Diet effective now        Question Answer Comment   Diet type Easy to chew    Other restriction(s): Potassium restricted 2 gm (50mEq)    Other restriction(s): 2 - 3 gm Sodium        11/09/23 0820                Estimated Needs:   Total Energy Estimated Needs (kCal): 2250 kCal  Method for Estimating Needs: 28-30 kcals/kg x IBW  Total Protein Estimated Needs (g): 105 g  Method for Estimating Needs: 1.3-1.5 g/kg x IBW  Total Fluid Estimated Needs (mL)  Method for  Estimating Needs: Per team's discretion      Nutrition Diagnosis   Nutrition Diagnosis:  Malnutrition Diagnosis  Patient has Malnutrition Diagnosis: No    Nutrition Diagnosis  Patient has Nutrition Diagnosis: Yes  Diagnosis Status (1): New  Nutrition Diagnosis 1: Increased nutrient needs  Related to (1): increased metabolic demand  As Evidenced by (1): colovesical fistula s/p takedown now c/b acute on CKD requiring HD initiation       Nutrition Interventions/Recommendations   Nutrition Interventions and Recommendations:        Nutrition Prescription:      1.) Recommend changing diet order to an easy to chew, low phosphorus diet           --> Pts potassium level has remained WNL this admission, no restriction is indicated.            2.) Will provide Ensure Clear BID for added nutrition        3.) Check vitamin D level & replete PRN      Nutrition Education:   Pt provided educational handout about choosing low phosphorus food items from the [employer]. Discussed high sources of phosphorus are typically proteins like meat, dairy, beans, nuts, & dark gwen. Pt notes understanding at completion of education.        Time Spent/Follow-up Reminder:   Follow Up  Time Spent (min): 60 minutes  Last Date of Nutrition Visit: 11/09/23  Nutrition Follow-Up Needed?: Dietitian to reassess per policy

## 2023-11-09 NOTE — PROGRESS NOTES
UROLOGY DAILY PROGRESS NOTE      Subjective     Underwent dialysis yesterday, reports issues with clotting off during dialysis preventing full session from being completed. Patient had mild nausea overnight, no episodes of emesis.  Tolerating CLD. Complains of malaise with sugar intake, concerned about possible diabetes. No other s/s of diabetes, no other complaints. Continues to ambulate.       Objective   Physical Exam  Gen: NAD, alert  HEENT: normocephalic, atraumatic  Pulm: nonlabored breathing on room air  CV: regular rate per chart  GI: soft, non-tender, mildly distended, colostomy L abdomen w/ healthy appearance, green liquid with gas. Midline incision c/d/I closed with staples, open to air, LINDSEY drains removed with dressings in place  : mild suprapubic tenderness, no CVA tenderness, SPT draining straw yellow with mucus, urethral andrade straw yellow with mucus, SPT and urethral andrade irrigated with return of mucus, b/l PCNT uncapped with straw yellow urine, flushed with return of mucus  MSK: STANLEY  Neuro: no focal deficits  Skin: WWP  Psych: appropriate mood and behavior    Last Recorded Vitals  Heart Rate:  [71-91]   Temp:  [36.3 °C (97.3 °F)-37.4 °C (99.3 °F)]   Resp:  [16-18]   BP: (113-116)/(60-77)   SpO2:  [92 %-96 %]   Intake/Output last 3 Shifts:  I/O last 3 completed shifts:  In: 2400 (21.2 mL/kg) [I.V.:1600 (14.2 mL/kg); Other:800]  Out: 2651 (23.5 mL/kg) [Urine:865 (0.2 mL/kg/hr); Other:1036; Stool:750]  Weight: 113 kg     Net IO Since Admission: -9,983.66 mL [11/09/23 0858]    Relevant Results  Results for orders placed or performed during the hospital encounter of 10/24/23 (from the past 24 hour(s))   Phosphorus   Result Value Ref Range    Phosphorus 8.3 (H) 2.5 - 4.9 mg/dL   Basic Metabolic Panel   Result Value Ref Range    Glucose 78 74 - 99 mg/dL    Sodium 138 136 - 145 mmol/L    Potassium 3.8 3.5 - 5.3 mmol/L    Chloride 98 98 - 107 mmol/L    Bicarbonate 21 21 - 32 mmol/L    Anion Gap 23 (H) 10  - 20 mmol/L    Urea Nitrogen 58 (H) 6 - 23 mg/dL    Creatinine 8.57 (H) 0.50 - 1.30 mg/dL    eGFR 7 (L) >60 mL/min/1.73m*2    Calcium 8.6 8.6 - 10.6 mg/dL   CBC   Result Value Ref Range    WBC 8.1 4.4 - 11.3 x10*3/uL    nRBC 0.0 0.0 - 0.0 /100 WBCs    RBC 4.20 (L) 4.50 - 5.90 x10*6/uL    Hemoglobin 11.2 (L) 13.5 - 17.5 g/dL    Hematocrit 35.9 (L) 41.0 - 52.0 %    MCV 86 80 - 100 fL    MCH 26.7 26.0 - 34.0 pg    MCHC 31.2 (L) 32.0 - 36.0 g/dL    RDW 16.0 (H) 11.5 - 14.5 %    Platelets 314 150 - 450 x10*3/uL         Imaging  CT AP non-con 11/5  Impression   1.  Postsurgical changes as described above with likely postoperative  ventral abdominal seroma.  2. Bilateral percutaneous nephrostomy tubes and bilateral ureteral  stents coursing through the bilateral ileal ureter and terminating  within the augmented urinary bladder. Air within the left renal  collecting system is likely postprocedural.  3. Mild diffuse fluid-filled small bowel dilatation with air-fluid  levels with the no clear transition point likely representing ileus.  4. Other chronic and incidental findings as described above.       RBUS 11/5  IMPRESSION:  1. No evidence of hydronephrosis.  2. Normal-sized kidneys with increased renal cortical echogenicity  bilaterally and mild left renal cortical thinning, suggestive of  medical renal disease.    KUB 11/3  IMPRESSION:  1.  Persistent moderate dilatation of the multiple small bowel loops,  measures up to 5.7 cm in the left quadrant.  2. Adynamic ileus versus partial obstruction. Correlation follow-up  recommended. Postop changes of the lower abdomen and pelvis with  multiple surgical clips and surgical incision.  3. Bilateral nephrostomy tube placement, left ureter stent placement.  4. No gross free air    Assessment/Plan   Jose Thornton is a 57 y.o. male with PMH of Crohn's disease and colovesical fistula who underwent Exploratory laparotomy, MYA, Cystoscopy, Bilateral ileal ureter and bladder augment  with right partial rectus muscle flap, and Suprapubic tube insertion on 10/27/23 with Dr. Andersen and Dr. Rodriguez.     Patient initially recovered well and had quick return of bowel function with consistent ostomy output. However, he then developed symptoms and imaging findings c/w ileus. Nausea is slowly resolving and he has not had recent emesis, though has had smoldering symptoms when trying to advance diet. Antibiotics were discontinued after courses were complete 11/3. Labs were significant for quickly rising creatinine, now up to 11.73.   No significant electrolyte abnormalities have been associated with the new acute renal failure. Nephrology consulted on 11/5, KADE likely due to multiple insults including hemodynamic injury (hypotension, anemia post op), with medication toxicity (gentamicin).  Patient tolerated dialysis on 11/7 and 11/8 but has not had significant correction of BUN/Creatinine.      Principal Problem:    Colovesical fistula    Plan:  Neuro: Tylenol, oxycodone, breakthrough dilaudid, oxybutynin and breakthrough flexeril for bladder spasm pain  Pulm: IS 10x / hr  CV: hemodynamically stable, VS Q8h  FEN/GI: continue scheduled zofran, breakthrough tygan, advance to renal soft diet, nutrition consult  /Alpesh: maintain andrade and SPT to drainage, teaching for home flushing, maintain b/l nephrostomy tubes uncapped. Continue flushing of SPT and nephrostomy tubes BID.  Nephrology will reevaluate for dialytic needs today  Heme: no indication for transfusion  ID: urine cultures with gentamicin sensitive Pseudomonas (10 day course gentamicin for complicated UTI, course completed 11/3), no current indication for antibiotics  Endo: no additional glycemic requirements, HbA1c for patient concerns about diabetes  MSK: OOB as tolerated  Prophylaxis: SQH / SCDs    Dispo:  Continue care on RNF    Seen with chief resident Dr. Ferrer, to be discussed with attending Dr. Nathaly Lerma MD  PGY-1 Urology  [place]  Pager: [phone]

## 2023-11-09 NOTE — PROGRESS NOTES
Physical Therapy                 Therapy Communication Note    Patient Name: Jose Thornton  MRN: [id number]  Today's Date: 11/9/2023     Discipline: Physical Therapy    Missed Visit Reason: Missed Visit Reason:  (Attempt at 1412. Pt. off division at .)    Missed Time: Attempt    Comment:

## 2023-11-09 NOTE — PROGRESS NOTES
NEPHROLOGY FOLLOW UP NOTE    Jose Thornton   57 y.o.    @WT@  MRN/Room: [id number]    Subjective:     He feels a bit better this morning compared to yesterday. He feels his nausea has gone down. As he wasn't sure what the best foods were to eat, he was waiting for the dietician to come by before having any solid foods. He has been mostly drinking water. Denies any shortness of breath, new swelling or abdominal pain. He was able to get up and walk around yesterday.    This morning, he has some pain in his genitals. He noted yesterday there was some difficulty flushing his suprapubic and urethral catheters. In the morning, when trying to draw back on the catheter, he had some pain until it was repositioned. The pain has gotten better since the attempt to draw back on the catheters, but has not completely gone away.     Meds:   acetaminophen, 975 mg, q6h  alteplase, 2 mg, Once  alteplase, 2 mg, Once  fluticasone, 2 spray, Daily  heparin (porcine), 5,000 Units, q8h  ondansetron, 4 mg, q8h   Or  ondansetron, 4 mg, q8h  oxybutynin, 5 mg, TID  sennosides, 2 tablet, BID  sodium bicarbonate, 650 mg, TID         benzocaine-menthol, 1 lozenge, q2h PRN  bisacodyl, 10 mg, Daily PRN  cyclobenzaprine, 5 mg, TID PRN  guaiFENesin, 600 mg, BID PRN  HYDROmorphone, 0.2 mg, q4h PRN  melatonin, 10 mg, Nightly PRN  naloxone, 0.2 mg, q5 min PRN  oxyCODONE, 10 mg, q6h PRN  oxyCODONE, 5 mg, q4h PRN  polyethylene glycol, 17 g, Daily PRN  trimethobenzamide, 200 mg, q6h PRN      Objective:     Vitals:    11/09/23 0442   BP: 114/75   Pulse: 91   Resp: 16   Temp: 37.4 °C (99.3 °F)   SpO2: 94%        Wt Readings from Last 10 Encounters:   10/27/23 113 kg (249 lb 1.9 oz)   10/05/23 115 kg (253 lb 4.9 oz)   10/02/23 113 kg (250 lb)   07/25/23 111 kg (244 lb)   07/17/23 109 kg (240 lb)   07/11/23 109 kg (240 lb)   06/19/23 105 kg (231 lb)   06/08/23 108 kg (237 lb)   05/15/23 94.3 kg (208 lb)   04/21/23 94.5 kg (208 lb 6 oz)          Intake/Output Summary (Last 24 hours) at 11/9/2023 0735  Last data filed at 11/9/2023 0442  Gross per 24 hour   Intake 2400 ml   Output 1701 ml   Net 699 ml       Net IO Since Admission: -9,983.66 mL [11/09/23 0735]    Physical Exam  General: Patient is awake, non-toxic appearing, normal body habitus  Pulm: Normal WOB at rest , no crackles or rhonchi  Cardiac: Regular rate and rhythm, normal S1/S2  Abdomen: Non-tender to palpation, non-distended  Extremities: No peripheral edema, or cyanosis  Neuro: Patient alert and oriented, cranial nerves grossly intact, normal strength and sensation.      Blood Labs:  Lab Results   Component Value Date    CREATININE 8.57 (H) 11/09/2023    BUN 58 (H) 11/09/2023     11/09/2023    K 3.8 11/09/2023    CL 98 11/09/2023    CO2 21 11/09/2023       Lab Results   Component Value Date    WBC 8.1 11/09/2023    HGB 11.2 (L) 11/09/2023    HCT 35.9 (L) 11/09/2023    MCV 86 11/09/2023     11/09/2023         Hemoglobin   Date Value Ref Range Status   11/09/2023 11.2 (L) 13.5 - 17.5 g/dL Final   11/08/2023 11.8 (L) 13.5 - 17.5 g/dL Final   11/07/2023 11.1 (L) 13.5 - 17.5 g/dL Final     WBC   Date Value Ref Range Status   11/09/2023 8.1 4.4 - 11.3 x10*3/uL Final   11/08/2023 8.8 4.4 - 11.3 x10*3/uL Final   11/07/2023 9.0 4.4 - 11.3 x10*3/uL Final     Platelets   Date Value Ref Range Status   11/09/2023 314 150 - 450 x10*3/uL Final   11/08/2023 323 150 - 450 x10*3/uL Final   11/07/2023 370 150 - 450 x10*3/uL Final       Sodium   Date Value Ref Range Status   11/09/2023 138 136 - 145 mmol/L Final   11/08/2023 137 136 - 145 mmol/L Final   11/07/2023 136 136 - 145 mmol/L Final     Potassium   Date Value Ref Range Status   11/09/2023 3.8 3.5 - 5.3 mmol/L Final   11/08/2023 3.6 3.5 - 5.3 mmol/L Final   11/07/2023 3.9 3.5 - 5.3 mmol/L Final     Bicarbonate   Date Value Ref Range Status   11/09/2023 21 21 - 32 mmol/L Final   11/08/2023 18 (L) 21 - 32 mmol/L Final   11/07/2023 12 (L)  21 - 32 mmol/L Final     Phosphorus   Date Value Ref Range Status   11/09/2023 8.3 (H) 2.5 - 4.9 mg/dL Final     Comment:     The performance characteristics of phosphorus testing in heparinized plasma have been validated by the individual  laboratory site where testing is performed. Testing on heparinized plasma is not approved by the FDA; however, such approval is not necessary.   11/08/2023 8.3 (H) 2.5 - 4.9 mg/dL Final     Comment:     The performance characteristics of phosphorus testing in heparinized plasma have been validated by the individual  laboratory site where testing is performed. Testing on heparinized plasma is not approved by the FDA; however, such approval is not necessary.   11/06/2023 7.0 (H) 2.5 - 4.9 mg/dL Final     Comment:     MILD HEMOLYSIS DETECTED. The result may be falsely elevated due to hemolysis or other interferents. Clinical correlation is recommended. Repeat testing may be considered.  The performance characteristics of phosphorus testing in heparinized plasma have been validated by the individual  laboratory site where testing is performed. Testing on heparinized plasma is not approved by the FDA; however, such approval is not necessary.     Calcium   Date Value Ref Range Status   11/09/2023 8.6 8.6 - 10.6 mg/dL Final   11/08/2023 8.5 (L) 8.6 - 10.6 mg/dL Final   11/07/2023 8.4 (L) 8.6 - 10.6 mg/dL Final     Urea Nitrogen   Date Value Ref Range Status   11/09/2023 58 (H) 6 - 23 mg/dL Final   11/08/2023 80 (H) 6 - 23 mg/dL Final   11/07/2023 97 (HH) 6 - 23 mg/dL Final     Creatinine   Date Value Ref Range Status   11/09/2023 8.57 (H) 0.50 - 1.30 mg/dL Final   11/08/2023 10.68 (H) 0.50 - 1.30 mg/dL Final   11/07/2023 11.73 (H) 0.50 - 1.30 mg/dL Final       POCT pH, Arterial   Date Value Ref Range Status   10/27/2023 7.35 (L) 7.38 - 7.42 pH Final   10/27/2023 7.34 (L) 7.38 - 7.42 pH Final     POCT pCO2, Arterial   Date Value Ref Range Status   10/27/2023 42 38 - 42 mm Hg Final    10/27/2023 42 38 - 42 mm Hg Final     POCT pO2, Arterial   Date Value Ref Range Status   10/27/2023 126 (H) 85 - 95 mm Hg Final   10/27/2023 125 (H) 85 - 95 mm Hg Final     pH, Venous   Date Value Ref Range Status   03/30/2023 7.37 7.33 - 7.43 Final     pCO2, Venous   Date Value Ref Range Status   03/30/2023 49 41 - 51 mmHg Final     POCT Lactate, Arterial   Date Value Ref Range Status   10/27/2023 1.1 0.4 - 2.0 mmol/L Final   10/27/2023 0.6 0.4 - 2.0 mmol/L Final     Lactate, Venous   Date Value Ref Range Status   03/30/2023 1.6 0.4 - 2.0 mmol/L Final       FeNa Values: Serum Creatinine/Serum Sodium * Urine sodium to creatinine ratio (FeNa in percent)    Lab Results   Component Value Date    GLUCOSE 78 11/09/2023    CALCIUM 8.6 11/09/2023     11/09/2023    K 3.8 11/09/2023    CO2 21 11/09/2023    CL 98 11/09/2023    BUN 58 (H) 11/09/2023    CREATININE 8.57 (H) 11/09/2023       Sodium/Creatinine Ratio   Date Value Ref Range Status   11/05/2023 68 Not established. mmol/g Creat Final           ASSESSMENT:  Jose Thornton is a 57 y.o. male with PMH of Crohn's disease and colovesical fistula who underwent Exploratory laparotomy, MYA, Cystoscopy, Bilateral ileal ureter and bladder augment with right partial rectus muscle flap, and Suprapubic tube insertion on 10/27, c/b post-op ileus and acute renal failure.  Nephrology is consulted for management of KADE.     His renal function continues to improve on dialysis.  Output from new bladder (suprapubic catheter) continues to be pretty limited, with a maximum of 225 mL on 11/7, but most of the time tends to be 50-80 mL. Bulk of urinary output coming through nephrostomy tubes. While the CT A/P scan showed no hydroureter or hydronephrosis, there still could be a concern for a distal obstructive process, especially given high level of fibrosis observed during operation.  Decrease in kidney function could be due to  the course of gentamicin, which is known to be  nephrotoxic.     Pathological assessment  - Hemodynamics:  113/60 -116/70, HR: 71-91  - Na of 138, Potassium of 3.8, Chloride of 98, BUN of 58 (decreased), Creatinine of 8.57 (decreased), Phos of 8.3 (unchanged)  -Bicarb of 21 (increased)  -Volume status: 465 mL of urine output yesterday (decreased from day before when it was  925). Most of the decreased output came from urethral and suprapubic catheter output.     #KADE on CKD3a, oliguric, Stage III, c/b hematuria, pyuria and proteinuria  - Baseline creatinine:  1.38  - Etiology:  Potentially an obstruction due to fibrosis, ATN from long course of gentamicin,  UTI is possible, renal restricted TMA or other GN  - Urine electrolyes showed FeNA/FeUrea of 4.7%, consistent with post renal >4 etiology  - Clinical volume status: Euvolemic  - Electrolytes (Na, K, Ca, Phos) are stable  - Acid base status: Bicarb of 21, not acidotic  - Blood pressures stable   - Avoid Contrast, hyperglycemia, NSAIDs, ischemia  - Example - Zosyn and vancomycin, NSAIDs, ACE/ARBs, etc.   - Renally dose meds  -  Renal USG/CT abdomen on 11/5 shows no signs of obstruction    Recommendations:  - Continue dialysis today  - If not stabilizing, may need a biopsy next week  - Avoid nephrotoxins, contrast if possible  - strict Is/Os  - Renal dosing for medications for latest eGFR, follow medication trough as appropriate  - Avoid hypotension/rapid fluctuations in BPs    John Ferreira MD, PhD  Nephrology Resident  24 hour Renal Pager - [phone]    Discussed with attending nephrologist Dr. Lydia Thomson

## 2023-11-10 LAB
ANION GAP SERPL CALC-SCNC: 19 MMOL/L (ref 10–20)
APPEARANCE UR: ABNORMAL
BACTERIA #/AREA URNS AUTO: ABNORMAL /HPF
BILIRUB UR STRIP.AUTO-MCNC: NEGATIVE MG/DL
BUN SERPL-MCNC: 40 MG/DL (ref 6–23)
CALCIUM SERPL-MCNC: 8.5 MG/DL (ref 8.6–10.6)
CHLORIDE SERPL-SCNC: 97 MMOL/L (ref 98–107)
CO2 SERPL-SCNC: 25 MMOL/L (ref 21–32)
COLOR UR: ABNORMAL
CREAT SERPL-MCNC: 6.79 MG/DL (ref 0.5–1.3)
ERYTHROCYTE [DISTWIDTH] IN BLOOD BY AUTOMATED COUNT: 15.9 % (ref 11.5–14.5)
GFR SERPL CREATININE-BSD FRML MDRD: 9 ML/MIN/1.73M*2
GLUCOSE SERPL-MCNC: 82 MG/DL (ref 74–99)
GLUCOSE UR STRIP.AUTO-MCNC: NEGATIVE MG/DL
HCT VFR BLD AUTO: 35.1 % (ref 41–52)
HGB BLD-MCNC: 11.4 G/DL (ref 13.5–17.5)
KETONES UR STRIP.AUTO-MCNC: ABNORMAL MG/DL
LEUKOCYTE ESTERASE UR QL STRIP.AUTO: ABNORMAL
MCH RBC QN AUTO: 27.7 PG (ref 26–34)
MCHC RBC AUTO-ENTMCNC: 32.5 G/DL (ref 32–36)
MCV RBC AUTO: 85 FL (ref 80–100)
NITRITE UR QL STRIP.AUTO: POSITIVE
NRBC BLD-RTO: 0 /100 WBCS (ref 0–0)
PH UR STRIP.AUTO: 6 [PH]
PHOSPHATE SERPL-MCNC: 5.8 MG/DL (ref 2.5–4.9)
PLATELET # BLD AUTO: 264 X10*3/UL (ref 150–450)
POTASSIUM SERPL-SCNC: 3.4 MMOL/L (ref 3.5–5.3)
PROT UR STRIP.AUTO-MCNC: ABNORMAL MG/DL
RBC # BLD AUTO: 4.11 X10*6/UL (ref 4.5–5.9)
RBC # UR STRIP.AUTO: ABNORMAL /UL
RBC #/AREA URNS AUTO: >20 /HPF
SODIUM SERPL-SCNC: 138 MMOL/L (ref 136–145)
SP GR UR STRIP.AUTO: 1.01
UROBILINOGEN UR STRIP.AUTO-MCNC: <2 MG/DL
WBC # BLD AUTO: 6.9 X10*3/UL (ref 4.4–11.3)
WBC #/AREA URNS AUTO: >50 /HPF
WBC CLUMPS #/AREA URNS AUTO: ABNORMAL /HPF

## 2023-11-10 PROCEDURE — 85027 COMPLETE CBC AUTOMATED: CPT | Performed by: STUDENT IN AN ORGANIZED HEALTH CARE EDUCATION/TRAINING PROGRAM

## 2023-11-10 PROCEDURE — 99232 SBSQ HOSP IP/OBS MODERATE 35: CPT

## 2023-11-10 PROCEDURE — 80048 BASIC METABOLIC PNL TOTAL CA: CPT | Performed by: STUDENT IN AN ORGANIZED HEALTH CARE EDUCATION/TRAINING PROGRAM

## 2023-11-10 PROCEDURE — 97110 THERAPEUTIC EXERCISES: CPT | Mod: GP

## 2023-11-10 PROCEDURE — 84100 ASSAY OF PHOSPHORUS: CPT | Performed by: STUDENT IN AN ORGANIZED HEALTH CARE EDUCATION/TRAINING PROGRAM

## 2023-11-10 PROCEDURE — 2500000004 HC RX 250 GENERAL PHARMACY W/ HCPCS (ALT 636 FOR OP/ED): Performed by: NURSE PRACTITIONER

## 2023-11-10 PROCEDURE — 2500000004 HC RX 250 GENERAL PHARMACY W/ HCPCS (ALT 636 FOR OP/ED)

## 2023-11-10 PROCEDURE — 2500000001 HC RX 250 WO HCPCS SELF ADMINISTERED DRUGS (ALT 637 FOR MEDICARE OP): Performed by: NURSE PRACTITIONER

## 2023-11-10 PROCEDURE — 97530 THERAPEUTIC ACTIVITIES: CPT | Mod: GP

## 2023-11-10 PROCEDURE — 96372 THER/PROPH/DIAG INJ SC/IM: CPT | Performed by: STUDENT IN AN ORGANIZED HEALTH CARE EDUCATION/TRAINING PROGRAM

## 2023-11-10 PROCEDURE — 2500000004 HC RX 250 GENERAL PHARMACY W/ HCPCS (ALT 636 FOR OP/ED): Performed by: STUDENT IN AN ORGANIZED HEALTH CARE EDUCATION/TRAINING PROGRAM

## 2023-11-10 PROCEDURE — 1170000001 HC PRIVATE ONCOLOGY ROOM DAILY

## 2023-11-10 PROCEDURE — 2500000005 HC RX 250 GENERAL PHARMACY W/O HCPCS

## 2023-11-10 RX ADMIN — HEPARIN SODIUM 5000 UNITS: 5000 INJECTION INTRAVENOUS; SUBCUTANEOUS at 20:28

## 2023-11-10 RX ADMIN — ONDANSETRON HYDROCHLORIDE 4 MG: 4 TABLET, FILM COATED ORAL at 05:18

## 2023-11-10 RX ADMIN — SODIUM BICARBONATE 650 MG: 650 TABLET ORAL at 20:21

## 2023-11-10 RX ADMIN — OXYCODONE HYDROCHLORIDE 5 MG: 5 TABLET ORAL at 08:25

## 2023-11-10 RX ADMIN — OXYCODONE HYDROCHLORIDE 10 MG: 5 TABLET ORAL at 01:18

## 2023-11-10 RX ADMIN — ACETAMINOPHEN 975 MG: 325 TABLET ORAL at 01:18

## 2023-11-10 RX ADMIN — HEPARIN SODIUM 5000 UNITS: 5000 INJECTION INTRAVENOUS; SUBCUTANEOUS at 12:51

## 2023-11-10 RX ADMIN — HEPARIN SODIUM 5000 UNITS: 5000 INJECTION INTRAVENOUS; SUBCUTANEOUS at 05:18

## 2023-11-10 RX ADMIN — SODIUM BICARBONATE 650 MG: 650 TABLET ORAL at 08:25

## 2023-11-10 RX ADMIN — OXYBUTYNIN CHLORIDE 5 MG: 5 TABLET ORAL at 08:24

## 2023-11-10 RX ADMIN — OXYBUTYNIN CHLORIDE 5 MG: 5 TABLET ORAL at 20:21

## 2023-11-10 ASSESSMENT — PAIN SCALES - GENERAL
PAINLEVEL_OUTOF10: 0 - NO PAIN
PAINLEVEL_OUTOF10: 5 - MODERATE PAIN
PAINLEVEL_OUTOF10: 7

## 2023-11-10 ASSESSMENT — COGNITIVE AND FUNCTIONAL STATUS - GENERAL
MOBILITY SCORE: 17
MOVING FROM LYING ON BACK TO SITTING ON SIDE OF FLAT BED WITH BEDRAILS: A LITTLE
TURNING FROM BACK TO SIDE WHILE IN FLAT BAD: A LITTLE
CLIMB 3 TO 5 STEPS WITH RAILING: A LOT
STANDING UP FROM CHAIR USING ARMS: A LITTLE
WALKING IN HOSPITAL ROOM: A LITTLE
MOVING TO AND FROM BED TO CHAIR: A LITTLE

## 2023-11-10 ASSESSMENT — PAIN - FUNCTIONAL ASSESSMENT
PAIN_FUNCTIONAL_ASSESSMENT: 0-10
PAIN_FUNCTIONAL_ASSESSMENT: 0-10

## 2023-11-10 NOTE — PROGRESS NOTES
Physical Therapy    Physical Therapy Treatment    Patient Name: Jose Thornton  MRN: [id number]  Today's Date: 11/10/2023  Time Calculation  Start Time: 1105  Stop Time: 1144  Time Calculation (min): 39 min       Assessment/Plan   PT Assessment  PT Assessment Results: Decreased strength, Decreased endurance, Impaired balance, Decreased mobility, Pain  Rehab Prognosis: Excellent  Evaluation/Treatment Tolerance: Patient limited by fatigue, Patient limited by pain  Medical Staff Made Aware: Yes  Strengths: Premorbid level of function, Housing layout, Support and attitude of living partners  End of Session Communication: Bedside nurse  Assessment Comment: Pt. is a 57 yom that presents with increased abdominal pain, decreased functional strength, mildly impaired balance, decreased activity tolerance/endurance, and increased difficulty with funcitonal mobility compared to baseline. Pt. will benefit from skilled PT intervention while inpatient to address these deficits.  End of Session Patient Position: Up in chair, Alarm off, not on at start of session     PT Plan  Treatment/Interventions: Bed mobility, Transfer training, Gait training, Stair training, Balance training, Strengthening, Endurance training, Therapeutic exercise, Therapeutic activity, Home exercise program  PT Plan: Skilled PT  PT Frequency: 3 times per week  PT Discharge Recommendations: Low intensity level of continued care  Equipment Recommended upon Discharge: Other (comment) (shower chair)  PT Recommended Transfer Status: Contact guard, Assistive device  PT - OK to Discharge: Yes (eval complete, refer to dispo)      General Visit Information:   PT  Visit  PT Received On: 11/10/23  Response to Previous Treatment: Patient with no complaints from previous session.  General  Reason for Referral: s/p Exploratory laparotomy, MYA, Cystoscopy, Bilateral ileal ureter and bladder augment with right partial rectus muscle flap, and Suprapubic tube insertion on  "10/27/23  Past Medical History Relevant to Rehab: Diverticulosis, GERD, GI bleed, UTI, and Crohn's disease complicated by colovesical fistula s/p bilateral nephrostomy tube placement + indwelling andrade catheter  Family/Caregiver Present: No  Prior to Session Communication: Bedside nurse  Patient Position Received: Bed, 3 rail up, Alarm off, not on at start of session  Preferred Learning Style: verbal  General Comment: Pt. received supine in bed, agreeable to participate in session with encouragement. Of note, pt. reports has not been ambulatory for multiple days 2/2 nausea, pain, and \"a lot going on\". Increased time spent educating pt.    Subjective   Precautions:  Precautions  Medical Precautions: Abdominal precautions, Fall precautions  Post-Surgical Precautions: Abdominal surgery precautions    Vital Signs:  Vital Signs  Heart Rate: 89 (EOB: 100)  Heart Rate Source: Monitor  SpO2: 96 % (EOB: 95% on room air)  BP: 113/75 (EOB: 93/66, Stand: 90/61, Post: 94/62)  BP Location: Left arm  BP Method: Automatic  Patient Position: Lying    Objective   Pain:  Pain Assessment  Pain Assessment: 0-10  Pain Score: 7  Pain Type: Surgical pain  Pain Location: Abdomen  Pain Frequency: Constant/continuous  Patient's Stated Pain Goal: No pain    Cognition:  Cognition  Overall Cognitive Status: Within Functional Limits  Orientation Level: Disoriented to time (Pt. oriented to month, not date)    Postural Control:  Postural Control  Postural Control: Within Functional Limits    Activity Tolerance:  Activity Tolerance  Endurance: Tolerates 30 min exercise with multiple rests  Early Mobility/Exercise Safety Screen: Proceed with mobilization - No exclusion criteria met  Activity Tolerance Comments: Session limited this date d/t pt. reporting persistent dizziness with notable drop in BP.    Treatments:  Therapeutic Exercise  Therapeutic Exercise Performed: Yes  Therapeutic Exercise Activity 1: EOB: virginia AP x10, virgiina LAQ x10    Therapeutic " Activity  Therapeutic Activity Performed: Yes  Therapeutic Activity 1: Pt. tolerated sitting EOB ~20 min total this date. Emphasis on upright functional posture, PLB, ther ex, and skillful monitoring of vitals to ensure safety with continued mobility.  Therapeutic Activity 2: Pt. tolerated static standing x3 min with CGAx1 and virginia UE support on FWW, emphasis on upright posture and PLB. Vitals assessed    Bed Mobility  Bed Mobility: Yes  Bed Mobility 1  Bed Mobility 1: Supine to sitting  Level of Assistance 1: Contact guard  Bed Mobility Comments 1: Log roll technique, HOB elevated significantly, heavy virginia UE assist on bed rails    Ambulation/Gait Training  Ambulation/Gait Training Performed: No (Deferred d/t concern for safety. Pt. with notable drop in BP and reporting persistent dizziness)    Transfers  Transfer: Yes  Transfer 1  Transfer From 1: Bed to  Transfer to 1: Stand  Technique 1: Sit to stand  Transfer Device 1: Walker  Transfer Level of Assistance 1: Contact guard, Minimal verbal cues  Trials/Comments 1: Completed 2x. Cues for proper UE placement and technique, pt. non compliant and pulling self up on walker  Transfers 2  Transfer From 2: Stand to  Transfer to 2: Bed  Technique 2: Stand to sit  Transfer Device 2: Walker  Transfer Level of Assistance 2: Contact guard, Minimal verbal cues  Trials/Comments 2: Cues to reach posteriorly with unilateral UE and to control descent to chair to maximize safety.  Transfers 3  Transfer From 3: Stand to  Transfer to 3: Chair with arms  Technique 3: Stand pivot  Transfer Device 3: Walker  Transfer Level of Assistance 3: Contact guard, Minimal verbal cues    Outcome Measures:  [facility] Basic Mobility  Turning from your back to your side while in a flat bed without using bedrails: A little  Moving from lying on your back to sitting on the side of a flat bed without using bedrails: A little  Moving to and from bed to chair (including a wheelchair): A little  Standing up from  a chair using your arms (e.g. wheelchair or bedside chair): A little  To walk in hospital room: A little  Climbing 3-5 steps with railing: A lot  Basic Mobility - Total Score: 17    Education Documentation  Handouts, taught by Areli Brasher PT at 11/10/2023 12:08 PM.  Learner: Patient  Readiness: Acceptance  Method: Explanation, Demonstration  Response: Verbalizes Understanding    Precautions, taught by Areli Brasher PT at 11/10/2023 12:08 PM.  Learner: Patient  Readiness: Acceptance  Method: Explanation, Demonstration  Response: Verbalizes Understanding    Body Mechanics, taught by Areli Brasher PT at 11/10/2023 12:08 PM.  Learner: Patient  Readiness: Acceptance  Method: Explanation, Demonstration  Response: Verbalizes Understanding    Home Exercise Program, taught by Areli Brasher PT at 11/10/2023 12:08 PM.  Learner: Patient  Readiness: Acceptance  Method: Explanation, Demonstration  Response: Verbalizes Understanding    Mobility Training, taught by Areli Brasher PT at 11/10/2023 12:08 PM.  Learner: Patient  Readiness: Acceptance  Method: Explanation, Demonstration  Response: Verbalizes Understanding    Education Comments  No comments found.        OP EDUCATION:       Encounter Problems       Encounter Problems (Active)       Balance       Patient to demonstrate Alexis static and dynamic standing balance without LOB with change of directions, no hesitancy, appropriate TRISTA and sequencing to complete functional task.  (Progressing)       Start:  10/30/23    Expected End:  11/13/23               Mobility       Patient will navigate 5 steps with unilateral HR and SBA to demo ability to safely traverse PATIENCE (Progressing)       Start:  10/30/23    Expected End:  11/13/23            Patient will ambulate >/= 400' Alexis with LRAD (Progressing)       Start:  10/30/23    Expected End:  11/13/23            Patient will tolerate >/=30 minutes continuous activity with stable vital signs, RPE </=13/20, RPD  </=3/10  (Progressing)       Start:  10/30/23    Expected End:  11/13/23               Transfers       Patient will perform bed mobility Alexis with HOB flat and no rails (Progressing)       Start:  10/30/23    Expected End:  11/13/23            Patient will transfer sit to and from stand Alexis with LRAD (Progressing)       Start:  10/30/23    Expected End:  11/13/23

## 2023-11-10 NOTE — PROGRESS NOTES
NEPHROLOGY FOLLOW UP NOTE    Jose Thornton   57 y.o.    @WT@  MRN/Room: [id number]    Subjective: Today, he feels nausea has subjectively improved when compared to yesterday. Denies any shortness of breath or abdominal pain. Yesterday ate some macaroni and beef. Still complains of abdominal pressure. No pain or shortness of berath.     Objective:     Meds:   acetaminophen, 975 mg, q6h  fluticasone, 2 spray, Daily  heparin (porcine), 5,000 Units, q8h  ondansetron, 4 mg, q8h   Or  ondansetron, 4 mg, q8h  oxybutynin, 5 mg, TID  sennosides, 2 tablet, BID  sodium bicarbonate, 650 mg, TID         benzocaine-menthol, 1 lozenge, q2h PRN  bisacodyl, 10 mg, Daily PRN  calcium carbonate, 500 mg, 4x daily PRN  cyclobenzaprine, 5 mg, TID PRN  guaiFENesin, 600 mg, BID PRN  HYDROmorphone, 0.2 mg, q4h PRN  melatonin, 10 mg, Nightly PRN  naloxone, 0.2 mg, q5 min PRN  oxyCODONE, 10 mg, q6h PRN  oxyCODONE, 5 mg, q4h PRN  polyethylene glycol, 17 g, Daily PRN  trimethobenzamide, 200 mg, q6h PRN        Vitals:    11/10/23 0510   BP: 106/72   Pulse: 78   Resp: 18   Temp: 36.4 °C (97.5 °F)   SpO2: 92%        Wt Readings from Last 10 Encounters:   11/09/23 113 kg (249 lb 1.9 oz)   10/05/23 115 kg (253 lb 4.9 oz)   10/02/23 113 kg (250 lb)   07/25/23 111 kg (244 lb)   07/17/23 109 kg (240 lb)   07/11/23 109 kg (240 lb)   06/19/23 105 kg (231 lb)   06/08/23 108 kg (237 lb)   05/15/23 94.3 kg (208 lb)   04/21/23 94.5 kg (208 lb 6 oz)         Intake/Output Summary (Last 24 hours) at 11/10/2023 0734  Last data filed at 11/10/2023 0515  Gross per 24 hour   Intake 3040 ml   Output 1531 ml   Net 1509 ml       Net IO Since Admission: -8,474.66 mL [11/10/23 0734]    Physical Exam  General: Patient is awake, non-toxic appearing, normal body habitus  Pulm: Normal WOB at rest, no crackles or rhonchi  Cardiac: Regular rate and rhythm, normal S1/S2  Abdomen: Non-tender to palpation, non-distended  Extremities: No peripheral edema, or  cyanosis  Neuro: Patient alert and oriented, cranial nerves grossly intact    Blood Labs:  Lab Results   Component Value Date    CREATININE 6.79 (H) 11/10/2023    BUN 40 (H) 11/10/2023     11/10/2023    K 3.4 (L) 11/10/2023    CL 97 (L) 11/10/2023    CO2 25 11/10/2023       Lab Results   Component Value Date    WBC 8.1 11/09/2023    HGB 11.2 (L) 11/09/2023    HCT 35.9 (L) 11/09/2023    MCV 86 11/09/2023     11/09/2023         Hemoglobin   Date Value Ref Range Status   11/09/2023 11.2 (L) 13.5 - 17.5 g/dL Final   11/08/2023 11.8 (L) 13.5 - 17.5 g/dL Final   11/07/2023 11.1 (L) 13.5 - 17.5 g/dL Final     WBC   Date Value Ref Range Status   11/09/2023 8.1 4.4 - 11.3 x10*3/uL Final   11/08/2023 8.8 4.4 - 11.3 x10*3/uL Final   11/07/2023 9.0 4.4 - 11.3 x10*3/uL Final     Platelets   Date Value Ref Range Status   11/09/2023 314 150 - 450 x10*3/uL Final   11/08/2023 323 150 - 450 x10*3/uL Final   11/07/2023 370 150 - 450 x10*3/uL Final       Sodium   Date Value Ref Range Status   11/10/2023 138 136 - 145 mmol/L Final   11/09/2023 138 136 - 145 mmol/L Final   11/08/2023 137 136 - 145 mmol/L Final     Potassium   Date Value Ref Range Status   11/10/2023 3.4 (L) 3.5 - 5.3 mmol/L Final   11/09/2023 3.8 3.5 - 5.3 mmol/L Final   11/08/2023 3.6 3.5 - 5.3 mmol/L Final     Bicarbonate   Date Value Ref Range Status   11/10/2023 25 21 - 32 mmol/L Final   11/09/2023 21 21 - 32 mmol/L Final   11/08/2023 18 (L) 21 - 32 mmol/L Final     Phosphorus   Date Value Ref Range Status   11/10/2023 5.8 (H) 2.5 - 4.9 mg/dL Final     Comment:     The performance characteristics of phosphorus testing in heparinized plasma have been validated by the individual  laboratory site where testing is performed. Testing on heparinized plasma is not approved by the FDA; however, such approval is not necessary.   11/09/2023 8.3 (H) 2.5 - 4.9 mg/dL Final     Comment:     The performance characteristics of phosphorus testing in heparinized plasma  have been validated by the individual  laboratory site where testing is performed. Testing on heparinized plasma is not approved by the FDA; however, such approval is not necessary.   11/08/2023 8.3 (H) 2.5 - 4.9 mg/dL Final     Comment:     The performance characteristics of phosphorus testing in heparinized plasma have been validated by the individual  laboratory site where testing is performed. Testing on heparinized plasma is not approved by the FDA; however, such approval is not necessary.     Calcium   Date Value Ref Range Status   11/10/2023 8.5 (L) 8.6 - 10.6 mg/dL Final   11/09/2023 8.6 8.6 - 10.6 mg/dL Final   11/08/2023 8.5 (L) 8.6 - 10.6 mg/dL Final     Urea Nitrogen   Date Value Ref Range Status   11/10/2023 40 (H) 6 - 23 mg/dL Final   11/09/2023 58 (H) 6 - 23 mg/dL Final   11/08/2023 80 (H) 6 - 23 mg/dL Final     Creatinine   Date Value Ref Range Status   11/10/2023 6.79 (H) 0.50 - 1.30 mg/dL Final   11/09/2023 8.57 (H) 0.50 - 1.30 mg/dL Final   11/08/2023 10.68 (H) 0.50 - 1.30 mg/dL Final       POCT pH, Arterial   Date Value Ref Range Status   10/27/2023 7.35 (L) 7.38 - 7.42 pH Final   10/27/2023 7.34 (L) 7.38 - 7.42 pH Final     POCT pCO2, Arterial   Date Value Ref Range Status   10/27/2023 42 38 - 42 mm Hg Final   10/27/2023 42 38 - 42 mm Hg Final     POCT pO2, Arterial   Date Value Ref Range Status   10/27/2023 126 (H) 85 - 95 mm Hg Final   10/27/2023 125 (H) 85 - 95 mm Hg Final     pH, Venous   Date Value Ref Range Status   03/30/2023 7.37 7.33 - 7.43 Final     pCO2, Venous   Date Value Ref Range Status   03/30/2023 49 41 - 51 mmHg Final     POCT Lactate, Arterial   Date Value Ref Range Status   10/27/2023 1.1 0.4 - 2.0 mmol/L Final   10/27/2023 0.6 0.4 - 2.0 mmol/L Final     Lactate, Venous   Date Value Ref Range Status   03/30/2023 1.6 0.4 - 2.0 mmol/L Final       FeNa Values: Serum Creatinine/Serum Sodium * Urine sodium to creatinine ratio (FeNa in percent)    Lab Results   Component Value  Date    GLUCOSE 82 11/10/2023    CALCIUM 8.5 (L) 11/10/2023     11/10/2023    K 3.4 (L) 11/10/2023    CO2 25 11/10/2023    CL 97 (L) 11/10/2023    BUN 40 (H) 11/10/2023    CREATININE 6.79 (H) 11/10/2023       Sodium/Creatinine Ratio   Date Value Ref Range Status   11/05/2023 68 Not established. mmol/g Creat Final         ASSESSMENT:  Jose Thornton is a 57 y.o. male with PMH of Crohn's disease and colovesical fistula who underwent Exploratory laparotomy, MYA, Cystoscopy, Bilateral ileal ureter and bladder augment with right partial rectus muscle flap, and Suprapubic tube insertion on 10/27, c/b post-op ileus and acute renal failure.  Nephrology is consulted for management of KADE.      His renal function continues to improve on dialysis.  Given that his urine output has been limited and electrolytes are improving, we will trial him a day off dialysis to see how his electrolytes and urine output do.     Pathological assessment  - Hemodynamics:  94//77, HR: 78-92  - Na of 138, Potassium of 3.4, Chloride of 97, BUN of 40 (decreased from 58), Creatinine of 6.79 (decreased from 8.57), Phos of 5.8 (decreased from 8.3)  -Acidosis: Bicarb of 25   -Volume status:  455 ml of output from nephrostomy tubes, only 50 mL of output from urethral/suprapubic tubes. 316 mL removed by CVVH. 1.531 L positive for the day.     #KADE on CKD3a, oliguric, Stage III, c/b hematuria, pyuria and proteinuria  - Baseline creatinine:  1.38  - Etiology:  Most likely ATN from long course of gentamicin,  UTI is possible, renal restricted TMA or other GN  - Urine electrolyes showed FeNA/FeUrea of 4.7%, consistent with post renal >4 etiology  - Clinical volume status: Euvolemic  - Electrolytes (Na, K, Ca, Phos) are stable  - Acid base status: Bicarb of 21, not acidotic  - Blood pressures stable   - Avoid Contrast, hyperglycemia, NSAIDs, ischemia  - Renally dose meds  -  Renal USG/CT abdomen on 11/5 shows no signs of obstruction      Recommendations:  - No dialysis today, will observe electrolytes and urine output tomorrow  - If not stabilizing, may need a biopsy next week  - Avoid nephrotoxins, contrast if possible  - strict Is/Os  - Renal dosing for medications for latest eGFR, follow medication trough as appropriate  - Avoid hypotension/rapid fluctuations in BPs     John Ferreira MD, PhD  Nephrology Resident  24 hour Renal Pager - [phone]     Discussed with attending nephrologist Dr. Lydia Thomson

## 2023-11-10 NOTE — PROGRESS NOTES
Jose Thornton is a 57 y.o. male on day 17 of admission presenting with Colovesical fistula.    TCC Note    Plan per Medical/Surgical Team: Acute renal failure requiring renal dialysis, continue to monitor renal function day by day.  Status: Inpatient  Payor Source: Self Pay  Discharge disposition: [facility] RN, approved for 3 HC RN visits.  Expected date of discharge: 11/14/23  Barriers: medical   Will continue to follow patient for any discharge needs.

## 2023-11-10 NOTE — CONSULTS
"Nutrition Initial Assessment:   Nutrition Assessment    Reason for Assessment: Provider consult order (Education)    Patient is a 57 y.o. male presenting with hx of Crohn's Disease presenting for colovesical fistula. Taken to OR (10/27) now s/p ex lap, MYA, cystoscopy, bilateral ileal ureter and bladder augment with right partial rectus muscle flap, and suprapubic tube insertion.    Advanced to a CLD on (10/29) & further to a regular diet (10/31). Pt with ROBF on (11/1). However, developed N/V (11/3) so was made NPO due to presence of post-op ileus.   Nephrology consulted for an KADE on HD.     Nutrition History:  Food and Nutrient History: Pt reports to have a poor appetite and to be consuming 1 meal per day due to getting full quickly. Reported that Ensure drink was causing heart burn, is requesting Glucerna with meals.  Pt UBW is 245#, Pt rep rots to have noticed weight loss since admission. Pt interested on diet education related to Phosphorous restriction. Pt educated and verbalized understanding, no barriers to learning identified.  Energy Intake: Poor < 50 %  Food Allergies/Intolerances:  None  GI Symptoms: None  Oral Problems: None  Food and Nutrition Program Participation:      Anthropometrics:  Height: 182.9 cm (6' 0.01\")   Weight: 113 kg (249 lb 1.9 oz)   IBW/kg (Dietitian Calculated): 80.91 kg  Percent of IBW: 140 %     Weight History:   6/8 108 kg  7/25 111 kg   8/25 109 kg  10/24 113 kg   11/9 113 kg     Weight Change %:  Weight History / % Weight Change: No significant weight changes noted. Weigth stable range 244-250# x 4mos  Significant Weight Loss: No  Nutrition Focused Physical Exam Findings:    Subcutaneous Fat Loss:   Orbital Fat Pads: Well nourshed (slightly bulging fat pads)   Buccal Fat Pads: Well nourished (full, rounded cheeks)   Triceps: Well nourished (ample fat tissue)   Muscle Wasting:  Temporalis: Well nourished (well-defined muscle)  Pectoralis (Clavicular Region): Well nourished " (clavicle not visible)  Deltoid/Trapezius: Well nourished (rounded appearance at arm, shoulder, neck)  Interosseous: Well nourished (muscle bulges)  Edema:  Edema: none   Edema Location: No edema ntoed     Nutrition Significant Labs: Reviewed  Results for orders placed or performed during the hospital encounter of 10/24/23 (from the past 24 hour(s))   Basic Metabolic Panel   Result Value Ref Range    Glucose 82 74 - 99 mg/dL    Sodium 138 136 - 145 mmol/L    Potassium 3.4 (L) 3.5 - 5.3 mmol/L    Chloride 97 (L) 98 - 107 mmol/L    Bicarbonate 25 21 - 32 mmol/L    Anion Gap 19 10 - 20 mmol/L    Urea Nitrogen 40 (H) 6 - 23 mg/dL    Creatinine 6.79 (H) 0.50 - 1.30 mg/dL    eGFR 9 (L) >60 mL/min/1.73m*2    Calcium 8.5 (L) 8.6 - 10.6 mg/dL   CBC   Result Value Ref Range    WBC 6.9 4.4 - 11.3 x10*3/uL    nRBC 0.0 0.0 - 0.0 /100 WBCs    RBC 4.11 (L) 4.50 - 5.90 x10*6/uL    Hemoglobin 11.4 (L) 13.5 - 17.5 g/dL    Hematocrit 35.1 (L) 41.0 - 52.0 %    MCV 85 80 - 100 fL    MCH 27.7 26.0 - 34.0 pg    MCHC 32.5 32.0 - 36.0 g/dL    RDW 15.9 (H) 11.5 - 14.5 %    Platelets 264 150 - 450 x10*3/uL   Phosphorus   Result Value Ref Range    Phosphorus 5.8 (H) 2.5 - 4.9 mg/dL     Nutrition Specific Medications: All med's reviewed noting- heparin, ondansetron, sennosides    I/O:     Intake/Output Summary (Last 24 hours) at 11/10/2023 1511  Last data filed at 11/10/2023 1500  Gross per 24 hour   Intake 2640 ml   Output 1411 ml   Net 1229 ml       Dietary Orders (From admission, onward)       Start     Ordered    11/10/23 1346  Oral nutritional supplements  Until discontinued        Question Answer Comment   Deliver with All meals    Select supplement: Glucerna Shake        11/10/23 1345    11/09/23 1704  Adult diet Easy to chew; Phosphorus 1 gm  Diet effective now        Question Answer Comment   Diet type Easy to chew    Other restriction(s): Phosphorus 1 gm        11/09/23 1703                     Estimated Needs:   Total Energy  Estimated Needs (kCal): 1204-5820 kcals/day  Method for Estimating Needs: MSJ x stress factor1.1-1.2;    Total Protein Estimated Needs (g): 96-104gm/day  Method for Estimating Needs: 1.2-1.3 gm/kg of IBW  Total Fluid Estimated Needs (mL): 2192 mL  Method for Estimating Needs: 1mL/kcal;      Nutrition Diagnosis   Nutrition Diagnosis:  Malnutrition Diagnosis  Patient has Malnutrition Diagnosis: No    Nutrition Diagnosis  Patient has Nutrition Diagnosis: Yes  Diagnosis Status (1): Ongoing  Nutrition Diagnosis 1: Increased nutrient needs  Related to (1): increased metabolic demand  As Evidenced by (1): colovesical fistula s/p takedown now c/b acute on CKD requiring HD initiation       Nutrition Interventions/Recommendations   Nutrition Interventions and Recommendations:        Nutrition Prescription:  Recommend Nepro TID to aid in Po intake provides --> 420kcasl, 19gm pro, 170mg Phosphorous per serving    Nutrition Education:   Review Phosphorous food restriction indications   Review why the need of Phosphorous restriction  Review eating smaller more frequent meals  Handout provided on foods to avoid with Phosphorous restriction and renal diet indications      Nutrition Monitoring and Evaluation   Monitoring/Evaluation:   Food/Nutrient Related History Monitoring  Energy Intake: Estimated energy intake  Criteria: Pt will consume >50% of meals and supplements    Time Spent/Follow-up Reminder:   Follow Up  Time Spent (min): 60 minutes  Last Date of Nutrition Visit: 11/10/23  Nutrition Follow-Up Needed?: Dietitian to reassess per policy

## 2023-11-10 NOTE — PROGRESS NOTES
"  UROLOGY DAILY PROGRESS NOTE      Subjective     - Received dialysis yesterday (3rd session), this time was able to undergo full 3hrs  - Patient tolerated soft foods yesterday with mild nausea and early satiety but no vomiting  - Patient subjectively feels that he is \"going in the right direction\"     Objective   Physical Exam  Gen: NAD, alert  HEENT: normocephalic, atraumatic  Pulm: nonlabored breathing on room air  CV: regular rate per chart  GI: soft, non-tender, mildly distended, colostomy L abdomen w/ healthy appearance, green liquid with gas. Midline incision c/d/I closed with staples, open to air, LINDSEY drains removed with dressings in place  : mild suprapubic tenderness, no CVA tenderness, SPT draining minimal straw yellow urine with mucus, urethral andrade draining minimal straw yellow with mucus, SPT and urethral andrade irrigated easily with return of mucus, b/l PCNT uncapped with straw yellow urine, flushed with return of urine and minimal mucus  MSK: STANLEY  Neuro: no focal deficits  Skin: WWP  Psych: appropriate mood and behavior    Last Recorded Vitals  Heart Rate:  [78-92]   Temp:  [36.1 °C (97 °F)-36.7 °C (98.1 °F)]   Resp:  [18]   BP: ()/(64-77)   Height:  [182.9 cm (6' 0.01\")]   Weight:  [113 kg (249 lb 1.9 oz)]   SpO2:  [92 %-96 %]   Intake/Output last 3 Shifts:  I/O last 3 completed shifts:  In: 3040 (26.9 mL/kg) [P.O.:240; I.V.:2000 (17.7 mL/kg); Other:800]  Out: 1946 (17.2 mL/kg) [Urine:830 (0.2 mL/kg/hr); Other:316; Stool:800]  Weight: 113 kg     Net IO Since Admission: -8,474.66 mL [11/10/23 0708]    Relevant Results  Results for orders placed or performed during the hospital encounter of 10/24/23 (from the past 24 hour(s))   Protein, Urine Random   Result Value Ref Range    Total Protein, Urine Random >1,000 (H) 5 - 25 mg/dL    Creatinine, Urine Random 142.9 20.0 - 370.0 mg/dL    T. Protein/Creatinine Ratio     Urinalysis with Reflex Microscopic   Result Value Ref Range    Color, Urine Zarina " (N) Straw, Yellow    Appearance, Urine Hazy (N) Clear    Specific Gravity, Urine 1.015 1.005 - 1.035    pH, Urine 6.0 5.0, 5.5, 6.0, 6.5, 7.0, 7.5, 8.0    Protein, Urine >=500 (3+) (N) NEGATIVE mg/dL    Glucose, Urine NEGATIVE NEGATIVE mg/dL    Blood, Urine MODERATE (2+) (A) NEGATIVE    Ketones, Urine 5 (TRACE) (A) NEGATIVE mg/dL    Bilirubin, Urine NEGATIVE NEGATIVE    Urobilinogen, Urine <2.0 <2.0 mg/dL    Nitrite, Urine POSITIVE (A) NEGATIVE    Leukocyte Esterase, Urine MODERATE (2+) (A) NEGATIVE   Microscopic Only, Urine   Result Value Ref Range    WBC, Urine >50 (A) 1-5, NONE /HPF    WBC Clumps, Urine MANY Reference range not established. /HPF    RBC, Urine >20 (A) NONE, 1-2, 3-5 /HPF    Bacteria, Urine 1+ (A) NONE SEEN /HPF         Imaging  CT AP non-con 11/5  Impression   1.  Postsurgical changes as described above with likely postoperative  ventral abdominal seroma.  2. Bilateral percutaneous nephrostomy tubes and bilateral ureteral  stents coursing through the bilateral ileal ureter and terminating  within the augmented urinary bladder. Air within the left renal  collecting system is likely postprocedural.  3. Mild diffuse fluid-filled small bowel dilatation with air-fluid  levels with the no clear transition point likely representing ileus.  4. Other chronic and incidental findings as described above.       RBUS 11/5  IMPRESSION:  1. No evidence of hydronephrosis.  2. Normal-sized kidneys with increased renal cortical echogenicity  bilaterally and mild left renal cortical thinning, suggestive of  medical renal disease.    KUB 11/8  IMPRESSION:  1.  Findings consistent with postoperative ileus  2. Stents appear in adequate position.    Assessment/Plan   Jose Thornton is a 57 y.o. male with PMH of Crohn's disease and colovesical fistula who underwent Exploratory laparotomy, MYA, Cystoscopy, Bilateral ileal ureter and bladder augment with right partial rectus muscle flap, and Suprapubic tube insertion on  10/27/23 with Dr. Andersen and Dr. Rodriguez.     Patient initially recovered well and had quick return of bowel function with consistent ostomy output. However, he then developed symptoms and imaging findings c/w ileus. Nausea is slowly resolving and he has not had recent emesis, though has had smoldering symptoms when trying to advance diet. Antibiotics (most notably Gentamicin) were discontinued after courses were complete 11/3. Labs became significant for quickly rising creatinine, which peaked at 11.73 on 11/7. Nephrology assessment determined him to be in acute renal failure and he has now received hemodialysis X3. No significant electrolyte abnormalities have been associated with the new acute renal failure. Nephrology is continuing to assess for dialysis needs.     Current ddx of etiology for ARF is gentamicin toxicity, distal obstruction (though current evidence indicates low concern), UTI (UA will be contaminated 2/2 ileal ureters, no current s/sx, if clinically suspected would involve ID), and renal restricted TMA or other GN. Nephrology on board for workup.      Principal Problem:    Colovesical fistula    Plan:  Neuro:   - Pain control: Tylenol, oxycodone, breakthrough dilaudid, oxybutynin and breakthrough flexeril for bladder spasm pain    Pulm:   - IS 10x / hr    CV:   - VS Q8h    FEN/GI:   - Continue scheduled zofran, breakthrough tygan  - PRN tums  - Appreciate nutrition recs for soft phosphorous restricted diet, re-engage nutrition for patient counseling for outside foods    /Alpesh:   - Maintain andrade and SPT to drainage, teaching for home flushing when wife present, BID flushing AM per urology, PM per nursing   - Maintain b/l nephrostomy tubes uncapped, BID flushing AM per urology, PM per nursing  - Appreciate nephrology evaluation for dialytic needs  - Appreciate nephrology recs for acute renal failure workup and treatment       - possible biopsy next week if not stabilizing       - avoid nephrotoxins,  contrast if possible       - strict Is/Os       - renal dosing for medications as appropriate       - avoid hypotension/ rapid BP fluctuations    Heme:   - Continue to monitor hemoglobin, no current indication for transfusion    ID:   - 10/25 urine cultures with gentamicin sensitive Pseudomonas (10 day course gentamicin for complicated UTI completed 11/3)  - 11/9 UA from PCNT with nitrites, 2+ LE, 2+ blood, 3+ protein, no clinical signs/symptoms of UTI, consistent with known contamination from ileal ureter, if concerns raised for UTI in setting of renal failure would engage ID to help distinguish clinical significance   - no current indication for antibiotics    Endo:   - HbA1c normal (performed 2/2 patient concerns)    MSK:   - OOB as tolerated    Prophylaxis: SQH / SCDs  Dispo:  Continue care on RNF    Seen with chief resident Dr. Ferrer, to be discussed with attending Dr. Nathaly Lerma MD  PGY-1 Urology [place]  Pager: [phone]

## 2023-11-11 ENCOUNTER — HOME HEALTH ADMISSION (OUTPATIENT)
Dept: HOME HEALTH SERVICES | Facility: HOME HEALTH | Age: 58
End: 2023-11-11

## 2023-11-11 LAB
ANION GAP SERPL CALC-SCNC: 20 MMOL/L (ref 10–20)
BUN SERPL-MCNC: 51 MG/DL (ref 6–23)
CALCIUM SERPL-MCNC: 8.7 MG/DL (ref 8.6–10.6)
CHLORIDE SERPL-SCNC: 98 MMOL/L (ref 98–107)
CO2 SERPL-SCNC: 23 MMOL/L (ref 21–32)
CREAT SERPL-MCNC: 7.05 MG/DL (ref 0.5–1.3)
ERYTHROCYTE [DISTWIDTH] IN BLOOD BY AUTOMATED COUNT: 15.5 % (ref 11.5–14.5)
GFR SERPL CREATININE-BSD FRML MDRD: 9 ML/MIN/1.73M*2
GLUCOSE SERPL-MCNC: 96 MG/DL (ref 74–99)
HCT VFR BLD AUTO: 34.5 % (ref 41–52)
HGB BLD-MCNC: 11 G/DL (ref 13.5–17.5)
MCH RBC QN AUTO: 27.5 PG (ref 26–34)
MCHC RBC AUTO-ENTMCNC: 31.9 G/DL (ref 32–36)
MCV RBC AUTO: 86 FL (ref 80–100)
NRBC BLD-RTO: 0 /100 WBCS (ref 0–0)
PHOSPHATE SERPL-MCNC: 6.8 MG/DL (ref 2.5–4.9)
PLATELET # BLD AUTO: 292 X10*3/UL (ref 150–450)
POTASSIUM SERPL-SCNC: 3.7 MMOL/L (ref 3.5–5.3)
RBC # BLD AUTO: 4 X10*6/UL (ref 4.5–5.9)
SODIUM SERPL-SCNC: 137 MMOL/L (ref 136–145)
WBC # BLD AUTO: 7.1 X10*3/UL (ref 4.4–11.3)

## 2023-11-11 PROCEDURE — 2500000004 HC RX 250 GENERAL PHARMACY W/ HCPCS (ALT 636 FOR OP/ED)

## 2023-11-11 PROCEDURE — 2500000005 HC RX 250 GENERAL PHARMACY W/O HCPCS

## 2023-11-11 PROCEDURE — 2500000004 HC RX 250 GENERAL PHARMACY W/ HCPCS (ALT 636 FOR OP/ED): Performed by: NURSE PRACTITIONER

## 2023-11-11 PROCEDURE — 99232 SBSQ HOSP IP/OBS MODERATE 35: CPT

## 2023-11-11 PROCEDURE — 1170000001 HC PRIVATE ONCOLOGY ROOM DAILY

## 2023-11-11 PROCEDURE — 85027 COMPLETE CBC AUTOMATED: CPT | Performed by: STUDENT IN AN ORGANIZED HEALTH CARE EDUCATION/TRAINING PROGRAM

## 2023-11-11 PROCEDURE — 2500000001 HC RX 250 WO HCPCS SELF ADMINISTERED DRUGS (ALT 637 FOR MEDICARE OP)

## 2023-11-11 PROCEDURE — 80048 BASIC METABOLIC PNL TOTAL CA: CPT | Performed by: STUDENT IN AN ORGANIZED HEALTH CARE EDUCATION/TRAINING PROGRAM

## 2023-11-11 PROCEDURE — 2500000001 HC RX 250 WO HCPCS SELF ADMINISTERED DRUGS (ALT 637 FOR MEDICARE OP): Performed by: NURSE PRACTITIONER

## 2023-11-11 PROCEDURE — 2500000004 HC RX 250 GENERAL PHARMACY W/ HCPCS (ALT 636 FOR OP/ED): Performed by: STUDENT IN AN ORGANIZED HEALTH CARE EDUCATION/TRAINING PROGRAM

## 2023-11-11 PROCEDURE — 96372 THER/PROPH/DIAG INJ SC/IM: CPT | Performed by: STUDENT IN AN ORGANIZED HEALTH CARE EDUCATION/TRAINING PROGRAM

## 2023-11-11 PROCEDURE — 84100 ASSAY OF PHOSPHORUS: CPT | Performed by: STUDENT IN AN ORGANIZED HEALTH CARE EDUCATION/TRAINING PROGRAM

## 2023-11-11 RX ADMIN — HEPARIN SODIUM 5000 UNITS: 5000 INJECTION INTRAVENOUS; SUBCUTANEOUS at 04:49

## 2023-11-11 RX ADMIN — Medication: at 09:15

## 2023-11-11 RX ADMIN — OXYCODONE HYDROCHLORIDE 5 MG: 5 TABLET ORAL at 00:37

## 2023-11-11 RX ADMIN — ONDANSETRON HYDROCHLORIDE 4 MG: 4 TABLET, FILM COATED ORAL at 21:24

## 2023-11-11 RX ADMIN — OXYBUTYNIN CHLORIDE 5 MG: 5 TABLET ORAL at 21:24

## 2023-11-11 RX ADMIN — HEPARIN SODIUM 5000 UNITS: 5000 INJECTION INTRAVENOUS; SUBCUTANEOUS at 21:24

## 2023-11-11 RX ADMIN — OXYBUTYNIN CHLORIDE 5 MG: 5 TABLET ORAL at 16:22

## 2023-11-11 RX ADMIN — SODIUM BICARBONATE 650 MG: 650 TABLET ORAL at 21:24

## 2023-11-11 RX ADMIN — OXYBUTYNIN CHLORIDE 5 MG: 5 TABLET ORAL at 08:05

## 2023-11-11 RX ADMIN — HEPARIN SODIUM 5000 UNITS: 5000 INJECTION INTRAVENOUS; SUBCUTANEOUS at 13:43

## 2023-11-11 RX ADMIN — SODIUM BICARBONATE 650 MG: 650 TABLET ORAL at 16:22

## 2023-11-11 RX ADMIN — ACETAMINOPHEN 975 MG: 325 TABLET ORAL at 00:37

## 2023-11-11 RX ADMIN — ONDANSETRON HYDROCHLORIDE 4 MG: 4 TABLET, FILM COATED ORAL at 16:22

## 2023-11-11 RX ADMIN — SODIUM BICARBONATE 650 MG: 650 TABLET ORAL at 08:05

## 2023-11-11 ASSESSMENT — PAIN - FUNCTIONAL ASSESSMENT
PAIN_FUNCTIONAL_ASSESSMENT: 0-10
PAIN_FUNCTIONAL_ASSESSMENT: 0-10

## 2023-11-11 ASSESSMENT — PAIN SCALES - GENERAL
PAINLEVEL_OUTOF10: 6
PAINLEVEL_OUTOF10: 5 - MODERATE PAIN

## 2023-11-11 NOTE — PROGRESS NOTES
"  UROLOGY DAILY PROGRESS NOTE      Subjective     - Patient continued to tolerate soft foods with \"false alarms\" but no emesis. States that he has nausea and early satiety but if he slows down \"things adjust\"  - Declines significant pain  - Unsure if his wife will be here this morning    Objective   Physical Exam  Gen: NAD, alert  HEENT: normocephalic, atraumatic  Pulm: nonlabored breathing on room air  CV: regular rate per chart  GI: soft, non-tender, mildly distended, colostomy L abdomen w/ healthy appearance, green liquid with gas. Midline incision c/d/I closed with staples, open to air, LINDSEY drains removed with dressings in place  : mild suprapubic tenderness, no CVA tenderness, SPT draining minimal straw yellow urine with mucus, urethral andrade draining straw yellow with mucus, SPT and urethral andrade irrigated easily with return of mucus, b/l PCNT uncapped with straw yellow urine, flushed with return of urine and minimal mucus  MSK: STANLEY  Neuro: no focal deficits  Skin: WWP  Psych: appropriate mood and behavior    Last Recorded Vitals  Heart Rate:  [75-94]   Temp:  [35.8 °C (96.4 °F)-36.6 °C (97.9 °F)]   Resp:  [16-20]   BP: ()/(63-75)   Height:  [182.9 cm (6' 0.01\")]   SpO2:  [95 %-97 %]   Intake/Output last 3 Shifts:  I/O last 3 completed shifts:  In: 460 (4.1 mL/kg) [P.O.:460]  Out: 2100 (18.6 mL/kg) [Urine:1300 (0.3 mL/kg/hr); Stool:800]  Weight: 113 kg     Net IO Since Admission: -9,589.66 mL [11/11/23 0802]    Relevant Results  Results for orders placed or performed during the hospital encounter of 10/24/23 (from the past 24 hour(s))   Basic Metabolic Panel   Result Value Ref Range    Glucose 96 74 - 99 mg/dL    Sodium 137 136 - 145 mmol/L    Potassium 3.7 3.5 - 5.3 mmol/L    Chloride 98 98 - 107 mmol/L    Bicarbonate 23 21 - 32 mmol/L    Anion Gap 20 10 - 20 mmol/L    Urea Nitrogen 51 (H) 6 - 23 mg/dL    Creatinine 7.05 (H) 0.50 - 1.30 mg/dL    eGFR 9 (L) >60 mL/min/1.73m*2    Calcium 8.7 8.6 - 10.6 " mg/dL   CBC   Result Value Ref Range    WBC 7.1 4.4 - 11.3 x10*3/uL    nRBC 0.0 0.0 - 0.0 /100 WBCs    RBC 4.00 (L) 4.50 - 5.90 x10*6/uL    Hemoglobin 11.0 (L) 13.5 - 17.5 g/dL    Hematocrit 34.5 (L) 41.0 - 52.0 %    MCV 86 80 - 100 fL    MCH 27.5 26.0 - 34.0 pg    MCHC 31.9 (L) 32.0 - 36.0 g/dL    RDW 15.5 (H) 11.5 - 14.5 %    Platelets 292 150 - 450 x10*3/uL   Phosphorus   Result Value Ref Range    Phosphorus 6.8 (H) 2.5 - 4.9 mg/dL         Imaging  CT AP non-con 11/5  Impression   1.  Postsurgical changes as described above with likely postoperative  ventral abdominal seroma.  2. Bilateral percutaneous nephrostomy tubes and bilateral ureteral  stents coursing through the bilateral ileal ureter and terminating  within the augmented urinary bladder. Air within the left renal  collecting system is likely postprocedural.  3. Mild diffuse fluid-filled small bowel dilatation with air-fluid  levels with the no clear transition point likely representing ileus.  4. Other chronic and incidental findings as described above.       RBUS 11/5  IMPRESSION:  1. No evidence of hydronephrosis.  2. Normal-sized kidneys with increased renal cortical echogenicity  bilaterally and mild left renal cortical thinning, suggestive of  medical renal disease.    KUB 11/8  IMPRESSION:  1.  Findings consistent with postoperative ileus  2. Stents appear in adequate position.    Assessment/Plan   Jose Thornton is a 57 y.o. male with PMH of Crohn's disease and colovesical fistula who underwent Exploratory laparotomy, MYA, Cystoscopy, Bilateral ileal ureter and bladder augment with right partial rectus muscle flap, and Suprapubic tube insertion on 10/27/23 with Dr. Andersen and Dr. Rodriguez.     Patient initially recovered well and had quick return of bowel function with consistent ostomy output. However, he then developed symptoms and imaging findings c/w ileus. Nausea is slowly resolving and he has not had recent emesis, though has had smoldering  symptoms when trying to advance diet. Antibiotics (most notably Gentamicin) were discontinued after courses were complete 11/3. Labs became significant for quickly rising creatinine, which peaked at 11.73 on 11/7. Nephrology assessment determined him to be in acute renal failure and he has now received hemodialysis X3. No significant electrolyte abnormalities have been associated with the new acute renal failure. Nephrology is continuing to assess for dialysis needs.     Current ddx of etiology for ARF is gentamicin toxicity, distal obstruction (though current evidence indicates low concern), UTI (UA will be contaminated 2/2 ileal ureters, no current s/sx, if clinically suspected would involve ID), and renal restricted TMA or other GN. Nephrology on board for workup.      Principal Problem:    Colovesical fistula    Plan:  Neuro:   - Pain control: Tylenol, oxycodone, breakthrough dilaudid, oxybutynin and breakthrough flexeril for bladder spasm pain    Pulm:   - IS 10x / hr    CV:   - VS Q8h    FEN/GI:   - Continue scheduled zofran, breakthrough tygan  - PRN tums  - Appreciate nutrition recs for soft phosphorous restricted diet, Nepro TID    /Alpesh:   - Maintain andrade and SPT to drainage, teaching for home flushing when wife present, BID flushing AM per urology, PM per nursing   - Maintain b/l nephrostomy tubes uncapped, BID flushing AM per urology, PM per nursing  - Appreciate nephrology evaluation for dialytic needs  - Appreciate nephrology recs for acute renal failure workup and treatment       - possible biopsy next week if not stabilizing       - avoid nephrotoxins, contrast if possible       - strict Is/Os       - renal dosing for medications as appropriate       - avoid hypotension/ rapid BP fluctuations    Heme:   - Continue to monitor hemoglobin, no current indication for transfusion    ID:   - 10/25 urine cultures with gentamicin sensitive Pseudomonas (10 day course gentamicin for complicated UTI completed  11/3)  - 11/9 UA from PCNT with nitrites, 2+ LE, 2+ blood, 3+ protein, no clinical signs/symptoms of UTI, consistent with known contamination from ileal ureter, if concerns raised for UTI in setting of renal failure would engage ID to help distinguish clinical significance   - no current indication for antibiotics    Endo:   - HbA1c normal (performed 2/2 patient concerns)    MSK:   - OOB as tolerated    Prophylaxis: SQH / SCDs  Dispo:  Continue care on RNF    Seen with chief resident Dr. Ferrer, to be discussed with attending Dr. Carloz Martinez MD  Urologic Surgery PGY-2  Adult Urology: [id number]    Pediatric Urology: [id number]

## 2023-11-11 NOTE — PROGRESS NOTES
NEPHROLOGY FOLLOW UP NOTE    Jose TARANGO Norberto   57 y.o.    @WT@  MRN/Room: [id number]    Subjective: He reports he is feeling better this morning, with his nausea down 25%. He states that he doesn't feel any abdominal pressure like he did yesterday. Denies any shortness of breath, abdominal pain or nausea/vomiting.  Had an episode of dizziness and brain fog yesterday which resolved after he drank some fluids.     Meds:   acetaminophen, 975 mg, q6h  fluticasone, 2 spray, Daily  heparin (porcine), 5,000 Units, q8h  ondansetron, 4 mg, q8h   Or  ondansetron, 4 mg, q8h  oxybutynin, 5 mg, TID  sennosides, 2 tablet, BID  sodium bicarbonate, 650 mg, TID         benzocaine-menthol, 1 lozenge, q2h PRN  bisacodyl, 10 mg, Daily PRN  calcium carbonate, 500 mg, 4x daily PRN  cyclobenzaprine, 5 mg, TID PRN  guaiFENesin, 600 mg, BID PRN  HYDROmorphone, 0.2 mg, q4h PRN  melatonin, 10 mg, Nightly PRN  naloxone, 0.2 mg, q5 min PRN  oxyCODONE, 10 mg, q6h PRN  oxyCODONE, 5 mg, q4h PRN  polyethylene glycol, 17 g, Daily PRN  trimethobenzamide, 200 mg, q6h PRN      Objective:     Vitals:    11/11/23 0537   BP: 109/72   Pulse: 75   Resp: 18   Temp: 36.1 °C (97 °F)   SpO2: 96%        Wt Readings from Last 10 Encounters:   11/09/23 113 kg (249 lb 1.9 oz)   10/05/23 115 kg (253 lb 4.9 oz)   10/02/23 113 kg (250 lb)   07/25/23 111 kg (244 lb)   07/17/23 109 kg (240 lb)   07/11/23 109 kg (240 lb)   06/19/23 105 kg (231 lb)   06/08/23 108 kg (237 lb)   05/15/23 94.3 kg (208 lb)   04/21/23 94.5 kg (208 lb 6 oz)         Intake/Output Summary (Last 24 hours) at 11/11/2023 0708  Last data filed at 11/11/2023 0500  Gross per 24 hour   Intake 220 ml   Output 1335 ml   Net -1115 ml       Net IO Since Admission: -9,589.66 mL [11/11/23 0708]    Physical Exam  General: Patient is awake, non-toxic appearing, normal body habitus  Pulm: Normal WOB at rest, no crackles or rhonchi  Cardiac: Regular rate and rhythm, normal S1/S2  Abdomen: Non-tender to  palpation, non-distended, brown output from ostomy, no inflammation around ostomy site  Extremities: No peripheral edema, or cyanosis  Neuro: Patient alert and oriented, cranial nerves grossly intact, normal strength and sensation.    Blood Labs:  Lab Results   Component Value Date    CREATININE 7.05 (H) 11/11/2023    BUN 51 (H) 11/11/2023     11/11/2023    K 3.7 11/11/2023    CL 98 11/11/2023    CO2 23 11/11/2023       Lab Results   Component Value Date    WBC 7.1 11/11/2023    HGB 11.0 (L) 11/11/2023    HCT 34.5 (L) 11/11/2023    MCV 86 11/11/2023     11/11/2023         Hemoglobin   Date Value Ref Range Status   11/11/2023 11.0 (L) 13.5 - 17.5 g/dL Final   11/10/2023 11.4 (L) 13.5 - 17.5 g/dL Final   11/09/2023 11.2 (L) 13.5 - 17.5 g/dL Final     WBC   Date Value Ref Range Status   11/11/2023 7.1 4.4 - 11.3 x10*3/uL Final   11/10/2023 6.9 4.4 - 11.3 x10*3/uL Final   11/09/2023 8.1 4.4 - 11.3 x10*3/uL Final     Platelets   Date Value Ref Range Status   11/11/2023 292 150 - 450 x10*3/uL Final   11/10/2023 264 150 - 450 x10*3/uL Final   11/09/2023 314 150 - 450 x10*3/uL Final       Sodium   Date Value Ref Range Status   11/11/2023 137 136 - 145 mmol/L Final   11/10/2023 138 136 - 145 mmol/L Final   11/09/2023 138 136 - 145 mmol/L Final     Potassium   Date Value Ref Range Status   11/11/2023 3.7 3.5 - 5.3 mmol/L Final   11/10/2023 3.4 (L) 3.5 - 5.3 mmol/L Final   11/09/2023 3.8 3.5 - 5.3 mmol/L Final     Bicarbonate   Date Value Ref Range Status   11/11/2023 23 21 - 32 mmol/L Final   11/10/2023 25 21 - 32 mmol/L Final   11/09/2023 21 21 - 32 mmol/L Final     Phosphorus   Date Value Ref Range Status   11/11/2023 6.8 (H) 2.5 - 4.9 mg/dL Final     Comment:     The performance characteristics of phosphorus testing in heparinized plasma have been validated by the individual  laboratory site where testing is performed. Testing on heparinized plasma is not approved by the FDA; however, such approval is not  necessary.   11/10/2023 5.8 (H) 2.5 - 4.9 mg/dL Final     Comment:     The performance characteristics of phosphorus testing in heparinized plasma have been validated by the individual  laboratory site where testing is performed. Testing on heparinized plasma is not approved by the FDA; however, such approval is not necessary.   11/09/2023 8.3 (H) 2.5 - 4.9 mg/dL Final     Comment:     The performance characteristics of phosphorus testing in heparinized plasma have been validated by the individual  laboratory site where testing is performed. Testing on heparinized plasma is not approved by the FDA; however, such approval is not necessary.     Calcium   Date Value Ref Range Status   11/11/2023 8.7 8.6 - 10.6 mg/dL Final   11/10/2023 8.5 (L) 8.6 - 10.6 mg/dL Final   11/09/2023 8.6 8.6 - 10.6 mg/dL Final     Urea Nitrogen   Date Value Ref Range Status   11/11/2023 51 (H) 6 - 23 mg/dL Final   11/10/2023 40 (H) 6 - 23 mg/dL Final   11/09/2023 58 (H) 6 - 23 mg/dL Final     Creatinine   Date Value Ref Range Status   11/11/2023 7.05 (H) 0.50 - 1.30 mg/dL Final   11/10/2023 6.79 (H) 0.50 - 1.30 mg/dL Final   11/09/2023 8.57 (H) 0.50 - 1.30 mg/dL Final       POCT pH, Arterial   Date Value Ref Range Status   10/27/2023 7.35 (L) 7.38 - 7.42 pH Final   10/27/2023 7.34 (L) 7.38 - 7.42 pH Final     POCT pCO2, Arterial   Date Value Ref Range Status   10/27/2023 42 38 - 42 mm Hg Final   10/27/2023 42 38 - 42 mm Hg Final     POCT pO2, Arterial   Date Value Ref Range Status   10/27/2023 126 (H) 85 - 95 mm Hg Final   10/27/2023 125 (H) 85 - 95 mm Hg Final     pH, Venous   Date Value Ref Range Status   03/30/2023 7.37 7.33 - 7.43 Final     pCO2, Venous   Date Value Ref Range Status   03/30/2023 49 41 - 51 mmHg Final     POCT Lactate, Arterial   Date Value Ref Range Status   10/27/2023 1.1 0.4 - 2.0 mmol/L Final   10/27/2023 0.6 0.4 - 2.0 mmol/L Final     Lactate, Venous   Date Value Ref Range Status   03/30/2023 1.6 0.4 - 2.0 mmol/L  Final       FeNa Values: Serum Creatinine/Serum Sodium * Urine sodium to creatinine ratio (FeNa in percent)    Lab Results   Component Value Date    GLUCOSE 96 11/11/2023    CALCIUM 8.7 11/11/2023     11/11/2023    K 3.7 11/11/2023    CO2 23 11/11/2023    CL 98 11/11/2023    BUN 51 (H) 11/11/2023    CREATININE 7.05 (H) 11/11/2023       Sodium/Creatinine Ratio   Date Value Ref Range Status   11/05/2023 68 Not established. mmol/g Creat Final       ASSESSMENT:  Jose Thornton is a 57 y.o. male with PMH of Crohn's disease and colovesical fistula who underwent Exploratory laparotomy, MYA, Cystoscopy, Bilateral ileal ureter and bladder augment with right partial rectus muscle flap, and Suprapubic tube insertion on 10/27, c/b post-op ileus and acute renal failure.  Nephrology is consulted for management of KADE.      He hs done well on his first day off dialysis with great urine output and no major electrolyte changes. Given that his electrolytes for the most part are within range, we will keep him off dialysis for today.      Pathological assessment  - Hemodynamics:  102//75, with one episode of hypotension to 95/63, HR: 35.8-36.6  - Na of 137, Potassium of 3.7, Chloride of 98, BUN of 51 (slightly worse from 40), Creatinine of 7.05 (slightly worse from 6.79), Phos (5.8-> 6.8), calcium stable at 8.7   -Acidosis: Bicarb of 23   -Volume status:  1085 mL of total urine output increased from 515 yesterday (0.4 mL/kg/hr), net negative -1.115 L for the day.  About 475 mL from urinary catheters and 610 mL output from nephrostomy tubes.   - WBC of 7.1, Hgb of 11, Platelets of 292     #KADE on CKD3a, oliguric, Stage III, c/b hematuria, pyuria and proteinuria  - Baseline creatinine:  1.38  - Etiology:  Most likely ATN from long course of gentamicin,  UTI is possible, renal restricted TMA or other GN  - Urine electrolyes showed FeNA/FeUrea of 4.7%, consistent with post renal >4 etiology  - Clinical volume status:  Euvolemic  - Electrolytes (Na, K, Ca, Phos) are stable  - Acid base status: Bicarb of 23, not acidotic  - Blood pressures stable   - Avoid Contrast, hyperglycemia, NSAIDs, ischemia  - Renally dose meds  -  Renal USG/CT abdomen on 11/5 shows no signs of obstruction     Recommendations:  - No dialysis today, will observe electrolytes and urine output tomorrow  - Avoid nephrotoxins, contrast if possible  - strict Is/Os  - Renal dosing for medications for latest eGFR, follow medication trough as appropriate  - Avoid hypotension/rapid fluctuations in BPs     John Ferreira MD, PhD  Nephrology Resident  24 hour Renal Pager - [phone]     Discussed with attending nephrologist Dr. Cindy Castillo

## 2023-11-11 NOTE — CARE PLAN
Problem: Pain  Goal: Takes deep breaths with improved pain control throughout the shift  Outcome: Progressing  Goal: Turns in bed with improved pain control throughout the shift  Outcome: Progressing  Goal: Walks with improved pain control throughout the shift  Outcome: Progressing  Goal: Performs ADL's with improved pain control throughout shift  Outcome: Progressing  Goal: Participates in PT with improved pain control throughout the shift  Outcome: Progressing  Goal: Free from opioid side effects throughout the shift  Outcome: Progressing  Goal: Free from acute confusion related to pain meds throughout the shift  Outcome: Progressing     Problem: Fall/Injury  Goal: Not fall by end of shift  Outcome: Progressing  Goal: Be free from injury by end of the shift  Outcome: Progressing  Goal: Verbalize understanding of personal risk factors for fall in the hospital  Outcome: Progressing  Goal: Verbalize understanding of risk factor reduction measures to prevent injury from fall in the home  Outcome: Progressing  Goal: Use assistive devices by end of the shift  Outcome: Progressing  Goal: Pace activities to prevent fatigue by end of the shift  Outcome: Progressing   The patient's goals for the shift include      The clinical goals for the shift include Pt to have no nausea during shift

## 2023-11-11 NOTE — CARE PLAN
Problem: Pain  Goal: Takes deep breaths with improved pain control throughout the shift  Outcome: Progressing  Goal: Turns in bed with improved pain control throughout the shift  Outcome: Progressing  Goal: Walks with improved pain control throughout the shift  Outcome: Progressing  Goal: Performs ADL's with improved pain control throughout shift  Outcome: Progressing  Goal: Participates in PT with improved pain control throughout the shift  Outcome: Progressing  Goal: Free from opioid side effects throughout the shift  Outcome: Progressing  Goal: Free from acute confusion related to pain meds throughout the shift  Outcome: Progressing     Problem: Fall/Injury  Goal: Not fall by end of shift  Outcome: Progressing  Goal: Be free from injury by end of the shift  Outcome: Progressing  Goal: Verbalize understanding of personal risk factors for fall in the hospital  Outcome: Progressing  Goal: Verbalize understanding of risk factor reduction measures to prevent injury from fall in the home  Outcome: Progressing  Goal: Use assistive devices by end of the shift  Outcome: Progressing  Goal: Pace activities to prevent fatigue by end of the shift  Outcome: Progressing     Problem: Dialysis  Goal: I will slow progression of end-stage renal disease through participating in dialysis 3 times a week  Outcome: Progressing   The patient's goals for the shift include      The clinical goals for the shift include Pt to have no nausea during shift    Pt had no c/o nausea during shift.

## 2023-11-12 LAB
ANION GAP SERPL CALC-SCNC: 21 MMOL/L (ref 10–20)
BUN SERPL-MCNC: 60 MG/DL (ref 6–23)
CALCIUM SERPL-MCNC: 8.8 MG/DL (ref 8.6–10.6)
CHLORIDE SERPL-SCNC: 98 MMOL/L (ref 98–107)
CO2 SERPL-SCNC: 23 MMOL/L (ref 21–32)
CREAT SERPL-MCNC: 7.11 MG/DL (ref 0.5–1.3)
ERYTHROCYTE [DISTWIDTH] IN BLOOD BY AUTOMATED COUNT: 15.6 % (ref 11.5–14.5)
GFR SERPL CREATININE-BSD FRML MDRD: 8 ML/MIN/1.73M*2
GLUCOSE SERPL-MCNC: 97 MG/DL (ref 74–99)
HCT VFR BLD AUTO: 36.1 % (ref 41–52)
HGB BLD-MCNC: 11.6 G/DL (ref 13.5–17.5)
MCH RBC QN AUTO: 27.8 PG (ref 26–34)
MCHC RBC AUTO-ENTMCNC: 32.1 G/DL (ref 32–36)
MCV RBC AUTO: 87 FL (ref 80–100)
NRBC BLD-RTO: 0 /100 WBCS (ref 0–0)
PHOSPHATE SERPL-MCNC: 6.1 MG/DL (ref 2.5–4.9)
PLATELET # BLD AUTO: 298 X10*3/UL (ref 150–450)
POTASSIUM SERPL-SCNC: 3.6 MMOL/L (ref 3.5–5.3)
RBC # BLD AUTO: 4.17 X10*6/UL (ref 4.5–5.9)
SODIUM SERPL-SCNC: 138 MMOL/L (ref 136–145)
WBC # BLD AUTO: 9.8 X10*3/UL (ref 4.4–11.3)

## 2023-11-12 PROCEDURE — 96372 THER/PROPH/DIAG INJ SC/IM: CPT | Performed by: STUDENT IN AN ORGANIZED HEALTH CARE EDUCATION/TRAINING PROGRAM

## 2023-11-12 PROCEDURE — 2500000004 HC RX 250 GENERAL PHARMACY W/ HCPCS (ALT 636 FOR OP/ED)

## 2023-11-12 PROCEDURE — 99232 SBSQ HOSP IP/OBS MODERATE 35: CPT | Performed by: STUDENT IN AN ORGANIZED HEALTH CARE EDUCATION/TRAINING PROGRAM

## 2023-11-12 PROCEDURE — 2500000005 HC RX 250 GENERAL PHARMACY W/O HCPCS

## 2023-11-12 PROCEDURE — 84100 ASSAY OF PHOSPHORUS: CPT | Performed by: STUDENT IN AN ORGANIZED HEALTH CARE EDUCATION/TRAINING PROGRAM

## 2023-11-12 PROCEDURE — 2500000004 HC RX 250 GENERAL PHARMACY W/ HCPCS (ALT 636 FOR OP/ED): Performed by: STUDENT IN AN ORGANIZED HEALTH CARE EDUCATION/TRAINING PROGRAM

## 2023-11-12 PROCEDURE — 85027 COMPLETE CBC AUTOMATED: CPT | Performed by: STUDENT IN AN ORGANIZED HEALTH CARE EDUCATION/TRAINING PROGRAM

## 2023-11-12 PROCEDURE — 80048 BASIC METABOLIC PNL TOTAL CA: CPT | Performed by: STUDENT IN AN ORGANIZED HEALTH CARE EDUCATION/TRAINING PROGRAM

## 2023-11-12 PROCEDURE — 1170000001 HC PRIVATE ONCOLOGY ROOM DAILY

## 2023-11-12 PROCEDURE — 2500000004 HC RX 250 GENERAL PHARMACY W/ HCPCS (ALT 636 FOR OP/ED): Performed by: NURSE PRACTITIONER

## 2023-11-12 RX ADMIN — SODIUM CHLORIDE, POTASSIUM CHLORIDE, SODIUM LACTATE AND CALCIUM CHLORIDE 500 ML: 600; 310; 30; 20 INJECTION, SOLUTION INTRAVENOUS at 22:27

## 2023-11-12 RX ADMIN — HEPARIN SODIUM 5000 UNITS: 5000 INJECTION INTRAVENOUS; SUBCUTANEOUS at 21:09

## 2023-11-12 RX ADMIN — SODIUM BICARBONATE 650 MG: 650 TABLET ORAL at 15:43

## 2023-11-12 RX ADMIN — HEPARIN SODIUM 5000 UNITS: 5000 INJECTION INTRAVENOUS; SUBCUTANEOUS at 13:42

## 2023-11-12 RX ADMIN — SODIUM BICARBONATE 650 MG: 650 TABLET ORAL at 21:09

## 2023-11-12 RX ADMIN — OXYBUTYNIN CHLORIDE 5 MG: 5 TABLET ORAL at 21:09

## 2023-11-12 RX ADMIN — SODIUM BICARBONATE 650 MG: 650 TABLET ORAL at 08:42

## 2023-11-12 RX ADMIN — OXYBUTYNIN CHLORIDE 5 MG: 5 TABLET ORAL at 15:43

## 2023-11-12 RX ADMIN — HEPARIN SODIUM 5000 UNITS: 5000 INJECTION INTRAVENOUS; SUBCUTANEOUS at 05:29

## 2023-11-12 RX ADMIN — OXYBUTYNIN CHLORIDE 5 MG: 5 TABLET ORAL at 08:59

## 2023-11-12 RX ADMIN — ONDANSETRON HYDROCHLORIDE 4 MG: 4 TABLET, FILM COATED ORAL at 21:09

## 2023-11-12 RX ADMIN — ONDANSETRON HYDROCHLORIDE 4 MG: 4 TABLET, FILM COATED ORAL at 05:29

## 2023-11-12 RX ADMIN — ONDANSETRON HYDROCHLORIDE 4 MG: 4 TABLET, FILM COATED ORAL at 13:42

## 2023-11-12 ASSESSMENT — COGNITIVE AND FUNCTIONAL STATUS - GENERAL
HELP NEEDED FOR BATHING: A LOT
DRESSING REGULAR LOWER BODY CLOTHING: A LITTLE
DAILY ACTIVITIY SCORE: 20
DRESSING REGULAR UPPER BODY CLOTHING: A LITTLE
CLIMB 3 TO 5 STEPS WITH RAILING: A LOT
MOVING TO AND FROM BED TO CHAIR: A LITTLE
WALKING IN HOSPITAL ROOM: A LOT
STANDING UP FROM CHAIR USING ARMS: A LITTLE
MOBILITY SCORE: 18

## 2023-11-12 ASSESSMENT — PAIN SCALES - GENERAL
PAINLEVEL_OUTOF10: 5 - MODERATE PAIN
PAINLEVEL_OUTOF10: 5 - MODERATE PAIN

## 2023-11-12 ASSESSMENT — PAIN - FUNCTIONAL ASSESSMENT
PAIN_FUNCTIONAL_ASSESSMENT: 0-10
PAIN_FUNCTIONAL_ASSESSMENT: 0-10

## 2023-11-12 NOTE — PROGRESS NOTES
Spiritual Care Visit    Clinical Encounter Type  Visited With: Patient  Routine Visit: Introduction  Continue Visiting: Yes  Referral From:  (Long term pt are visited and offered support as they desire.)  Referral To:               Sacramental Encounters  Communion: Ask the patient  Communion Given Indicator: No    Patient Spiritual Care Encounters  Social Interaction: Other (Comment) (pt able to express self and appears assertive with care team.  I have not observed his encounters.)                        Taxonomy  Intended Effects: Convey a calming presence, De-esclate emotionally charged situations, Establish rapport and connectedness, Lessen anxiety  Methods: Demonstrate acceptance, Encourage self reflection, Encourage someone to recognize their strengths, Setting boundaries  Interventions: Acknowledge current situation, Active listening, Facilitate communication, Fidelity    Pt was alert and immediately engaged with this provider indicating his medical progress and extreme frustration with some staff.  Pt stated he has spoken with  and indicated plans for further communication.  My role was to deescalate without denying his personal experience and encourage pt constructive ways to resolve issues of concern.

## 2023-11-12 NOTE — PROGRESS NOTES
NEPHROLOGY FOLLOW UP NOTE    Jose Thornton   57 y.o.    @WT@  MRN/Room: [id number]    Subjective: He reports he has some early fullness needing to take a break from eating midmeal to avoid nausea, ut no emesis episodes. Urine OP increasing.     Meds:   acetaminophen, 975 mg, q6h  fluticasone, 2 spray, Daily  heparin (porcine), 5,000 Units, q8h  ondansetron, 4 mg, q8h   Or  ondansetron, 4 mg, q8h  oxybutynin, 5 mg, TID  sennosides, 2 tablet, BID  sodium bicarbonate, 650 mg, TID         benzocaine-menthol, 1 lozenge, q2h PRN  bisacodyl, 10 mg, Daily PRN  calcium carbonate, 500 mg, 4x daily PRN  cyclobenzaprine, 5 mg, TID PRN  guaiFENesin, 600 mg, BID PRN  HYDROmorphone, 0.2 mg, q4h PRN  melatonin, 10 mg, Nightly PRN  naloxone, 0.2 mg, q5 min PRN  oxyCODONE, 10 mg, q6h PRN  oxyCODONE, 5 mg, q4h PRN  polyethylene glycol, 17 g, Daily PRN  trimethobenzamide, 200 mg, q6h PRN      Objective:     Vitals:    11/12/23 1236   BP: 107/71   Pulse: 99   Resp: 18   Temp: 36.6 °C (97.9 °F)   SpO2: 96%        Wt Readings from Last 10 Encounters:   11/09/23 113 kg (249 lb 1.9 oz)   10/05/23 115 kg (253 lb 4.9 oz)   10/02/23 113 kg (250 lb)   07/25/23 111 kg (244 lb)   07/17/23 109 kg (240 lb)   07/11/23 109 kg (240 lb)   06/19/23 105 kg (231 lb)   06/08/23 108 kg (237 lb)   05/15/23 94.3 kg (208 lb)   04/21/23 94.5 kg (208 lb 6 oz)         Intake/Output Summary (Last 24 hours) at 11/12/2023 1758  Last data filed at 11/12/2023 1741  Gross per 24 hour   Intake 1010 ml   Output 2035 ml   Net -1025 ml         Net IO Since Admission: -10,879.66 mL [11/12/23 2558]    Physical Exam  General: Patient is awake, non-toxic appearing, normal body habitus  Pulm: Normal WOB at rest, no crackles or rhonchi  Cardiac: Regular rate and rhythm, normal S1/S2  Abdomen: Non-tender to palpation, non-distended, brown output from ostomy, no inflammation around ostomy site  Extremities: No peripheral edema, or cyanosis  Neuro: Patient alert and  oriented, cranial nerves grossly intact, normal strength and sensation.    Blood Labs:  Lab Results   Component Value Date    CREATININE 7.11 (H) 11/12/2023    BUN 60 (H) 11/12/2023     11/12/2023    K 3.6 11/12/2023    CL 98 11/12/2023    CO2 23 11/12/2023       Lab Results   Component Value Date    WBC 9.8 11/12/2023    HGB 11.6 (L) 11/12/2023    HCT 36.1 (L) 11/12/2023    MCV 87 11/12/2023     11/12/2023         Hemoglobin   Date Value Ref Range Status   11/12/2023 11.6 (L) 13.5 - 17.5 g/dL Final   11/11/2023 11.0 (L) 13.5 - 17.5 g/dL Final   11/10/2023 11.4 (L) 13.5 - 17.5 g/dL Final     WBC   Date Value Ref Range Status   11/12/2023 9.8 4.4 - 11.3 x10*3/uL Final   11/11/2023 7.1 4.4 - 11.3 x10*3/uL Final   11/10/2023 6.9 4.4 - 11.3 x10*3/uL Final     Platelets   Date Value Ref Range Status   11/12/2023 298 150 - 450 x10*3/uL Final   11/11/2023 292 150 - 450 x10*3/uL Final   11/10/2023 264 150 - 450 x10*3/uL Final       Sodium   Date Value Ref Range Status   11/12/2023 138 136 - 145 mmol/L Final   11/11/2023 137 136 - 145 mmol/L Final   11/10/2023 138 136 - 145 mmol/L Final     Potassium   Date Value Ref Range Status   11/12/2023 3.6 3.5 - 5.3 mmol/L Final   11/11/2023 3.7 3.5 - 5.3 mmol/L Final   11/10/2023 3.4 (L) 3.5 - 5.3 mmol/L Final     Bicarbonate   Date Value Ref Range Status   11/12/2023 23 21 - 32 mmol/L Final   11/11/2023 23 21 - 32 mmol/L Final   11/10/2023 25 21 - 32 mmol/L Final     Phosphorus   Date Value Ref Range Status   11/12/2023 6.1 (H) 2.5 - 4.9 mg/dL Final     Comment:     The performance characteristics of phosphorus testing in heparinized plasma have been validated by the individual  laboratory site where testing is performed. Testing on heparinized plasma is not approved by the FDA; however, such approval is not necessary.   11/11/2023 6.8 (H) 2.5 - 4.9 mg/dL Final     Comment:     The performance characteristics of phosphorus testing in heparinized plasma have been  validated by the individual  laboratory site where testing is performed. Testing on heparinized plasma is not approved by the FDA; however, such approval is not necessary.   11/10/2023 5.8 (H) 2.5 - 4.9 mg/dL Final     Comment:     The performance characteristics of phosphorus testing in heparinized plasma have been validated by the individual  laboratory site where testing is performed. Testing on heparinized plasma is not approved by the FDA; however, such approval is not necessary.     Calcium   Date Value Ref Range Status   11/12/2023 8.8 8.6 - 10.6 mg/dL Final   11/11/2023 8.7 8.6 - 10.6 mg/dL Final   11/10/2023 8.5 (L) 8.6 - 10.6 mg/dL Final     Urea Nitrogen   Date Value Ref Range Status   11/12/2023 60 (H) 6 - 23 mg/dL Final   11/11/2023 51 (H) 6 - 23 mg/dL Final   11/10/2023 40 (H) 6 - 23 mg/dL Final     Creatinine   Date Value Ref Range Status   11/12/2023 7.11 (H) 0.50 - 1.30 mg/dL Final   11/11/2023 7.05 (H) 0.50 - 1.30 mg/dL Final   11/10/2023 6.79 (H) 0.50 - 1.30 mg/dL Final       POCT pH, Arterial   Date Value Ref Range Status   10/27/2023 7.35 (L) 7.38 - 7.42 pH Final   10/27/2023 7.34 (L) 7.38 - 7.42 pH Final     POCT pCO2, Arterial   Date Value Ref Range Status   10/27/2023 42 38 - 42 mm Hg Final   10/27/2023 42 38 - 42 mm Hg Final     POCT pO2, Arterial   Date Value Ref Range Status   10/27/2023 126 (H) 85 - 95 mm Hg Final   10/27/2023 125 (H) 85 - 95 mm Hg Final     pH, Venous   Date Value Ref Range Status   03/30/2023 7.37 7.33 - 7.43 Final     pCO2, Venous   Date Value Ref Range Status   03/30/2023 49 41 - 51 mmHg Final     POCT Lactate, Arterial   Date Value Ref Range Status   10/27/2023 1.1 0.4 - 2.0 mmol/L Final   10/27/2023 0.6 0.4 - 2.0 mmol/L Final     Lactate, Venous   Date Value Ref Range Status   03/30/2023 1.6 0.4 - 2.0 mmol/L Final       FeNa Values: Serum Creatinine/Serum Sodium * Urine sodium to creatinine ratio (FeNa in percent)    Lab Results   Component Value Date    GLUCOSE  97 11/12/2023    CALCIUM 8.8 11/12/2023     11/12/2023    K 3.6 11/12/2023    CO2 23 11/12/2023    CL 98 11/12/2023    BUN 60 (H) 11/12/2023    CREATININE 7.11 (H) 11/12/2023       Sodium/Creatinine Ratio   Date Value Ref Range Status   11/05/2023 68 Not established. mmol/g Creat Final       ASSESSMENT:  Jose Thornton is a 57 y.o. male with PMH of Crohn's disease and colovesical fistula who underwent Exploratory laparotomy, MYA, Cystoscopy, Bilateral ileal ureter and bladder augment with right partial rectus muscle flap, and Suprapubic tube insertion on 10/27, c/b post-op ileus and acute renal failure.  Nephrology is consulted for management of KADE.        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  - Clinical volume status: Euvolemic  - Electrolytes (Na, K, Ca, Phos) are stable  - Acid base status: Bicarb of 23, not acidotic on bicarb supplementation  - Renally dose meds  -  Renal USG/CT abdomen on 11/5 shows no signs of obstruction     Recommendations:  - No dialysis today, will continue to observe electrolytes and urine output tomorrow given increasing urine OP  - Avoid nephrotoxins, contrast if possible  - strict Is/Os  - Renal dosing for medications for latest eGFR, follow medication trough as appropriate  - Avoid hypotension/rapid fluctuations in BPs     Onesimo Serrano PGY 5   Nephrology Fellow  24 hour Renal Pager - [phone]

## 2023-11-12 NOTE — CARE PLAN
The patient's goals for the shift include      The clinical goals for the shift include Pt pain and nausea will be managed    Problem: Pain  Goal: Takes deep breaths with improved pain control throughout the shift  Outcome: Progressing  Goal: Turns in bed with improved pain control throughout the shift  Outcome: Progressing  Goal: Walks with improved pain control throughout the shift  Outcome: Progressing  Goal: Performs ADL's with improved pain control throughout shift  Outcome: Progressing  Goal: Participates in PT with improved pain control throughout the shift  Outcome: Progressing  Goal: Free from opioid side effects throughout the shift  Outcome: Progressing  Goal: Free from acute confusion related to pain meds throughout the shift  Outcome: Progressing     Problem: Fall/Injury  Goal: Not fall by end of shift  Outcome: Progressing  Goal: Be free from injury by end of the shift  Outcome: Progressing  Goal: Verbalize understanding of personal risk factors for fall in the hospital  Outcome: Progressing  Goal: Verbalize understanding of risk factor reduction measures to prevent injury from fall in the home  Outcome: Progressing  Goal: Use assistive devices by end of the shift  Outcome: Progressing  Goal: Pace activities to prevent fatigue by end of the shift  Outcome: Progressing     Problem: Dialysis  Goal: I will slow progression of end-stage renal disease through participating in dialysis 3 times a week  Outcome: Progressing     Problem: Skin  Goal: Participates in plan/prevention/treatment measures  Outcome: Progressing  Goal: Promote/optimize nutrition  Outcome: Progressing

## 2023-11-12 NOTE — PROGRESS NOTES
UROLOGY DAILY PROGRESS NOTE      Subjective     - Tolerating diet without vomiting, still having intermittent nausea but able to self-regulate intake to avoid vomiting  - Declines significant pain  - Upset that his ostomy bag leaked overnight    Objective   Physical Exam  Gen: NAD, alert  HEENT: normocephalic, atraumatic  Pulm: nonlabored breathing on room air  CV: regular rate per chart  GI: soft, non-tender, mildly distended, colostomy L abdomen w/ healthy appearance, green liquid with gas. Midline incision c/d/I closed with staples, open to air, LINDSEY drains removed with dressings in place  : mild suprapubic tenderness, no CVA tenderness, SPT draining minimal straw yellow urine with mucus, urethral andrade draining straw yellow with mucus, SPT and urethral andrade irrigated easily with return of mucus, b/l PCNT uncapped with straw yellow urine, flushed with return of urine and minimal mucus  MSK: STANLEY  Neuro: no focal deficits  Skin: WWP  Psych: appropriate mood and behavior    Last Recorded Vitals  Heart Rate:  [80-99]   Temp:  [36 °C (96.8 °F)-36.9 °C (98.4 °F)]   Resp:  [18-20]   BP: (100-107)/(68-71)   SpO2:  [94 %-96 %]   Intake/Output last 3 Shifts:  I/O last 3 completed shifts:  In: 440 (3.9 mL/kg) [P.O.:440]  Out: 2390 (21.2 mL/kg) [Urine:1840 (0.5 mL/kg/hr); Stool:550]  Weight: 113 kg     Net IO Since Admission: -11,254.66 mL [11/12/23 0850]    Relevant Results  Results for orders placed or performed during the hospital encounter of 10/24/23 (from the past 24 hour(s))   Basic Metabolic Panel   Result Value Ref Range    Glucose 97 74 - 99 mg/dL    Sodium 138 136 - 145 mmol/L    Potassium 3.6 3.5 - 5.3 mmol/L    Chloride 98 98 - 107 mmol/L    Bicarbonate 23 21 - 32 mmol/L    Anion Gap 21 (H) 10 - 20 mmol/L    Urea Nitrogen 60 (H) 6 - 23 mg/dL    Creatinine 7.11 (H) 0.50 - 1.30 mg/dL    eGFR 8 (L) >60 mL/min/1.73m*2    Calcium 8.8 8.6 - 10.6 mg/dL   CBC   Result Value Ref Range    WBC 9.8 4.4 - 11.3 x10*3/uL     nRBC 0.0 0.0 - 0.0 /100 WBCs    RBC 4.17 (L) 4.50 - 5.90 x10*6/uL    Hemoglobin 11.6 (L) 13.5 - 17.5 g/dL    Hematocrit 36.1 (L) 41.0 - 52.0 %    MCV 87 80 - 100 fL    MCH 27.8 26.0 - 34.0 pg    MCHC 32.1 32.0 - 36.0 g/dL    RDW 15.6 (H) 11.5 - 14.5 %    Platelets 298 150 - 450 x10*3/uL   Phosphorus   Result Value Ref Range    Phosphorus 6.1 (H) 2.5 - 4.9 mg/dL         Imaging  CT AP non-con 11/5  Impression   1.  Postsurgical changes as described above with likely postoperative  ventral abdominal seroma.  2. Bilateral percutaneous nephrostomy tubes and bilateral ureteral  stents coursing through the bilateral ileal ureter and terminating  within the augmented urinary bladder. Air within the left renal  collecting system is likely postprocedural.  3. Mild diffuse fluid-filled small bowel dilatation with air-fluid  levels with the no clear transition point likely representing ileus.  4. Other chronic and incidental findings as described above.       RBUS 11/5  IMPRESSION:  1. No evidence of hydronephrosis.  2. Normal-sized kidneys with increased renal cortical echogenicity  bilaterally and mild left renal cortical thinning, suggestive of  medical renal disease.    KUB 11/8  IMPRESSION:  1.  Findings consistent with postoperative ileus  2. Stents appear in adequate position.    Assessment/Plan   Jose Thornton is a 57 y.o. male with PMH of Crohn's disease and colovesical fistula who underwent Exploratory laparotomy, MYA, Cystoscopy, Bilateral ileal ureter and bladder augment with right partial rectus muscle flap, and Suprapubic tube insertion on 10/27/23 with Dr. Andersen and Dr. Rodriguez.     Patient initially recovered well and had quick return of bowel function with consistent ostomy output. However, he then developed symptoms and imaging findings c/w ileus. Nausea is slowly resolving and he has not had recent emesis, though has had smoldering symptoms when trying to advance diet. Antibiotics (most notably Gentamicin)  were discontinued after courses were complete 11/3. Labs became significant for quickly rising creatinine, which peaked at 11.73 on 11/7. Nephrology assessment determined him to be in acute renal failure and he has now received hemodialysis X3. No significant electrolyte abnormalities have been associated with the new acute renal failure. Nephrology is continuing to assess for dialysis needs.     Current ddx of etiology for ARF is gentamicin toxicity, distal obstruction (though current evidence indicates low concern), UTI (UA will be contaminated 2/2 ileal ureters, no current s/sx, if clinically suspected would involve ID), and renal restricted TMA or other GN. Nephrology on board for workup.      Principal Problem:    Colovesical fistula    Plan:  Neuro:   - Pain control: Tylenol, oxycodone, breakthrough dilaudid, oxybutynin and breakthrough flexeril for bladder spasm pain    Pulm:   - IS 10x / hr    CV:   - VS Q8h    FEN/GI:   - Continue scheduled zofran, breakthrough tygan  - PRN tums  - Appreciate nutrition recs for soft phosphorous restricted diet, Nepro TID    /Alpesh:   - Maintain andrade and SPT to drainage, teaching for home flushing when wife present, BID flushing (AM per urology, PM per nursing)  - Maintain b/l nephrostomy tubes uncapped, BID flushing (AM per urology, PM per nursing)  - Appreciate nephrology evaluation for dialytic needs  - Appreciate nephrology recs for acute renal failure workup and treatment       - possible biopsy next week if not stabilizing       - avoid nephrotoxins, contrast if possible       - strict Is/Os       - renal dosing for medications as appropriate       - avoid hypotension/ rapid BP fluctuations    Heme:   - Continue to monitor hemoglobin, no current indication for transfusion    ID:   - 10/25 urine cultures with gentamicin sensitive Pseudomonas (10 day course gentamicin for complicated UTI completed 11/3)  - 11/9 UA from PCNT with nitrites, 2+ LE, 2+ blood, 3+ protein, no  clinical signs/symptoms of UTI, consistent with known contamination from ileal ureter, if concerns raised for UTI in setting of renal failure would engage ID to help distinguish clinical significance   - no current indication for antibiotics    Endo:   - HbA1c normal (performed 2/2 patient concerns)    MSK:   - OOB as tolerated    Prophylaxis: SQH / SCDs  Dispo:  Continue care on RNF    Seen with chief resident Dr. Ferrer, to be discussed with attending Dr. Carloz Martinez MD  Urologic Surgery PGY-2  Adult Urology: [id number]    Pediatric Urology: [id number]

## 2023-11-13 LAB
ALBUMIN SERPL BCP-MCNC: 3.1 G/DL (ref 3.4–5)
ANION GAP SERPL CALC-SCNC: 19 MMOL/L (ref 10–20)
BUN SERPL-MCNC: 65 MG/DL (ref 6–23)
CALCIUM SERPL-MCNC: 8.8 MG/DL (ref 8.6–10.6)
CHLORIDE SERPL-SCNC: 100 MMOL/L (ref 98–107)
CO2 SERPL-SCNC: 24 MMOL/L (ref 21–32)
CREAT SERPL-MCNC: 6.55 MG/DL (ref 0.5–1.3)
GFR SERPL CREATININE-BSD FRML MDRD: 9 ML/MIN/1.73M*2
GLUCOSE SERPL-MCNC: 96 MG/DL (ref 74–99)
PHOSPHATE SERPL-MCNC: 6.5 MG/DL (ref 2.5–4.9)
POTASSIUM SERPL-SCNC: 3.6 MMOL/L (ref 3.5–5.3)
SODIUM SERPL-SCNC: 139 MMOL/L (ref 136–145)

## 2023-11-13 PROCEDURE — 96372 THER/PROPH/DIAG INJ SC/IM: CPT | Performed by: STUDENT IN AN ORGANIZED HEALTH CARE EDUCATION/TRAINING PROGRAM

## 2023-11-13 PROCEDURE — 2500000005 HC RX 250 GENERAL PHARMACY W/O HCPCS

## 2023-11-13 PROCEDURE — 99233 SBSQ HOSP IP/OBS HIGH 50: CPT

## 2023-11-13 PROCEDURE — 2500000004 HC RX 250 GENERAL PHARMACY W/ HCPCS (ALT 636 FOR OP/ED): Performed by: NURSE PRACTITIONER

## 2023-11-13 PROCEDURE — 1170000001 HC PRIVATE ONCOLOGY ROOM DAILY

## 2023-11-13 PROCEDURE — 2500000004 HC RX 250 GENERAL PHARMACY W/ HCPCS (ALT 636 FOR OP/ED): Performed by: STUDENT IN AN ORGANIZED HEALTH CARE EDUCATION/TRAINING PROGRAM

## 2023-11-13 PROCEDURE — 80069 RENAL FUNCTION PANEL: CPT | Performed by: STUDENT IN AN ORGANIZED HEALTH CARE EDUCATION/TRAINING PROGRAM

## 2023-11-13 PROCEDURE — 2500000004 HC RX 250 GENERAL PHARMACY W/ HCPCS (ALT 636 FOR OP/ED)

## 2023-11-13 RX ADMIN — SODIUM BICARBONATE 650 MG: 650 TABLET ORAL at 19:57

## 2023-11-13 RX ADMIN — SODIUM BICARBONATE 650 MG: 650 TABLET ORAL at 09:08

## 2023-11-13 RX ADMIN — HEPARIN SODIUM 5000 UNITS: 5000 INJECTION INTRAVENOUS; SUBCUTANEOUS at 12:50

## 2023-11-13 RX ADMIN — ONDANSETRON HYDROCHLORIDE 4 MG: 4 TABLET, FILM COATED ORAL at 12:50

## 2023-11-13 RX ADMIN — HEPARIN SODIUM 5000 UNITS: 5000 INJECTION INTRAVENOUS; SUBCUTANEOUS at 05:33

## 2023-11-13 RX ADMIN — SODIUM BICARBONATE 650 MG: 650 TABLET ORAL at 16:00

## 2023-11-13 RX ADMIN — ONDANSETRON HYDROCHLORIDE 4 MG: 4 TABLET, FILM COATED ORAL at 05:33

## 2023-11-13 RX ADMIN — OXYBUTYNIN CHLORIDE 5 MG: 5 TABLET ORAL at 09:08

## 2023-11-13 RX ADMIN — OXYBUTYNIN CHLORIDE 5 MG: 5 TABLET ORAL at 16:00

## 2023-11-13 RX ADMIN — OXYBUTYNIN CHLORIDE 5 MG: 5 TABLET ORAL at 19:57

## 2023-11-13 RX ADMIN — HEPARIN SODIUM 5000 UNITS: 5000 INJECTION INTRAVENOUS; SUBCUTANEOUS at 19:58

## 2023-11-13 ASSESSMENT — COGNITIVE AND FUNCTIONAL STATUS - GENERAL
CLIMB 3 TO 5 STEPS WITH RAILING: A LOT
DRESSING REGULAR LOWER BODY CLOTHING: A LITTLE
DAILY ACTIVITIY SCORE: 17
MOVING TO AND FROM BED TO CHAIR: A LITTLE
PERSONAL GROOMING: A LITTLE
WALKING IN HOSPITAL ROOM: A LOT
MOBILITY SCORE: 18
DRESSING REGULAR UPPER BODY CLOTHING: A LITTLE
HELP NEEDED FOR BATHING: A LOT
STANDING UP FROM CHAIR USING ARMS: A LITTLE
TOILETING: A LOT

## 2023-11-13 ASSESSMENT — PAIN SCALES - GENERAL
PAINLEVEL_OUTOF10: 0 - NO PAIN
PAINLEVEL_OUTOF10: 3
PAINLEVEL_OUTOF10: 5 - MODERATE PAIN

## 2023-11-13 ASSESSMENT — PAIN - FUNCTIONAL ASSESSMENT
PAIN_FUNCTIONAL_ASSESSMENT: 0-10

## 2023-11-13 NOTE — CARE PLAN
The patient's goals for the shift include      The clinical goals for the shift include Pt will drink more fluids; increase mobility sitting in chair throughout day as well ambulating the halls      Problem: Pain  Goal: Takes deep breaths with improved pain control throughout the shift  Outcome: Progressing  Goal: Turns in bed with improved pain control throughout the shift  Outcome: Progressing  Goal: Walks with improved pain control throughout the shift  Outcome: Progressing  Goal: Performs ADL's with improved pain control throughout shift  Outcome: Progressing  Goal: Participates in PT with improved pain control throughout the shift  Outcome: Progressing  Goal: Free from opioid side effects throughout the shift  Outcome: Progressing  Goal: Free from acute confusion related to pain meds throughout the shift  Outcome: Progressing     Problem: Fall/Injury  Goal: Not fall by end of shift  Outcome: Progressing  Goal: Be free from injury by end of the shift  Outcome: Progressing  Goal: Verbalize understanding of personal risk factors for fall in the hospital  Outcome: Progressing  Goal: Verbalize understanding of risk factor reduction measures to prevent injury from fall in the home  Outcome: Progressing  Goal: Use assistive devices by end of the shift  Outcome: Progressing  Goal: Pace activities to prevent fatigue by end of the shift  Outcome: Progressing     Problem: Dialysis  Goal: I will slow progression of end-stage renal disease through participating in dialysis 3 times a week  Outcome: Progressing     Problem: Skin  Goal: Participates in plan/prevention/treatment measures  Outcome: Progressing  Goal: Promote/optimize nutrition  Outcome: Progressing

## 2023-11-13 NOTE — PROGRESS NOTES
NEPHROLOGY FOLLOW UP NOTE    Jose Thornton   57 y.o.      MRN/Room: [id number]    Subjective: Patient reports feeling well this morning, denies any current N/V, fever/chills, or abdominal pain. Denies any evidence of hematuria in Schultz bags or nephrostomy tubes.    Objective:   Meds:   acetaminophen, 975 mg, q6h  fluticasone, 2 spray, Daily  heparin (porcine), 5,000 Units, q8h  ondansetron, 4 mg, q8h   Or  ondansetron, 4 mg, q8h  oxybutynin, 5 mg, TID  sennosides, 2 tablet, BID  sodium bicarbonate, 650 mg, TID         benzocaine-menthol, 1 lozenge, q2h PRN  bisacodyl, 10 mg, Daily PRN  calcium carbonate, 500 mg, 4x daily PRN  cyclobenzaprine, 5 mg, TID PRN  guaiFENesin, 600 mg, BID PRN  HYDROmorphone, 0.2 mg, q4h PRN  melatonin, 10 mg, Nightly PRN  naloxone, 0.2 mg, q5 min PRN  oxyCODONE, 10 mg, q6h PRN  oxyCODONE, 5 mg, q4h PRN  polyethylene glycol, 17 g, Daily PRN  trimethobenzamide, 200 mg, q6h PRN        Vitals:    11/13/23 1236   BP: 109/76   Pulse: 77   Resp: 18   Temp: 36.6 °C (97.9 °F)   SpO2: 96%        Intake/Output Summary (Last 24 hours) at 11/13/2023 1506  Last data filed at 11/13/2023 1400  Gross per 24 hour   Intake 1537 ml   Output 1090 ml   Net 447 ml     Physical Exam:  General appearance: Awake and alert, oriented, . No distress  HEENT: NC/AT, MMM  Skin: no apparent rash  Heart: heart sounds 1 & 2 present and normal, no murmurs heard or rub  Lungs: Adequate air entry, breath sounds normal.  No wheezing/crackles appreciated  Abdomen: soft, non tender  Extremities: No edema, no joint swelling  Neuro: No FND,    ACCESS: pIV    Blood Labs:  Results for orders placed or performed during the hospital encounter of 10/24/23 (from the past 24 hour(s))   Renal function panel   Result Value Ref Range    Glucose 96 74 - 99 mg/dL    Sodium 139 136 - 145 mmol/L    Potassium 3.6 3.5 - 5.3 mmol/L    Chloride 100 98 - 107 mmol/L    Bicarbonate 24 21 - 32 mmol/L    Anion Gap 19 10 - 20 mmol/L    Urea  Nitrogen 65 (H) 6 - 23 mg/dL    Creatinine 6.55 (H) 0.50 - 1.30 mg/dL    eGFR 9 (L) >60 mL/min/1.73m*2    Calcium 8.8 8.6 - 10.6 mg/dL    Phosphorus 6.5 (H) 2.5 - 4.9 mg/dL    Albumin 3.1 (L) 3.4 - 5.0 g/dL          ASSESSMENT:  Jose Thornton is a 57 y.o. male with PMH of Crohn's disease and colovesical fistula, s/p bilateral nephrostomy tube placement in January 2023, who underwent Exploratory laparotomy, MYA, Cystoscopy, Bilateral ileal ureter and bladder augment with right partial rectus muscle flap, and Suprapubic tube insertion on 10/27. Post-op course c/b post-op ileus and acute renal failure.  Treated with 5 day course gentamicin for complicated UTI (10/28-11/2). Nephrology consulted for management of KADE.    Last dialysis session 11/9,  now with improving UOP and down-trending creatinine.     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 Continue to monitor UOP and urine electrolytes  - Avoid nephrotoxins, contrast if possible  - strict Is/Os  - Renal dosing for medications for latest eGFR, follow medication trough as appropriate  - Avoid hypotension/rapid fluctuations in BPs    Mirian Salazar  PGY-1

## 2023-11-13 NOTE — PROGRESS NOTES
UROLOGY DAILY PROGRESS NOTE      Subjective     - NAEON; VSS  - Flushed SPT and catheter this am  - Pain well tolerated, no nausea  - Flushing techniques to be taught to spouse   - Cr downtrending, neph on board    Objective   Physical Exam  Gen: NAD, alert  HEENT: normocephalic, atraumatic  Pulm: nonlabored breathing on room air  CV: regular rate per chart  GI: soft, non-tender, mildly distended, colostomy L abdomen w/ healthy appearance, green liquid with gas. Midline incision c/d/I closed with staples, open to air, LINDSEY drains removed with dressings in place  : mild suprapubic tenderness, no CVA tenderness, SPT draining minimal straw yellow urine with mucus, urethral andrade draining straw yellow with mucus, SPT and urethral andrade irrigated easily with return of mucus, b/l PCNT uncapped with straw yellow urine  MSK: STANLEY  Neuro: no focal deficits  Skin: WWP  Psych: appropriate mood and behavior    Last Recorded Vitals  Heart Rate:  []   Temp:  [36.2 °C (97.2 °F)-36.7 °C (98.1 °F)]   Resp:  [17-20]   BP: ()/(65-76)   SpO2:  [94 %-96 %]   Intake/Output last 3 Shifts:  I/O last 3 completed shifts:  In: 1510 (13.4 mL/kg) [P.O.:1010; IV Piggyback:500]  Out: 2325 (20.6 mL/kg) [Urine:1895 (0.5 mL/kg/hr); Stool:430]  Weight: 113 kg     Net IO Since Admission: -10,852.66 mL [11/13/23 1242]    Relevant Results  Results for orders placed or performed during the hospital encounter of 10/24/23 (from the past 24 hour(s))   Renal function panel   Result Value Ref Range    Glucose 96 74 - 99 mg/dL    Sodium 139 136 - 145 mmol/L    Potassium 3.6 3.5 - 5.3 mmol/L    Chloride 100 98 - 107 mmol/L    Bicarbonate 24 21 - 32 mmol/L    Anion Gap 19 10 - 20 mmol/L    Urea Nitrogen 65 (H) 6 - 23 mg/dL    Creatinine 6.55 (H) 0.50 - 1.30 mg/dL    eGFR 9 (L) >60 mL/min/1.73m*2    Calcium 8.8 8.6 - 10.6 mg/dL    Phosphorus 6.5 (H) 2.5 - 4.9 mg/dL    Albumin 3.1 (L) 3.4 - 5.0 g/dL         Imaging  CT AP non-con 11/5  Impression   1.   Postsurgical changes as described above with likely postoperative  ventral abdominal seroma.  2. Bilateral percutaneous nephrostomy tubes and bilateral ureteral  stents coursing through the bilateral ileal ureter and terminating  within the augmented urinary bladder. Air within the left renal  collecting system is likely postprocedural.  3. Mild diffuse fluid-filled small bowel dilatation with air-fluid  levels with the no clear transition point likely representing ileus.  4. Other chronic and incidental findings as described above.       RBUS 11/5  IMPRESSION:  1. No evidence of hydronephrosis.  2. Normal-sized kidneys with increased renal cortical echogenicity  bilaterally and mild left renal cortical thinning, suggestive of  medical renal disease.    KUB 11/8  IMPRESSION:  1.  Findings consistent with postoperative ileus  2. Stents appear in adequate position.    Assessment/Plan   Jose Thornton is a 57 y.o. male with PMH of Crohn's disease and colovesical fistula who underwent Exploratory laparotomy, MYA, Cystoscopy, Bilateral ileal ureter and bladder augment with right partial rectus muscle flap, and Suprapubic tube insertion on 10/27/23 with Dr. Andersen and Dr. Rodriguez.     Patient initially recovered well and had quick return of bowel function with consistent ostomy output. However, he then developed symptoms and imaging findings c/w ileus. Nausea is slowly resolving and he has not had recent emesis, though has had smoldering symptoms when trying to advance diet. Antibiotics (most notably Gentamicin) were discontinued after courses were complete 11/3. Labs became significant for quickly rising creatinine, which peaked at 11.73 on 11/7. Nephrology assessment determined him to be in acute renal failure and he has now received hemodialysis X3. No significant electrolyte abnormalities have been associated with the new acute renal failure. Nephrology is continuing to assess for dialysis needs.     Current ddx of  etiology for ARF is gentamicin toxicity, distal obstruction (though current evidence indicates low concern), UTI (UA will be contaminated 2/2 ileal ureters, no current s/sx, if clinically suspected would involve ID), and renal restricted TMA or other GN. Nephrology on board for workup.      Principal Problem:    Colovesical fistula    Plan:  Neuro:   - Pain control: Tylenol, oxycodone, breakthrough dilaudid, oxybutynin and breakthrough flexeril for bladder spasm pain    Pulm:   - IS 10x / hr    CV:   - VS Q8h    FEN/GI:   - Continue scheduled zofran, breakthrough tygan  - PRN tums  - Appreciate nutrition recs for soft phosphorous restricted diet, Nepro TID    /Alpesh:   - Maintain andrade and SPT to drainage, teaching for home flushing when wife present, BID flushing (AM per urology, PM per nursing)  - Maintain b/l nephrostomy tubes uncapped, BID flushing (AM per urology, PM per nursing)  - Appreciate nephrology evaluation for dialytic needs  - Appreciate nephrology recs for acute renal failure workup and treatment       - possible biopsy next week if not stabilizing       - avoid nephrotoxins, contrast if possible       - strict Is/Os       - renal dosing for medications as appropriate       - avoid hypotension/ rapid BP fluctuations    Heme:   - Continue to monitor hemoglobin, no current indication for transfusion    ID:   - 10/25 urine cultures with gentamicin sensitive Pseudomonas (10 day course gentamicin for complicated UTI completed 11/3)  - 11/9 UA from PCNT with nitrites, 2+ LE, 2+ blood, 3+ protein, no clinical signs/symptoms of UTI, consistent with known contamination from ileal ureter, if concerns raised for UTI in setting of renal failure would engage ID to help distinguish clinical significance   - no current indication for antibiotics    Endo:   - HbA1c normal (performed 2/2 patient concerns)    MSK:   - OOB as tolerated    Prophylaxis: SQH / SCDs  Dispo:  Continue care on RNF    Seen with chief resident  Dr. Lacy, to be discussed with attending Dr. Carloz Castle MD  Urologic Surgery PGY-1  Adult Urology: [id number]    Pediatric Urology: [id number]

## 2023-11-13 NOTE — PROGRESS NOTES
Wound Care Progress Note     Visit Date: 11/13/2023      Patient Name: Jose Thornton         MRN: [id number]                Reason for Visit: Assess ostomy pouch and changing schedule.        Wound History: Jose Thornton is a 57 y.o. male with PMH of Crohn's disease and colovesical fistula who underwent Exploratory laparotomy, MYA, Cystoscopy, Bilateral ileal ureter and bladder augment with right partial rectus muscle flap, and Suprapubic tube insertion on 10/27/23 with Dr. Andersen and Dr. Rodriguez.      Pertinent Labs:   Albumin   Date Value Ref Range Status   11/13/2023 3.1 (L) 3.4 - 5.0 g/dL Final           Wound Assessment: Patient in bed and on phone. Alert and oriented. States he has changed his pouch twice since getting here on 10/24/2023. He is using his own supplies and said he does not need any supplies yet.  Told him we will check back about twice a week to see if anything is needed.            NEW    Nephrostomy Left 10 Fr. (Active)   Placement Date/Time: 10/18/23 0915   Hand Hygiene Completed: Yes  Location: Left  Tube Size (Fr.): 10 Fr.  Urine Returned: Yes   Number of days: 26       Nephrostomy Right 10 Fr. (Active)   Placement Date/Time: 10/18/23 0920   Hand Hygiene Completed: Yes  Location: Right  Tube Size (Fr.): 10 Fr.  Urine Returned: Yes   Number of days: 26       Colostomy LUQ (Active)   Placement Date/Time: (c)  (c)    Location: LUQ   Number of days:        Urethral Catheter Non-latex 18 Fr. (Active)   Placement Date/Time: 10/27/23 1428   Placed by: SURGEON  Hand Hygiene Completed: Yes  Catheter Type: Non-latex  Tube Size (Fr.): 18 Fr.  Catheter Balloon Size: 10 mL  Urine Returned: Yes   Number of days: 17       Suprapubic Catheter Non-latex 20 Fr. (Active)   Placement Date/Time: 10/27/23 1539   Placed by: KATIE  Hand Hygiene Completed: Yes  Catheter Type: Non-latex  Tube Size (Fr.): 20 Fr.  Catheter Balloon Size: 5 mL  Urine Returned: Yes  Securement Method: (c) Sutured   Number of days: 17      Nephrostomy Left 10 Fr. (Active)   Site/Stoma Assessment Clean;Intact 11/13/23 1320   Dressing Status Clean;Dry 11/13/23 1320   Dressing Type Dry dressing 11/13/23 1320   Collection Container Standard drainage bag 11/11/23 0923   Securement Method Tape 11/11/23 0923   Output (mL) 50 mL 11/13/23 1320   Output Description Cloudy 11/11/23 0048       Nephrostomy Right 10 Fr. (Active)   Site/Stoma Assessment Clean;Intact 11/13/23 1320   Dressing Status Clean;Dry 11/13/23 1320   Dressing Type Dry dressing 11/13/23 1320   Collection Container Standard drainage bag 11/11/23 0923   Securement Method Tape 11/11/23 0048   Output (mL) 125 mL 11/13/23 1320   Output Description Cloudy 11/11/23 0048       Colostomy LUQ (Active)   Stomal Appliance 1 piece;Dry;Intact 11/11/23 0923   Site/Stoma Assessment Clean;Intact 11/13/23 1320   Peristomal Assessment Clean;Intact 11/13/23 1320   Treatment Placement checked 11/11/23 0048   Drainage Characteristics Brown 11/13/23 0919   Output (mL) 50 mL 11/13/23 0919       Urethral Catheter Non-latex 18 Fr. (Active)   Site Assessment Clean;Skin intact 11/13/23 0707   Collection Container Standard drainage bag 11/13/23 0707   Securement Method Securing device (Describe) 11/13/23 0707   Reason for Continuing Urinary Catheterization surgical procedures: urological/gynecological, pelvic oncology, anal, prolonged surgical procedure 11/13/23 0707   Output (mL) 20 mL 11/13/23 0108   $ Urethral Catheter Charge Indwelling cath 10/29/23 0740       Suprapubic Catheter Non-latex 20 Fr. (Active)   Site/Stoma Assessment Clean;Intact 11/13/23 0908   Dressing Status Clean;Dry;Occlusive 11/13/23 0908   Dressing Type Dry dressing 11/13/23 0908   Collection Container Standard drainage bag 11/13/23 0908   Reason for Continuing Urinary Catheterization surgical procedures: urological/gynecological, pelvic oncology, anal, prolonged surgical procedure 11/13/23 0908   Output (mL) 30 mL 11/13/23 0108                    Wound 10/27/23 Incision Abdomen Lower;Medial (Active)   Date First Assessed/Time First Assessed: 10/27/23 0910   Present on Original Admission: No  Primary Wound Type: Incision  Location: Abdomen  Wound Location Orientation: Lower;Medial   Number of days: 17     Wound 10/27/23 Incision Abdomen Lower;Medial (Active)   Site Assessment Clean;Dry 11/13/23 0908   Anu-Wound Assessment Clean;Dry 11/13/23 0908   State of Healing Healing ridge 11/10/23 0830   Margins Attached edges 11/10/23 2030   Closure Approximated 11/13/23 0908   Sutures/Staple Line Approximated 11/13/23 0908   Drainage Description Serosanguineous 11/13/23 0908   Drainage Amount Scant 11/13/23 0908   Dressing Other (Comment) 11/13/23 0908   Dressing Status Clean;Dry 11/06/23 0847                   Wound Team Plan: Continue to follow ftwice a week to see if anything is needed.      SUZANNE GERHARDT, RN, BSN, CWOCN  11/13/2023  2:42 PM

## 2023-11-14 LAB
ALBUMIN SERPL BCP-MCNC: 3.1 G/DL (ref 3.4–5)
ANION GAP SERPL CALC-SCNC: 20 MMOL/L (ref 10–20)
BUN SERPL-MCNC: 66 MG/DL (ref 6–23)
CALCIUM SERPL-MCNC: 9 MG/DL (ref 8.6–10.6)
CHLORIDE SERPL-SCNC: 101 MMOL/L (ref 98–107)
CO2 SERPL-SCNC: 24 MMOL/L (ref 21–32)
CREAT SERPL-MCNC: 6.51 MG/DL (ref 0.5–1.3)
GFR SERPL CREATININE-BSD FRML MDRD: 9 ML/MIN/1.73M*2
GLUCOSE SERPL-MCNC: 82 MG/DL (ref 74–99)
PHOSPHATE SERPL-MCNC: 6.1 MG/DL (ref 2.5–4.9)
POTASSIUM SERPL-SCNC: 3.6 MMOL/L (ref 3.5–5.3)
SODIUM SERPL-SCNC: 141 MMOL/L (ref 136–145)

## 2023-11-14 PROCEDURE — 2500000001 HC RX 250 WO HCPCS SELF ADMINISTERED DRUGS (ALT 637 FOR MEDICARE OP): Performed by: NURSE PRACTITIONER

## 2023-11-14 PROCEDURE — 1170000001 HC PRIVATE ONCOLOGY ROOM DAILY

## 2023-11-14 PROCEDURE — 84100 ASSAY OF PHOSPHORUS: CPT | Performed by: STUDENT IN AN ORGANIZED HEALTH CARE EDUCATION/TRAINING PROGRAM

## 2023-11-14 PROCEDURE — 2500000001 HC RX 250 WO HCPCS SELF ADMINISTERED DRUGS (ALT 637 FOR MEDICARE OP): Performed by: STUDENT IN AN ORGANIZED HEALTH CARE EDUCATION/TRAINING PROGRAM

## 2023-11-14 PROCEDURE — 2500000004 HC RX 250 GENERAL PHARMACY W/ HCPCS (ALT 636 FOR OP/ED): Performed by: NURSE PRACTITIONER

## 2023-11-14 PROCEDURE — 99233 SBSQ HOSP IP/OBS HIGH 50: CPT

## 2023-11-14 PROCEDURE — 2500000005 HC RX 250 GENERAL PHARMACY W/O HCPCS

## 2023-11-14 PROCEDURE — 96372 THER/PROPH/DIAG INJ SC/IM: CPT | Performed by: STUDENT IN AN ORGANIZED HEALTH CARE EDUCATION/TRAINING PROGRAM

## 2023-11-14 PROCEDURE — 2500000004 HC RX 250 GENERAL PHARMACY W/ HCPCS (ALT 636 FOR OP/ED): Performed by: STUDENT IN AN ORGANIZED HEALTH CARE EDUCATION/TRAINING PROGRAM

## 2023-11-14 PROCEDURE — 2500000004 HC RX 250 GENERAL PHARMACY W/ HCPCS (ALT 636 FOR OP/ED)

## 2023-11-14 RX ADMIN — ONDANSETRON 4 MG: 2 INJECTION INTRAMUSCULAR; INTRAVENOUS at 09:41

## 2023-11-14 RX ADMIN — OXYBUTYNIN CHLORIDE 5 MG: 5 TABLET ORAL at 11:35

## 2023-11-14 RX ADMIN — OXYBUTYNIN CHLORIDE 5 MG: 5 TABLET ORAL at 16:34

## 2023-11-14 RX ADMIN — STANDARDIZED SENNA CONCENTRATE 17.2 MG: 8.6 TABLET ORAL at 11:35

## 2023-11-14 RX ADMIN — HEPARIN SODIUM 5000 UNITS: 5000 INJECTION INTRAVENOUS; SUBCUTANEOUS at 20:58

## 2023-11-14 RX ADMIN — ONDANSETRON HYDROCHLORIDE 4 MG: 4 TABLET, FILM COATED ORAL at 16:33

## 2023-11-14 RX ADMIN — SODIUM BICARBONATE 650 MG: 650 TABLET ORAL at 20:58

## 2023-11-14 RX ADMIN — HEPARIN SODIUM 5000 UNITS: 5000 INJECTION INTRAVENOUS; SUBCUTANEOUS at 05:27

## 2023-11-14 RX ADMIN — HEPARIN SODIUM 5000 UNITS: 5000 INJECTION INTRAVENOUS; SUBCUTANEOUS at 14:14

## 2023-11-14 RX ADMIN — OXYBUTYNIN CHLORIDE 5 MG: 5 TABLET ORAL at 20:58

## 2023-11-14 RX ADMIN — SODIUM BICARBONATE 650 MG: 650 TABLET ORAL at 16:34

## 2023-11-14 RX ADMIN — OXYCODONE HYDROCHLORIDE 5 MG: 5 TABLET ORAL at 14:14

## 2023-11-14 RX ADMIN — SODIUM BICARBONATE 650 MG: 650 TABLET ORAL at 11:35

## 2023-11-14 RX ADMIN — ACETAMINOPHEN 975 MG: 325 TABLET ORAL at 01:38

## 2023-11-14 RX ADMIN — OXYCODONE HYDROCHLORIDE 10 MG: 5 TABLET ORAL at 01:38

## 2023-11-14 RX ADMIN — ACETAMINOPHEN 975 MG: 325 TABLET ORAL at 14:14

## 2023-11-14 RX ADMIN — ONDANSETRON HYDROCHLORIDE 4 MG: 4 TABLET, FILM COATED ORAL at 20:58

## 2023-11-14 ASSESSMENT — COGNITIVE AND FUNCTIONAL STATUS - GENERAL
CLIMB 3 TO 5 STEPS WITH RAILING: A LOT
WALKING IN HOSPITAL ROOM: A LOT
MOBILITY SCORE: 19
DRESSING REGULAR UPPER BODY CLOTHING: A LITTLE
STANDING UP FROM CHAIR USING ARMS: A LITTLE
DRESSING REGULAR LOWER BODY CLOTHING: A LITTLE
WALKING IN HOSPITAL ROOM: A LOT
CLIMB 3 TO 5 STEPS WITH RAILING: A LOT
MOBILITY SCORE: 18
STANDING UP FROM CHAIR USING ARMS: A LITTLE
HELP NEEDED FOR BATHING: A LOT
TOILETING: A LOT
PERSONAL GROOMING: A LITTLE
PERSONAL GROOMING: A LITTLE
DAILY ACTIVITIY SCORE: 17
DAILY ACTIVITIY SCORE: 17
DRESSING REGULAR UPPER BODY CLOTHING: A LITTLE
HELP NEEDED FOR BATHING: A LOT
TOILETING: A LOT
DRESSING REGULAR LOWER BODY CLOTHING: A LITTLE
MOVING TO AND FROM BED TO CHAIR: A LITTLE

## 2023-11-14 ASSESSMENT — PAIN SCALES - GENERAL
PAINLEVEL_OUTOF10: 2
PAINLEVEL_OUTOF10: 6
PAINLEVEL_OUTOF10: 9
PAINLEVEL_OUTOF10: 2

## 2023-11-14 ASSESSMENT — PAIN - FUNCTIONAL ASSESSMENT
PAIN_FUNCTIONAL_ASSESSMENT: 0-10

## 2023-11-14 NOTE — PROGRESS NOTES
NEPHROLOGY FOLLOW UP NOTE    Jose Tohrnton   57 y.o.      MRN/Room: [id number]    Subjective:   Mr. Thornton reports some GI upset/moderate nausea after breakfast this morning but denies any vomiting. Notes occasional bladder spasms, which he states is a chronic issue. Denies any hematuria, fever/chills, abdominal pain, SOB, chest pain, or leg swelling. Feels ostomy output is at baseline.     Objective:   Meds:   acetaminophen, 975 mg, q6h  fluticasone, 2 spray, Daily  heparin (porcine), 5,000 Units, q8h  ondansetron, 4 mg, q8h   Or  ondansetron, 4 mg, q8h  oxybutynin, 5 mg, TID  sennosides, 2 tablet, BID  sodium bicarbonate, 650 mg, TID         benzocaine-menthol, 1 lozenge, q2h PRN  bisacodyl, 10 mg, Daily PRN  calcium carbonate, 500 mg, 4x daily PRN  cyclobenzaprine, 5 mg, TID PRN  guaiFENesin, 600 mg, BID PRN  HYDROmorphone, 0.2 mg, q4h PRN  melatonin, 10 mg, Nightly PRN  naloxone, 0.2 mg, q5 min PRN  oxyCODONE, 10 mg, q6h PRN  oxyCODONE, 5 mg, q4h PRN  polyethylene glycol, 17 g, Daily PRN  trimethobenzamide, 200 mg, q6h PRN        Vitals:    11/14/23 0846   BP:    Pulse: 90   Resp: 18   Temp: 36.2 °C (97.2 °F)   SpO2:           Intake/Output Summary (Last 24 hours) at 11/14/2023 1007  Last data filed at 11/14/2023 0417  Gross per 24 hour   Intake 480 ml   Output 1085 ml   Net -605 ml     Physical Exam:  General appearance: Awake and alert, oriented. No acute distress.  HEENT: NC/AT, clear oral mucosa  Skin: no apparent rash  Heart: heart sounds 1 & 2 present and normal, no murmurs heard or rub  Lungs: Adequate air entry, breath sounds normal.  No wheezing/crackles  Abdomen: soft, non tender  Extremities: No edema, no joint swelling  Neuro: No FND,  no asterixis   ACCESS: pIV     Blood Labs:  Results for orders placed or performed during the hospital encounter of 10/24/23 (from the past 24 hour(s))   Renal function panel   Result Value Ref Range    Glucose 82 74 - 99 mg/dL    Sodium 141 136 - 145 mmol/L     Potassium 3.6 3.5 - 5.3 mmol/L    Chloride 101 98 - 107 mmol/L    Bicarbonate 24 21 - 32 mmol/L    Anion Gap 20 10 - 20 mmol/L    Urea Nitrogen 66 (H) 6 - 23 mg/dL    Creatinine 6.51 (H) 0.50 - 1.30 mg/dL    eGFR 9 (L) >60 mL/min/1.73m*2    Calcium 9.0 8.6 - 10.6 mg/dL    Phosphorus 6.1 (H) 2.5 - 4.9 mg/dL    Albumin 3.1 (L) 3.4 - 5.0 g/dL        ASSESSMENT:  Jose Thornton is a 57 y.o. male with PMH of Crohn's disease and colovesical fistula, s/p bilateral nephrostomy tube placement in January 2023, who underwent Exploratory laparotomy, MYA, Cystoscopy, Bilateral ileal ureter and bladder augment with right partial rectus muscle flap, and Suprapubic tube insertion on 10/27. Post-op course c/b post-op ileus and Pseudomonas UTI, treated with 5 day course gentamicin for complicated UTI (10/28-11/2). Nephrology consulted for management of KADE, suspected etiology ATN 2/2 gentamicin toxicity.     Last dialysis session 11/9, UOP gradually improving. Creatinine and BUN relatively unchanged from yesterday.      #KADE on CKD3a, now nonoliguric, Stage III, c/b hematuria, pyuria and proteinuria  - Baseline creatinine:  1.38, peak Cr: 11.7 (11/7/23)  - Etiology:  Most likely ATN from long course of gentamicin  - Urine electrolyes showed FeNA/FeUrea of 4.7%, consistent with intrarenal vs post renal etiology  - Renal USG/CT abdomen on 11/5 showed no signs of obstruction  -S/p dialysis 11/7-11/9  - Clinical volume status: Euvolemic  -Phos elevated to 6.1. Anticipate improvement as renal function continues to improve. No indication for phosphate binder at this time.  - Electrolytes (Na, K, Ca, Phos) otherwise stable  - Acid base status: Bicarb of 24, not acidotic on bicarb supplementation  - Renally dose meds     Recommendations:  - UOP mildly improved, creatinine stable at 6.51 from 6.55. No indication for dialysis today  -Will continue to monitor UOP and renal function as ATN resolves  - Avoid nephrotoxins, contrast if  possible  - strict Is/Os  - Renal dosing for medications for latest eGFR, follow medication trough as appropriate  - Avoid hypotension/rapid fluctuations in BPs  Jose TARANGO Norberto is a  57 y.o.  Year old , with PMHx of       Mirian Salazar MD  Nephrology Resident  24 hour Renal Pager - [phone]    Discussed with attending nephrologist

## 2023-11-14 NOTE — CARE PLAN
Problem: Pain  Goal: Takes deep breaths with improved pain control throughout the shift  Outcome: Progressing  Goal: Turns in bed with improved pain control throughout the shift  Outcome: Progressing  Goal: Walks with improved pain control throughout the shift  Outcome: Progressing  Goal: Performs ADL's with improved pain control throughout shift  Outcome: Progressing  Goal: Free from opioid side effects throughout the shift  Outcome: Progressing  Goal: Free from acute confusion related to pain meds throughout the shift  Outcome: Progressing     Problem: Skin  Goal: Participates in plan/prevention/treatment measures  11/14/2023 1743 by Lula Wilburn RN  Outcome: Progressing  11/14/2023 1640 by Lula Wilburn RN  Outcome: Progressing  Goal: Promote/optimize nutrition  11/14/2023 1743 by Lula Wilburn RN  Outcome: Progressing  11/14/2023 1640 by Lula Wilburn RN  Outcome: Progressing   The patient's goals for the shift include      The clinical goals for the shift include decrease in pain and no nausea/vomiting for shift    Over the shift, the patient did not make progress toward the following goals. Barriers to progression include nausea and pain r/t lack of ambulation and movement. Also proper nutritional intake. Recommendations to address these barriers include small frequent meals. Anti-nausea medication prior to meals and Pain management. Encourage pt oob and ambulation daily.

## 2023-11-14 NOTE — PROGRESS NOTES
UROLOGY DAILY PROGRESS NOTE      Subjective     - NAEON; VSS  - Flushed SPT and catheter this am  - Pain well tolerated, no nausea    Objective   Physical Exam  Gen: NAD, alert  HEENT: normocephalic, atraumatic  Pulm: nonlabored breathing on room air  CV: regular rate per chart  GI: soft, non-tender, mildly distended, colostomy L abdomen w/ healthy appearance, green liquid with gas. Midline incision c/d/I closed with staples, open to air, LINDSEY drains removed with dressings in place  : mild suprapubic tenderness, no CVA tenderness, SPT draining minimal straw yellow urine with mucus, urethral andrade draining straw yellow with mucus, SPT and urethral andrade irrigated easily with return of mucus, b/l PCNT uncapped with straw yellow urine  MSK: STANLEY  Neuro: no focal deficits  Skin: WWP  Psych: appropriate mood and behavior    Last Recorded Vitals  Heart Rate:  [77-95]   Temp:  [36.2 °C (97.2 °F)-36.7 °C (98.1 °F)]   Resp:  [16-18]   BP: ()/(70-76)   SpO2:  [92 %-96 %]   Intake/Output last 3 Shifts:  I/O last 3 completed shifts:  In: 1297 (11.5 mL/kg) [P.O.:797; IV Piggyback:500]  Out: 1875 (16.6 mL/kg) [Urine:1600 (0.4 mL/kg/hr); Stool:275]  Weight: 113 kg     Net IO Since Admission: -11,457.66 mL [11/14/23 0754]    Relevant Results  No results found for this or any previous visit (from the past 24 hour(s)).        Imaging  CT AP non-con 11/5  Impression   1.  Postsurgical changes as described above with likely postoperative  ventral abdominal seroma.  2. Bilateral percutaneous nephrostomy tubes and bilateral ureteral  stents coursing through the bilateral ileal ureter and terminating  within the augmented urinary bladder. Air within the left renal  collecting system is likely postprocedural.  3. Mild diffuse fluid-filled small bowel dilatation with air-fluid  levels with the no clear transition point likely representing ileus.  4. Other chronic and incidental findings as described above.       RBUS  11/5  IMPRESSION:  1. No evidence of hydronephrosis.  2. Normal-sized kidneys with increased renal cortical echogenicity  bilaterally and mild left renal cortical thinning, suggestive of  medical renal disease.    KUB 11/8  IMPRESSION:  1.  Findings consistent with postoperative ileus  2. Stents appear in adequate position.    Assessment/Plan   Jose Thornton is a 57 y.o. male with PMH of Crohn's disease and colovesical fistula who underwent Exploratory laparotomy, MYA, Cystoscopy, Bilateral ileal ureter and bladder augment with right partial rectus muscle flap, and Suprapubic tube insertion on 10/27/23 with Dr. Andersen and Dr. Rodriguez.     Patient initially recovered well and had quick return of bowel function with consistent ostomy output. However, he then developed symptoms and imaging findings c/w ileus. Nausea is slowly resolving and he has not had recent emesis, though has had smoldering symptoms when trying to advance diet. Antibiotics (most notably Gentamicin) were discontinued after courses were complete 11/3. Labs became significant for quickly rising creatinine, which peaked at 11.73 on 11/7. Nephrology assessment determined him to be in acute renal failure and he has now received hemodialysis X3. No significant electrolyte abnormalities have been associated with the new acute renal failure. Nephrology is continuing to assess for dialysis needs.     Current ddx of etiology for ARF is gentamicin toxicity, distal obstruction (though current evidence indicates low concern), UTI (UA will be contaminated 2/2 ileal ureters, no current s/sx, if clinically suspected would involve ID), and renal restricted TMA or other GN. Nephrology on board for workup.    11/14: UOP 1.3L<0.85L. sCr 6.55<7.11. No dialysis yesterday.     Principal Problem:    Colovesical fistula    Plan:  Neuro:   - Pain control: Tylenol, oxycodone, breakthrough dilaudid, oxybutynin and breakthrough flexeril for bladder spasm pain    Pulm:   - IS 10x  / hr    CV:   - VS Q8h    FEN/GI:   - Continue scheduled zofran, breakthrough tygan  - PRN tums  - Appreciate nutrition recs for soft phosphorous restricted diet, Nepro TID    /Alpesh:   - Maintain andrade and SPT to drainage, teaching for home flushing when wife present, BID flushing (AM per urology, PM per nursing)  - Maintain b/l nephrostomy tubes uncapped, BID flushing (AM per urology, PM per nursing)  - Appreciate nephrology evaluation for dialytic needs  - Appreciate nephrology recs for acute renal failure workup and treatment       - possible biopsy next week if not stabilizing       - avoid nephrotoxins, contrast if possible       - strict Is/Os       - renal dosing for medications as appropriate       - avoid hypotension/ rapid BP fluctuations  - Staple removal    Heme:   - Continue to monitor hemoglobin, no current indication for transfusion    ID:   - 10/25 urine cultures with gentamicin sensitive Pseudomonas (10 day course gentamicin for complicated UTI completed 11/3)  - 11/9 UA from PCNT with nitrites, 2+ LE, 2+ blood, 3+ protein, no clinical signs/symptoms of UTI, consistent with known contamination from ileal ureter, if concerns raised for UTI in setting of renal failure would engage ID to help distinguish clinical significance   - no current indication for antibiotics    Endo:   - HbA1c normal (performed 2/2 patient concerns)    MSK:   - OOB as tolerated    Prophylaxis: SQH / SCDs  Dispo:  Continue care on RNF    Seen with chief resident Dr. Lacy, to be discussed with attending Dr. Carloz Koenig PAVanessaC  Adult Urology: [id number]    Pediatric Urology: [id number]

## 2023-11-14 NOTE — CARE PLAN
Problem: Pain  Goal: Takes deep breaths with improved pain control throughout the shift  Outcome: Progressing  Goal: Turns in bed with improved pain control throughout the shift  Outcome: Progressing  Goal: Walks with improved pain control throughout the shift  Outcome: Progressing  Goal: Performs ADL's with improved pain control throughout shift  Outcome: Progressing  Goal: Free from opioid side effects throughout the shift  Outcome: Progressing  Goal: Free from acute confusion related to pain meds throughout the shift  Outcome: Progressing     Problem: Skin  Goal: Participates in plan/prevention/treatment measures  Outcome: Progressing  Goal: Promote/optimize nutrition  Outcome: Progressing   The patient's goals for the shift include      The clinical goals for the shift include Pt to have no nausea during shift    Over the shift, the patient did not make progress toward the following goals. Barriers to progression include nausea/vomiting and pain.  Recommendations to address these barriers include adequate oral/iv pain regime, heat/cold tx , anti-nausea medication as ordered, ambulation, oob to chair daily.

## 2023-11-14 NOTE — PROGRESS NOTES
UROLOGY DAILY PROGRESS NOTE      Subjective     - NAEON; VSS  - Flushed SPT and catheter this am  - Pain well tolerated, no nausea  - Flushing techniques to be taught to spouse   - Cr downtrending, neph on board    Objective   Physical Exam  Gen: NAD, alert  HEENT: normocephalic, atraumatic  Pulm: nonlabored breathing on room air  CV: regular rate per chart  GI: soft, non-tender, mildly distended, colostomy L abdomen w/ healthy appearance, green liquid with gas. Midline incision c/d/I closed with staples, open to air, LINDSEY drains removed with dressings in place  : mild suprapubic tenderness, no CVA tenderness, SPT draining minimal straw yellow urine with mucus, urethral andrade draining straw yellow with mucus, SPT and urethral andrade irrigated easily with return of mucus, b/l PCNT uncapped with straw yellow urine  MSK: STANLEY  Neuro: no focal deficits  Skin: WWP  Psych: appropriate mood and behavior    Last Recorded Vitals  Heart Rate:  [77-95]   Temp:  [36.2 °C (97.2 °F)-36.7 °C (98.1 °F)]   Resp:  [16-18]   BP: ()/(70-76)   SpO2:  [92 %-96 %]   Intake/Output last 3 Shifts:  I/O last 3 completed shifts:  In: 1297 (11.5 mL/kg) [P.O.:797; IV Piggyback:500]  Out: 1875 (16.6 mL/kg) [Urine:1600 (0.4 mL/kg/hr); Stool:275]  Weight: 113 kg     Net IO Since Admission: -11,457.66 mL [11/14/23 0757]    Relevant Results  No results found for this or any previous visit (from the past 24 hour(s)).        Imaging  CT AP non-con 11/5  Impression   1.  Postsurgical changes as described above with likely postoperative  ventral abdominal seroma.  2. Bilateral percutaneous nephrostomy tubes and bilateral ureteral  stents coursing through the bilateral ileal ureter and terminating  within the augmented urinary bladder. Air within the left renal  collecting system is likely postprocedural.  3. Mild diffuse fluid-filled small bowel dilatation with air-fluid  levels with the no clear transition point likely representing ileus.  4.  Other chronic and incidental findings as described above.       RBUS 11/5  IMPRESSION:  1. No evidence of hydronephrosis.  2. Normal-sized kidneys with increased renal cortical echogenicity  bilaterally and mild left renal cortical thinning, suggestive of  medical renal disease.    KUB 11/8  IMPRESSION:  1.  Findings consistent with postoperative ileus  2. Stents appear in adequate position.    Assessment/Plan   Jose Thornton is a 57 y.o. male with PMH of Crohn's disease and colovesical fistula who underwent Exploratory laparotomy, MYA, Cystoscopy, Bilateral ileal ureter and bladder augment with right partial rectus muscle flap, and Suprapubic tube insertion on 10/27/23 with Dr. Andersen and Dr. Rodriguez.     Patient initially recovered well and had quick return of bowel function with consistent ostomy output. However, he then developed symptoms and imaging findings c/w ileus. Nausea is slowly resolving and he has not had recent emesis, though has had smoldering symptoms when trying to advance diet. Antibiotics (most notably Gentamicin) were discontinued after courses were complete 11/3. Labs became significant for quickly rising creatinine, which peaked at 11.73 on 11/7. Nephrology assessment determined him to be in acute renal failure and he has now received hemodialysis X3. No significant electrolyte abnormalities have been associated with the new acute renal failure. Nephrology is continuing to assess for dialysis needs.     Current ddx of etiology for ARF is gentamicin toxicity, distal obstruction (though current evidence indicates low concern), UTI (UA will be contaminated 2/2 ileal ureters, no current s/sx, if clinically suspected would involve ID), and renal restricted TMA or other GN. Nephrology on board for workup.      Principal Problem:    Colovesical fistula    Plan:  Neuro:   - Pain control: Tylenol, oxycodone, breakthrough dilaudid, oxybutynin and breakthrough flexeril for bladder spasm pain    Pulm:   -  IS 10x / hr    CV:   - VS Q8h    FEN/GI:   - Continue scheduled zofran, breakthrough tygan  - PRN tums  - Appreciate nutrition recs for soft phosphorous restricted diet, Nepro TID    /Alpesh:   - Maintain andrade and SPT to drainage, teaching for home flushing when wife present, BID flushing (AM per urology, PM per nursing)  - Maintain b/l nephrostomy tubes uncapped, BID flushing (AM per urology, PM per nursing)  - Appreciate nephrology evaluation for dialytic needs  - Appreciate nephrology recs for acute renal failure workup and treatment       - possible biopsy next week if not stabilizing       - avoid nephrotoxins, contrast if possible       - strict Is/Os       - renal dosing for medications as appropriate       - avoid hypotension/ rapid BP fluctuations    Heme:   - Continue to monitor hemoglobin, no current indication for transfusion    ID:   - 10/25 urine cultures with gentamicin sensitive Pseudomonas (10 day course gentamicin for complicated UTI completed 11/3)  - 11/9 UA from PCNT with nitrites, 2+ LE, 2+ blood, 3+ protein, no clinical signs/symptoms of UTI, consistent with known contamination from ileal ureter, if concerns raised for UTI in setting of renal failure would engage ID to help distinguish clinical significance   - no current indication for antibiotics    Endo:   - HbA1c normal (performed 2/2 patient concerns)    MSK:   - OOB as tolerated    Prophylaxis: SQH / SCDs  Dispo:  Continue care on RNF    Seen with chief resident Dr. Lacy, to be discussed with attending Dr. Andersen  Adult Urology: [id number]    Pediatric Urology: [id number]

## 2023-11-14 NOTE — CARE PLAN
Problem: Pain  Goal: Takes deep breaths with improved pain control throughout the shift  Outcome: Progressing  Goal: Turns in bed with improved pain control throughout the shift  Outcome: Progressing  Goal: Walks with improved pain control throughout the shift  Outcome: Progressing  Goal: Performs ADL's with improved pain control throughout shift  Outcome: Progressing  Goal: Participates in PT with improved pain control throughout the shift  Outcome: Progressing  Goal: Free from opioid side effects throughout the shift  Outcome: Progressing  Goal: Free from acute confusion related to pain meds throughout the shift  Outcome: Progressing     Problem: Fall/Injury  Goal: Not fall by end of shift  Outcome: Progressing  Goal: Be free from injury by end of the shift  Outcome: Progressing  Goal: Verbalize understanding of personal risk factors for fall in the hospital  Outcome: Progressing  Goal: Verbalize understanding of risk factor reduction measures to prevent injury from fall in the home  Outcome: Progressing  Goal: Use assistive devices by end of the shift  Outcome: Progressing  Goal: Pace activities to prevent fatigue by end of the shift  Outcome: Progressing     Problem: Dialysis  Goal: I will slow progression of end-stage renal disease through participating in dialysis 3 times a week  Outcome: Progressing     Problem: Skin  Goal: Participates in plan/prevention/treatment measures  Outcome: Progressing  Goal: Promote/optimize nutrition  Outcome: Progressing   The patient's goals for the shift include      The clinical goals for the shift include Pt to have no nausea during shift    Pt had no nausea during shift.

## 2023-11-14 NOTE — PROGRESS NOTES
Jose Thornton is a 57 y.o. male on day 21 of admission presenting with Colovesical fistula.    TCC Note    Plan per Medical/Surgical Team: Continue to monitor UOP and renal function, creatinine stable   Status: Inpatient  Payor Source: Self pay  Discharge disposition: [facility]  RN  Expected date of discharge: 11/17/23 pending renal recovery and no dialysis  Barriers: medical

## 2023-11-15 LAB
ALBUMIN SERPL BCP-MCNC: 3.1 G/DL (ref 3.4–5)
ANION GAP SERPL CALC-SCNC: 17 MMOL/L (ref 10–20)
BUN SERPL-MCNC: 61 MG/DL (ref 6–23)
CALCIUM SERPL-MCNC: 8.9 MG/DL (ref 8.6–10.6)
CHLORIDE SERPL-SCNC: 101 MMOL/L (ref 98–107)
CO2 SERPL-SCNC: 26 MMOL/L (ref 21–32)
CREAT SERPL-MCNC: 5.72 MG/DL (ref 0.5–1.3)
GFR SERPL CREATININE-BSD FRML MDRD: 11 ML/MIN/1.73M*2
GLUCOSE SERPL-MCNC: 98 MG/DL (ref 74–99)
PHOSPHATE SERPL-MCNC: 6.1 MG/DL (ref 2.5–4.9)
POTASSIUM SERPL-SCNC: 3.7 MMOL/L (ref 3.5–5.3)
SODIUM SERPL-SCNC: 140 MMOL/L (ref 136–145)

## 2023-11-15 PROCEDURE — 96372 THER/PROPH/DIAG INJ SC/IM: CPT | Performed by: STUDENT IN AN ORGANIZED HEALTH CARE EDUCATION/TRAINING PROGRAM

## 2023-11-15 PROCEDURE — 2500000004 HC RX 250 GENERAL PHARMACY W/ HCPCS (ALT 636 FOR OP/ED): Performed by: STUDENT IN AN ORGANIZED HEALTH CARE EDUCATION/TRAINING PROGRAM

## 2023-11-15 PROCEDURE — 1170000001 HC PRIVATE ONCOLOGY ROOM DAILY

## 2023-11-15 PROCEDURE — 2500000001 HC RX 250 WO HCPCS SELF ADMINISTERED DRUGS (ALT 637 FOR MEDICARE OP)

## 2023-11-15 PROCEDURE — 2500000005 HC RX 250 GENERAL PHARMACY W/O HCPCS

## 2023-11-15 PROCEDURE — 80069 RENAL FUNCTION PANEL: CPT | Performed by: STUDENT IN AN ORGANIZED HEALTH CARE EDUCATION/TRAINING PROGRAM

## 2023-11-15 PROCEDURE — 51702 INSERT TEMP BLADDER CATH: CPT

## 2023-11-15 PROCEDURE — 2500000004 HC RX 250 GENERAL PHARMACY W/ HCPCS (ALT 636 FOR OP/ED): Performed by: NURSE PRACTITIONER

## 2023-11-15 PROCEDURE — 2500000001 HC RX 250 WO HCPCS SELF ADMINISTERED DRUGS (ALT 637 FOR MEDICARE OP): Performed by: NURSE PRACTITIONER

## 2023-11-15 RX ADMIN — HEPARIN SODIUM 5000 UNITS: 5000 INJECTION INTRAVENOUS; SUBCUTANEOUS at 12:45

## 2023-11-15 RX ADMIN — ONDANSETRON HYDROCHLORIDE 4 MG: 4 TABLET, FILM COATED ORAL at 05:41

## 2023-11-15 RX ADMIN — Medication 10 MG: at 22:47

## 2023-11-15 RX ADMIN — ACETAMINOPHEN 975 MG: 325 TABLET ORAL at 20:15

## 2023-11-15 RX ADMIN — OXYBUTYNIN CHLORIDE 5 MG: 5 TABLET ORAL at 08:10

## 2023-11-15 RX ADMIN — OXYBUTYNIN CHLORIDE 5 MG: 5 TABLET ORAL at 20:15

## 2023-11-15 RX ADMIN — ONDANSETRON HYDROCHLORIDE 4 MG: 4 TABLET, FILM COATED ORAL at 12:45

## 2023-11-15 RX ADMIN — OXYCODONE HYDROCHLORIDE 5 MG: 5 TABLET ORAL at 20:15

## 2023-11-15 RX ADMIN — ONDANSETRON HYDROCHLORIDE 4 MG: 4 TABLET, FILM COATED ORAL at 20:16

## 2023-11-15 RX ADMIN — OXYCODONE HYDROCHLORIDE 10 MG: 5 TABLET ORAL at 05:41

## 2023-11-15 RX ADMIN — HEPARIN SODIUM 5000 UNITS: 5000 INJECTION INTRAVENOUS; SUBCUTANEOUS at 20:14

## 2023-11-15 RX ADMIN — HEPARIN SODIUM 5000 UNITS: 5000 INJECTION INTRAVENOUS; SUBCUTANEOUS at 05:41

## 2023-11-15 RX ADMIN — OXYBUTYNIN CHLORIDE 5 MG: 5 TABLET ORAL at 14:51

## 2023-11-15 ASSESSMENT — COGNITIVE AND FUNCTIONAL STATUS - GENERAL
MOBILITY SCORE: 22
DRESSING REGULAR UPPER BODY CLOTHING: A LITTLE
CLIMB 3 TO 5 STEPS WITH RAILING: A LITTLE
TOILETING: A LITTLE
PERSONAL GROOMING: A LITTLE
WALKING IN HOSPITAL ROOM: A LITTLE
DAILY ACTIVITIY SCORE: 18
DRESSING REGULAR LOWER BODY CLOTHING: A LITTLE
HELP NEEDED FOR BATHING: A LOT

## 2023-11-15 ASSESSMENT — PAIN SCALES - GENERAL
PAINLEVEL_OUTOF10: 6
PAINLEVEL_OUTOF10: 7
PAINLEVEL_OUTOF10: 2

## 2023-11-15 NOTE — PROGRESS NOTES
UROLOGY DAILY PROGRESS NOTE      Subjective     - NAEON; VSS  - Flushed SPT and catheter this am  - Pain well tolerated, nausea yesterday, plan for better control with scheduled antiemetics today  - Flushing techniques to be taught to patient/spouse   - Cr downtrending, neph on board    Objective   Physical Exam  Gen: NAD, alert  HEENT: normocephalic, atraumatic  Pulm: nonlabored breathing on room air  CV: regular rate per chart  GI: soft, non-tender, mildly distended, colostomy L abdomen w/ healthy appearance, green liquid with gas. Midline incision c/d/I closed with staples, open to air, LINDSEY drains removed with dressings in place  : mild suprapubic tenderness, no CVA tenderness, SPT draining minimal straw yellow urine with mucus, urethral andrade draining straw yellow with mucus, SPT and urethral andrade irrigated easily with return of mucus, b/l PCNT uncapped with straw yellow urine  MSK: STANLEY  Neuro: no focal deficits  Skin: WWP  Psych: appropriate mood and behavior    Last Recorded Vitals  Heart Rate:  [77-90]   Temp:  [36 °C (96.8 °F)-36.6 °C (97.9 °F)]   Resp:  [18]   BP: ()/(67-76)   SpO2:  [93 %-97 %]   Intake/Output last 3 Shifts:  I/O last 3 completed shifts:  In: - (0 mL/kg)   Out: 1685 (14.9 mL/kg) [Urine:1560 (0.4 mL/kg/hr); Stool:125]  Weight: 113 kg     Net IO Since Admission: -12,682.66 mL [11/15/23 0701]    Relevant Results  Results for orders placed or performed during the hospital encounter of 10/24/23 (from the past 24 hour(s))   Renal function panel   Result Value Ref Range    Glucose 98 74 - 99 mg/dL    Sodium 140 136 - 145 mmol/L    Potassium 3.7 3.5 - 5.3 mmol/L    Chloride 101 98 - 107 mmol/L    Bicarbonate 26 21 - 32 mmol/L    Anion Gap 17 10 - 20 mmol/L    Urea Nitrogen 61 (H) 6 - 23 mg/dL    Creatinine 5.72 (H) 0.50 - 1.30 mg/dL    eGFR 11 (L) >60 mL/min/1.73m*2    Calcium 8.9 8.6 - 10.6 mg/dL    Phosphorus 6.1 (H) 2.5 - 4.9 mg/dL    Albumin 3.1 (L) 3.4 - 5.0 g/dL       Imaging  CT  AP non-con 11/5  Impression   1.  Postsurgical changes as described above with likely postoperative  ventral abdominal seroma.  2. Bilateral percutaneous nephrostomy tubes and bilateral ureteral  stents coursing through the bilateral ileal ureter and terminating  within the augmented urinary bladder. Air within the left renal  collecting system is likely postprocedural.  3. Mild diffuse fluid-filled small bowel dilatation with air-fluid  levels with the no clear transition point likely representing ileus.  4. Other chronic and incidental findings as described above.       RBUS 11/5  IMPRESSION:  1. No evidence of hydronephrosis.  2. Normal-sized kidneys with increased renal cortical echogenicity  bilaterally and mild left renal cortical thinning, suggestive of  medical renal disease.    KUB 11/8  IMPRESSION:  1.  Findings consistent with postoperative ileus  2. Stents appear in adequate position.    Assessment/Plan   Jose Thornton is a 57 y.o. male with PMH of Crohn's disease and colovesical fistula who underwent Exploratory laparotomy, MYA, Cystoscopy, Bilateral ileal ureter and bladder augment with right partial rectus muscle flap, and Suprapubic tube insertion on 10/27/23 with Dr. Andersen and Dr. Rodriguez.     Patient initially recovered well and had quick return of bowel function with consistent ostomy output. However, he then developed symptoms and imaging findings c/w ileus. Nausea is slowly resolving and he has not had recent emesis, though has had smoldering symptoms when trying to advance diet. Antibiotics (most notably Gentamicin) were discontinued after courses were complete 11/3. Labs became significant for quickly rising creatinine, which peaked at 11.73 on 11/7. Nephrology assessment determined him to be in acute renal failure and he has now received hemodialysis X3. No significant electrolyte abnormalities have been associated with the new acute renal failure. Nephrology is continuing to assess for  dialysis needs.     Current ddx of etiology for ARF is gentamicin toxicity, distal obstruction (though current evidence indicates low concern), UTI (UA will be contaminated 2/2 ileal ureters, no current s/sx, if clinically suspected would involve ID), and renal restricted TMA or other GN. Nephrology on board for workup.    11/15: Mild hypotension this am, other VSS. 1L total UOP, neph tubes patent. sCr 5.72<6.51<6.55. phos 6.1<6.1. k+ wnl.     Plan:  Neuro:   - Pain control: Tylenol, oxycodone, breakthrough dilaudid, oxybutynin and breakthrough flexeril for bladder spasm pain    Pulm:   - IS 10x / hr    CV:   - VS Q8h    FEN/GI:   - Continue scheduled zofran, breakthrough tygan  - PRN tums  - Appreciate nutrition recs for soft phosphorous restricted diet, Nepro TID    /Alpesh:   - Maintain andrade and SPT to drainage, teaching for home flushing when wife present, BID flushing (AM per urology, PM per nursing)  - Maintain b/l nephrostomy tubes uncapped, BID flushing (AM per urology, PM per nursing). Plan to cap nephrostomy tubes after discussion with nephrology regarding appropriate timing  - Non-voiding cystogram prior to discharge  - Appreciate nephrology evaluation for dialytic needs  - Appreciate nephrology recs for acute renal failure workup and treatment       - avoid nephrotoxins, contrast if possible       - strict Is/Os       - renal dosing for medications as appropriate       - avoid hypotension/ rapid BP fluctuations    Heme:   - Continue to monitor hemoglobin, no current indication for transfusion    ID:   - 10/25 urine cultures with gentamicin sensitive Pseudomonas (10 day course gentamicin for complicated UTI completed 11/3)  - 11/9 UA from PCNT with nitrites, 2+ LE, 2+ blood, 3+ protein, no clinical signs/symptoms of UTI, consistent with known contamination from ileal ureter, if concerns raised for UTI in setting of renal failure would engage ID to help distinguish clinical significance   - no current  indication for antibiotics    Endo:   - HbA1c normal (performed 2/2 patient concerns)    MSK:   - OOB as tolerated, PT on board    Prophylaxis: SQH / SCDs  Dispo:  Continue care on RNF, anticipated discharge to home by the end of the week    Gracie Koenig PA-C  Seen with chief resident Dr. Lacy, to be discussed with attending Dr. Andersen  Adult Urology: [id number]    Pediatric Urology: [id number]

## 2023-11-15 NOTE — CARE PLAN
The patient's goals for the shift include      The clinical goals for the shift include decrease in pain and no nausea/vomiting for shift      Problem: Pain  Goal: Takes deep breaths with improved pain control throughout the shift  Outcome: Progressing  Goal: Turns in bed with improved pain control throughout the shift  Outcome: Progressing  Goal: Walks with improved pain control throughout the shift  Outcome: Progressing  Goal: Performs ADL's with improved pain control throughout shift  Outcome: Progressing  Goal: Participates in PT with improved pain control throughout the shift  Outcome: Progressing  Goal: Free from opioid side effects throughout the shift  Outcome: Progressing  Goal: Free from acute confusion related to pain meds throughout the shift  Outcome: Progressing     Problem: Fall/Injury  Goal: Not fall by end of shift  Outcome: Progressing  Goal: Be free from injury by end of the shift  Outcome: Progressing  Goal: Verbalize understanding of personal risk factors for fall in the hospital  Outcome: Progressing  Goal: Verbalize understanding of risk factor reduction measures to prevent injury from fall in the home  Outcome: Progressing  Goal: Use assistive devices by end of the shift  Outcome: Progressing  Goal: Pace activities to prevent fatigue by end of the shift  Outcome: Progressing     Problem: Dialysis  Goal: I will slow progression of end-stage renal disease through participating in dialysis 3 times a week  Outcome: Progressing     Problem: Skin  Goal: Participates in plan/prevention/treatment measures  Outcome: Progressing  Flowsheets (Taken 11/12/2023 2340 by Oleksandr Cloud RN)  Participates in plan/prevention/treatment measures:   Increase activity/out of bed for meals   Elevate heels  Goal: Promote/optimize nutrition  Outcome: Progressing  Flowsheets (Taken 11/12/2023 2340 by Oleksandr Cloud RN)  Promote/optimize nutrition:   Monitor/record intake including meals   Consume > 50%  meals/supplements   Offer water/supplements/favorite foods

## 2023-11-15 NOTE — PROGRESS NOTES
NEPHROLOGY FOLLOW UP NOTE    Jose Thornton   57 y.o.      MRN/Room: [id number]    Subjective:   NAEO. Mr. Thornton reports feeling decent this morning, reports a fair amount of nausea yesterday but states that he tried to make it through the day without any anti-emetics. Otherwise denies any fever/chills, vomiting, abdominal pain, hematuria, or leg swelling.     Objective:   Meds:   acetaminophen, 975 mg, q6h  fluticasone, 2 spray, Daily  heparin (porcine), 5,000 Units, q8h  ondansetron, 4 mg, q8h   Or  ondansetron, 4 mg, q8h  oxybutynin, 5 mg, TID  sennosides, 2 tablet, BID         benzocaine-menthol, 1 lozenge, q2h PRN  bisacodyl, 10 mg, Daily PRN  calcium carbonate, 500 mg, 4x daily PRN  cyclobenzaprine, 5 mg, TID PRN  guaiFENesin, 600 mg, BID PRN  HYDROmorphone, 0.2 mg, q4h PRN  melatonin, 10 mg, Nightly PRN  naloxone, 0.2 mg, q5 min PRN  oxyCODONE, 10 mg, q6h PRN  oxyCODONE, 5 mg, q4h PRN  polyethylene glycol, 17 g, Daily PRN  trimethobenzamide, 200 mg, q6h PRN        Vitals:    11/15/23 0827   BP: 114/75   Pulse: 72   Resp: 18   Temp: 36.1 °C (97 °F)   SpO2: 94%          Intake/Output Summary (Last 24 hours) at 11/15/2023 0938  Last data filed at 11/15/2023 0700  Gross per 24 hour   Intake --   Output 1465 ml   Net -1465 ml       General appearance: Awake and alert, oriented, . No distress  HEENT: NC/AT, clear oral mucosa, MMM  Skin: no apparent rash  Heart: heart sounds 1 & 2 present and normal, no murmurs heard or rub  Lungs: Adequate air entry, breath sounds normal.  No wheezing/crackles  Abdomen: soft, non tender. Well-healed midline incision to umbilicus, no discharge or tenderness. Suprapubic catheter and b/l nephrostomy tubes in place  Extremities: No edema, no joint swelling  Neuro: No FND,  no asterixis   ACCESS: pIV    Blood Labs:  Results for orders placed or performed during the hospital encounter of 10/24/23 (from the past 24 hour(s))   Renal function panel   Result Value Ref Range     Glucose 98 74 - 99 mg/dL    Sodium 140 136 - 145 mmol/L    Potassium 3.7 3.5 - 5.3 mmol/L    Chloride 101 98 - 107 mmol/L    Bicarbonate 26 21 - 32 mmol/L    Anion Gap 17 10 - 20 mmol/L    Urea Nitrogen 61 (H) 6 - 23 mg/dL    Creatinine 5.72 (H) 0.50 - 1.30 mg/dL    eGFR 11 (L) >60 mL/min/1.73m*2    Calcium 8.9 8.6 - 10.6 mg/dL    Phosphorus 6.1 (H) 2.5 - 4.9 mg/dL    Albumin 3.1 (L) 3.4 - 5.0 g/dL        ASSESSMENT:  Jose Thornton is a 57 y.o. male with PMH of Crohn's disease and colovesical fistula, s/p bilateral nephrostomy tube placement in January 2023, who underwent Exploratory laparotomy, MYA, Cystoscopy, Bilateral ileal ureter and bladder augment with right partial rectus muscle flap, and Suprapubic tube insertion on 10/27. Post-op course c/b post-op ileus and Pseudomonas UTI, treated with 5 day course gentamicin for complicated UTI (10/28-11/2). Nephrology consulted for management of KADE, suspected etiology ATN 2/2 gentamicin toxicity.     Last dialysis session 11/9, UOP continues to improve. Creatinine improved to 5.72 from 6.51 yesterday.     #KADE on CKD3a, now nonoliguric, Stage III, c/b hematuria, pyuria and proteinuria  - Baseline creatinine:  1.38, peak Cr: 11.7 (11/7/23)  - Etiology:  Most likely ATN from long course of gentamicin  - Urine electrolyes showed FeNA/FeUrea of 4.7%, consistent with intrarenal vs post renal etiology  - Renal USG/CT abdomen on 11/5 showed no signs of obstruction  -S/p dialysis 11/7-11/9  - Clinical volume status: Euvolemic  -Phos elevated to 6.1. Anticipate improvement as renal function continues to improve. No indication for phosphate binder at this time.  - Electrolytes (Na, K, Ca, Phos) otherwise stable  - Acid base status: Bicarb of 26, not acidotic  - Renally dose meds     Recommendations:  - UOP uptrending, creatinine improved to 5.72 from 6.51 yesterday. Anticipate continued gradual improvement in kidney function and UOP as ATN resolves  -Regarding capping of  nephrostomy tubes, ok to trial capping now. Will monitor for any worsening of KADE  - Avoid nephrotoxins, contrast if possible  - strict Is/Os  - Renal dosing for medications for latest eGFR, follow medication trough as appropriate  - Avoid hypotension/rapid fluctuations in BPs    Mirian Salazar MD  Nephrology Resident  24 hour Renal Pager - [phone]    Discussed with attending nephrologist

## 2023-11-15 NOTE — PROGRESS NOTES
Jose Thornton is a 57 y.o. male on day 22 of admission presenting with Colovesical fistula.    TCC Note:  Spoke with patient regarding PCP and Home Care services, RN and PT. Patient states he does not have a PCP and does not want Home Care services. Elidia Yoder CNP notified.

## 2023-11-16 ENCOUNTER — APPOINTMENT (OUTPATIENT)
Dept: RADIOLOGY | Facility: HOSPITAL | Age: 58
DRG: 353 | End: 2023-11-16

## 2023-11-16 LAB
ALBUMIN SERPL BCP-MCNC: 3.2 G/DL (ref 3.4–5)
ANION GAP SERPL CALC-SCNC: 18 MMOL/L (ref 10–20)
BUN SERPL-MCNC: 62 MG/DL (ref 6–23)
CALCIUM SERPL-MCNC: 9.2 MG/DL (ref 8.6–10.6)
CHLORIDE SERPL-SCNC: 100 MMOL/L (ref 98–107)
CO2 SERPL-SCNC: 25 MMOL/L (ref 21–32)
CREAT SERPL-MCNC: 5.39 MG/DL (ref 0.5–1.3)
GFR SERPL CREATININE-BSD FRML MDRD: 12 ML/MIN/1.73M*2
GLUCOSE SERPL-MCNC: 90 MG/DL (ref 74–99)
PHOSPHATE SERPL-MCNC: 5.4 MG/DL (ref 2.5–4.9)
POTASSIUM SERPL-SCNC: 3.8 MMOL/L (ref 3.5–5.3)
SODIUM SERPL-SCNC: 139 MMOL/L (ref 136–145)

## 2023-11-16 PROCEDURE — 74430 CONTRAST X-RAY BLADDER: CPT

## 2023-11-16 PROCEDURE — 2500000001 HC RX 250 WO HCPCS SELF ADMINISTERED DRUGS (ALT 637 FOR MEDICARE OP): Performed by: STUDENT IN AN ORGANIZED HEALTH CARE EDUCATION/TRAINING PROGRAM

## 2023-11-16 PROCEDURE — 2500000001 HC RX 250 WO HCPCS SELF ADMINISTERED DRUGS (ALT 637 FOR MEDICARE OP): Performed by: NURSE PRACTITIONER

## 2023-11-16 PROCEDURE — 80069 RENAL FUNCTION PANEL: CPT | Performed by: STUDENT IN AN ORGANIZED HEALTH CARE EDUCATION/TRAINING PROGRAM

## 2023-11-16 PROCEDURE — 1170000001 HC PRIVATE ONCOLOGY ROOM DAILY

## 2023-11-16 PROCEDURE — 2500000005 HC RX 250 GENERAL PHARMACY W/O HCPCS

## 2023-11-16 PROCEDURE — 2550000001 HC RX 255 CONTRASTS: Performed by: UROLOGY

## 2023-11-16 PROCEDURE — 2500000004 HC RX 250 GENERAL PHARMACY W/ HCPCS (ALT 636 FOR OP/ED): Performed by: STUDENT IN AN ORGANIZED HEALTH CARE EDUCATION/TRAINING PROGRAM

## 2023-11-16 PROCEDURE — 78740 URETERAL REFLUX STUDY: CPT | Performed by: STUDENT IN AN ORGANIZED HEALTH CARE EDUCATION/TRAINING PROGRAM

## 2023-11-16 PROCEDURE — 96372 THER/PROPH/DIAG INJ SC/IM: CPT | Performed by: STUDENT IN AN ORGANIZED HEALTH CARE EDUCATION/TRAINING PROGRAM

## 2023-11-16 PROCEDURE — 2500000004 HC RX 250 GENERAL PHARMACY W/ HCPCS (ALT 636 FOR OP/ED): Performed by: NURSE PRACTITIONER

## 2023-11-16 RX ADMIN — OXYBUTYNIN CHLORIDE 5 MG: 5 TABLET ORAL at 09:06

## 2023-11-16 RX ADMIN — DIATRIZOATE MEGLUMINE 290 ML: 300 INJECTION, SOLUTION INTRAVENOUS at 11:35

## 2023-11-16 RX ADMIN — HEPARIN SODIUM 5000 UNITS: 5000 INJECTION INTRAVENOUS; SUBCUTANEOUS at 20:20

## 2023-11-16 RX ADMIN — OXYBUTYNIN CHLORIDE 5 MG: 5 TABLET ORAL at 20:18

## 2023-11-16 RX ADMIN — HEPARIN SODIUM 5000 UNITS: 5000 INJECTION INTRAVENOUS; SUBCUTANEOUS at 05:00

## 2023-11-16 RX ADMIN — OXYCODONE HYDROCHLORIDE 10 MG: 5 TABLET ORAL at 15:24

## 2023-11-16 RX ADMIN — OXYBUTYNIN CHLORIDE 5 MG: 5 TABLET ORAL at 15:24

## 2023-11-16 RX ADMIN — HEPARIN SODIUM 5000 UNITS: 5000 INJECTION INTRAVENOUS; SUBCUTANEOUS at 12:45

## 2023-11-16 RX ADMIN — ONDANSETRON HYDROCHLORIDE 4 MG: 4 TABLET, FILM COATED ORAL at 12:31

## 2023-11-16 RX ADMIN — ONDANSETRON HYDROCHLORIDE 4 MG: 4 TABLET, FILM COATED ORAL at 05:00

## 2023-11-16 RX ADMIN — ONDANSETRON HYDROCHLORIDE 4 MG: 4 TABLET, FILM COATED ORAL at 20:19

## 2023-11-16 RX ADMIN — ACETAMINOPHEN 975 MG: 325 TABLET ORAL at 20:19

## 2023-11-16 ASSESSMENT — COGNITIVE AND FUNCTIONAL STATUS - GENERAL
DRESSING REGULAR UPPER BODY CLOTHING: A LITTLE
DRESSING REGULAR LOWER BODY CLOTHING: A LITTLE
HELP NEEDED FOR BATHING: A LOT
DRESSING REGULAR LOWER BODY CLOTHING: A LITTLE
HELP NEEDED FOR BATHING: A LITTLE
TURNING FROM BACK TO SIDE WHILE IN FLAT BAD: A LITTLE
PERSONAL GROOMING: A LITTLE
MOVING TO AND FROM BED TO CHAIR: A LITTLE
DAILY ACTIVITIY SCORE: 19
WALKING IN HOSPITAL ROOM: A LITTLE
MOBILITY SCORE: 22
TOILETING: A LITTLE
MOVING FROM LYING ON BACK TO SITTING ON SIDE OF FLAT BED WITH BEDRAILS: A LITTLE
DRESSING REGULAR UPPER BODY CLOTHING: A LITTLE
DAILY ACTIVITIY SCORE: 20
WALKING IN HOSPITAL ROOM: A LITTLE
CLIMB 3 TO 5 STEPS WITH RAILING: A LITTLE
STANDING UP FROM CHAIR USING ARMS: A LITTLE
MOBILITY SCORE: 18
CLIMB 3 TO 5 STEPS WITH RAILING: A LITTLE

## 2023-11-16 ASSESSMENT — PAIN DESCRIPTION - LOCATION
LOCATION: GENERALIZED
LOCATION: ABDOMEN

## 2023-11-16 ASSESSMENT — PAIN SCALES - GENERAL
PAINLEVEL_OUTOF10: 5 - MODERATE PAIN
PAINLEVEL_OUTOF10: 3
PAINLEVEL_OUTOF10: 5 - MODERATE PAIN
PAINLEVEL_OUTOF10: 7
PAINLEVEL_OUTOF10: 0 - NO PAIN

## 2023-11-16 ASSESSMENT — PAIN - FUNCTIONAL ASSESSMENT
PAIN_FUNCTIONAL_ASSESSMENT: 0-10

## 2023-11-16 ASSESSMENT — PAIN DESCRIPTION - DESCRIPTORS
DESCRIPTORS: ACHING
DESCRIPTORS: ACHING

## 2023-11-16 NOTE — PROGRESS NOTES
"Jose Thornton is a 57 y.o. male on day 23 of admission presenting with Colovesical fistula.    Subjective   NAEO. Pain well controlled. Tolerating diet, denies nausea/vomiting. Catheter flushed easily this morning. Ambulating.       Objective     Physical Exam    Gen: NAD, alert  HEENT: normocephalic, atraumatic  Pulm: nonlabored breathing on room air  CV: regular rate per chart  GI: soft, non-tender, mildly distended, colostomy L abdomen w/ healthy appearance, green liquid with gas. Midline incision c/d/I closed with staples  : no CVA tenderness, SPT draining minimal straw yellow urine with mucus, urethral andrade draining straw yellow with mucus, SPT and urethral andrade irrigated easily with return of mucus, b/l PCNT capped.   MSK: STANLEY  Neuro: no focal deficits  Skin: WWP  Psych: appropriate mood and behavior    Last Recorded Vitals  Blood pressure 105/70, pulse 71, temperature 36.5 °C (97.7 °F), resp. rate 18, height 1.829 m (6' 0.01\"), weight 113 kg (249 lb 1.9 oz), SpO2 96 %.  Intake/Output last 3 Shifts:  I/O last 3 completed shifts:  In: - (0 mL/kg)   Out: 1740 (15.4 mL/kg) [Urine:1615 (0.4 mL/kg/hr); Stool:125]  Weight: 113 kg     Relevant Results  Scheduled medications  acetaminophen, 975 mg, oral, q6h  fluticasone, 2 spray, Each Nostril, Daily  heparin (porcine), 5,000 Units, subcutaneous, q8h  ondansetron, 4 mg, oral, q8h   Or  ondansetron, 4 mg, intravenous, q8h  oxybutynin, 5 mg, oral, TID  sennosides, 2 tablet, oral, BID      Continuous medications     PRN medications  PRN medications: benzocaine-menthol, bisacodyl, calcium carbonate, cyclobenzaprine, guaiFENesin, HYDROmorphone **OR** [DISCONTINUED] HYDROmorphone, melatonin, naloxone, oxyCODONE, oxyCODONE, polyethylene glycol, trimethobenzamide    Results for orders placed or performed during the hospital encounter of 10/24/23 (from the past 24 hour(s))   Renal function panel   Result Value Ref Range    Glucose 90 74 - 99 mg/dL    Sodium 139 136 - 145 " mmol/L    Potassium 3.8 3.5 - 5.3 mmol/L    Chloride 100 98 - 107 mmol/L    Bicarbonate 25 21 - 32 mmol/L    Anion Gap 18 10 - 20 mmol/L    Urea Nitrogen 62 (H) 6 - 23 mg/dL    Creatinine 5.39 (H) 0.50 - 1.30 mg/dL    eGFR 12 (L) >60 mL/min/1.73m*2    Calcium 9.2 8.6 - 10.6 mg/dL    Phosphorus 5.4 (H) 2.5 - 4.9 mg/dL    Albumin 3.2 (L) 3.4 - 5.0 g/dL                   Assessment/Plan   Principal Problem:    Colovesical fistula    Jose Thornton is a 57 y.o. male with PMH of Crohn's disease and colovesical fistula who underwent Exploratory laparotomy, MYA, Cystoscopy, Bilateral ileal ureter and bladder augment with right partial rectus muscle flap, and Suprapubic tube insertion on 10/27/23 with Dr. Andersen and Dr. Rodriguez.      Patient initially recovered well and had quick return of bowel function with consistent ostomy output. However, he then developed symptoms and imaging findings c/w ileus. Nausea is slowly resolving and he has not had recent emesis, though has had smoldering symptoms when trying to advance diet. Antibiotics (most notably Gentamicin) were discontinued after courses were complete 11/3. Labs became significant for quickly rising creatinine, which peaked at 11.73 on 11/7. Nephrology assessment determined him to be in acute renal failure and he has now received hemodialysis X3. No significant electrolyte abnormalities have been associated with the new acute renal failure. Nephrology is continuing to assess for dialysis needs.      Current ddx of etiology for ARF is gentamicin toxicity, distal obstruction (though current evidence indicates low concern), UTI (UA will be contaminated 2/2 ileal ureters, no current s/sx, if clinically suspected would involve ID), and renal restricted TMA or other GN. Nephrology on board for workup.     Patient doing well this morning. VSS, afbrile. Neph tubes capped, UOP total 825 ( and andrade 550). sCr continues to downtrend (5.39 from 5.72), phos continues to  downtrend 5.74 from 6.1).    Neuro:   - Pain control: Tylenol, oxycodone, breakthrough dilaudid, oxybutynin and breakthrough flexeril for bladder spasm pain     Pulm:   - IS 10x / hr     CV:   - VS Q8h     FEN/GI:   - Continue scheduled zofran, breakthrough tygan  - PRN tums  - Appreciate nutrition recs for soft phosphorous restricted diet, Nepro TID     /Alpesh:   - Maintain andrade and SPT to drainage, teaching for home flushing when wife present, BID flushing (AM per urology, PM per nursing)  - B/l Nephrostomy tubes capped  - Non-voiding cystogram today (11/16)  - Appreciate nephrology evaluation for dialytic needs  - Appreciate nephrology recs for acute renal failure workup and treatment       - avoid nephrotoxins, contrast if possible       - strict Is/Os       - renal dosing for medications as appropriate       - avoid hypotension/ rapid BP fluctuations     Heme:   - Continue to monitor hemoglobin, no current indication for transfusion     ID:   - 10/25 urine cultures with gentamicin sensitive Pseudomonas (10 day course gentamicin for complicated UTI completed 11/3)  - 11/9 UA from PCNT with nitrites, 2+ LE, 2+ blood, 3+ protein, no clinical signs/symptoms of UTI, consistent with known contamination from ileal ureter, if concerns raised for UTI in setting of renal failure would engage ID to help distinguish clinical significance   - no current indication for antibiotics     Endo:   - HbA1c normal (performed 2/2 patient concerns)     MSK:   - OOB as tolerated, PT on board     Prophylaxis: SQH / SCDs  Dispo:  Anticipated discharge to home tomorrow (11/17). Patient does not want homecare services.     Discussed and seen with Dr. Jaimee Lacy  --------------------------------------------  Bonita Gould PA-C  Urology  Consult Uro: [id number]  After 5pm and Weekends: [id number]

## 2023-11-16 NOTE — PROGRESS NOTES
"Physical Therapy                 Therapy Communication Note    Patient Name: Jose Thornton  MRN: [id number]  Today's Date: 11/16/2023     Discipline: Physical Therapy    Missed Visit Reason: Missed Visit Reason: Other (Comment) (Attempt at 1129, pt. off division at XRay. Reattempted at 1243, pt. adamantly refusing to participate 2/2 fatigue. Pt. reported \"I've been walking my laps with the nurses\". Will reattempt as schedule permist.)    Missed Time: Attempt    Comment:  "

## 2023-11-16 NOTE — PROGRESS NOTES
Wound Care Progress Note     Visit Date: 11/16/2023      Patient Name: Jose Thornton         MRN: [id number]                Reason for Visit: Ostomy assessment and pouch change if needed.         Wound History:  Jose Thornton is a 57 y.o. male with PMH of Crohn's disease and colovesical fistula who underwent Exploratory laparotomy, MYA, Cystoscopy, Bilateral ileal ureter and bladder augment with right partial rectus muscle flap, and Suprapubic tube insertion on 10/27/23 with Dr. Andersen and Dr. Rodriguez.         Pertinent Labs:   Albumin   Date Value Ref Range Status   11/16/2023 3.2 (L) 3.4 - 5.0 g/dL Final           Assessment: Patient up and in bed. States he does not need a pouch change today. He changed the pouch 2 days ago and likes to get good wear time out of his pouches.        NEW    Nephrostomy Left 10 Fr. (Active)   Placement Date/Time: 10/18/23 0915   Hand Hygiene Completed: Yes  Location: Left  Tube Size (Fr.): 10 Fr.  Urine Returned: Yes   Number of days: 29       Nephrostomy Right 10 Fr. (Active)   Placement Date/Time: 10/18/23 0920   Hand Hygiene Completed: Yes  Location: Right  Tube Size (Fr.): 10 Fr.  Urine Returned: Yes   Number of days: 29       Colostomy LUQ (Active)   Placement Date/Time: (c)  (c)    Location: LUQ   Number of days:        Urethral Catheter Non-latex 18 Fr. (Active)   Placement Date/Time: 10/27/23 1428   Placed by: SURGEON  Hand Hygiene Completed: Yes  Catheter Type: Non-latex  Tube Size (Fr.): 18 Fr.  Catheter Balloon Size: 10 mL  Urine Returned: Yes   Number of days: 20       Suprapubic Catheter Non-latex 20 Fr. (Active)   Placement Date/Time: 10/27/23 1539   Placed by: KATIE  Hand Hygiene Completed: Yes  Catheter Type: Non-latex  Tube Size (Fr.): 20 Fr.  Catheter Balloon Size: 5 mL  Urine Returned: Yes  Securement Method: (c) Sutured   Number of days: 20     Nephrostomy Left 10 Fr. (Active)   Site/Stoma Assessment Clean;Intact 11/16/23 1626   Dressing Status Clean;Dry  11/16/23 1626   Dressing Type Split gauze 11/15/23 2023   Collection Container Other (Comment) 11/15/23 2023   Securement Method Leg strap 11/15/23 0817   Output (mL) 0 mL 11/16/23 0500   Output Description Cloudy 11/15/23 0817       Nephrostomy Right 10 Fr. (Active)   Site/Stoma Assessment Clean;Intact 11/16/23 1627   Dressing Status Clean;Dry 11/16/23 1627   Dressing Type Split gauze 11/15/23 2023   Collection Container Other (Comment) 11/15/23 2023   Securement Method Leg strap 11/15/23 0817   Output (mL) 0 mL 11/16/23 0500   Output Description Cloudy 11/15/23 0817       Colostomy LUQ (Active)   Stomal Appliance 2 piece 11/15/23 2023   Site/Stoma Assessment Pink;Intact 11/16/23 1320   Peristomal Assessment MILAGRO 11/16/23 1320   Treatment Placement checked 11/11/23 0048   Drainage Characteristics Brown 11/15/23 2023   Output (mL) 150 mL 11/16/23 1259       Urethral Catheter Non-latex 18 Fr. (Active)   Site Assessment Clean 11/16/23 1507   Collection Container Standard drainage bag 11/15/23 0818   Securement Method Securing device (Describe) 11/15/23 0818   Reason for Continuing Urinary Catheterization surgical procedures: urological/gynecological, pelvic oncology, anal, prolonged surgical procedure 11/15/23 0810   Output (mL) 15 mL 11/16/23 1259   $ Urethral Catheter Charge Indwelling cath 11/15/23 0810       Suprapubic Catheter Non-latex 20 Fr. (Active)   Site/Stoma Assessment Clean;Intact 11/15/23 2023   Dressing Status Dry 11/15/23 2023   Dressing Type Dry dressing 11/15/23 0800   Collection Container Standard drainage bag 11/15/23 2023   Reason for Continuing Urinary Catheterization surgical procedures: urological/gynecological, pelvic oncology, anal, prolonged surgical procedure 11/15/23 2023   Output (mL) 500 mL 11/16/23 1259                   Wound 10/27/23 Incision Abdomen Lower;Medial (Active)   Date First Assessed/Time First Assessed: 10/27/23 0910   Present on Original Admission: No  Primary Wound Type:  Incision  Location: Abdomen  Wound Location Orientation: Lower;Medial   Number of days: 20     Wound 10/27/23 Incision Abdomen Lower;Medial (Active)   Site Assessment Clean;Dry;Intact 11/16/23 0900   Anu-Wound Assessment Clean;Dry;Intact 11/16/23 0900   Shape Linear 11/15/23 0810   State of Healing Healing ridge;Closed wound edges 11/16/23 0900   Margins Attached edges 11/16/23 0900   Closure Approximated 11/16/23 0900   Sutures/Staple Line Approximated 11/16/23 0900   Drainage Description None 11/16/23 0900   Drainage Amount None 11/16/23 0900   Dressing Open to air 11/16/23 0900   Dressing Status Clean;Dry 11/14/23 2058       Wound Team Plan: Continue to follow twice a week and change as needed.      SUZANNE GERHARDT, RN, BSN CWOCN  11/16/2023  5:35 PM

## 2023-11-16 NOTE — CARE PLAN
Problem: Pain  Goal: Takes deep breaths with improved pain control throughout the shift  11/16/2023 0739 by Nahun Sanford RN  Outcome: Progressing  11/16/2023 0739 by Nahun Sanford RN  Outcome: Progressing  Goal: Turns in bed with improved pain control throughout the shift  11/16/2023 0739 by Nahun Sanford RN  Outcome: Progressing  11/16/2023 0739 by Nahun Sanford RN  Outcome: Progressing  Goal: Walks with improved pain control throughout the shift  11/16/2023 0739 by Nahun Sanford RN  Outcome: Progressing  11/16/2023 0739 by Nahun Sanford RN  Outcome: Progressing  Goal: Performs ADL's with improved pain control throughout shift  11/16/2023 0739 by Nahun Sanford RN  Outcome: Progressing  11/16/2023 0739 by Nahun Sanford RN  Outcome: Progressing  Goal: Participates in PT with improved pain control throughout the shift  11/16/2023 0739 by Nahun Sanford RN  Outcome: Progressing  11/16/2023 0739 by Nahun Sanford RN  Outcome: Progressing  Goal: Free from opioid side effects throughout the shift  11/16/2023 0739 by Nahun Sanford RN  Outcome: Progressing  11/16/2023 0739 by Nahun Sanford RN  Outcome: Progressing  Goal: Free from acute confusion related to pain meds throughout the shift  11/16/2023 0739 by Nahun Sanford RN  Outcome: Progressing  11/16/2023 0739 by Nahun Sanford RN  Outcome: Progressing     Problem: Fall/Injury  Goal: Not fall by end of shift  11/16/2023 0739 by Nahun Sanford RN  Outcome: Progressing  11/16/2023 0739 by Nahun Sanford RN  Outcome: Progressing  Goal: Be free from injury by end of the shift  11/16/2023 0739 by Nahun Sanford RN  Outcome: Progressing  11/16/2023 0739 by Nahun Sanford RN  Outcome: Progressing  Goal: Verbalize understanding of personal risk factors for fall in the hospital  11/16/2023 0739 by Nahun Sanford RN  Outcome: Progressing  11/16/2023 0739 by Nahun  DANNI Sanford  Outcome: Progressing  Goal: Verbalize understanding of risk factor reduction measures to prevent injury from fall in the home  11/16/2023 0739 by Nahun Sanford RN  Outcome: Progressing  11/16/2023 0739 by Nahun Sanford RN  Outcome: Progressing  Goal: Use assistive devices by end of the shift  11/16/2023 0739 by Nahun Sanford RN  Outcome: Progressing  11/16/2023 0739 by Nahun Sanford RN  Outcome: Progressing  Goal: Pace activities to prevent fatigue by end of the shift  11/16/2023 0739 by Nahun Sanford RN  Outcome: Progressing  11/16/2023 0739 by Nahun Sanford RN  Outcome: Progressing     Problem: Dialysis  Goal: I will slow progression of end-stage renal disease through participating in dialysis 3 times a week  11/16/2023 0739 by Nahun Sanford RN  Outcome: Progressing  11/16/2023 0739 by Nahun Sanford RN  Outcome: Progressing     Problem: Skin  Goal: Participates in plan/prevention/treatment measures  11/16/2023 0739 by Nahun Sanford RN  Outcome: Progressing  11/16/2023 0739 by Nahun Sanford RN  Outcome: Progressing  Goal: Promote/optimize nutrition  Outcome: Progressing

## 2023-11-16 NOTE — CARE PLAN
Problem: Pain  Goal: Takes deep breaths with improved pain control throughout the shift  Outcome: Progressing  Goal: Turns in bed with improved pain control throughout the shift  Outcome: Progressing  Goal: Walks with improved pain control throughout the shift  Outcome: Progressing  Goal: Performs ADL's with improved pain control throughout shift  Outcome: Progressing  Goal: Participates in PT with improved pain control throughout the shift  Outcome: Progressing  Goal: Free from opioid side effects throughout the shift  Outcome: Progressing  Goal: Free from acute confusion related to pain meds throughout the shift  Outcome: Progressing     Problem: Skin  Goal: Participates in plan/prevention/treatment measures  Outcome: Progressing  Goal: Promote/optimize nutrition  Outcome: Progressing  Flowsheets (Taken 11/12/2023 9540 by Oleksandr Cloud RN)  Promote/optimize nutrition:   Monitor/record intake including meals   Consume > 50% meals/supplements   Offer water/supplements/favorite foods   The patient's goals for the shift include      The clinical goals for the shift include NAUSEA WILL BE CONTROL AND INCREASE IN AMBULATION    Over the shift, the patient did not make progress toward the following goals. Barriers to progression include nausea , which interfers with pt getting oob to ambulate in hallway. Recommendations to address these barriers include given antiemetic medication prior to meal time to  decrease nausea, and increase po intake.  If nausea is control pt will get out of bed and ambulate in hallway.

## 2023-11-16 NOTE — PROGRESS NOTES
"Nutrition Follow Up Assessment:   Nutrition Assessment       Patient is a 57 y.o. male with colovesical fistula now s/p exploratory laparotomy, MYA, cystoscopy, bilateral ileal ureter and bladder augment with right partial rectus muscle flap, and suprapubic tube insertion on (10/27/23).    Post-op course c/b ileus & ARF likely 2/2 gentamicin toxicity, distal obstruction, UTI, and renal restricted TMA or other GN. Nephrology following with last HD session (11/9). UOP & creatinine levels improving.     Pt s/p non-voiding cystogram earlier today (11/16). Possible discharge tomorrow.     Nutrition History:  Follow up visit made. Pt resting in bed watching videos on his phone. Reports he was ready to wake up & have a good day, which was side tracked following administration of contrast for cystogram. Pt notes he can almost immediately taste the contrast, which has induced nausea & deterred him from eating anything. Notes ongoing c/o foods tasting different than they should. Notes he didn't eat anything yesterday but had some of a hamburger & pot roast the day before. Stockpile of Nepro Shakes noted at bedside, pt reporting they're too thick for him to drink right now. Agreeable to trial of thinner Ensure Clear shakes. Notes he feels like his body is really only craving water at this time.     Food Allergies/Intolerances:  None  GI Symptoms: Nausea, Early satiety, Taste changes    Anthropometrics:  Height: 182.9 cm (6' 0.01\")   Weight: 113 kg (249 lb 1.9 oz)   BMI (Calculated): 33.78  IBW/kg (Dietitian Calculated): 80.9 kg  Percent of IBW: 140 %     Weight History:   Weight Change %:  Weight History / % Weight Change:   No further weights in EMR since prior assessment. Unable to assess for changes.    Nutrition Focused Physical Exam Findings:  Defer: See RDN assessment (11/9/23)    Edema:  Edema: none     Physical Findings:  Medial lower abdominal incision     Nutrition Significant Labs:  Results for orders placed or " performed during the hospital encounter of 10/24/23 (from the past 24 hour(s))   Renal function panel   Result Value Ref Range    Glucose 90 74 - 99 mg/dL    Sodium 139 136 - 145 mmol/L    Potassium 3.8 3.5 - 5.3 mmol/L    Chloride 100 98 - 107 mmol/L    Bicarbonate 25 21 - 32 mmol/L    Anion Gap 18 10 - 20 mmol/L    Urea Nitrogen 62 (H) 6 - 23 mg/dL    Creatinine 5.39 (H) 0.50 - 1.30 mg/dL    eGFR 12 (L) >60 mL/min/1.73m*2    Calcium 9.2 8.6 - 10.6 mg/dL    Phosphorus 5.4 (H) 2.5 - 4.9 mg/dL    Albumin 3.2 (L) 3.4 - 5.0 g/dL       Nutrition Specific Medications:  Zofran, Senokot    I/O:   +Colostomy w/ output today (11/16)      Dietary Orders (From admission, onward)       Start     Ordered    11/10/23 1541  Oral nutritional supplements  Until discontinued        Question Answer Comment   Deliver with All meals TID   Select supplement: Nepro        11/10/23 1540    11/09/23 1704  Adult diet Easy to chew; Phosphorus 1 gm  Diet effective now        Question Answer Comment   Diet type Easy to chew    Other restriction(s): Phosphorus 1 gm        11/09/23 1703              Estimated Needs:   Total Energy Estimated Needs (kCal): 5790-8417 kCal  Method for Estimating Needs: 28-30 kcals/kg x IBW  Total Protein Estimated Needs (g): 95 g  Method for Estimating Needs: 1.2-1.4 g/kg x IBW  Total Fluid Estimated Needs: Per team's discretion        Nutrition Diagnosis   Nutrition Diagnosis:  Malnutrition Diagnosis  Patient has Malnutrition Diagnosis: No    Nutrition Diagnosis  Patient has Nutrition Diagnosis: Yes  Diagnosis Status (1): Ongoing  Nutrition Diagnosis 1: Increased nutrient needs  Related to (1): increased metabolic demand  As Evidenced by (1): colovesical fistula s/p takedown now c/b acute on CKD requiring HD initiation       Nutrition Interventions/Recommendations   Nutrition Interventions and Recommendations:        Nutrition Prescription:  Continue an easy to chew, low phosphorus diet as tolerated  Will trial  Ensure Clear TID in place of Nepro given poor PO acceptance        Time Spent/Follow-up Reminder:   Follow Up  Time Spent (min): 60 minutes  Last Date of Nutrition Visit: 11/16/23  Nutrition Follow-Up Needed?: Dietitian to reassess per policy

## 2023-11-16 NOTE — CARE PLAN
HPI: Jose Thornton is a 57 y.o. male with PMH of Crohn's disease and colovesical fistula, s/p bilateral nephrostomy tube placement in January 2023, who underwent Exploratory laparotomy, MYA, Cystoscopy, Bilateral ileal ureter and bladder augment with right partial rectus muscle flap, and Suprapubic tube insertion on 10/27. Post-op course c/b post-op ileus and Pseudomonas UTI, treated with 5 day course gentamicin for complicated UTI (10/28-11/2). Nephrology consulted for management of KADE, suspected etiology ATN 2/2 gentamicin toxicity. Required 3 days of HD, last session 11/9.    Interval history:  Creatinine continues to down-trend, 5.39 today from 5.72 yesterday. Phosphorus improving, electrolytes otherwise stable.     Recommendations:  -Ok to remove trialysis catheter  -Please ensure patient has outpatient nephrology follow-up for continued lab monitoring    Nephrology will sign off at this time. Please do not hesitate to reach out with any additional questions or concerns

## 2023-11-17 ENCOUNTER — PHARMACY VISIT (OUTPATIENT)
Dept: PHARMACY | Facility: CLINIC | Age: 58
End: 2023-11-17

## 2023-11-17 VITALS
RESPIRATION RATE: 16 BRPM | WEIGHT: 249.12 LBS | SYSTOLIC BLOOD PRESSURE: 110 MMHG | OXYGEN SATURATION: 95 % | HEART RATE: 95 BPM | TEMPERATURE: 98.6 F | BODY MASS INDEX: 33.74 KG/M2 | DIASTOLIC BLOOD PRESSURE: 72 MMHG | HEIGHT: 72 IN

## 2023-11-17 LAB
ALBUMIN SERPL BCP-MCNC: 3.2 G/DL (ref 3.4–5)
ANION GAP SERPL CALC-SCNC: 17 MMOL/L (ref 10–20)
BUN SERPL-MCNC: 65 MG/DL (ref 6–23)
CALCIUM SERPL-MCNC: 8.9 MG/DL (ref 8.6–10.6)
CHLORIDE SERPL-SCNC: 100 MMOL/L (ref 98–107)
CO2 SERPL-SCNC: 24 MMOL/L (ref 21–32)
CREAT SERPL-MCNC: 5.42 MG/DL (ref 0.5–1.3)
ERYTHROCYTE [DISTWIDTH] IN BLOOD BY AUTOMATED COUNT: 15.6 % (ref 11.5–14.5)
GFR SERPL CREATININE-BSD FRML MDRD: 12 ML/MIN/1.73M*2
GLUCOSE SERPL-MCNC: 90 MG/DL (ref 74–99)
HCT VFR BLD AUTO: 32.4 % (ref 41–52)
HGB BLD-MCNC: 10.2 G/DL (ref 13.5–17.5)
MCH RBC QN AUTO: 27.4 PG (ref 26–34)
MCHC RBC AUTO-ENTMCNC: 31.5 G/DL (ref 32–36)
MCV RBC AUTO: 87 FL (ref 80–100)
NRBC BLD-RTO: 0 /100 WBCS (ref 0–0)
PHOSPHATE SERPL-MCNC: 5.5 MG/DL (ref 2.5–4.9)
PLATELET # BLD AUTO: 342 X10*3/UL (ref 150–450)
POTASSIUM SERPL-SCNC: 4.1 MMOL/L (ref 3.5–5.3)
RBC # BLD AUTO: 3.72 X10*6/UL (ref 4.5–5.9)
SODIUM SERPL-SCNC: 137 MMOL/L (ref 136–145)
WBC # BLD AUTO: 8.4 X10*3/UL (ref 4.4–11.3)

## 2023-11-17 PROCEDURE — 85027 COMPLETE CBC AUTOMATED: CPT | Performed by: PHYSICIAN ASSISTANT

## 2023-11-17 PROCEDURE — 96372 THER/PROPH/DIAG INJ SC/IM: CPT | Performed by: STUDENT IN AN ORGANIZED HEALTH CARE EDUCATION/TRAINING PROGRAM

## 2023-11-17 PROCEDURE — 2500000005 HC RX 250 GENERAL PHARMACY W/O HCPCS

## 2023-11-17 PROCEDURE — 2500000004 HC RX 250 GENERAL PHARMACY W/ HCPCS (ALT 636 FOR OP/ED): Performed by: STUDENT IN AN ORGANIZED HEALTH CARE EDUCATION/TRAINING PROGRAM

## 2023-11-17 PROCEDURE — 2500000004 HC RX 250 GENERAL PHARMACY W/ HCPCS (ALT 636 FOR OP/ED): Performed by: NURSE PRACTITIONER

## 2023-11-17 PROCEDURE — 2500000004 HC RX 250 GENERAL PHARMACY W/ HCPCS (ALT 636 FOR OP/ED)

## 2023-11-17 PROCEDURE — 80069 RENAL FUNCTION PANEL: CPT | Performed by: STUDENT IN AN ORGANIZED HEALTH CARE EDUCATION/TRAINING PROGRAM

## 2023-11-17 PROCEDURE — RXMED WILLOW AMBULATORY MEDICATION CHARGE

## 2023-11-17 RX ORDER — OXYCODONE HYDROCHLORIDE 5 MG/1
5 TABLET ORAL EVERY 6 HOURS PRN
Qty: 15 TABLET | Refills: 0 | Status: SHIPPED | OUTPATIENT
Start: 2023-11-17 | End: 2023-12-13 | Stop reason: HOSPADM

## 2023-11-17 RX ORDER — SENNOSIDES 8.6 MG/1
2 TABLET ORAL 2 TIMES DAILY PRN
Qty: 120 TABLET | Refills: 0 | Status: SHIPPED | OUTPATIENT
Start: 2023-11-17 | End: 2023-12-13 | Stop reason: HOSPADM

## 2023-11-17 RX ORDER — TAMSULOSIN HYDROCHLORIDE 0.4 MG/1
0.4 CAPSULE ORAL DAILY
Status: DISCONTINUED | OUTPATIENT
Start: 2023-11-17 | End: 2023-11-17 | Stop reason: HOSPADM

## 2023-11-17 RX ORDER — TAMSULOSIN HYDROCHLORIDE 0.4 MG/1
0.4 CAPSULE ORAL DAILY
Qty: 30 CAPSULE | Refills: 0 | Status: ON HOLD | OUTPATIENT
Start: 2023-11-17 | End: 2023-12-13 | Stop reason: SDUPTHER

## 2023-11-17 RX ORDER — ONDANSETRON 4 MG/1
4 TABLET, ORALLY DISINTEGRATING ORAL EVERY 8 HOURS PRN
Qty: 20 TABLET | Refills: 0 | Status: SHIPPED | OUTPATIENT
Start: 2023-11-17 | End: 2023-12-28 | Stop reason: WASHOUT

## 2023-11-17 RX ORDER — CYCLOBENZAPRINE HCL 5 MG
5 TABLET ORAL 3 TIMES DAILY PRN
Qty: 90 TABLET | Refills: 0 | Status: SHIPPED | OUTPATIENT
Start: 2023-11-17 | End: 2024-01-04 | Stop reason: WASHOUT

## 2023-11-17 RX ADMIN — HEPARIN SODIUM 5000 UNITS: 5000 INJECTION INTRAVENOUS; SUBCUTANEOUS at 13:06

## 2023-11-17 RX ADMIN — ONDANSETRON HYDROCHLORIDE 4 MG: 4 TABLET, FILM COATED ORAL at 05:54

## 2023-11-17 RX ADMIN — OXYBUTYNIN CHLORIDE 5 MG: 5 TABLET ORAL at 15:43

## 2023-11-17 RX ADMIN — HEPARIN SODIUM 5000 UNITS: 5000 INJECTION INTRAVENOUS; SUBCUTANEOUS at 05:54

## 2023-11-17 RX ADMIN — OXYBUTYNIN CHLORIDE 5 MG: 5 TABLET ORAL at 08:27

## 2023-11-17 RX ADMIN — TAMSULOSIN HYDROCHLORIDE 0.4 MG: 0.4 CAPSULE ORAL at 08:27

## 2023-11-17 RX ADMIN — ONDANSETRON HYDROCHLORIDE 4 MG: 4 TABLET, FILM COATED ORAL at 13:06

## 2023-11-17 ASSESSMENT — COGNITIVE AND FUNCTIONAL STATUS - GENERAL
WALKING IN HOSPITAL ROOM: A LITTLE
DRESSING REGULAR UPPER BODY CLOTHING: A LITTLE
HELP NEEDED FOR BATHING: A LITTLE
MOVING TO AND FROM BED TO CHAIR: A LITTLE
DAILY ACTIVITIY SCORE: 21
MOBILITY SCORE: 19
STANDING UP FROM CHAIR USING ARMS: A LITTLE
TURNING FROM BACK TO SIDE WHILE IN FLAT BAD: A LITTLE
DRESSING REGULAR LOWER BODY CLOTHING: A LITTLE
MOVING FROM LYING ON BACK TO SITTING ON SIDE OF FLAT BED WITH BEDRAILS: A LITTLE

## 2023-11-17 ASSESSMENT — PAIN SCALES - GENERAL
PAINLEVEL_OUTOF10: 0 - NO PAIN
PAINLEVEL_OUTOF10: 5 - MODERATE PAIN
PAINLEVEL_OUTOF10: 0 - NO PAIN

## 2023-11-17 ASSESSMENT — PAIN - FUNCTIONAL ASSESSMENT
PAIN_FUNCTIONAL_ASSESSMENT: 0-10

## 2023-11-17 NOTE — DISCHARGE SUMMARY
Discharge Diagnosis  Colovesical fistula    Issues Requiring Follow-Up  POV 11/21, review recorded PVRs, eventual PCNT/SPT removal, nephrology follow-up    Test Results Pending At Discharge  Pending Labs       No current pending labs.            Hospital Course  Jose Thornton is a 57 year old male with a significant pmhx of Crohn's disease c/b colovesical fistula, s/p bilateral nephrostomy tube and indwelling andrade placement 1/2023, who underwent ex-lap, MYA, cystoscopy, bilateral ileal ureter, bilateral stent placement, bladder augment with right partial rectus muscle flap, and suprapubic tube insertion on 10/27 with Karine Andersen and Jennifer. Findings were no large fistula seen in the bladder or urethra. Post-op course c/b persistent PONV, ileus and pseudomonas UTI, treated with 5 day course gentamicin for complicated UTI (10/28-11/2). He subsequently developed KADE with peak serum creatinine of 12 with oliguria and without significant electrolyte abnormalities. Nephrology was consulted and suspected etiology of ATN 2/2 gentamicin toxicity, and patient required 3 days of HD, last session 11/9, now with downtrending creatinine (currently 5.42). Nephrostomy tubes were capped 11/15 and cystogram showed no urine leak. On 11/16, patient was voiding good amounts of clear yellow urine per andrade and SPT thus urethral andrade was removed and SPT was capped. PVRs per SPT thereafter continued to be higher than voids. On 11/17 trialysis catheter was removed. The patient is currently POD #21. At the time of hospital discharge he was tolerating a regular diet, ambulating independently, passing flatus, having normal bowel movements and pain was well-controlled. The patient worked with the nursing team to attain a baseline proficiency in new SPT care. Home care and home PT services were offered to the patient who declined. Recommendations for discharge included scheduled voiding q2-3hr with 1-2x per day PVR checks by way of capped SPT  with outputs recorded for review at outpatient follow-up. He was discharged to home in satisfactory condition and will follow up with outpatient urology on 11/21 for PVR review, nephrology for renal function lab monitoring (referral placed), and Dr Andersen for cystoscopy/stent removal and POV in 6 weeks (scheduling request made).    Pertinent Physical Exam At Time of Discharge  Constitutional: Alert, NAD  HEENT: normocephalic, atraumatic EOMI  Neuro: A&O x3  CV: rate is regular, trialysis catheter removed  Pulm: Non-laboured breathing  GI: abdomen soft, non-tender, non-distended  : Schultz removed, voiding clear yellow urine spontaneously, SPT capped.   Skin: Midline incision c/d/I, staples removed. No lesions or discoloration noted.  Musculoskeletal: ADELE  Extremities: no edema or cyanosis noted    Home Medications     Medication List      START taking these medications     cyclobenzaprine 5 mg tablet; Commonly known as: Flexeril; Take 1 tablet   (5 mg) by mouth 3 times a day as needed for muscle spasms (bladder   spasms).   sennosides 8.6 mg tablet; Commonly known as: Senokot; Take 2 tablets   (17.2 mg) by mouth 2 times a day as needed for constipation.   tamsulosin 0.4 mg 24 hr capsule; Commonly known as: Flomax; Take 1   capsule (0.4 mg) by mouth once daily.     CHANGE how you take these medications     oxyCODONE 5 mg immediate release tablet; Commonly known as: Roxicodone;   Take 1 tablet (5 mg) by mouth every 6 hours if needed for severe pain (7 -   10) for up to 5 days.; What changed: when to take this, reasons to take   this, additional instructions     CONTINUE taking these medications     acetaminophen 500 mg tablet; Commonly known as: Tylenol     STOP taking these medications     amoxicillin-pot clavulanate 875-125 mg tablet; Commonly known as:   Augmentin   oxybutynin XL 10 mg 24 hr tablet; Commonly known as: Ditropan-XL   phenazopyridine 95 mg tablet; Commonly known as: Urinary Pain Relief     ASK your  doctor about these medications     diazePAM 2 mg tablet; Commonly known as: Valium; Take 1 tablet (2 mg) by   mouth if needed for anxiety. TAKE 1 TABLET BY MOUTH AN HOUR PRIOR TO   CATHETER CHANGE.   Remicade 100 mg injection; Generic drug: inFLIXimab; INFUSE 500MG PER   PROTOCOL ON WEEKS 0,2, 6 AND THEN EVERY 8 WEEKS AS MAINTENANCE.       Outpatient Follow-Up  Future Appointments   Date Time Provider Department Center   11/21/2023  9:30 AM Dhiraj Baldwin, APRN-CNP [id number]       Gracie Koenig PA-C

## 2023-11-17 NOTE — PROGRESS NOTES
Jose Thornton is a 57 y.o. male on day 24 of admission presenting with Colovesical fistula.  TCC Note    Plan per Medical/Surgical Team: Medically ready for discharge today.  Status: Inpatient  Payor Source: Self Pay  Discharge disposition: [facility]  Expected date of discharge: 11/17/23  Barriers: none

## 2023-11-17 NOTE — CARE PLAN
Pt afebrile. Vitals stable. Minimal pain. Some nausea. No vomitting. Schultz removed urine output okay.

## 2023-11-17 NOTE — PROGRESS NOTES
"Physical Therapy                 Therapy Communication Note    Patient Name: Jose Thornton  MRN: [id number]  Today's Date: 11/17/2023     Discipline: Physical Therapy    Missed Visit Reason: Missed Visit Reason: Patient refused (Attempt at 1406. Pt. adamantly refused stating \"I just got back from walking\". Encouraged pt. to work on stair training with therapist, though pt. continued to decline. Pt. waiting for lunch and d/c home later today.)    Missed Time: Attempt    Comment:  "

## 2023-11-18 NOTE — CARE PLAN
Problem: Pain  Goal: Takes deep breaths with improved pain control throughout the shift  Outcome: Met  Goal: Turns in bed with improved pain control throughout the shift  Outcome: Met  Goal: Walks with improved pain control throughout the shift  Outcome: Met  Goal: Performs ADL's with improved pain control throughout shift  Outcome: Met  Goal: Participates in PT with improved pain control throughout the shift  Outcome: Met  Goal: Free from opioid side effects throughout the shift  Outcome: Met  Goal: Free from acute confusion related to pain meds throughout the shift  Outcome: Met     Problem: Fall/Injury  Goal: Not fall by end of shift  Outcome: Met  Goal: Be free from injury by end of the shift  Outcome: Met  Goal: Verbalize understanding of personal risk factors for fall in the hospital  Outcome: Met  Goal: Verbalize understanding of risk factor reduction measures to prevent injury from fall in the home  Outcome: Met  Goal: Use assistive devices by end of the shift  Outcome: Met  Goal: Pace activities to prevent fatigue by end of the shift  Outcome: Met     Problem: Dialysis  Goal: I will slow progression of end-stage renal disease through participating in dialysis 3 times a week  Outcome: Met     Problem: Skin  Goal: Participates in plan/prevention/treatment measures  Outcome: Met  Goal: Promote/optimize nutrition  Outcome: Met   The patient's goals for the shift include      The clinical goals for the shift include nausea and pain management    Over the shift, the patient did not make progress toward the following goals. Barriers to progression include none. Recommendations to address these barriers include pain medication and antiemetic medication as needed  Pt. Discharged home with wife. AVS reviewed with them. Pt and wife stated understanding of dischrage instructions and medication regime.  Pt condition stable at time of discharge.

## 2023-11-20 ENCOUNTER — APPOINTMENT (OUTPATIENT)
Dept: RADIOLOGY | Facility: HOSPITAL | Age: 58
End: 2023-11-20

## 2023-11-21 ENCOUNTER — OFFICE VISIT (OUTPATIENT)
Dept: UROLOGY | Facility: CLINIC | Age: 58
End: 2023-11-21

## 2023-11-21 ENCOUNTER — APPOINTMENT (OUTPATIENT)
Dept: UROLOGY | Facility: CLINIC | Age: 58
End: 2023-11-21

## 2023-11-21 ENCOUNTER — LAB (OUTPATIENT)
Dept: LAB | Facility: LAB | Age: 58
End: 2023-11-21

## 2023-11-21 VITALS
SYSTOLIC BLOOD PRESSURE: 94 MMHG | DIASTOLIC BLOOD PRESSURE: 62 MMHG | HEART RATE: 101 BPM | WEIGHT: 219 LBS | BODY MASS INDEX: 29.66 KG/M2 | TEMPERATURE: 96.8 F | HEIGHT: 72 IN

## 2023-11-21 DIAGNOSIS — R79.89 ELEVATED SERUM CREATININE: ICD-10-CM

## 2023-11-21 DIAGNOSIS — R30.0 DYSURIA: ICD-10-CM

## 2023-11-21 LAB
ALBUMIN SERPL BCP-MCNC: 3.7 G/DL (ref 3.4–5)
ANION GAP SERPL CALC-SCNC: 24 MMOL/L (ref 10–20)
BUN SERPL-MCNC: 77 MG/DL (ref 6–23)
CALCIUM SERPL-MCNC: 9.2 MG/DL (ref 8.6–10.3)
CHLORIDE SERPL-SCNC: 96 MMOL/L (ref 98–107)
CO2 SERPL-SCNC: 20 MMOL/L (ref 21–32)
CREAT SERPL-MCNC: 6.53 MG/DL (ref 0.5–1.3)
GFR SERPL CREATININE-BSD FRML MDRD: 9 ML/MIN/1.73M*2
GLUCOSE SERPL-MCNC: 98 MG/DL (ref 74–99)
PHOSPHATE SERPL-MCNC: 5.6 MG/DL (ref 2.5–4.9)
POTASSIUM SERPL-SCNC: 5.2 MMOL/L (ref 3.5–5.3)
SODIUM SERPL-SCNC: 135 MMOL/L (ref 136–145)

## 2023-11-21 PROCEDURE — 1036F TOBACCO NON-USER: CPT | Performed by: STUDENT IN AN ORGANIZED HEALTH CARE EDUCATION/TRAINING PROGRAM

## 2023-11-21 PROCEDURE — 36415 COLL VENOUS BLD VENIPUNCTURE: CPT

## 2023-11-21 PROCEDURE — 99024 POSTOP FOLLOW-UP VISIT: CPT | Performed by: STUDENT IN AN ORGANIZED HEALTH CARE EDUCATION/TRAINING PROGRAM

## 2023-11-21 PROCEDURE — 3008F BODY MASS INDEX DOCD: CPT | Performed by: STUDENT IN AN ORGANIZED HEALTH CARE EDUCATION/TRAINING PROGRAM

## 2023-11-21 PROCEDURE — 80069 RENAL FUNCTION PANEL: CPT

## 2023-11-21 RX ORDER — PHENAZOPYRIDINE HYDROCHLORIDE 95 MG/1
95 TABLET ORAL 3 TIMES DAILY PRN
Qty: 30 TABLET | Refills: 1 | Status: SHIPPED | OUTPATIENT
Start: 2023-11-21 | End: 2024-03-07 | Stop reason: WASHOUT

## 2023-11-21 NOTE — PROGRESS NOTES
"Jose Thornton 1965 is a AGE@ male seen today for follow-up.    Referred by: No referring provider defined for this encounter.    CC:  Colovesical fistula    HPI:    Patient is a 57-year-old male with a past medical history significant for diverticulitis, Crohn's disease, colovesical fistula for which she underwent resection of fistula and bilateral ureteral reimplant with Dr. Andersen on October 27, 2023.  On encounter today patient states that he feels that he is improving slowly  He had his catheter removed on Friday and has been voiding per urethra only partially  He has kept a log of how much is coming out of the suprapubic tube after he voids and it appears that on average he is voiding about 100 cc from urethra and about 300 to 500 cc comes from the suprapubic tube  As a result we discussed that we will hold off on removing the suprapubic tube at this time    No results found for: \"PSA\"     reports that he has never smoked. He has never used smokeless tobacco. He reports that he does not currently use alcohol. He reports that he does not use drugs.    Current Outpatient Medications   Medication Sig Dispense Refill    acetaminophen (Tylenol) 500 mg tablet Take 2 tablets (1,000 mg) by mouth every 6 hours if needed.      cyclobenzaprine (Flexeril) 5 mg tablet Take 1 tablet (5 mg) by mouth 3 times a day as needed for muscle spasms (bladder spasms). 90 tablet 0    diazePAM (Valium) 2 mg tablet Take 1 tablet (2 mg) by mouth if needed for anxiety. TAKE 1 TABLET BY MOUTH AN HOUR PRIOR TO CATHETER CHANGE. 1 tablet 0    inFLIXimab (Remicade) 100 mg injection INFUSE 500MG PER PROTOCOL ON WEEKS 0,2, 6 AND THEN EVERY 8 WEEKS AS MAINTENANCE. 5 each 6    ondansetron ODT (Zofran-ODT) 4 mg disintegrating tablet Take 1 tablet (4 mg) by mouth every 8 hours if needed for nausea or vomiting. 20 tablet 0    oxyCODONE (Roxicodone) 5 mg immediate release tablet Take 1 tablet (5 mg) by mouth every 6 hours if needed for severe " pain (7 - 10) for up to 5 days. 15 tablet 0    sennosides (Senokot) 8.6 mg tablet Take 2 tablets (17.2 mg) by mouth 2 times a day as needed for constipation. 120 tablet 0    tamsulosin (Flomax) 0.4 mg 24 hr capsule Take 1 capsule (0.4 mg) by mouth once daily. 30 capsule 0    phenazopyridine (Urinary Pain Relief) 95 mg tablet Take 1 tablet (95 mg) by mouth 3 times a day as needed for bladder spasms. 30 tablet 1     No current facility-administered medications for this visit.       Past Surgical History:   Procedure Laterality Date    CT GUIDED PERCUTANEOUS PERITONEAL OR RETROPERITONEAL FLUID COLLECTION DRAINAGE  9/28/2022    CT GUIDED PERCUTANEOUS PERITONEAL OR RETROPERITONEAL FLUID COLLECTION DRAINAGE 9/28/2022 [facility] INPATIENT LEGACY    IR CVC NONTUNNELED  11/6/2023    IR CVC NONTUNNELED 11/6/2023 Drew Terry MD [facility] ANGIO    IR NEPHROSTOMY TUBE EXCHANGE  1/31/2023    IR NEPHROSTOMY TUBE EXCHANGE 1/31/2023 [facility]GAC[facility]    IR NEPHROSTOMY TUBE EXCHANGE  1/31/2023    IR NEPHROSTOMY TUBE EXCHANGE 1/31/2023 [facility]CTOR OFFICE LEGACY    IR NEPHROSTOMY TUBE EXCHANGE  3/23/2023    IR NEPHROSTOMY TUBE EXCHANGE [facility]    IR NEPHROSTOMY TUBE EXCHANGE  3/23/2023    IR NEPHROSTOMY TUBE EXCHANGE [facility]    IR NEPHROSTOMY TUBE EXCHANGE  3/30/2023    IR NEPHROSTOMY TUBE EXCHANGE [facility]    IR NEPHROSTOMY TUBE EXCHANGE  4/25/2023    IR NEPHROSTOMY TUBE EXCHANGE [facility]    IR NEPHROSTOMY TUBE EXCHANGE  6/30/2023    IR NEPHROSTOMY TUBE EXCHANGE 6/30/2023 [facility]    IR NEPHROSTOMY TUBE EXCHANGE  6/30/2023    IR NEPHROSTOMY TUBE EXCHANGE 6/30/2023 [facility]    IR NEPHROSTOMY TUBE EXCHANGE  8/25/2023    IR NEPHROSTOMY TUBE EXCHANGE 8/25/2023 [facility]    IR NEPHROSTOMY TUBE EXCHANGE  8/25/2023    IR NEPHROSTOMY TUBE EXCHANGE 8/25/2023 [facility]    IR NEPHROSTOMY TUBE EXCHANGE  10/18/2023    IR NEPHROSTOMY TUBE EXCHANGE 10/18/2023 Onesimo Reyes MD [facility]GIO     No family history on file.   No Known Allergies     Past  "Medical History:   Diagnosis Date    Crohn's disease (CMS/HCC)     Diverticulosis     GERD (gastroesophageal reflux disease)     GI (gastrointestinal bleed)     Urinary tract infection         Review of Systems:   No fevers, chills, chest pain, or shortness of breath.     Physical Exam:  General: no Acute distress, appears well   Psych: Alert and oriented X 3  : Suprapubic tube is capped and the surgical sites look healthy    Lab Results   Component Value Date    WBC 8.4 11/17/2023    HGB 10.2 (L) 11/17/2023    HCT 32.4 (L) 11/17/2023    MCV 87 11/17/2023     11/17/2023     Lab Results   Component Value Date    GLUCOSE 90 11/17/2023    CALCIUM 8.9 11/17/2023     11/17/2023    K 4.1 11/17/2023    CO2 24 11/17/2023     11/17/2023    BUN 65 (H) 11/17/2023    CREATININE 5.42 (H) 11/17/2023     No results found for: \"PSA\"  Lab Results   Component Value Date    HGBA1C 5.1 11/09/2023     FL cystogram 3V    Result Date: 11/16/2023  Interpreted By:  Katie Brown and Ohs Zachary STUDY: FL CYSTOGRAM 3V;  11/16/2023 11:31 am   INDICATION: Signs/Symptoms:Post-op patency check, non-voiding cystogram.   COMPARISON: Abdominal radiograph dated 11/08/2023, CT abdomen/pelvis dated 11/05/2023.   ACCESSION NUMBER(S): [id number]   ORDERING CLINICIAN: ZIGGY STOKES   TECHNIQUE: A single KUB  film was obtained.  The patient arrived with a suprapubic catheter and a Schultz catheter in place. The suprapubic catheter was clamped and subsequently, 290cc of Cysto-Conray was delivered through the patient's Schultz catheter. Multiple fluoroscopic images with AP and oblique views were obtained during and after bladder filling. The patient then voided the contrast material.  A post void radiograph was obtained. The patient tolerated the procedure well. Total fluoroscopy time was  1.1 minutes.   FINDINGS: Initial KUB film demonstrates  a nonobstructive unremarkable bowel gas pattern.  Bilateral nephrostomy tubes and " bilateral nephroureteral stents noted. Schultz catheter appears appropriately placed. Suprapubic catheter and Schultz catheter with tips overlying the expected location of the bladder.   Fluoroscopic images demonstrate  no evidenceof leak or obstruction. The urinary bladder is small in size and irregular. The distal ileal ureter is contrast opacify without evidence of extraluminal contrast extravasation.       1.  No fluoroscopic evidence of urinary bladder leak or distal ileal ureteric leak.   I personally reviewed the images/study and I agree with the findings as stated by Dr. Valentin Phillips. This study was interpreted at [place], [place].   Signed by: Katie Brown 11/16/2023 7:47 PM Dictation workstation:   [id number]    XR abdomen 1 view    Result Date: 11/9/2023  Interpreted By:  Charile Cervantes, STUDY: XR ABDOMEN 1 VIEW;  11/8/2023 8:56 am   INDICATION: Signs/Symptoms:Persistent nausea, persistent ileus.   COMPARISON: CT abdomen and pelvis 11/05/2023, one-view abdomen 11/05/2023.   ACCESSION NUMBER(S): [id number]   ORDERING CLINICIAN: ZIGGY STOKES   FINDINGS: Supine projection of the abdomen and 2 images are submitted   Bilateral nephrostomy stents are noted. A right ureteral stent is noted with the distal portion overlying the expected region of the bladder. A left ureteral stent appears in satisfactory position. Vascular clips are noted within the mid abdomen and pelvis skin clips michele the incision. Prominent loops of small bowel are noted similar previous study. Gas is demonstrated within small bowel and colon. Osseous and articular anatomy appear normal for age.       1.  Findings consistent with postoperative ileus 2. Stents appear in adequate position.   MACRO: None   Signed by: Charlie Cervantes 11/9/2023 7:44 AM Dictation workstation:   [id number]    IR CVC nontunneled    Result Date: 11/7/2023  Interpreted By:  Drew Terry and Kamau Nyokabi STUDY: IR CVC  NONTUNNELED;  11/6/2023 2:14 pm   INDICATION: Signs/Symptoms:Placement of temporary HD catheter.   COMPARISON: None.   ACCESSION NUMBER(S): [id number]   ORDERING CLINICIAN: ANUPAMA OLIVARES   TECHNIQUE: INTERVENTIONALIST(S): Dr. Drew Terry MD   CONSENT: The patient/patient's POA/next of kin was informed of the nature of the proposed procedure. The purposes, alternatives, risks, and benefits were explained and discussed. All questions were answered and consent was obtained.   RADIATION EXPOSURE: Fluoroscopy time: 0.0 min. Dose: 0.51 mGy. Dose Area Product (DAP): 170 mGycm^2   SEDATION: Moderate conscious IV sedation services (supervision of administration, induction, and maintenance) were provided by the physician performing the procedure with intravenous fentanyl 50 mcg and versed 1 mg for 45 minutes. The physician was assisted by an independent trained observer, an interventional radiology nurse, in the continuous monitoring of patient level of consciousness and physiologic status.   MEDICATION/CONTRAST: No additional   TIME OUT: A time out was performed immediately prior to procedure start with the interventional team, correctly identifying the patient name, date of birth, MRN, procedure, anatomy (including marking of site and side), patient position, procedure consent form, relevant laboratory and imaging test results, antibiotic administration, safety precautions, and procedure-specific equipment needs.   COMPLICATIONS: No immediate adverse events identified.   FINDINGS: The patient was positioned supine on the angiography table. The right  supraclavicular cutaneous tissues were prepared and draped in sterile manner.  1% lidocaine local anesthesia was instilled into the subcutaneous soft tissues at the selected access site for local anesthesia. Ultrasound images demonstrate a patent  right  internal jugular vein. Utilizing direct ultrasound guidance and micropuncture/Seldinger technique, the  right  internal  jugular vein was accessed. An ultrasound digital spot image was acquired and stored on the  PACS. After confirmation of location, a 018 Hamden-Mandrel guidewire was introduced and advanced into the inferior vena cava utilizing intermittent fluoroscopy.   The needle was removed with the guidewire left in place and exchanged for a 5-Turkmen coaxial dilator.  The inner dilator and wire were removed and a J-tipped 035 guidewire was introduced through the access sheath.  The skin tract was dilated with successive increase in size of vascular dilators under direct fluoroscopic visualization.   Subsequently, a temporary 13 Turkmen x 20 cm catheter was advanced with its tip overlying the right atrium under direct fluoroscopic guidance.  The catheter ports were aspirated and flushed with normal saline and charged with heparin.  The external portions of the catheter were secured in place with sterile suture dressings.   The patient tolerated the procedure without complication.       1.  Technically uneventful placement of a temporary right internal jugular Trialysis catheter under direct ultrasound and fluoroscopic guidance with catheter tip position at the right atrium. The catheter is ready for utilization.   I was present for and/or performed the critical portions of the procedure and immediately available throughout the entire procedure.   I personally reviewed the image(s) / study and resident interpretation. I agree with the findings as stated.   Dictated at [facility].   Signed by: Drew Terry 11/7/2023 4:42 PM Dictation workstation:   [id number]    XR abdomen 1 view    Result Date: 11/6/2023  Interpreted By:  Charlie Cervantes, STUDY: XR ABDOMEN 1 VIEW;  11/5/2023 10:07 am   INDICATION: Signs/Symptoms:monitor distended SB.   COMPARISON: Abdomen supine 11/03/2023   ACCESSION NUMBER(S): [id number]   ORDERING CLINICIAN: SARAI HERNANDEZ   FINDINGS: Bilateral nephrostomy  tubes are demonstrated. A left ureteral stent catheter appears in adequate position. Skin clips michele a midline incision. Prominent loops of small bowel are noted. A reference loop within the left lower quadrant of the abdomen measures 43.1 mm in diameter. There is slightly less small bowel distention in comparison to the prior study 11/03/2023. Several vascular clips are noted within the right abdomen and within the pelvis.       1.  Findings consistent with postoperative ileus   MACRO: None   Signed by: Charlie Cervantes 11/6/2023 8:48 AM Dictation workstation:   [id number]    CT abdomen pelvis wo IV contrast    Result Date: 11/5/2023  Interpreted By:  Chilo Pizano,  and Negin Vela STUDY: CT ABDOMEN PELVIS WO IV CONTRAST;  11/5/2023 12:24 pm   INDICATION: Signs/Symptoms:Low urine output and KADE s/p complex gu recon.   Per EMR patient is a 57-year-old male with history of colovesical fistula secondary to Crohn's. Status post bilateral ureter and bladder augmentation with right partial rectus muscle flap and SBT placement on 10/27/2023.   COMPARISON: CT urogram 06/01/2023   ACCESSION NUMBER(S): [id number]   ORDERING CLINICIAN: SARAI HERNANDEZ   TECHNIQUE: CT of the abdomen and pelvis was performed. Contiguous axial images were obtained at 3 mm slice thickness through the abdomen and pelvis. Coronal and sagittal reconstructions at 3 mm slice thickness were performed.  No intravenous or oral contrast agents were administered.   FINDINGS: Please note that the evaluation of vessels, lymph nodes and organs is limited without intravenous contrast.   LOWER CHEST: Bandlike bibasilar opacities favored to represent atelectasis or scarring. Otherwise the visualized lung bases are clear. The heart is normal in size with trace pericardial fluid. The visualized distal esophagus is normal. Partially visualized calcified subcarinal lymph node.   ABDOMEN:   LIVER: The liver is normal in size without evidence of focal  liver lesions.   BILE DUCTS: The intrahepatic and extrahepatic ducts are not dilated.   GALLBLADDER: The gallbladder is nondistended and without evidence of radiopaque stones.   PANCREAS: The pancreas appears unremarkable without evidence of ductal dilatation or masses.   SPLEEN: The spleen is normal in size without focal lesions.   ADRENAL GLANDS: Bilateral adrenal glands appear normal.   KIDNEYS AND URETERS: Bilateral percutaneous nephrostomy tubes and bilateral ureteral stents are in place. Unchanged mild-to-moderate left renal atrophy and cortical thinning. Nonspecific left-greater-than-right perinephric fat stranding. Postsurgical changes from prior ureteral surgery with the bilateral ileal ureter and bladder augmentation. There is air within the left collecting system, likely related to recent instrumentation. No hydroureteronephrosis or nephroureterolithiasis is identified.   PELVIS:   BLADDER: The bladder is decompressed. There is air within the urinary bladder, likely related to recent instrumentation. Schultz and suprapubic catheters in place. There is adjacent fat stranding, likely postoperative in nature.   REPRODUCTIVE ORGANS: No pelvic masses.   BOWEL: The stomach is unremarkable.  Postsurgical changes from left abdominal ileostomy. Small bowel loops are mildly dilated and fluid-filled with the multiple air-fluid levels. Dilated loops measure up to 5.1 cm in diameter and demonstrates smooth transition into nondilated small bowel loops up to the ileocecal junction. No abnormal bowel wall thickening is seen. There is a colostomy in the left mid abdomen and a rectal stump in the pelvis. There is again apparent tethering of the rectal stump with the urinary bladder. Ill-defined soft tissue extending from the rectal stump and right lateral aspect of the urinary bladder into the right lower quadrant with the few surgical clips surrounding it likely represents collapsed bowel related to the urinary bladder  augmentation. The appendix is not definitely visualized. There is however no pericecal stranding or fluid.   VESSELS: There is no aneurysmal dilatation of the abdominal aorta. The IVC appears normal. Mild burden of atherosclerotic calcifications of the abdominal aorta and its branches.   PERITONEUM/RETROPERITONEUM/LYMPH NODES: Trace free fluid in the pelvis. No loculated fluid collection. No pneumoperitoneum. No abdominopelvic lymphadenopathy is present.   ABDOMINAL WALL: Postsurgical changes of the ventral abdominal wall with underlying subcutaneous emphysema and fat stranding. Loculated 4.4 x 2.6 cm fluid collection favored to represent a postoperative seroma. Left lower quadrant ileostomy.   BONES: No suspicious osseous lesions are identified.       1.  Postsurgical changes as described above with likely postoperative ventral abdominal seroma. 2. Bilateral percutaneous nephrostomy tubes and bilateral ureteral stents coursing through the bilateral ileal ureter and terminating within the augmented urinary bladder. Air within the left renal collecting system is likely postprocedural. 3. Mild diffuse fluid-filled small bowel dilatation with air-fluid levels with the no clear transition point likely representing ileus. 4. Other chronic and incidental findings as described above.   I personally reviewed the images/study and I agree with the findings as stated. This study was interpreted at [facility] [place], [place].   MACRO: None   Signed by: Chilo Pizano 11/5/2023 2:09 PM Dictation workstation:   [id number]    XR abdomen 1 view    Result Date: 11/4/2023  Interpreted By:  Willie Aguilar, STUDY: XR ABDOMEN 1 VIEW;  11/3/2023 10:21 pm   INDICATION: Signs/Symptoms:Trend ileus.   COMPARISON: 3:40 a.m..   ACCESSION NUMBER(S): [id number]   ORDERING CLINICIAN: SABINA BOSE       1.  Persistent moderate dilatation of the multiple small bowel loops, measures up to 5.7 cm in the  left quadrant. 2. Adynamic ileus versus partial obstruction. Correlation follow-up recommended. Postop changes of the lower abdomen and pelvis with multiple surgical clips and surgical incision. 3. Bilateral nephrostomy tube placement, left ureter stent placement. 4. No gross free air 5.   Signed by: Willie Aguilar 11/4/2023 12:47 PM Dictation workstation:   [id number]    US renal complete    Result Date: 11/4/2023  Interpreted By:  Chilo Pizano and Sullivan Shannon STUDY: US RENAL COMPLETE;  11/4/2023 10:54 am   INDICATION: Signs/Symptoms:Assess for hydronephrosis. Cr 6.9 from 3.66..   COMPARISON: Bilateral renal ultrasound 01/29/2023   ACCESSION NUMBER(S): [id number]   ORDERING CLINICIAN: MAZIN LUJAN   TECHNIQUE: Multiple images of the kidneys were obtained  with color Doppler evaluation.   FINDINGS: Somewhat suboptimal evaluation due to overlying bowel gas.   RIGHT KIDNEY: The right kidney measures 10.4 cm in length. The renal cortical echogenicity is increased. The renal cortical thickness is within normal limits. No hydronephrosis is present; no evidence of nephrolithiasis.   LEFT KIDNEY: The left kidney measures 11.5 cm in length. The renal cortical echogenicity is increased. The renal cortical thickness is mildly thinned. No hydronephrosis is present; no evidence of nephrolithiasis.   BLADDER: Urinary bladder is decompressed, limited for evaluation       1. No evidence of hydronephrosis. 2. Normal-sized kidneys with increased renal cortical echogenicity bilaterally and mild left renal cortical thinning, suggestive of medical renal disease.     I personally reviewed the images/study and I agree with the findings as stated by Radiology resident Dr. Villalpando.   MACRO: None   Signed by: Chilo Pizano 11/4/2023 12:00 PM Dictation workstation:   [id number]    XR abdomen 1 view    Result Date: 11/3/2023  Interpreted By:  Dashawn Faustin and Bera Kaustav STUDY: XR ABDOMEN 1 VIEW;  11/3/2023 3:22  am   INDICATION: Signs/Symptoms:Possible ileus.   COMPARISON: 10/27/2023   ACCESSION NUMBER(S): [id number]   ORDERING CLINICIAN: SABINA BOSE   FINDINGS: 3 AP radiographs of the abdomen and pelvis are provided.   Bilateral nephrostomy tubes in place. Multiple surgical clips and surgical drains overlie the pelvis. Prior cholecystectomy.   Multiple prominent loops of bowel. Limited evaluation of pneumoperitoneum on supine imaging, however no gross evidence of free air is noted.   Visualized lungs are clear.   Osseous structures demonstrate no acute bony changes.       1. Multiple prominent loops of bowel, which can be seen in ileus versus partial small bowel obstruction. Recommend follow-up radiographs to resolution. 2. Medical devices and postsurgical changes as above.   I personally reviewed the images/study and I agree with the findings as stated. This study was interpreted at [place], [place].   MACRO: None   Signed by: Dashawn Faustin 11/3/2023 9:12 AM Dictation workstation:   [id number]    XR abdomen 1 view    Result Date: 10/27/2023  Interpreted By:  Costa Montenegro and Hanreck James STUDY: XR ABDOMEN 1 VIEW;  10/27/2023 3:14 pm   INDICATION: Signs/Symptoms:incorrect count missing needle [facility] OR room 3 phone: [phone]. Missing Kitner.   COMPARISON: 03/30/2023 CT abdomen pelvis   ACCESSION NUMBER(S): [id number]   ORDERING CLINICIAN: JAZIEL WILSON   FINDINGS: There is a serpiginous hyperdensity overlying the right iliac bone near the sacroiliac joint.   Partially nephrostomy tubes are partially visualized. Bilateral ureteral stents extend into the expected location of patient's ileostomy. A surgical drain and multiple surgical clips are seen in the pelvis. Status post cholecystectomy. Partially visualized enteric tube tip projects over the expected location of the gastric body. A Schultz catheter is partially visualized.   Nonobstructive bowel gas pattern. The  diaphragm is not visualized limiting evaluation of free air.   Osseous structures demonstrate no acute bony changes.       1. Serpiginous hyperdensity overlying the right iliac bone near the sacroiliac joint which may relate to pelvic drain, however reported missing kitner radiopaque marker could appear similar. Upon further discussion with the operating room the Kitner marker was found and is therefore unlikely to represent this object. 2. Additional medical devices and postsurgical changes as above.   Findings were discussed with Kenyatta Coburn via telephone at 3:20 p.m. and radiology attending Dr. Montenegro.   I personally reviewed the images/study and I agree with the findings as stated. This study was interpreted at [place], [place].   Signed by: Costa Montenegro 10/27/2023 4:28 PM Dictation workstation:   [id number]          Jose was seen today for follow-up.  Diagnoses and all orders for this visit:  Dysuria  -     phenazopyridine (Urinary Pain Relief) 95 mg tablet; Take 1 tablet (95 mg) by mouth 3 times a day as needed for bladder spasms.  Elevated serum creatinine  -     Renal Function Panel; Future    Patient is a 57-year-old male with a history of Crohn's disease and colovesical fistula who underwent fistula resection and bilateral ureteral reimplant with Dr. Andersen on October 27, 2023.  His catheter was removed approximately 4 days ago per urethra and he has been voiding about 100 cc through his urethra and venting about 300 to 500 cc through his suprapubic tube.    1.  Colovesical fistula  Based on the incomplete voiding per urethra, we decided to keep the suprapubic tube in for another week.  During this time the patient will continue voiding per urethra and will do a better job of documenting each void and how much is coming out of the suprapubic tube.  Return to clinic in 1 week for consideration of suprapubic tube removal versus exchange.    The dictation  was performed using Dragon software. Please excuse any errors. Please contact our office with any inquiries or clarifications.    Con Jauregui MD

## 2023-11-28 ENCOUNTER — HOSPITAL ENCOUNTER (INPATIENT)
Facility: HOSPITAL | Age: 58
LOS: 13 days | Discharge: HOME | DRG: 872 | End: 2023-12-13
Attending: EMERGENCY MEDICINE | Admitting: UROLOGY

## 2023-11-28 ENCOUNTER — APPOINTMENT (OUTPATIENT)
Dept: RADIOLOGY | Facility: HOSPITAL | Age: 58
DRG: 872 | End: 2023-11-28

## 2023-11-28 ENCOUNTER — OFFICE VISIT (OUTPATIENT)
Dept: UROLOGY | Facility: CLINIC | Age: 58
End: 2023-11-28

## 2023-11-28 VITALS
TEMPERATURE: 96.9 F | HEART RATE: 133 BPM | BODY MASS INDEX: 29.66 KG/M2 | SYSTOLIC BLOOD PRESSURE: 89 MMHG | WEIGHT: 219 LBS | HEIGHT: 72 IN | DIASTOLIC BLOOD PRESSURE: 70 MMHG

## 2023-11-28 DIAGNOSIS — N32.1 COLOVESICAL FISTULA: ICD-10-CM

## 2023-11-28 DIAGNOSIS — E86.1 HYPOTENSION DUE TO HYPOVOLEMIA: ICD-10-CM

## 2023-11-28 DIAGNOSIS — K50.913 CROHN'S DISEASE WITH FISTULA, UNSPECIFIED GASTROINTESTINAL TRACT LOCATION (MULTI): Primary | ICD-10-CM

## 2023-11-28 DIAGNOSIS — N39.0 URINARY TRACT INFECTION WITHOUT HEMATURIA, SITE UNSPECIFIED: Primary | ICD-10-CM

## 2023-11-28 DIAGNOSIS — R00.0 TACHYCARDIA: ICD-10-CM

## 2023-11-28 DIAGNOSIS — I95.89 HYPOTENSION DUE TO HYPOVOLEMIA: ICD-10-CM

## 2023-11-28 DIAGNOSIS — R79.89 ELEVATED SERUM CREATININE: ICD-10-CM

## 2023-11-28 PROBLEM — E86.0 DEHYDRATION: Status: ACTIVE | Noted: 2023-11-28

## 2023-11-28 LAB
ALBUMIN SERPL BCP-MCNC: 3.5 G/DL (ref 3.4–5)
ALP SERPL-CCNC: 162 U/L (ref 33–120)
ALT SERPL W P-5'-P-CCNC: 34 U/L (ref 10–52)
ANION GAP BLDV CALCULATED.4IONS-SCNC: 17 MMOL/L (ref 10–25)
ANION GAP SERPL CALC-SCNC: 23 MMOL/L (ref 10–20)
APPEARANCE UR: ABNORMAL
AST SERPL W P-5'-P-CCNC: 23 U/L (ref 9–39)
BASE EXCESS BLDV CALC-SCNC: -7.2 MMOL/L (ref -2–3)
BASOPHILS # BLD AUTO: 0.05 X10*3/UL (ref 0–0.1)
BASOPHILS NFR BLD AUTO: 0.3 %
BILIRUB SERPL-MCNC: 0.5 MG/DL (ref 0–1.2)
BILIRUB UR STRIP.AUTO-MCNC: NEGATIVE MG/DL
BODY TEMPERATURE: 37 DEGREES CELSIUS
BUN SERPL-MCNC: 86 MG/DL (ref 6–23)
CA-I BLDV-SCNC: 1.25 MMOL/L (ref 1.1–1.33)
CALCIUM SERPL-MCNC: 9.3 MG/DL (ref 8.6–10.6)
CHLORIDE BLDV-SCNC: 102 MMOL/L (ref 98–107)
CHLORIDE SERPL-SCNC: 99 MMOL/L (ref 98–107)
CO2 SERPL-SCNC: 18 MMOL/L (ref 21–32)
COLOR UR: ABNORMAL
CREAT SERPL-MCNC: 5.73 MG/DL (ref 0.5–1.3)
EOSINOPHIL # BLD AUTO: 0.05 X10*3/UL (ref 0–0.7)
EOSINOPHIL NFR BLD AUTO: 0.3 %
ERYTHROCYTE [DISTWIDTH] IN BLOOD BY AUTOMATED COUNT: 15.8 % (ref 11.5–14.5)
GFR SERPL CREATININE-BSD FRML MDRD: 11 ML/MIN/1.73M*2
GLUCOSE BLD MANUAL STRIP-MCNC: 120 MG/DL (ref 74–99)
GLUCOSE BLDV-MCNC: 148 MG/DL (ref 74–99)
GLUCOSE SERPL-MCNC: 126 MG/DL (ref 74–99)
GLUCOSE UR STRIP.AUTO-MCNC: NEGATIVE MG/DL
HCO3 BLDV-SCNC: 19.3 MMOL/L (ref 22–26)
HCT VFR BLD AUTO: 34.8 % (ref 41–52)
HCT VFR BLD EST: 33 % (ref 41–52)
HGB BLD-MCNC: 10.7 G/DL (ref 13.5–17.5)
HGB BLDV-MCNC: 11.1 G/DL (ref 13.5–17.5)
HOLD SPECIMEN: NORMAL
IMM GRANULOCYTES # BLD AUTO: 0.13 X10*3/UL (ref 0–0.7)
IMM GRANULOCYTES NFR BLD AUTO: 0.9 % (ref 0–0.9)
KETONES UR STRIP.AUTO-MCNC: ABNORMAL MG/DL
LACTATE BLDV-SCNC: 2.3 MMOL/L (ref 0.4–2)
LEUKOCYTE ESTERASE UR QL STRIP.AUTO: ABNORMAL
LIPASE SERPL-CCNC: 28 U/L (ref 9–82)
LYMPHOCYTES # BLD AUTO: 0.98 X10*3/UL (ref 1.2–4.8)
LYMPHOCYTES NFR BLD AUTO: 6.8 %
MCH RBC QN AUTO: 27.4 PG (ref 26–34)
MCHC RBC AUTO-ENTMCNC: 30.7 G/DL (ref 32–36)
MCV RBC AUTO: 89 FL (ref 80–100)
MONOCYTES # BLD AUTO: 1.29 X10*3/UL (ref 0.1–1)
MONOCYTES NFR BLD AUTO: 9 %
NEUTROPHILS # BLD AUTO: 11.86 X10*3/UL (ref 1.2–7.7)
NEUTROPHILS NFR BLD AUTO: 82.7 %
NITRITE UR QL STRIP.AUTO: POSITIVE
NRBC BLD-RTO: 0 /100 WBCS (ref 0–0)
OXYHGB MFR BLDV: 27.3 % (ref 45–75)
PCO2 BLDV: 42 MM HG (ref 41–51)
PH BLDV: 7.27 PH (ref 7.33–7.43)
PH UR STRIP.AUTO: 6 [PH]
PLATELET # BLD AUTO: 540 X10*3/UL (ref 150–450)
PO2 BLDV: 31 MM HG (ref 35–45)
POTASSIUM BLDV-SCNC: 5.2 MMOL/L (ref 3.5–5.3)
POTASSIUM SERPL-SCNC: 5 MMOL/L (ref 3.5–5.3)
PROT SERPL-MCNC: 7.6 G/DL (ref 6.4–8.2)
PROT UR STRIP.AUTO-MCNC: ABNORMAL MG/DL
RBC # BLD AUTO: 3.91 X10*6/UL (ref 4.5–5.9)
RBC # UR STRIP.AUTO: ABNORMAL /UL
RBC #/AREA URNS AUTO: >20 /HPF
SAO2 % BLDV: 28 % (ref 45–75)
SODIUM BLDV-SCNC: 133 MMOL/L (ref 136–145)
SODIUM SERPL-SCNC: 135 MMOL/L (ref 136–145)
SP GR UR STRIP.AUTO: 1.02
UROBILINOGEN UR STRIP.AUTO-MCNC: 4 MG/DL
WBC # BLD AUTO: 14.4 X10*3/UL (ref 4.4–11.3)
WBC #/AREA URNS AUTO: >50 /HPF

## 2023-11-28 PROCEDURE — 2500000001 HC RX 250 WO HCPCS SELF ADMINISTERED DRUGS (ALT 637 FOR MEDICARE OP): Performed by: STUDENT IN AN ORGANIZED HEALTH CARE EDUCATION/TRAINING PROGRAM

## 2023-11-28 PROCEDURE — 80053 COMPREHEN METABOLIC PANEL: CPT

## 2023-11-28 PROCEDURE — G0378 HOSPITAL OBSERVATION PER HR: HCPCS

## 2023-11-28 PROCEDURE — 96366 THER/PROPH/DIAG IV INF ADDON: CPT

## 2023-11-28 PROCEDURE — 96368 THER/DIAG CONCURRENT INF: CPT

## 2023-11-28 PROCEDURE — 2500000004 HC RX 250 GENERAL PHARMACY W/ HCPCS (ALT 636 FOR OP/ED)

## 2023-11-28 PROCEDURE — 36415 COLL VENOUS BLD VENIPUNCTURE: CPT | Performed by: STUDENT IN AN ORGANIZED HEALTH CARE EDUCATION/TRAINING PROGRAM

## 2023-11-28 PROCEDURE — 87081 CULTURE SCREEN ONLY: CPT

## 2023-11-28 PROCEDURE — 2500000005 HC RX 250 GENERAL PHARMACY W/O HCPCS: Performed by: STUDENT IN AN ORGANIZED HEALTH CARE EDUCATION/TRAINING PROGRAM

## 2023-11-28 PROCEDURE — 96361 HYDRATE IV INFUSION ADD-ON: CPT

## 2023-11-28 PROCEDURE — 84132 ASSAY OF SERUM POTASSIUM: CPT | Performed by: STUDENT IN AN ORGANIZED HEALTH CARE EDUCATION/TRAINING PROGRAM

## 2023-11-28 PROCEDURE — 81001 URINALYSIS AUTO W/SCOPE: CPT | Performed by: EMERGENCY MEDICINE

## 2023-11-28 PROCEDURE — 82805 BLOOD GASES W/O2 SATURATION: CPT

## 2023-11-28 PROCEDURE — 85025 COMPLETE CBC W/AUTO DIFF WBC: CPT | Performed by: STUDENT IN AN ORGANIZED HEALTH CARE EDUCATION/TRAINING PROGRAM

## 2023-11-28 PROCEDURE — 83690 ASSAY OF LIPASE: CPT | Performed by: STUDENT IN AN ORGANIZED HEALTH CARE EDUCATION/TRAINING PROGRAM

## 2023-11-28 PROCEDURE — 87632 RESP VIRUS 6-11 TARGETS: CPT | Performed by: EMERGENCY MEDICINE

## 2023-11-28 PROCEDURE — 74176 CT ABD & PELVIS W/O CONTRAST: CPT

## 2023-11-28 PROCEDURE — 1036F TOBACCO NON-USER: CPT | Performed by: NURSE PRACTITIONER

## 2023-11-28 PROCEDURE — 82947 ASSAY GLUCOSE BLOOD QUANT: CPT

## 2023-11-28 PROCEDURE — 3008F BODY MASS INDEX DOCD: CPT | Performed by: NURSE PRACTITIONER

## 2023-11-28 PROCEDURE — 87186 SC STD MICRODIL/AGAR DIL: CPT | Performed by: STUDENT IN AN ORGANIZED HEALTH CARE EDUCATION/TRAINING PROGRAM

## 2023-11-28 PROCEDURE — 87086 URINE CULTURE/COLONY COUNT: CPT | Performed by: EMERGENCY MEDICINE

## 2023-11-28 PROCEDURE — 99214 OFFICE O/P EST MOD 30 MIN: CPT | Performed by: NURSE PRACTITIONER

## 2023-11-28 PROCEDURE — 96365 THER/PROPH/DIAG IV INF INIT: CPT

## 2023-11-28 PROCEDURE — 87040 BLOOD CULTURE FOR BACTERIA: CPT | Performed by: STUDENT IN AN ORGANIZED HEALTH CARE EDUCATION/TRAINING PROGRAM

## 2023-11-28 PROCEDURE — 74176 CT ABD & PELVIS W/O CONTRAST: CPT | Performed by: RADIOLOGY

## 2023-11-28 PROCEDURE — 2500000004 HC RX 250 GENERAL PHARMACY W/ HCPCS (ALT 636 FOR OP/ED): Performed by: STUDENT IN AN ORGANIZED HEALTH CARE EDUCATION/TRAINING PROGRAM

## 2023-11-28 PROCEDURE — 99285 EMERGENCY DEPT VISIT HI MDM: CPT | Mod: 25 | Performed by: EMERGENCY MEDICINE

## 2023-11-28 PROCEDURE — 93010 ELECTROCARDIOGRAM REPORT: CPT | Performed by: EMERGENCY MEDICINE

## 2023-11-28 PROCEDURE — 99285 EMERGENCY DEPT VISIT HI MDM: CPT | Performed by: EMERGENCY MEDICINE

## 2023-11-28 PROCEDURE — 96375 TX/PRO/DX INJ NEW DRUG ADDON: CPT

## 2023-11-28 RX ORDER — VANCOMYCIN HYDROCHLORIDE 1 G/200ML
1000 INJECTION, SOLUTION INTRAVENOUS ONCE
Status: COMPLETED | OUTPATIENT
Start: 2023-11-28 | End: 2023-11-28

## 2023-11-28 RX ORDER — CYCLOBENZAPRINE HCL 10 MG
5 TABLET ORAL 3 TIMES DAILY PRN
Status: DISCONTINUED | OUTPATIENT
Start: 2023-11-28 | End: 2023-12-13 | Stop reason: HOSPADM

## 2023-11-28 RX ORDER — ACETAMINOPHEN 325 MG/1
1000 TABLET ORAL EVERY 6 HOURS PRN
Status: DISCONTINUED | OUTPATIENT
Start: 2023-11-28 | End: 2023-12-13 | Stop reason: HOSPADM

## 2023-11-28 RX ORDER — SODIUM CHLORIDE, SODIUM LACTATE, POTASSIUM CHLORIDE, CALCIUM CHLORIDE 600; 310; 30; 20 MG/100ML; MG/100ML; MG/100ML; MG/100ML
10 INJECTION, SOLUTION INTRAVENOUS CONTINUOUS
Status: DISCONTINUED | OUTPATIENT
Start: 2023-11-28 | End: 2023-12-13 | Stop reason: HOSPADM

## 2023-11-28 RX ORDER — ONDANSETRON HYDROCHLORIDE 2 MG/ML
INJECTION, SOLUTION INTRAVENOUS
Status: DISPENSED
Start: 2023-11-28 | End: 2023-11-29

## 2023-11-28 RX ORDER — HEPARIN SODIUM 5000 [USP'U]/ML
5000 INJECTION, SOLUTION INTRAVENOUS; SUBCUTANEOUS EVERY 8 HOURS
Status: DISCONTINUED | OUTPATIENT
Start: 2023-11-28 | End: 2023-12-13 | Stop reason: HOSPADM

## 2023-11-28 RX ORDER — TAMSULOSIN HYDROCHLORIDE 0.4 MG/1
0.4 CAPSULE ORAL DAILY
Status: DISCONTINUED | OUTPATIENT
Start: 2023-11-28 | End: 2023-12-08

## 2023-11-28 RX ORDER — ONDANSETRON 4 MG/1
4 TABLET, ORALLY DISINTEGRATING ORAL EVERY 8 HOURS PRN
Status: DISCONTINUED | OUTPATIENT
Start: 2023-11-28 | End: 2023-12-13 | Stop reason: HOSPADM

## 2023-11-28 RX ORDER — ACETAMINOPHEN 325 MG/1
1000 TABLET ORAL EVERY 6 HOURS PRN
Status: DISCONTINUED | OUTPATIENT
Start: 2023-11-28 | End: 2023-11-28

## 2023-11-28 RX ORDER — HYDROMORPHONE HYDROCHLORIDE 1 MG/ML
0.2 INJECTION, SOLUTION INTRAMUSCULAR; INTRAVENOUS; SUBCUTANEOUS ONCE
Status: COMPLETED | OUTPATIENT
Start: 2023-11-28 | End: 2023-11-28

## 2023-11-28 RX ORDER — SENNOSIDES 8.6 MG/1
2 TABLET ORAL 2 TIMES DAILY
Status: DISCONTINUED | OUTPATIENT
Start: 2023-11-28 | End: 2023-12-13 | Stop reason: HOSPADM

## 2023-11-28 RX ORDER — VANCOMYCIN HYDROCHLORIDE 500 MG/100ML
500 INJECTION, SOLUTION INTRAVENOUS ONCE
Status: COMPLETED | OUTPATIENT
Start: 2023-11-28 | End: 2023-11-28

## 2023-11-28 RX ADMIN — SODIUM CHLORIDE, POTASSIUM CHLORIDE, SODIUM LACTATE AND CALCIUM CHLORIDE 1000 ML: 600; 310; 30; 20 INJECTION, SOLUTION INTRAVENOUS at 13:15

## 2023-11-28 RX ADMIN — STANDARDIZED SENNA CONCENTRATE 17.2 MG: 8.6 TABLET ORAL at 21:00

## 2023-11-28 RX ADMIN — SODIUM CHLORIDE, POTASSIUM CHLORIDE, SODIUM LACTATE AND CALCIUM CHLORIDE 1000 ML: 600; 310; 30; 20 INJECTION, SOLUTION INTRAVENOUS at 14:46

## 2023-11-28 RX ADMIN — Medication 1 G: at 18:00

## 2023-11-28 RX ADMIN — VANCOMYCIN HYDROCHLORIDE 500 MG: 500 INJECTION, SOLUTION INTRAVENOUS at 15:36

## 2023-11-28 RX ADMIN — ONDANSETRON 4 MG: 4 TABLET, ORALLY DISINTEGRATING ORAL at 22:37

## 2023-11-28 RX ADMIN — PIPERACILLIN SODIUM AND TAZOBACTAM SODIUM 2.25 G: 2; .25 INJECTION, SOLUTION INTRAVENOUS at 14:47

## 2023-11-28 RX ADMIN — VANCOMYCIN HYDROCHLORIDE 1000 MG: 1 INJECTION, SOLUTION INTRAVENOUS at 14:35

## 2023-11-28 RX ADMIN — SODIUM CHLORIDE, POTASSIUM CHLORIDE, SODIUM LACTATE AND CALCIUM CHLORIDE 100 ML/HR: 600; 310; 30; 20 INJECTION, SOLUTION INTRAVENOUS at 20:34

## 2023-11-28 RX ADMIN — HYDROMORPHONE HYDROCHLORIDE 0.2 MG: 1 INJECTION, SOLUTION INTRAMUSCULAR; INTRAVENOUS; SUBCUTANEOUS at 14:47

## 2023-11-28 SDOH — SOCIAL STABILITY: SOCIAL INSECURITY: DO YOU FEEL UNSAFE GOING BACK TO THE PLACE WHERE YOU ARE LIVING?: NO

## 2023-11-28 SDOH — SOCIAL STABILITY: SOCIAL INSECURITY: HAS ANYONE EVER THREATENED TO HURT YOUR FAMILY OR YOUR PETS?: NO

## 2023-11-28 SDOH — SOCIAL STABILITY: SOCIAL INSECURITY: DOES ANYONE TRY TO KEEP YOU FROM HAVING/CONTACTING OTHER FRIENDS OR DOING THINGS OUTSIDE YOUR HOME?: NO

## 2023-11-28 SDOH — SOCIAL STABILITY: SOCIAL INSECURITY: ARE YOU OR HAVE YOU BEEN THREATENED OR ABUSED PHYSICALLY, EMOTIONALLY, OR SEXUALLY BY ANYONE?: NO

## 2023-11-28 SDOH — SOCIAL STABILITY: SOCIAL INSECURITY: ABUSE: ADULT

## 2023-11-28 SDOH — SOCIAL STABILITY: SOCIAL INSECURITY: DO YOU FEEL ANYONE HAS EXPLOITED OR TAKEN ADVANTAGE OF YOU FINANCIALLY OR OF YOUR PERSONAL PROPERTY?: NO

## 2023-11-28 SDOH — SOCIAL STABILITY: SOCIAL INSECURITY: HAVE YOU HAD THOUGHTS OF HARMING ANYONE ELSE?: NO

## 2023-11-28 SDOH — SOCIAL STABILITY: SOCIAL INSECURITY: ARE THERE ANY APPARENT SIGNS OF INJURIES/BEHAVIORS THAT COULD BE RELATED TO ABUSE/NEGLECT?: NO

## 2023-11-28 ASSESSMENT — COGNITIVE AND FUNCTIONAL STATUS - GENERAL
MOBILITY SCORE: 23
HELP NEEDED FOR BATHING: A LITTLE
DRESSING REGULAR LOWER BODY CLOTHING: A LITTLE
WALKING IN HOSPITAL ROOM: A LITTLE
DAILY ACTIVITIY SCORE: 22
PATIENT BASELINE BEDBOUND: NO

## 2023-11-28 ASSESSMENT — COLUMBIA-SUICIDE SEVERITY RATING SCALE - C-SSRS
6. HAVE YOU EVER DONE ANYTHING, STARTED TO DO ANYTHING, OR PREPARED TO DO ANYTHING TO END YOUR LIFE?: NO
1. IN THE PAST MONTH, HAVE YOU WISHED YOU WERE DEAD OR WISHED YOU COULD GO TO SLEEP AND NOT WAKE UP?: NO
2. HAVE YOU ACTUALLY HAD ANY THOUGHTS OF KILLING YOURSELF?: NO

## 2023-11-28 ASSESSMENT — LIFESTYLE VARIABLES
REASON UNABLE TO ASSESS: NO
EVER HAD A DRINK FIRST THING IN THE MORNING TO STEADY YOUR NERVES TO GET RID OF A HANGOVER: NO
HAVE YOU EVER FELT YOU SHOULD CUT DOWN ON YOUR DRINKING: NO
HOW OFTEN DO YOU HAVE 6 OR MORE DRINKS ON ONE OCCASION: NEVER
SUBSTANCE_ABUSE_PAST_12_MONTHS: NO
HAVE PEOPLE ANNOYED YOU BY CRITICIZING YOUR DRINKING: NO
AUDIT-C TOTAL SCORE: 1
SKIP TO QUESTIONS 9-10: 1
AUDIT-C TOTAL SCORE: 1
EVER FELT BAD OR GUILTY ABOUT YOUR DRINKING: NO
PRESCIPTION_ABUSE_PAST_12_MONTHS: NO
HOW OFTEN DO YOU HAVE A DRINK CONTAINING ALCOHOL: MONTHLY OR LESS
HOW MANY STANDARD DRINKS CONTAINING ALCOHOL DO YOU HAVE ON A TYPICAL DAY: 1 OR 2

## 2023-11-28 ASSESSMENT — ENCOUNTER SYMPTOMS
APPETITE CHANGE: 1
CHILLS: 1
FEVER: 1
WEAKNESS: 1
FATIGUE: 1
NAUSEA: 1
VOMITING: 1

## 2023-11-28 ASSESSMENT — PATIENT HEALTH QUESTIONNAIRE - PHQ9
SUM OF ALL RESPONSES TO PHQ9 QUESTIONS 1 & 2: 5
1. LITTLE INTEREST OR PLEASURE IN DOING THINGS: NEARLY EVERY DAY
2. FEELING DOWN, DEPRESSED OR HOPELESS: MORE THAN HALF THE DAYS

## 2023-11-28 ASSESSMENT — PAIN SCALES - GENERAL
PAINLEVEL_OUTOF10: 8
PAINLEVEL_OUTOF10: 7

## 2023-11-28 ASSESSMENT — ACTIVITIES OF DAILY LIVING (ADL)
GROOMING: NEEDS ASSISTANCE
JUDGMENT_ADEQUATE_SAFELY_COMPLETE_DAILY_ACTIVITIES: YES
DRESSING YOURSELF: NEEDS ASSISTANCE
FEEDING YOURSELF: INDEPENDENT
BATHING: NEEDS ASSISTANCE
WALKS IN HOME: INDEPENDENT
HEARING - LEFT EAR: FUNCTIONAL
TOILETING: INDEPENDENT
PATIENT'S MEMORY ADEQUATE TO SAFELY COMPLETE DAILY ACTIVITIES?: YES
HEARING - RIGHT EAR: FUNCTIONAL
ASSISTIVE_DEVICE: WALKER
ADEQUATE_TO_COMPLETE_ADL: YES

## 2023-11-28 ASSESSMENT — PAIN - FUNCTIONAL ASSESSMENT
PAIN_FUNCTIONAL_ASSESSMENT: 0-10
PAIN_FUNCTIONAL_ASSESSMENT: 0-10

## 2023-11-28 ASSESSMENT — PAIN DESCRIPTION - PAIN TYPE: TYPE: ACUTE PAIN

## 2023-11-28 ASSESSMENT — PAIN DESCRIPTION - PROGRESSION: CLINICAL_PROGRESSION: NOT CHANGED

## 2023-11-28 NOTE — PROGRESS NOTES
UROLOGIC FOLLOW-UP VISIT     PROBLEM LIST:  1. Crohn's disease with fistula, unspecified gastrointestinal tract location (CMS/HCC)        2. Elevated serum creatinine        3. Hypotension due to hypovolemia        4. Tachycardia        5. Colovesical fistula             HISTORY OF PRESENT ILLNESS:   Jose Thornton is a 57 y.o. male with Crohn's disease and associated colovesical fistula s/p B nephrostomy and Schultz catheter placement. Bilateral ureteral reimplant and fistula repair with Dr. Andersen and Dr. Rodriguez 10/27/23. Post-op course complicated by ileus, persistent N/V, and pseudomonas UTI; subsequently developed KADE and required RRT x 3 days for suspected gentamicin toxicity. Seen by Dr. Jauregui 11/21, SPT maintained due to low volume of urethral voiding.    INTERVAL HISTORY:  Returns today for POV and possible SPT removal  Seen accompanied by spouse  Reports feeling unwell for last week  Continues to have N/V, new dysgeusia  Only able to tolerate plain water  Voiding minimally per urethra  Feels urethral catheter may have been removed incorrectly prior to hospital discharge, has had sensation of blockage since then  Taking Azo x past 6 days due to bladder spasm  They also note neph tubes remain in place, appear to be leaking  Subjective fever & chills    PAST MEDICAL HISTORY:  Past Medical History:   Diagnosis Date    Crohn's disease (CMS/HCC)     Diverticulosis     GERD (gastroesophageal reflux disease)     GI (gastrointestinal bleed)     Urinary tract infection        PAST SURGICAL HISTORY:  Past Surgical History:   Procedure Laterality Date    CT GUIDED PERCUTANEOUS PERITONEAL OR RETROPERITONEAL FLUID COLLECTION DRAINAGE  9/28/2022    CT GUIDED PERCUTANEOUS PERITONEAL OR RETROPERITONEAL FLUID COLLECTION DRAINAGE 9/28/2022 [facility]    IR CVC NONTUNNELED  11/6/2023    IR CVC NONTUNNELED 11/6/2023 Drew Terry MD [facility]GIO    IR NEPHROSTOMY TUBE EXCHANGE  1/31/2023    IR NEPHROSTOMY TUBE EXCHANGE  1/31/2023 [facility]GAC[facility]    IR NEPHROSTOMY TUBE EXCHANGE  1/31/2023    IR NEPHROSTOMY TUBE EXCHANGE 1/31/2023 DOCTOR OFFICE LEGACY    IR NEPHROSTOMY TUBE EXCHANGE  3/23/2023    IR NEPHROSTOMY TUBE EXCHANGE [facility]    IR NEPHROSTOMY TUBE EXCHANGE  3/23/2023    IR NEPHROSTOMY TUBE EXCHANGE [facility]    IR NEPHROSTOMY TUBE EXCHANGE  3/30/2023    IR NEPHROSTOMY TUBE EXCHANGE [facility]    IR NEPHROSTOMY TUBE EXCHANGE  4/25/2023    IR NEPHROSTOMY TUBE EXCHANGE [facility]    IR NEPHROSTOMY TUBE EXCHANGE  6/30/2023    IR NEPHROSTOMY TUBE EXCHANGE 6/30/2023 [facility]    IR NEPHROSTOMY TUBE EXCHANGE  6/30/2023    IR NEPHROSTOMY TUBE EXCHANGE 6/30/2023 [facility]    IR NEPHROSTOMY TUBE EXCHANGE  8/25/2023    IR NEPHROSTOMY TUBE EXCHANGE 8/25/2023 [facility]    IR NEPHROSTOMY TUBE EXCHANGE  8/25/2023    IR NEPHROSTOMY TUBE EXCHANGE 8/25/2023 [facility]    IR NEPHROSTOMY TUBE EXCHANGE  10/18/2023    IR NEPHROSTOMY TUBE EXCHANGE 10/18/2023 Onesimo Reyes MD [facility]        ALLERGIES:   No Known Allergies     MEDICATIONS:   Current Outpatient Medications on File Prior to Visit   Medication Sig Dispense Refill    acetaminophen (Tylenol) 500 mg tablet Take 2 tablets (1,000 mg) by mouth every 6 hours if needed.      cyclobenzaprine (Flexeril) 5 mg tablet Take 1 tablet (5 mg) by mouth 3 times a day as needed for muscle spasms (bladder spasms). 90 tablet 0    diazePAM (Valium) 2 mg tablet Take 1 tablet (2 mg) by mouth if needed for anxiety. TAKE 1 TABLET BY MOUTH AN HOUR PRIOR TO CATHETER CHANGE. 1 tablet 0    inFLIXimab (Remicade) 100 mg injection INFUSE 500MG PER PROTOCOL ON WEEKS 0,2, 6 AND THEN EVERY 8 WEEKS AS MAINTENANCE. 5 each 6    ondansetron ODT (Zofran-ODT) 4 mg disintegrating tablet Take 1 tablet (4 mg) by mouth every 8 hours if needed for nausea or vomiting. 20 tablet 0    phenazopyridine (Urinary Pain Relief) 95 mg tablet Take 1 tablet (95 mg) by mouth 3 times a day as needed for bladder spasms. 30 tablet 1     sennosides (Senokot) 8.6 mg tablet Take 2 tablets (17.2 mg) by mouth 2 times a day as needed for constipation. 120 tablet 0    tamsulosin (Flomax) 0.4 mg 24 hr capsule Take 1 capsule (0.4 mg) by mouth once daily. 30 capsule 0    [] oxyCODONE (Roxicodone) 5 mg immediate release tablet Take 1 tablet (5 mg) by mouth every 6 hours if needed for severe pain (7 - 10) for up to 5 days. 15 tablet 0     No current facility-administered medications on file prior to visit.        SOCIAL HISTORY:  Patient  reports that he has never smoked. He has never used smokeless tobacco. He reports that he does not currently use alcohol. He reports that he does not use drugs.   Social History     Socioeconomic History    Marital status:      Spouse name: Not on file    Number of children: Not on file    Years of education: Not on file    Highest education level: Not on file   Occupational History    Not on file   Tobacco Use    Smoking status: Never    Smokeless tobacco: Never   Substance and Sexual Activity    Alcohol use: Not Currently    Drug use: Never    Sexual activity: Not on file   Other Topics Concern    Not on file   Social History Narrative    Not on file     Social Determinants of Health     Financial Resource Strain: Medium Risk (2023)    Overall Financial Resource Strain (CARDIA)     Difficulty of Paying Living Expenses: Somewhat hard   Food Insecurity: Not on file   Transportation Needs: No Transportation Needs (2023)    PRAPARE - Transportation     Lack of Transportation (Medical): No     Lack of Transportation (Non-Medical): No   Physical Activity: Not on file   Stress: Not on file   Social Connections: Not on file   Intimate Partner Violence: Not on file   Housing Stability: Low Risk  (2023)    Housing Stability Vital Sign     Unable to Pay for Housing in the Last Year: No     Number of Places Lived in the Last Year: 1     Unstable Housing in the Last Year: No       FAMILY HISTORY:  No family  "history on file.    REVIEW OF SYSTEMS:  Review of Systems   Constitutional:  Positive for appetite change, chills, fatigue and fever.   Gastrointestinal:  Positive for nausea and vomiting.   Neurological:  Positive for weakness.       PHYSICAL EXAM:  Visit Vitals  BP 89/70   Pulse (!) 133   Temp 36.1 °C (96.9 °F)     Constitutional: Thin, appears fatigued. No acute distress.  Head: Normocephalic and atraumatic.    Neck: Normal range of motion.     Pulmonary/Chest: Effort normal. No respiratory distress.   Abdominal: Non-distended.  : Deferred.  Integumentary: Pale. No rash or lesions visualized.  Musculoskeletal: Ambulating with walker.  Neurological: Alert and oriented.  Psychiatric: Normal mood and affect. Thought content normal.      LABORATORY REVIEW:   Lab Results   Component Value Date    BUN 77 (H) 11/21/2023    CREATININE 6.53 (H) 11/21/2023    EGFR 9 (L) 11/21/2023     (L) 11/21/2023    K 5.2 11/21/2023    CL 96 (L) 11/21/2023    CO2 20 (L) 11/21/2023    CALCIUM 9.2 11/21/2023      Lab Results   Component Value Date    WBC 8.4 11/17/2023    RBC 3.72 (L) 11/17/2023    HGB 10.2 (L) 11/17/2023    HCT 32.4 (L) 11/17/2023    MCV 87 11/17/2023    MCH 27.4 11/17/2023    MCHC 31.5 (L) 11/17/2023    RDW 15.6 (H) 11/17/2023     11/17/2023    MPV 9.2 10/30/2023        No results found for: \"PSA\"          Assessment:      1. Crohn's disease with fistula, unspecified gastrointestinal tract location (CMS/HCC)        2. Elevated serum creatinine        3. Hypotension due to hypovolemia        4. Tachycardia        5. Colovesical fistula          Jose Thornton is a 57 y.o. male with Crohn's disease and associated colovesical fistula s/p B nephrostomy and Schultz catheter placement. Bilateral ureteral reimplant and fistula repair with Dr. Andersen and Dr. Rodriguez 10/27/23.     Post-op course complicated by ileus, persistent N/V, and pseudomonas UTI; subsequently developed KADE and required RRT x 3 days for " suspected gentamicin toxicity. Seen by Dr. Jauregui 11/21, SPT maintained due to low volume of urethral voiding.     Weak, tachy, and hypotensive; likely dehydrated, possible infection. Worsening creatinine on labs last week. At minimum would benefit from IV hydration and management of N/V. May need cystoscopy to evaluate urethral patency. Agreeable to plan as below.     Plan:   Will go to [facility], anticipate admission; Dr. Andersen advised  RTC pending outcome of above

## 2023-11-28 NOTE — CONSULTS
[facility] [place]  Consult Note    Jose Thornton  [id number]  1965 (58 y.o.)  Reason for Consult: dehydration., failure  to thrive, nausea and vomiting     HPI: 58 y.o. male with a past medical history notable for Crohn's disease and colovesical fistula who underwent Exploratory laparotomy, MYA, Cystoscopy, Bilateral ileal ureter and bladder augment with right partial rectus muscle flap, and Suprapubic tube insertion on 10/27/23 with Dr. Andersen and Dr. Mckinney, who presented to ED  today. Urology is consulted for evaluation and management recommendations for dehydration, failure to thrive, nausea, vomiting.       Subjective    General: Laying in bed. NAD.   Eyes: EOMI  ENMT: no apparent injury, no lesions seen, MMM  Head/neck: NCAT  Cardiac: regular rate in chart  Pulm: normal respiratory effort  GI: soft, NT/ND, no masses palpated  Msk: STANLEY  Extremities: normal extremities  Skin: warm, dry, no lesions noted  Neuro: AOx3  Psych: appropriate mood and behavior      Jose Thornton is a 57 y.o. male with PMH of Crohn's disease and colovesical fistula who underwent Exploratory laparotomy, MYA, Cystoscopy, Bilateral ileal ureter and bladder augment with right partial rectus muscle flap, and Suprapubic tube insertion on 10/27/23 with Dr. Andersen and Dr. Rodriguez. Patient initially recovered well and had quick return of bowel function with consistent ostomy output. However, he then developed symptoms and imaging findings c/w ileus. Nausea is slowly resolving and he has not had recent emesis, though has had smoldering symptoms when trying to advance diet. Antibiotics (most notably Gentamicin) were discontinued after courses were complete 11/3. Labs became significant for quickly rising creatinine, which peaked at 11.73 on 11/7. Nephrology assessment determined him to be in acute renal failure and he has now received hemodialysis X3. No significant electrolyte abnormalities have been associated with the new acute renal  failure. Nephrology is continuing to assess for dialysis needs.       No Known Allergies    Past Medical History:   Diagnosis Date    Crohn's disease (CMS/HCC)     Diverticulosis     GERD (gastroesophageal reflux disease)     GI (gastrointestinal bleed)     Urinary tract infection      Past Surgical History:   Procedure Laterality Date    CT GUIDED PERCUTANEOUS PERITONEAL OR RETROPERITONEAL FLUID COLLECTION DRAINAGE  9/28/2022    CT GUIDED PERCUTANEOUS PERITONEAL OR RETROPERITONEAL FLUID COLLECTION DRAINAGE [place] [facility]    [place] CVC NONTUNNELED  11/6/2023    IR CVC NONTUNNELED 11/6/2023 [place] Terry [place] [facility] [place] [facility]    IR NEPHROSTOMY TUBE EXCHANGE  1/31/2023    IR NEPHROSTOMY TUBE EXCHANGE 1/31/2023 DOCTOR OFFICE LEGACY    IR NEPHROSTOMY TUBE EXCHANGE  1/31/2023    IR NEPHROSTOMY TUBE EXCHANGE 1/31/2023 DOCTOR OFFICE LEGACY    IR NEPHROSTOMY TUBE EXCHANGE  3/23/2023    IR NEPHROSTOMY TUBE EXCHANGE [facility]    IR NEPHROSTOMY TUBE EXCHANGE  3/23/2023    IR NEPHROSTOMY TUBE EXCHANGE [facility]    IR NEPHROSTOMY TUBE EXCHANGE  3/30/2023    IR NEPHROSTOMY TUBE EXCHANGE [facility]    IR NEPHROSTOMY TUBE EXCHANGE  4/25/2023    IR NEPHROSTOMY TUBE EXCHANGE [facility]    IR NEPHROSTOMY TUBE EXCHANGE  6/30/2023    IR NEPHROSTOMY TUBE EXCHANGE 6/30/2023 [facility]    IR NEPHROSTOMY TUBE EXCHANGE  6/30/2023    IR NEPHROSTOMY TUBE EXCHANGE 6/30/2023 [facility]    IR NEPHROSTOMY TUBE EXCHANGE  8/25/2023    IR NEPHROSTOMY TUBE EXCHANGE 8/25/2023 [facility]    IR NEPHROSTOMY TUBE EXCHANGE  8/25/2023    IR NEPHROSTOMY TUBE EXCHANGE 8/25/2023 [facility]    IR NEPHROSTOMY TUBE EXCHANGE  10/18/2023    IR NEPHROSTOMY TUBE EXCHANGE 10/18/2023 Onesimo Reyes [place] [facility] [place]     Social History     Socioeconomic History    Marital status:      Spouse name: Not on file    Number of children: Not on file    Years of education: Not on file    Highest education level: Not on file   Occupational History    Not on file   Tobacco  Use    Smoking status: Never    Smokeless tobacco: Never   Substance and Sexual Activity    Alcohol use: Not Currently    Drug use: Never    Sexual activity: Not on file   Other Topics Concern    Not on file   Social History Narrative    Not on file     Social Determinants of Health     Financial Resource Strain: Medium Risk (11/1/2023)    Overall Financial Resource Strain (CARDIA)     Difficulty of Paying Living Expenses: Somewhat hard   Food Insecurity: Not on file   Transportation Needs: No Transportation Needs (11/1/2023)    PRAPARE - Transportation     Lack of Transportation (Medical): No     Lack of Transportation (Non-Medical): No   Physical Activity: Not on file   Stress: Not on file   Social Connections: Not on file   Intimate Partner Violence: Not on file   Housing Stability: Low Risk  (11/1/2023)    Housing Stability Vital Sign     Unable to Pay for Housing in the Last Year: No     Number of Places Lived in the Last Year: 1     Unstable Housing in the Last Year: No     No family history on file.  Current Facility-Administered Medications   Medication Dose Route Frequency Provider Last Rate Last Admin    ondansetron (Zofran) injection  - [employer] Override Pull              Current Outpatient Medications   Medication Sig Dispense Refill    acetaminophen (Tylenol) 500 mg tablet Take 2 tablets (1,000 mg) by mouth every 6 hours if needed.      cyclobenzaprine (Flexeril) 5 mg tablet Take 1 tablet (5 mg) by mouth 3 times a day as needed for muscle spasms (bladder spasms). 90 tablet 0    diazePAM (Valium) 2 mg tablet Take 1 tablet (2 mg) by mouth if needed for anxiety. TAKE 1 TABLET BY MOUTH AN HOUR PRIOR TO CATHETER CHANGE. 1 tablet 0    inFLIXimab (Remicade) 100 mg injection INFUSE 500MG PER PROTOCOL ON WEEKS 0,2, 6 AND THEN EVERY 8 WEEKS AS MAINTENANCE. 5 each 6    ondansetron ODT (Zofran-ODT) 4 mg disintegrating tablet Take 1 tablet (4 mg) by mouth every 8 hours if needed for nausea or vomiting. 20 tablet 0  "   phenazopyridine (Urinary Pain Relief) 95 mg tablet Take 1 tablet (95 mg) by mouth 3 times a day as needed for bladder spasms. 30 tablet 1    sennosides (Senokot) 8.6 mg tablet Take 2 tablets (17.2 mg) by mouth 2 times a day as needed for constipation. 120 tablet 0    tamsulosin (Flomax) 0.4 mg 24 hr capsule Take 1 capsule (0.4 mg) by mouth once daily. 30 capsule 0       Labs & Test Results    No intake/output data recorded.  Stoma output in past 24h:   I/O this shift:  In: 2250 [IV Piggyback:2250]  Out: -     Data Review:  CBC:   Lab Results   Component Value Date    WBC 14.4 (H) 11/28/2023    RBC 3.91 (L) 11/28/2023     BMP:   Lab Results   Component Value Date    GLUCOSE 126 (H) 11/28/2023    CO2 18 (L) 11/28/2023    BUN 86 (H) 11/28/2023    CREATININE 5.73 (H) 11/28/2023    CALCIUM 9.3 11/28/2023     Coagulation: No results found for: \"INR\", \"APTT\"    .RESUFAST[COLORU,APPEARANCEU,SPECGRAVU,CAROLINA,PROTUR,GLUCOSEU,BLOODU,KETONESU,BILIRUBINU,UROBILINOGEN,NITRITEU,LEUKOCYTESU]    Physical Exam     Gen -  No acute distress     Neuro - Alert, oriented, conversant     CV -  Regular rate     Pulm - Symmetric chest rise, non-labored breathing     Abd - Soft, non-tender, non-distended     Ext - Warm, well perfused     Skin - without jaunice       Psych - appropriate tone, affect      - Schultz catheter is in place         Impression & Recommendations  58 y.o. male with a past medical history notable for Crohn's disease and colovesical fistula who underwent Exploratory laparotomy, MYA, Cystoscopy, Bilateral ileal ureter and bladder augment with right partial rectus muscle flap, and Suprapubic tube insertion on 10/27/23 with Dr. Andersen and Dr. Mckinney, who presented to ED  today. Urology is consulted for evaluation and management recommendations for dehydration, failure to thrive, nausea, vomiting today.  He was seen at bedside today and the catheter was flushed with return of dark yellow urine with significant mucus indicative " of dehydration. His latest creatinine 5.73 and  K is 5.0. His urine analysis shows positivity for nitrite and leukocyte esterase.      - Urology will admit this patient   - Commence on cefepime given his recent UA results   - Start tamsulosin 0.4 mg, sennosides 17.2 mg, ondansetron 4 mg, tylenol 975 mg, cyclobenzaprine 5 mg.   - Follow up nutritionist for recommendations on his diet  -  PT/OT evaluation   - Follow up labs  - Follow up urine culture, urine analysis  - Trend creatinine     Patient assessment and plan to be discussed with Dr. Mckeon.     Rosa Arteaga MD  Urology Resident [facility]  Urology [id number]

## 2023-11-28 NOTE — ED TRIAGE NOTES
Pt to ed from DR office with c/o abdominal pain and hypotension. Pt recently had surgery and was discharged after bilateral nephrostomy tubes were placed. Pt also has a suprapubic catheter as well as a colostomy

## 2023-11-28 NOTE — ED PROVIDER NOTES
HPI:  Jose Thornton is a 58 y.o. with a history of Crohn's disease, colovesicular fistula status post bilateral ureteral stent placement, course complicated by UTI treated with gentamicin resulting in KADE with transient need for RRT, presenting to the emergency department with concern for hypotension.  Patient states he has been feeling fatigued Since yesterday, saw his primary care doctor this morning and was found to be hypotensive.  States that he has not had any recent fevers, does endorse intermittent abdominal pain without nausea or vomiting.      Limitations to History: No limitations    External Records Reviewed: I reviewed recent and relevant outside records including: Chart review, recent hospitalization, outpatient charts    ------------------------------------------------------------------------------------------------------------------------------------------    Physical Exam:    ED Triage Vitals [11/28/23 1254]   Temp Heart Rate Resp BP   36.3 °C (97.3 °F) (!) 130 16 89/70      SpO2 Temp src Heart Rate Source Patient Position   97 % -- Monitor Sitting      BP Location FiO2 (%)     Left arm --         Gen: Awake, appears chronically ill, fatigued.  Vital signs on arrival show tachycardia heart rate in the 130s, blood pressure initially recorded as 89/70, afebrile, satting well on room air. Repeat BP 120s/90s.   Head/Neck: NCAT, neck w/ FROM  Eyes: EOMI, PERRL, anicteric sclerae, noninjected conjunctivae  Nose: Nares patent w/o rhinorrhea  Mouth:  dry MM, no OP lesions noted  Heart: tachycardic, regular rhythm, well perfused  Lungs: CTA b/l no RRW, no increased work of breathing  Abdomen: soft, nondistended, mild ttp. Colostomy in place with stool present.   Musculoskeletal: no joint swelling noted  Extremities: Warm, well perfused. Compartments soft, nontender   Neurologic: Alert, symmetrical facies, phonates clearly, moves all extremities equally, responsive to touch   Skin: warm, dry    Psychological: calm, no SI/HI    ------------------------------------------------------------------------------------------------------------------------------------------    Medical Decision Making  57 y/o M hx crohns c/b colovesciular fistula, recent ureteral stenting, course complicated by KADE in the setting of gentamicin, transiently crying RRT, presenting to the emergency department today with concern for fatigue, hypotension, tachycardia.  Vital signs on arrival initially show soft blood pressure which was repeated to be 120s over 90s however blood pressure is labile, settling around 100s over 70s.  Patient given a liter of fluid with slight improvement in his blood pressure.  Blood cultures were drawn, patient was started on broad-spectrum antibiotics with plan for CT of the abdomen pelvis.  Labs show creatinine of 5.7, this is notably decreased from a week prior 6.5.  Electrolytes are stable.  Patient does have a leukocytosis of 14, hemoglobin is 10.7, near baseline.  UA, CT pending upon signout to incoming team.  Anticipate admission.       Procedures    EKG interpreted by myself : Sinus tachycardia at 118 bpm, baseline is somewhat limited in the setting of artifact, axis appears to be normal, no anatomically contiguous elevations of the ST segments are appreciated, intervals appear to be within normal limits.    Clinical Impression: hypotension     Dispo: [facility]     Discussed with ED Attending, Dr. Fan Kim DO   Emergency Medicine, PGY3      Jannet Kim DO  Resident  12/01/23 [id number]

## 2023-11-29 ENCOUNTER — APPOINTMENT (OUTPATIENT)
Dept: CARDIOLOGY | Facility: HOSPITAL | Age: 58
End: 2023-11-29

## 2023-11-29 LAB
ANION GAP SERPL CALC-SCNC: 17 MMOL/L (ref 10–20)
ATRIAL RATE: 118 BPM
BUN SERPL-MCNC: 77 MG/DL (ref 6–23)
CALCIUM SERPL-MCNC: 8.2 MG/DL (ref 8.6–10.6)
CHLORIDE SERPL-SCNC: 105 MMOL/L (ref 98–107)
CO2 SERPL-SCNC: 19 MMOL/L (ref 21–32)
CREAT SERPL-MCNC: 4.87 MG/DL (ref 0.5–1.3)
ERYTHROCYTE [DISTWIDTH] IN BLOOD BY AUTOMATED COUNT: 15.9 % (ref 11.5–14.5)
GFR SERPL CREATININE-BSD FRML MDRD: 13 ML/MIN/1.73M*2
GLUCOSE SERPL-MCNC: 91 MG/DL (ref 74–99)
HCT VFR BLD AUTO: 27.5 % (ref 41–52)
HGB BLD-MCNC: 8.5 G/DL (ref 13.5–17.5)
MCH RBC QN AUTO: 27.6 PG (ref 26–34)
MCHC RBC AUTO-ENTMCNC: 30.9 G/DL (ref 32–36)
MCV RBC AUTO: 89 FL (ref 80–100)
NRBC BLD-RTO: 0 /100 WBCS (ref 0–0)
P AXIS: 72 DEGREES
P OFFSET: 205 MS
P ONSET: 137 MS
PLATELET # BLD AUTO: 451 X10*3/UL (ref 150–450)
POTASSIUM SERPL-SCNC: 4.3 MMOL/L (ref 3.5–5.3)
PR INTERVAL: 180 MS
Q ONSET: 227 MS
QRS COUNT: 19 BEATS
QRS DURATION: 94 MS
QT INTERVAL: 312 MS
QTC CALCULATION(BAZETT): 437 MS
QTC FREDERICIA: 391 MS
R AXIS: 39 DEGREES
RBC # BLD AUTO: 3.08 X10*6/UL (ref 4.5–5.9)
SODIUM SERPL-SCNC: 137 MMOL/L (ref 136–145)
T AXIS: 72 DEGREES
T OFFSET: 383 MS
VANCOMYCIN TROUGH SERPL-MCNC: 10.6 UG/ML (ref 5–20)
VENTRICULAR RATE: 118 BPM
WBC # BLD AUTO: 12.4 X10*3/UL (ref 4.4–11.3)

## 2023-11-29 PROCEDURE — 36415 COLL VENOUS BLD VENIPUNCTURE: CPT

## 2023-11-29 PROCEDURE — 93005 ELECTROCARDIOGRAM TRACING: CPT

## 2023-11-29 PROCEDURE — 97161 PT EVAL LOW COMPLEX 20 MIN: CPT | Mod: GP

## 2023-11-29 PROCEDURE — 96372 THER/PROPH/DIAG INJ SC/IM: CPT

## 2023-11-29 PROCEDURE — 97165 OT EVAL LOW COMPLEX 30 MIN: CPT | Mod: GO

## 2023-11-29 PROCEDURE — 2500000005 HC RX 250 GENERAL PHARMACY W/O HCPCS: Performed by: STUDENT IN AN ORGANIZED HEALTH CARE EDUCATION/TRAINING PROGRAM

## 2023-11-29 PROCEDURE — 80202 ASSAY OF VANCOMYCIN: CPT | Performed by: STUDENT IN AN ORGANIZED HEALTH CARE EDUCATION/TRAINING PROGRAM

## 2023-11-29 PROCEDURE — 99222 1ST HOSP IP/OBS MODERATE 55: CPT | Performed by: INTERNAL MEDICINE

## 2023-11-29 PROCEDURE — 2500000004 HC RX 250 GENERAL PHARMACY W/ HCPCS (ALT 636 FOR OP/ED)

## 2023-11-29 PROCEDURE — 2500000001 HC RX 250 WO HCPCS SELF ADMINISTERED DRUGS (ALT 637 FOR MEDICARE OP)

## 2023-11-29 PROCEDURE — G0378 HOSPITAL OBSERVATION PER HR: HCPCS

## 2023-11-29 PROCEDURE — 2500000001 HC RX 250 WO HCPCS SELF ADMINISTERED DRUGS (ALT 637 FOR MEDICARE OP): Performed by: STUDENT IN AN ORGANIZED HEALTH CARE EDUCATION/TRAINING PROGRAM

## 2023-11-29 PROCEDURE — 80048 BASIC METABOLIC PNL TOTAL CA: CPT

## 2023-11-29 PROCEDURE — 85027 COMPLETE CBC AUTOMATED: CPT

## 2023-11-29 PROCEDURE — 2500000004 HC RX 250 GENERAL PHARMACY W/ HCPCS (ALT 636 FOR OP/ED): Performed by: STUDENT IN AN ORGANIZED HEALTH CARE EDUCATION/TRAINING PROGRAM

## 2023-11-29 RX ORDER — OXYCODONE HYDROCHLORIDE 5 MG/1
5 TABLET ORAL ONCE AS NEEDED
Status: COMPLETED | OUTPATIENT
Start: 2023-11-29 | End: 2023-11-29

## 2023-11-29 RX ORDER — VANCOMYCIN HYDROCHLORIDE 1 G/20ML
INJECTION, POWDER, LYOPHILIZED, FOR SOLUTION INTRAVENOUS DAILY PRN
Status: DISCONTINUED | OUTPATIENT
Start: 2023-11-29 | End: 2023-11-29

## 2023-11-29 RX ADMIN — MEROPENEM 500 MG: 500 INJECTION, POWDER, FOR SOLUTION INTRAVENOUS at 13:34

## 2023-11-29 RX ADMIN — ONDANSETRON 4 MG: 4 TABLET, ORALLY DISINTEGRATING ORAL at 10:22

## 2023-11-29 RX ADMIN — OXYCODONE HYDROCHLORIDE 5 MG: 5 TABLET ORAL at 20:57

## 2023-11-29 RX ADMIN — HEPARIN SODIUM 5000 UNITS: 5000 INJECTION INTRAVENOUS; SUBCUTANEOUS at 02:57

## 2023-11-29 RX ADMIN — ONDANSETRON 4 MG: 4 TABLET, ORALLY DISINTEGRATING ORAL at 18:39

## 2023-11-29 RX ADMIN — CYCLOBENZAPRINE 5 MG: 10 TABLET, FILM COATED ORAL at 21:00

## 2023-11-29 RX ADMIN — SODIUM CHLORIDE, POTASSIUM CHLORIDE, SODIUM LACTATE AND CALCIUM CHLORIDE 100 ML/HR: 600; 310; 30; 20 INJECTION, SOLUTION INTRAVENOUS at 16:34

## 2023-11-29 RX ADMIN — HEPARIN SODIUM 5000 UNITS: 5000 INJECTION INTRAVENOUS; SUBCUTANEOUS at 18:00

## 2023-11-29 RX ADMIN — ACETAMINOPHEN 975 MG: 325 TABLET ORAL at 20:56

## 2023-11-29 RX ADMIN — HEPARIN SODIUM 5000 UNITS: 5000 INJECTION INTRAVENOUS; SUBCUTANEOUS at 10:22

## 2023-11-29 RX ADMIN — MEROPENEM 500 MG: 500 INJECTION, POWDER, FOR SOLUTION INTRAVENOUS at 02:58

## 2023-11-29 RX ADMIN — TAMSULOSIN HYDROCHLORIDE 0.4 MG: 0.4 CAPSULE ORAL at 10:23

## 2023-11-29 SDOH — SOCIAL STABILITY: SOCIAL INSECURITY: DOES ANYONE TRY TO KEEP YOU FROM HAVING/CONTACTING OTHER FRIENDS OR DOING THINGS OUTSIDE YOUR HOME?: NO

## 2023-11-29 SDOH — ECONOMIC STABILITY: HOUSING INSECURITY
IN THE LAST 12 MONTHS, WAS THERE A TIME WHEN YOU DID NOT HAVE A STEADY PLACE TO SLEEP OR SLEPT IN A SHELTER (INCLUDING NOW)?: NO

## 2023-11-29 SDOH — ECONOMIC STABILITY: TRANSPORTATION INSECURITY
IN THE PAST 12 MONTHS, HAS THE LACK OF TRANSPORTATION KEPT YOU FROM MEDICAL APPOINTMENTS OR FROM GETTING MEDICATIONS?: NO

## 2023-11-29 SDOH — SOCIAL STABILITY: SOCIAL INSECURITY: ABUSE: ADULT

## 2023-11-29 SDOH — ECONOMIC STABILITY: INCOME INSECURITY: HOW HARD IS IT FOR YOU TO PAY FOR THE VERY BASICS LIKE FOOD, HOUSING, MEDICAL CARE, AND HEATING?: NOT VERY HARD

## 2023-11-29 SDOH — ECONOMIC STABILITY: HOUSING INSECURITY: IN THE LAST 12 MONTHS, HOW MANY PLACES HAVE YOU LIVED?: 1

## 2023-11-29 SDOH — SOCIAL STABILITY: SOCIAL INSECURITY: HAVE YOU HAD THOUGHTS OF HARMING ANYONE ELSE?: NO

## 2023-11-29 SDOH — SOCIAL STABILITY: SOCIAL INSECURITY: HAS ANYONE EVER THREATENED TO HURT YOUR FAMILY OR YOUR PETS?: NO

## 2023-11-29 SDOH — SOCIAL STABILITY: SOCIAL INSECURITY: DO YOU FEEL ANYONE HAS EXPLOITED OR TAKEN ADVANTAGE OF YOU FINANCIALLY OR OF YOUR PERSONAL PROPERTY?: NO

## 2023-11-29 SDOH — SOCIAL STABILITY: SOCIAL INSECURITY: WERE YOU ABLE TO COMPLETE ALL THE BEHAVIORAL HEALTH SCREENINGS?: YES

## 2023-11-29 SDOH — ECONOMIC STABILITY: TRANSPORTATION INSECURITY
IN THE PAST 12 MONTHS, HAS LACK OF TRANSPORTATION KEPT YOU FROM MEETINGS, WORK, OR FROM GETTING THINGS NEEDED FOR DAILY LIVING?: NO

## 2023-11-29 SDOH — ECONOMIC STABILITY: INCOME INSECURITY: IN THE LAST 12 MONTHS, WAS THERE A TIME WHEN YOU WERE NOT ABLE TO PAY THE MORTGAGE OR RENT ON TIME?: NO

## 2023-11-29 SDOH — SOCIAL STABILITY: SOCIAL INSECURITY: DO YOU FEEL UNSAFE GOING BACK TO THE PLACE WHERE YOU ARE LIVING?: NO

## 2023-11-29 SDOH — SOCIAL STABILITY: SOCIAL INSECURITY: ARE THERE ANY APPARENT SIGNS OF INJURIES/BEHAVIORS THAT COULD BE RELATED TO ABUSE/NEGLECT?: NO

## 2023-11-29 SDOH — SOCIAL STABILITY: SOCIAL INSECURITY: ARE YOU OR HAVE YOU BEEN THREATENED OR ABUSED PHYSICALLY, EMOTIONALLY, OR SEXUALLY BY ANYONE?: NO

## 2023-11-29 ASSESSMENT — PATIENT HEALTH QUESTIONNAIRE - PHQ9
1. LITTLE INTEREST OR PLEASURE IN DOING THINGS: SEVERAL DAYS
SUM OF ALL RESPONSES TO PHQ9 QUESTIONS 1 & 2: 2
2. FEELING DOWN, DEPRESSED OR HOPELESS: SEVERAL DAYS

## 2023-11-29 ASSESSMENT — COGNITIVE AND FUNCTIONAL STATUS - GENERAL
TURNING FROM BACK TO SIDE WHILE IN FLAT BAD: A LITTLE
CLIMB 3 TO 5 STEPS WITH RAILING: A LOT
MOBILITY SCORE: 17
WALKING IN HOSPITAL ROOM: A LITTLE
MOVING TO AND FROM BED TO CHAIR: A LITTLE
HELP NEEDED FOR BATHING: A LITTLE
DAILY ACTIVITIY SCORE: 19
DAILY ACTIVITIY SCORE: 19
STANDING UP FROM CHAIR USING ARMS: A LITTLE
STANDING UP FROM CHAIR USING ARMS: A LITTLE
MOVING TO AND FROM BED TO CHAIR: A LITTLE
HELP NEEDED FOR BATHING: A LITTLE
PERSONAL GROOMING: A LITTLE
DRESSING REGULAR UPPER BODY CLOTHING: A LITTLE
MOBILITY SCORE: 17
TURNING FROM BACK TO SIDE WHILE IN FLAT BAD: A LITTLE
TOILETING: A LITTLE
PATIENT BASELINE BEDBOUND: NO
PERSONAL GROOMING: A LITTLE
DRESSING REGULAR LOWER BODY CLOTHING: A LITTLE
DRESSING REGULAR UPPER BODY CLOTHING: A LITTLE
WALKING IN HOSPITAL ROOM: A LITTLE
DRESSING REGULAR LOWER BODY CLOTHING: A LITTLE
MOVING FROM LYING ON BACK TO SITTING ON SIDE OF FLAT BED WITH BEDRAILS: A LITTLE
TOILETING: A LITTLE
TOILETING: A LITTLE
MOVING TO AND FROM BED TO CHAIR: A LITTLE
MOBILITY SCORE: 17
CLIMB 3 TO 5 STEPS WITH RAILING: A LOT
MOVING FROM LYING ON BACK TO SITTING ON SIDE OF FLAT BED WITH BEDRAILS: A LITTLE
TURNING FROM BACK TO SIDE WHILE IN FLAT BAD: A LITTLE
DRESSING REGULAR LOWER BODY CLOTHING: A LITTLE
CLIMB 3 TO 5 STEPS WITH RAILING: A LOT
DAILY ACTIVITIY SCORE: 19
MOVING FROM LYING ON BACK TO SITTING ON SIDE OF FLAT BED WITH BEDRAILS: A LITTLE
WALKING IN HOSPITAL ROOM: A LITTLE
PERSONAL GROOMING: A LITTLE
STANDING UP FROM CHAIR USING ARMS: A LITTLE
HELP NEEDED FOR BATHING: A LITTLE
DRESSING REGULAR UPPER BODY CLOTHING: A LITTLE

## 2023-11-29 ASSESSMENT — PAIN - FUNCTIONAL ASSESSMENT
PAIN_FUNCTIONAL_ASSESSMENT: 0-10

## 2023-11-29 ASSESSMENT — LIFESTYLE VARIABLES
HOW MANY STANDARD DRINKS CONTAINING ALCOHOL DO YOU HAVE ON A TYPICAL DAY: PATIENT DOES NOT DRINK
HOW OFTEN DO YOU HAVE 6 OR MORE DRINKS ON ONE OCCASION: NEVER
SUBSTANCE_ABUSE_PAST_12_MONTHS: NO
SKIP TO QUESTIONS 9-10: 1
HOW OFTEN DO YOU HAVE A DRINK CONTAINING ALCOHOL: NEVER
AUDIT-C TOTAL SCORE: 0
AUDIT-C TOTAL SCORE: 0
PRESCIPTION_ABUSE_PAST_12_MONTHS: NO

## 2023-11-29 ASSESSMENT — ACTIVITIES OF DAILY LIVING (ADL)
FEEDING YOURSELF: INDEPENDENT
GROOMING: NEEDS ASSISTANCE
PATIENT'S MEMORY ADEQUATE TO SAFELY COMPLETE DAILY ACTIVITIES?: YES
HEARING - RIGHT EAR: FUNCTIONAL
DRESSING YOURSELF: NEEDS ASSISTANCE
TOILETING: NEEDS ASSISTANCE
LACK_OF_TRANSPORTATION: NO
ADEQUATE_TO_COMPLETE_ADL: YES
ASSISTIVE_DEVICE: OXYGEN;WALKER
BATHING: NEEDS ASSISTANCE
WALKS IN HOME: NEEDS ASSISTANCE
JUDGMENT_ADEQUATE_SAFELY_COMPLETE_DAILY_ACTIVITIES: YES
LACK_OF_TRANSPORTATION: NO
HEARING - LEFT EAR: FUNCTIONAL
BATHING_ASSISTANCE: MINIMAL

## 2023-11-29 ASSESSMENT — PAIN SCALES - GENERAL
PAINLEVEL_OUTOF10: 6
PAINLEVEL_OUTOF10: 7
PAINLEVEL_OUTOF10: 6

## 2023-11-29 ASSESSMENT — PAIN DESCRIPTION - DESCRIPTORS: DESCRIPTORS: BURNING

## 2023-11-29 NOTE — PROGRESS NOTES
HPI  Jose Thornton is a 58 y.o. male with history of Crohn's disease and CKD who was hospitalized in 11/2022 with pelvic abscess 2/2 perianal and colovesical fistula and was also found to have bilateral hydronephrosis s/p b/l nephrostomy tube placement (last exchanged dec), readmitted 12/2022 for acute diverticulitis s/p anterior proctosigmoidectomy with colostomy and colovesicular fistula takedown 12/20. 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 On discharge urethral andrade was removed and SPT and PCNTs were capped with plan for timed voids and PVRs via SPT release. After discharge, patient reported resumed nausea and vomiting after zofran prescription ran out, with increasing malaise, weakness, lightheadedness, intermittent fevers, and decreased PO intake with corresponding reduction in UOP. Denied pain. At follow-up outpatient urology appointment 11/28 he was found to be tachycardic to 133, hypotensive to 89/70, with a creatinine 6.53 and was sent to ED for possible dehydration vs sepsis. While in the ED, he was febrile to 101 with continued tachycardia, and with labs significant for creatinine of 5.73 and leukocytosis to 14.4 with UA +nitrites/+LE. He was started on vanc/zosyn changed to meorpenem per ID based on his kidney function and previous history of DTR-Pseudomonas. He was admitted to urology and is now improving on RNF.    Subjective   Reports feeling improved since admission, yet still endorses nausea, weakness and fatigue. Denies pain, current fever/chills/SOB. Has not been OOB.     Objective    Vital Signs:  Heart Rate:  []   Temp:  [36.1 °C (96.9 °F)-38.8 °C (101.8 °F)]   Resp:  [12-24]   BP: ()/(63-94)   Height:  [182.9 cm  (6')]   Weight:  [95.3 kg (210 lb)-99.3 kg (219 lb)]   SpO2:  [89 %-100 %]     Intake/Output Summary (Last 24 hours) at 11/29/2023 0755  Last data filed at 11/29/2023 0558  Gross per 24 hour   Intake 4210 ml   Output 1125 ml   Net 3085 ml        Results from last 72 hours   Lab Units 11/29/23  0551 11/28/23  1341   CREATININE mg/dL 4.87* 5.73*   HEMOGLOBIN g/dL 8.5* 10.7*   WBC AUTO x10*3/uL 12.4* 14.4*   GLUCOSE mg/dL 91 126*   POTASSIUM mmol/L 4.3 5.0      Physical Exam:   Constitutional: Alert, in NAD  HEENT: Normocephalic, atraumatic  Neuro: A&O x3  CV: Mild sinus tachycardia  Pulm: Non-laboured breathing on NC  GI: Abdomen soft, non-distended, non-tender  : SPT in place draining clear yellow urine. Nephrostomy tubes uncapped, right flushed with difficulty, left flushed easily. Pain upon flushing right PCNT.   Skin: Incisions well healed. No lesions or discoloration noted.  Musculoskeletal: ADELE  Extremities: no edema or cyanosis noted    Current Medications:  heparin (porcine), 5,000 Units, subcutaneous, q8h  meropenem, 500 mg, intravenous, q12h  sennosides, 2 tablet, oral, BID  tamsulosin, 0.4 mg, oral, Daily      PRN medications: acetaminophen, cyclobenzaprine, ondansetron ODT, vancomycin   Dietary Orders (From admission, onward)       Start     Ordered    11/28/23 1737  Adult diet Renal; Potassium Restricted 2 gm (50mEq); 2 - 3 grams Sodium; Phosphorus 1 gm  Diet effective now        Question Answer Comment   Diet type Renal    Potassium restriction: Potassium Restricted 2 gm (50mEq)    Sodium restriction: 2 - 3 grams Sodium    Phosphorus: Phosphorus 1 gm        11/28/23 1738                    Current Outpatient Medications   Medication Instructions    acetaminophen (Tylenol) 500 mg tablet 2 tablets, oral, Every 6 hours PRN    cyclobenzaprine (FLEXERIL) 5 mg, oral, 3 times daily PRN    diazePAM (VALIUM) 2 mg, oral, As needed, TAKE 1 TABLET BY MOUTH AN HOUR PRIOR TO CATHETER CHANGE.    inFLIXimab  (Remicade) 100 mg injection INFUSE 500MG PER PROTOCOL ON WEEKS 0,2, 6 AND THEN EVERY 8 WEEKS AS MAINTENANCE.    ondansetron ODT (ZOFRAN-ODT) 4 mg, oral, Every 8 hours PRN    phenazopyridine (URINARY PAIN RELIEF) 95 mg, oral, 3 times daily PRN    senna 17.2 mg, oral, 2 times daily PRN    tamsulosin (FLOMAX) 0.4 mg, oral, Daily      Imagin/28 - Diffuse urinary bladder wall thickening c/f underlying  infectious process. Mild retraction of right-sided percutaneous nephrostomy tube from  prior position in the right renal pelvis. Recommend clinical correlation for obstruction. Mild improvement in previously seen postsurgical changes as evidenced by resolving pericystic and pelvic soft tissue stranding. Additional resolution of previously seen fluid collection posterior to the abdominal wall surgical site. Stable position of bilateral ureteral stents terminating in the augmented urinary bladder.    Assessment: UTI, KADE on CKD likely multifactorial 2/2 dehydration, hypotension, infection in the setting of possibly misplaced right nephrostomy tube    Updates:  : Afebrile, mild tachycardia, other VSS. sCr 4.87<5.73. WBC 12.4<14.4 on alejandro. UOP 1.1L. b/l PCNT (right flushed with difficulty, left flushed easily) and SPT uncapped with return of 400mL clear yellow urine.     Plan:   Neuro:  - Tylenol 975 mg q6h PRN mile pain/fever, continue home flexeril 5mg PRN  - Zofran 4 mg q8h PRN for nausea    CV:   - VS q8hr    Heme:   #Chronic normocytic anemia of CKD [Hgb 8.5<10.7; baseline 11]  - No indication for transfusion at this time  - Daily CBC    Resp:  - Aggressive pulmonary toilet and incentive spirometry 10x/hour  - Wean supplemental O2 as tolerated, maintain SpO2>92%    GI/Diet:   #hx of Chron's   - Continue regular renal diet  - Boost clear  - Bowel regimen - continue home senna    /Renal:   #KADE on CKD, downtrending 4.87<5.73<6.53 (prior to admission)  - Maintain SPT and b/l PCNTs uncapped  - Strict I/Os, monitor  UOP of right nephrostomy tube  - Daily BMP  - IVF at 100 cc/hr until oral intake improves  - Continue home flomax    Endocrine:   - No current needs    MSK:  - OOB most of the day  - Ambulate at least 3x per day, more if tolerated  - PT on board    ID:   #afebrile, UA +nitrites/+LE, WBC downtrending 12.4<14.4  - Follow-up urine/blood cultures  - Follow-up ID recs:   - Continue meropenum    PPx: SCDs and SQH 5000 U q8H for DVT prophylaxis    Dispo: RNF    Seen and discussed with chief resident Dr. Mckeon. To be discussed with attending physician Dr. Andersen.    ---  Gracie Koenig PA-C  Service Pager [phone]

## 2023-11-29 NOTE — PROGRESS NOTES
Pharmacy Medication History Review    Jose Thornton is a 58 y.o. male admitted for Dehydration. Pharmacy reviewed the patient's [id number] medications and allergies for accuracy.    The list below reflects the updated PTA list. Comments regarding how patient may be taking medications differently can be found in the Admit Orders Activity  Medications Prior to Admission   Medication Sig Dispense Refill Last Dose    acetaminophen (Tylenol) 500 mg tablet Take 2 tablets (1,000 mg) by mouth every 6 hours if needed.       cyclobenzaprine (Flexeril) 5 mg tablet Take 1 tablet (5 mg) by mouth 3 times a day as needed for muscle spasms (bladder spasms). 90 tablet 0     diazePAM (Valium) 2 mg tablet Take 1 tablet (2 mg) by mouth if needed for anxiety. TAKE 1 TABLET BY MOUTH AN HOUR PRIOR TO CATHETER CHANGE. 1 tablet 0     inFLIXimab (Remicade) 100 mg injection INFUSE 500MG PER PROTOCOL ON WEEKS 0,2, 6 AND THEN EVERY 8 WEEKS AS MAINTENANCE. (Patient not taking: Reported on 2023) 5 each 6 Not Taking    ondansetron ODT (Zofran-ODT) 4 mg disintegrating tablet Take 1 tablet (4 mg) by mouth every 8 hours if needed for nausea or vomiting. 20 tablet 0     [] oxyCODONE (Roxicodone) 5 mg immediate release tablet Take 1 tablet (5 mg) by mouth every 6 hours if needed for severe pain (7 - 10) for up to 5 days. 15 tablet 0     phenazopyridine (Urinary Pain Relief) 95 mg tablet Take 1 tablet (95 mg) by mouth 3 times a day as needed for bladder spasms. 30 tablet 1     sennosides (Senokot) 8.6 mg tablet Take 2 tablets (17.2 mg) by mouth 2 times a day as needed for constipation. (Patient not taking: Reported on 2023) 120 tablet 0 Not Taking    tamsulosin (Flomax) 0.4 mg 24 hr capsule Take 1 capsule (0.4 mg) by mouth once daily. 30 capsule 0         The list below reflects the updated allergy list. Please review each documented allergy for additional clarification and justification.  Allergies  Reviewed by Yony  Tarun RN on 11/28/2023   No Known Allergies         Patient accepts M2B at discharge. Pharmacy has been updated to [facility].    Sources used to complete the med history include out patient fill history, OARRS, and patient interview - including patient's wife to help complete history    Below are additional concerns with the patient's PTA list.  N/A    Jorge L De Jesus PharmD  Transitions of Care Pharmacist  [facility]  Please reach out via Secure Chat for questions, or if no response call [employer] or [facility]

## 2023-11-29 NOTE — PROGRESS NOTES
"Vancomycin Dosing by Pharmacy- INITIAL    Jose Thornton is a 58 y.o. year old male who Pharmacy has been consulted for vancomycin dosing for other uti . Based on the patient's indication and renal status this patient will be dosed based on a goal trough/random level of 10-15.     Renal function is currently stable    Visit Vitals  /72   Pulse 95   Temp 37 °C (98.6 °F)   Resp 16        Lab Results   Component Value Date    CREATININE 5.73 (H) 11/28/2023    CREATININE 6.53 (H) 11/21/2023    CREATININE 5.42 (H) 11/17/2023    CREATININE 5.39 (H) 11/16/2023        Patient weight is No results found for: \"PTWEIGHT\"    No results found for: \"CULTURE\"     No intake/output data recorded.  [unfilled]    Lab Results   Component Value Date    PATIENTTEMP 37.0 11/28/2023    PATIENTTEMP 37.0 10/27/2023    PATIENTTEMP 37.0 10/27/2023          Assessment/Plan     Patient will be given a loading dose of 1500 mg. Will check random level on 11/29 @ 1330  Will initiate vancomycin maintenance,   This dosing regimen is predicted by [employer] to result in the following pharmacokinetic parameters:  <<<<<PASTE [employer] DATA HERE>>>>>    Follow-up level will be ordered on 11/29 at 1330 unless clinically indicated sooner.  Will continue to monitor renal function daily while on vancomycin and order serum creatinine at least every 48 hours if not already ordered.  Follow for continued vancomycin needs, clinical response, and signs/symptoms of toxicity.       Kyaw Jansen, PharmD       "

## 2023-11-29 NOTE — PROGRESS NOTES
Jose Thornton is a 58 y.o. male on day 0 of admission presenting with Dehydration.  TCC Note    Plan per Medical/Surgical Team: UTI, KADE on CKD , mild tachycardia, chronic anema, pulmonary toileting, wean O2, regular renal diet, maintain SPT and [place] PCNTs uncapped, strict I&Os, IV fluids, continue meropenem, pending ID recs.  Status: inpatient  Payor Source: Self pay  Discharge disposition: Home  Expected date of discharge: TBD  Barriers: medical  Primary Oncologist:  Preferred home care agency:  Will continue to follow patient for any discharge needs.

## 2023-11-29 NOTE — PROGRESS NOTES
Physical Therapy    Physical Therapy Evaluation    Patient Name: Jose Thornton  MRN: [id number]  Today's Date: 11/29/2023   Time Calculation  Start Time: 1058  Stop Time: 1116  Time Calculation (min): 18 min    Assessment/Plan   PT Assessment  PT Assessment Results: Decreased strength, Decreased endurance, Impaired balance, Decreased mobility, Pain  Rehab Prognosis: Good  Evaluation/Treatment Tolerance: Patient tolerated treatment well (despite pain and fatigue. Pt. tachycardic with mobility though improved with seated rest break)  Medical Staff Made Aware: Yes  Strengths: Ability to acquire knowledge, Housing layout, Premorbid level of function, Support and attitude of living partners  End of Session Communication: Bedside nurse  Assessment Comment: Pt. is a 58 yom that presents with generalized pain, decreased functional strength, decreased activity tolerance/endurance, mild balance impairments, and increased difficulty with functional mobility compared to baseline. Pt. will benefit from skilled PT intervention while inpatient to address the above deficits.  End of Session Patient Position: Up in chair, Alarm off, not on at start of session (Call light in reach, educated pt. to request assistance)  IP OR SWING BED PT PLAN  Inpatient or Swing Bed: Inpatient  PT Plan  Treatment/Interventions: Bed mobility, Transfer training, Gait training, Stair training, Balance training, Strengthening, Endurance training, Therapeutic exercise, Therapeutic activity, Home exercise program  PT Plan: Skilled PT  PT Frequency: 3 times per week  PT Discharge Recommendations: Low intensity level of continued care  PT Recommended Transfer Status: Assist x1, Assistive device  PT - OK to Discharge: Yes (Eval complete, refer to dispo)      Subjective   General Visit Information:  General  Reason for Referral: Pt. was sent to ED 2/2 c/f possible dehydration vs sepsis. On 11/28, pt. presented to OP follow up appointment, was found to be  tachycardic to 133, hypotensive to 89/70, with a creatinine 6.53  Past Medical History Relevant to Rehab: Crohn's disease and associated colovesical fistula s/p B nephrostomy and Schultz catheter placement. Shadi ureteral reimplant and fistula repair with Dr. Andersen and Dr. Rodriguez 10/27/23. Post-op course c/b ileus, persistent N/V, and pseudomonas UTI; subsequently developed KADE and required RRT x 3 days for suspected gentamicin toxicity.  Family/Caregiver Present: No  Co-Treatment: OT  Co-Treatment Reason: To maximize pt. safety and therapeutic potential. Pt. with increased pain and decreased activity tolerance/endurance.  Prior to Session Communication: PCT/NA/CTA  Patient Position Received: Bed, 3 rail up, Alarm off, not on at start of session  Preferred Learning Style: verbal, visual  General Comment: Pt. received supine in bed, agreeable to participate in session    Home Living:  Home Living  Type of Home: House  Lives With: Spouse (Spouse works)  Home Adaptive Equipment: Walker rolling or standard  Home Layout: One level  Home Access: Stairs to enter without rails  Entrance Stairs-Rails: None  Entrance Stairs-Number of Steps: 5  Bathroom Shower/Tub: Tub/shower unit    Prior Level of Function:  Prior Function Per Pt/Caregiver Report  Level of [place]:  (Priorly IND with ADLs and functional mobility. Since recent hospital admission, pt. reports requiring assist for ADLs, able to ambulate short distances within home with FWW.)  Receives Help From:  (Spouse)  ADL Assistance:  (Previously IND, since recent hospital admission has required assist from spouse for dressing and sponge bathing)  Homemaking Assistance:  (Priorly IND, spouse completes since recent hospitalization)  Ambulatory Assistance:  (Priorly IND without AD, since recent hospitalization has been able to ambulate short distances within home with FWW)  Vocational: On disability  Prior Function Comments: Pt. denied  "falls    Precautions:  Precautions  Medical Precautions: Abdominal precautions, Fall precautions  Post-Surgical Precautions: Abdominal surgery precautions    Vital Signs:  Vital Signs  Heart Rate: (!) 122 (Following ambulation. HR mproved to 110 bpm with seated rest break)  Heart Rate Source: Monitor  SpO2: 97 % (2L)    Objective   Pain:  Pain Assessment  Pain Assessment: 0-10  Pain Score: 6  Pain Type: Acute pain  Pain Location: Generalized    Cognition:  Cognition  Overall Cognitive Status: Within Functional Limits  Orientation Level: Oriented X4  Cognition Comments: Flat affect    General Assessments:  Activity Tolerance  Endurance: Tolerates 10 - 20 min exercise with multiple rests (Tachycardic with ambulation)  Early Mobility/Exercise Safety Screen: Proceed with mobilization - No exclusion criteria met  Activity Tolerance Comments: Pt. tolerated ambulating in hallways this date despite endorsing pain. Pt. endorsing fatigue though stated \"I have to get moving\"    Sensation  Light Touch: No apparent deficits    Postural Control  Postural Control: Within Functional Limits    Static Sitting Balance  Static Sitting-Comment/Number of Minutes: CGAx1  Dynamic Sitting Balance  Dynamic Sitting-Comments: CGAx1    Static Standing Balance  Static Standing-Comment/Number of Minutes: minAx1 with FWW, progressed to CGAx1  Dynamic Standing Balance  Dynamic Standing-Comments: CGAx1 with FWW    Functional Assessments:  Bed Mobility  Bed Mobility: Yes  Bed Mobility 1  Bed Mobility 1: Supine to sitting  Level of Assistance 1: Minimum assistance, Minimal verbal cues  Bed Mobility Comments 1: Log roll technique, HOB elevated, use of bed rails    Transfers  Transfer: Yes  Transfer 1  Transfer From 1: Bed to  Transfer to 1: Stand  Technique 1: Sit to stand  Transfer Device 1: Walker  Transfer Level of Assistance 1: Minimum assistance, Minimal verbal cues  Trials/Comments 1: Cues for proper UE placement and technique, however pt. non " compliant and pulling self on walker; bed elevated to improve ease    Transfers 2  Transfer From 2: Stand to  Transfer to 2: Chair with arms  Technique 2: Stand to sit  Transfer Device 2: Walker  Transfer Level of Assistance 2: Minimum assistance, Minimal verbal cues  Trials/Comments 2: Cues to reach posteriorly with unilateral UE and control descent to chair    Ambulation/Gait Training  Ambulation/Gait Training Performed: Yes  Ambulation/Gait Training 1  Surface 1: Level tile  Device 1: Rolling walker  Assistance 1: Contact guard, Minimal verbal cues  Quality of Gait 1:  (decerased mera, mild path deviation)  Comments/Distance (ft) 1: 400 ft (offered seated rest break multiple times as pt. reporting mild dizziness and nausea though pt. adamantly refusing. Cues to maintain self within parameters of FWW)    Stairs  Stairs: No    Extremity/Trunk Assessments:  RLE   RLE : Within Functional Limits  LLE   LLE : Within Functional Limits    Outcome Measures:  [facility] Basic Mobility  Turning from your back to your side while in a flat bed without using bedrails: A little  Moving from lying on your back to sitting on the side of a flat bed without using bedrails: A little  Moving to and from bed to chair (including a wheelchair): A little  Standing up from a chair using your arms (e.g. wheelchair or bedside chair): A little  To walk in hospital room: A little  Climbing 3-5 steps with railing: A lot  Basic Mobility - Total Score: 17    Encounter Problems       Encounter Problems (Active)       Balance       Patient to demonstrate Alexis static and dynamic standing balance without LOB with change of directions, no hesitancy, appropriate TRISTA and sequencing to complete functional task.        Start:  11/29/23    Expected End:  12/13/23               Mobility       STG - Patient will ambulate >/= 400 ft Alexis with LRAD       Start:  11/29/23    Expected End:  12/13/23            Patient to ascend/descend >/= 5 stairs with SBA to  demo ability to safely traverse PATIENCE        Start:  11/29/23    Expected End:  12/13/23            Patient will tolerate >/=30 minutes continuous activity with stable vital signs, RPE </=13/20, RPD </=3/10        Start:  11/29/23    Expected End:  12/13/23               Transfers       STG - Patient will perform bed mobility Alexis with HOB flat and no rails       Start:  11/29/23    Expected End:  12/13/23            STG - Patient will transfer sit to and from stand Alexis with LRAD       Start:  11/29/23    Expected End:  12/13/23                   Education Documentation  Precautions, taught by Areli Brasher, PT at 11/29/2023 12:23 PM.  Learner: Patient  Readiness: Acceptance  Method: Explanation, Demonstration  Response: Verbalizes Understanding    Body Mechanics, taught by Areli Brasher, PT at 11/29/2023 12:23 PM.  Learner: Patient  Readiness: Acceptance  Method: Explanation, Demonstration  Response: Verbalizes Understanding    Mobility Training, taught by Areli Brasher, PT at 11/29/2023 12:23 PM.  Learner: Patient  Readiness: Acceptance  Method: Explanation, Demonstration  Response: Verbalizes Understanding    Education Comments  No comments found.

## 2023-11-29 NOTE — PROGRESS NOTES
I received Jose Thornton in signout from Dr. Jannet Kim.  Please see the previous note for all HPI, PE and MDM up to the time of signout at 1500.    In brief Jose Thornton is an 58 y.o. male presenting for   Chief Complaint   Patient presents with    Hypotension   .  At the time of signout we were awaiting:.  CT imaging, urology recommendations.    Jose Thornton is a 57 year old male with a significant pmhx of Crohn's disease c/b colovesical fistula, s/p bilateral nephrostomy tube s/p ex-lap, MYA, cystoscopy, bilateral ileal ureter, bilateral stent placement, bladder augment with right partial rectus muscle flap, and suprapubic tube insertion on 10/27 with Karine Andersen and Jennifer   Presenting to the emergency department with fatigue and abdominal pain.  Patient was reportedly sent in by urology given his symptoms for further evaluation.  Patient showed concern for failure to thrive, dehydration, nausea, vomiting.  See peers better note for details of initial presentation.  Patient was ended up to me pending final CT imaging results which showed possible slight interval dislodgment of one of his percutaneous nephrostomy tubes, but no other large acute abscess.  Patient was remained largely hemodynamically stable, but did spike a fever and was treated with antibiotics.  Had a lactate of 2.3 for which she was given 2 L in total of IV fluids.  Patient additionally showed evidence of a UTI and was started on antibiotics including cefepime.  Urology was consulted and evaluated the patient.  Recommended admission to their service for further evaluation for failure to thrive, UTI, and overlying infection.  Patient will be admitted in stable condition to their service.    Pt Disposition: Admit    Procedures

## 2023-11-29 NOTE — PROGRESS NOTES
"Vancomycin Dosing by Pharmacy- INITIAL    Jose Thornton is a 58 y.o. year old male who Pharmacy has been consulted for vancomycin dosing for other uti . Based on the patient's indication and renal status this patient will be dosed based on a goal trough/random level of 10-15.     Renal function is currently stable    Visit Vitals  /72   Pulse 95   Temp 37 °C (98.6 °F)   Resp 16        Lab Results   Component Value Date    CREATININE 5.73 (H) 11/28/2023    CREATININE 6.53 (H) 11/21/2023    CREATININE 5.42 (H) 11/17/2023    CREATININE 5.39 (H) 11/16/2023        Patient weight is No results found for: \"PTWEIGHT\"    No results found for: \"CULTURE\"     No intake/output data recorded.  [unfilled]    Lab Results   Component Value Date    PATIENTTEMP 37.0 11/28/2023    PATIENTTEMP 37.0 10/27/2023    PATIENTTEMP 37.0 10/27/2023          Assessment/Plan     Patient will be given a loading dose of 1500 mg. Will check random level on 11/29 @ 1330  Will initiate vancomycin maintenance,   This dosing regimen is predicted by [employer] to result in the following pharmacokinetic parameters:  <<<<<PASTE [employer] DATA HERE>>>>>    Follow-up level will be ordered on 11/29 at 1330 unless clinically indicated sooner.  Will continue to monitor renal function daily while on vancomycin and order serum creatinine at least every 48 hours if not already ordered.  Follow for continued vancomycin needs, clinical response, and signs/symptoms of toxicity.       Kyaw Jansen, PharmD       "

## 2023-11-29 NOTE — CONSULTS
"Nutrition Follow Up Assessment:   Nutrition Assessment    Reason for Assessment: Provider consult order    Patient is a 58 y.o. male with Crohn's Disease status post colovesical fistula take down on 10/27. Post-op course c/b ileus & ARF.    Now readmitted for failure to thrive, UTI, and overlying infection.     PMH:  Past Medical History:   Diagnosis Date    Crohn's disease (CMS/HCC)     Diverticulosis     GERD (gastroesophageal reflux disease)     GI (gastrointestinal bleed)     Urinary tract infection         Nutrition History:  Food and Nutrient History: Pt reports having a poor appetite since having surgery on 10/27. Pt states that he does not feel like eating due to all of his health issues going on. Pt has not been consuming any nutritional supplements because he has not been receiving any ensure clears. Pt used to consume nepro shakes, but he did not enjoy them because they were too thick. Pt reports consuming 1-2 meals a day depending on when he feels hungry. Pt ate breakfast this morning and consumed cream of wheat, 6 grapes, and yogurt. Pt reports that sugar has been making him feel nauseous. Pt reports that his nausea and vomitting as been progressing. Pt has not thrown up since before hospital admission.  Energy Intake: Fair 50-75 %  Food Allergies/Intolerances:  None  GI Symptoms: None  Oral Problems: None       Anthropometrics:  Height: 182.9 cm (6' 0.01\")   Weight: 95.3 kg (210 lb 1.6 oz)   BMI (Calculated): 28.49  IBW/kg (Dietitian Calculated): 80.9 kg  Percent of IBW: 118 %     Weight History:   Wt Readings from Last 20 Encounters:   11/29/23 95.3 kg (210 lb 1.6 oz)   11/28/23 99.3 kg (219 lb)   11/21/23 99.3 kg (219 lb)   11/16/23 113 kg (249 lb 1.9 oz)   10/05/23 115 kg (253 lb 4.9 oz)   10/02/23 113 kg (250 lb)   07/25/23 111 kg (244 lb)   07/17/23 109 kg (240 lb)   07/11/23 109 kg (240 lb)   06/19/23 105 kg (231 lb)   06/08/23 108 kg (237 lb)   05/15/23 94.3 kg (208 lb)   04/21/23 94.5 kg (208 " lb 6 oz)   04/14/23 96.2 kg (212 lb)   04/03/23 89.8 kg (198 lb)   03/28/23 89.9 kg (198 lb 2 oz)   03/21/23 90.4 kg (199 lb 5 oz)   03/06/23 78.5 kg (173 lb)   02/21/23 78.5 kg (173 lb)   01/17/23 70.4 kg (155 lb 4 oz)      Weight Change %:   Pt states that used to weigh 250lbs (114kg), but unsure of when that time period was. If accurate, that would indicate a 16% weight loss. Weight has been fluctuating over the past year. Unable to assess if weight loss is significant.        Nutrition Focused Physical Exam Findings:    Subcutaneous Fat Loss:   Orbital Fat Pads: Well nourshed (slightly bulging fat pads)  Buccal Fat Pads: Well nourished (full, rounded cheeks)  Triceps: Well nourished (ample fat tissue)  Muscle Wasting:  Temporalis: Mild-Moderate (slight depression)  Pectoralis (Clavicular Region): Mild-Moderate (some protrusion of clavicle)  Deltoid/Trapezius: Well nourished (rounded appearance at arm, shoulder, neck)  Interosseous: Well nourished (muscle bulges)  Edema:  Edema: none  Physical Findings:  Hair: Negative  Eyes: Negative  Mouth: Negative  Nails: Negative  Skin: Positive (incisions)    Nutrition Significant Labs:  Renal Lab Trend:   Results from last 7 days   Lab Units 11/29/23  0551 11/28/23  1341   POTASSIUM mmol/L 4.3 5.0   SODIUM mmol/L 137 135*   EGFR mL/min/1.73m*2 13* 11*   BUN mg/dL 77* 86*   CREATININE mg/dL 4.87* 5.73*      Nutrition Specific Medications:  Sennosides    I/O:   Last BM Date: 11/28/23;        Dietary Orders (From admission, onward)       Start     Ordered    11/29/23 0830  Oral nutritional supplements  Until discontinued        Question Answer Comment   Deliver with All meals    Select supplement: Ensure Clear        11/29/23 0830    11/28/23 1737  Adult diet Renal; Potassium Restricted 2 gm (50mEq); 2 - 3 grams Sodium; Phosphorus 1 gm  Diet effective now        Question Answer Comment   Diet type Renal    Potassium restriction: Potassium Restricted 2 gm (50mEq)    Sodium  restriction: 2 - 3 grams Sodium    Phosphorus: Phosphorus 1 gm        11/28/23 1738                     Estimated Needs:   Total Energy Estimated Needs (kCal): 9723-2818 kCal  Method for Estimating Needs: 1825 (REE MSJ) X 1.2-1.3  Total Protein Estimated Needs (g): 97 g  Method for Estimating Needs: 80.9 kg X 1.2 g/kg   Total Fluid Estimated Needs  Method for Estimating Needs: 1 mL/kcal or per team        Nutrition Diagnosis   Nutrition Diagnosis:  Malnutrition Diagnosis  Patient has Malnutrition Diagnosis: No    Nutrition Diagnosis  Patient has Nutrition Diagnosis: Yes  Diagnosis Status (1): New  Nutrition Diagnosis 1: Increased nutrient needs  Related to (1): increased metabolic demand  As Evidenced by (1): colovesical fistula s/p takedown now c/b failure to thrive post-op.       Nutrition Interventions/Recommendations   Nutrition Interventions and Recommendations:        Nutrition Prescription:  Liberalize diet to phosphorous 1 gm restriction.   Potassium and sodium values are now in the normal range.  Check phosphorous lab value.  Continue ensure clear on all meals.  Consider an addition of an appetite stimulant.     Nutrition Education:    Discussed with pt foods high in phosphorous. Discussed how receiving adequate nutrition daily is important in his healing process.       Nutrition Monitoring and Evaluation   Monitoring/Evaluation:   Food/Nutrient Related History Monitoring  Monitoring and Evaluation Plan: Energy intake  Energy Intake: Estimated energy intake  Criteria: Meet >75% of estimated needs    Body Composition/Growth/Weight History  Monitoring and Evaluation Plan: Weight  Criteria: Maintain weight      Time Spent/Follow-up Reminder:   Follow Up  Time Spent (min): 60 minutes  Last Date of Nutrition Visit: 11/29/23  Nutrition Follow-Up Needed?: Dietitian to reassess per policy

## 2023-11-29 NOTE — CONSULTS
Ld Wilkinson MD    Inpatient consult to Infectious Diseases  Consult performed by: Ld Wilkinson MD  Consult ordered by: Tomas Andersen MD          Referred by     Primary MD: No Assigned PCP Generic MD Vandana    Reason For Consult  complicated UTI      History Of Present Illness  Jose Thornton is a 58 y.o. male with history of Crohn's disease, CKD, C. difficile colitis , colonovesical fistula with intra-abdominal abscess .  He was hospitalized in September 2022 for intra-abdominal abscess as well as C. difficile colitis which was treated with levofloxacin/metronidazole and oral vancomycin.  and IR drainage .  He was readmitted in November with pelvic  abscess from rectovesical fistula .  He was treated with IV fluid and Zosyn (changed to Augmentin on discharge )and had bilateral nephrostomy tube placed for hydronephrosis .  He developed acute diverticulitis in December 2022 and had anterior proctosigmoidectomy with colostomy and colovesicular fistula takedown surgery.  his nephrostomy tube was exchanged in January 2023     He had bilateral ileal ureter and bladder augment with a right partial rectus muscle flap, cystoscopy, exploratory laparotomy, suprapubic tube insertion, omental flap, kerecis abdominal wall enforcement surgery done on 10/27/2023.  He developed nausea vomiting from  Pseudomonas UTI after the surgery and received 5 days of gentamicin ( 10/28 - 11/2) .  He developed acute on chronic kidney disease with a peak creatinine of 12 with oliguria and received 3 days of hemodialysis (last 111/16).  Kidney function improved to creatinine around 5.4.   His Schultz catheter was removed on the follow-up and suprapubic catheter was planned to be removed this week.    He came to see his urology nurse practitioner on 11/28 and found to have tachycardia ( 133), low blood pressure( 89/70) with a creatinine 6.53 and was sent to ED for possible dehydration vs sepsis.  He was afebrile with white blood cell count 14 k at  E.R.     He had pyuria.  Urine culture is pending.  His previous urine culture from 10/25th/2023 showed Pseudomonas aeruginosa resistant to aztreonam, ciprofloxacin, levofloxacin, Piperacillin-tazobactam.  It was intermediate activity to cefepime, and ceftazidime .  Was sensitive to meropenem and gentamicin.    He received a dose of vancomycin and Zosyn yesterday afternoon.  But based on his kidney function and previous history of DTR-Pseudomonas, antibiotic order  was changed to meropenem last night.      For his Crohns.  He was once offered remicade but he opted not to . And pursued alternative medicine such as gluten free diet , probiotic and healthy life style.      He has had andrade for 1 year which caused bladder cramp and has been taking otc pyridium.  He sess sediment in his urine which he was told is common from ileal ureter and bladder.        .past Medical History  He has a past medical history of Crohn's disease (CMS/[facility]), Diverticulosis, GERD (gastroesophageal reflux disease), GI (gastrointestinal bleed), and Urinary tract infection.    Surgical History  He has a past surgical history that includes IR nephrostomy tube exchange (1/31/2023); IR nephrostomy tube exchange (1/31/2023); CT guided percutaneous peritoneal or retroperitoneal fluid collection drainage (9/28/2022); IR nephrostomy tube exchange (3/23/2023); IR nephrostomy tube exchange (3/23/2023); IR nephrostomy tube exchange (3/30/2023); IR nephrostomy tube exchange (4/25/2023); IR nephrostomy tube exchange (6/30/2023); IR nephrostomy tube exchange (6/30/2023); IR nephrostomy tube exchange (8/25/2023); IR nephrostomy tube exchange (8/25/2023); IR nephrostomy tube exchange (10/18/2023); and IR CVC nontunneled (11/6/2023).     Social History  He reports that he has never smoked. He has never used smokeless tobacco. He reports that he does not currently use alcohol. He reports that he does not use drugs.      Family History  No family history on  "file.  Allergies  Patient has no known allergies.       There is no immunization history on file for this patient.  Review of Systems   Denies fever or chill or abdomen pain.  But has malaise since last admission    Physical Exam   not in acute distress, alert , well appearing  no jaundice or anemic  regular heart sound without murmur , normal heart rate.   clear bilaterally  soft , non tender , colostomy present , and bilateral nephrostomy tube and suprapubic catheter   No andrade or central line    Range of Vitals (last 24 hours)  Heart Rate:  []   Temp:  [36.1 °C (96.9 °F)-38.8 °C (101.8 °F)]   Resp:  [12-24]   BP: ()/(63-94)   Height:  [182.9 cm (6')]   Weight:  [95.3 kg (210 lb)-99.3 kg (219 lb)]   SpO2:  [89 %-100 %]     Temp Readings from Last 10 Encounters:   11/29/23 36.5 °C (97.7 °F) (Temporal)   11/28/23 36.1 °C (96.9 °F)   11/21/23 36 °C (96.8 °F) (Temporal)   11/17/23 37 °C (98.6 °F)   10/18/23 36.7 °C (98.1 °F)   10/05/23 37 °C (98.6 °F) (Oral)   10/02/23 36.5 °C (97.7 °F)   04/21/23 36.6 °C (97.8 °F)   04/14/23 36.1 °C (97 °F)   04/03/23 36.4 °C (97.5 °F)       Relevant Results  Results from last 72 hours   Lab Units 11/28/23  1341   WBC AUTO x10*3/uL 14.4*   HEMOGLOBIN g/dL 10.7*   HEMATOCRIT % 34.8*   PLATELETS AUTO x10*3/uL 540*   NEUTROS PCT AUTO % 82.7   LYMPHS PCT AUTO % 6.8   MONOS PCT AUTO % 9.0   EOS PCT AUTO % 0.3     Results from last 72 hours   Lab Units 11/28/23  1341   SODIUM mmol/L 135*   POTASSIUM mmol/L 5.0   CHLORIDE mmol/L 99   CO2 mmol/L 18*   BUN mg/dL 86*   CREATININE mg/dL 5.73*   GLUCOSE mg/dL 126*   CALCIUM mg/dL 9.3   ANION GAP mmol/L 23*   EGFR mL/min/1.73m*2 11*     Results from last 72 hours   Lab Units 11/28/23  1341   ALK PHOS U/L 162*   BILIRUBIN TOTAL mg/dL 0.5   PROTEIN TOTAL g/dL 7.6   ALT U/L 34   AST U/L 23   ALBUMIN g/dL 3.5     Estimated Creatinine Clearance: 17.2 mL/min (A) (by C-G formula based on SCr of 5.73 mg/dL (H)).  No results found for: \"CRP\", " "\"SEDRATE\"  No results found for: \"HIV1X2\", \"HIVCONF\", \"GUZBZA8IX\"  No results found for: \"HCVPCRQUANT\"    Temp Readings from Last 5 Encounters:   11/29/23 36.5 °C (97.7 °F) (Temporal)   11/28/23 36.1 °C (96.9 °F)   11/21/23 36 °C (96.8 °F) (Temporal)   11/17/23 37 °C (98.6 °F)   10/18/23 36.7 °C (98.1 °F)       Estimated Creatinine Clearance: 17.2 mL/min (A) (by C-G formula based on SCr of 5.73 mg/dL (H)).      Lab Results   Component Value Date    CREATININE 4.87 (H) 11/29/2023 11/28  CT abdomen   Diffuse urinary bladder wall thickening. Recommend clinical  correlation with urinalysis as findings could represent underlying  infectious process.  2. Mild retraction of right-sided percutaneous nephrostomy tube from  prior position in the right renal pelvis. Recommend clinical  correlation for obstruction.  3. Mild improvement in previously seen postsurgical changes as  evidenced by resolving pericystic and pelvic soft tissue stranding.  Additional resolution of previously seen fluid collection posterior  to the abdominal wall surgical site.  4. Stable position of bilateral ureteral stents terminating in the  augmented urinary bladder.  5. Resolution of previously seen fluid-filled bowel dilation.            Micro  3/30/23 urine culture   Escherichia coli Abnormal      Comment: >100,000 CFU/ML    Pseudomonas aeruginosa Abnormal     Comment: >100,000 CFU/ML   Resulting Agency [facility]        Susceptibility       Escherichia coli Pseudomonas aeruginosa     GRAM NEG URINE RISHABH SUSC GRAM NEG URINE RISHABH SUSC    $ Amoxicillin/Clavulanate Resistant     $$ Ampicillin Resistant     $ Aztreonam Susceptible Resistant    $ Cefazolin Resistant     $$ Cefepime  Susceptible    $$ Ceftazidime  Susceptible     Ceftriaxone Susceptible     $ Ciprofloxacin Resistant Resistant    $ Gentamicin Susceptible Susceptible    $$$ Levofloxacin Resistant Resistant    $$$ Meropenem Susceptible     $ Nitrofurantoin Susceptible     $$ " Piperacillin/Tazobactam Susceptible Susceptible    $ Tobramycin  Susceptible    $ Trimethoprim/Sulfamethoxazole Susceptible                 10/05/23 urine culture   Urine Culture >100,000 Escherichia coli Abnormal    Identification and susceptibility testing to follow        Resulting Agency: [facility]     Susceptibility       Escherichia coli     MICROSCAN    $ Amoxicillin/Clavulanate Susceptible    $$ Ampicillin Resistant    $$$ Ampicillin/Sulbactam Intermediate    $ Cefazolin Susceptible     Cefazolin (uncomplicated UTIs only) Susceptible    $ Ciprofloxacin Resistant    $ Gentamicin Susceptible    $ Nitrofurantoin Susceptible    $$ Piperacillin/Tazobactam Susceptible    $ Trimethoprim/Sulfamethoxazole Resistant                        10/25  urine culture   Urine Culture >100,000 Pseudomonas aeruginosa Abnormal            Resulting Agency: [facility]     Susceptibility     Pseudomonas aeruginosa     MICROSCAN    $ Aztreonam Resistant    $$ Cefepime Intermediate    $$ Ceftazidime Intermediate    $ Ciprofloxacin Resistant    $ Gentamicin Susceptible    $$$ Levofloxacin Resistant    $$$ Meropenem Susceptible    $$ Piperacillin/Tazobactam Resistant    $ Tobramycin Susceptible                       Blood culture urine culture MRSA screen from 11/28  in process            INFECTIOUS SYNOPSIS AND ASSESSMENT  58 years old male with a history of Crohn's disease, rectovesical fistula, diverticulitis with partial colectomy, C. difficile colitis, hydronephrosis with nephrostomy, ileal ureter and bladder augmentation presented with low blood pressure/tachycardia/worsening kidney function.    It is tricky to determine whether his pyuria was truly from UTI or ileal colonization but given his clinical picture, we shall treat him for possible urosepsis.   His previous urine culture showed Pseudomonas resistant to cephalosporin, and E. coli resistant to quinolone and ampicillin.   He is currently on meropenem.    SIRS . Possible  urosepsis  Hx of DTR pseudomonas  Hx of rectovesical fistula / ileal ureter and bladder with indwelling with nephrostomy and suprapubic catheter  CKD       RECOMMENDATION    CONTINUE meropenem renal dose  Follow up urine culture     ID will follow  This case was discussed with my attending, Dr. Willie Hinson, who agreed with my assessment and plan.     MARCELLUS GARRIDO.  M.D /  ID consult TEAM B  Infectious disease fellow PGY-4  Reach me through [employer] instead of paging if possible ( especially during noon conference )  Or page me through Team B  [phone]

## 2023-11-29 NOTE — PROGRESS NOTES
Occupational Therapy    Occupational Therapy    Evaluation    Patient Name: Jose Thornton  MRN: [id number]  Today's Date: 11/29/2023  Time Calculation  Start Time: 1058  Stop Time: 1116  Time Calculation (min): 18 min    Assessment  IP OT Assessment  OT Assessment: difficulty I/ADLs, safety, fxnl mob  Prognosis: Good  Barriers to Discharge: None  Evaluation/Treatment Tolerance: Patient tolerated treatment well  Medical Staff Made Aware: Yes  End of Session Communication: Bedside nurse  End of Session Patient Position: Up in chair, Alarm off, not on at start of session  Plan:  Treatment Interventions: ADL retraining, Functional transfer training, UE strengthening/ROM, Endurance training, Equipment evaluation/education, Compensatory technique education  OT Frequency: 2 times per week  OT Discharge Recommendations: Low intensity level of continued care  Equipment Recommended upon Discharge:  (hip kit, shower chair)  OT Recommended Transfer Status: Minimal assist, Assist of 1  OT - OK to Discharge: Yes    Subjective   Current Problem:  1. Urinary tract infection without hematuria, site unspecified          General:  Reason for Referral: Pt. was sent to ED 2/2 c/f possible dehydration vs sepsis. On 11/28, pt. presented to OP follow up appointment, was found to be tachycardic to 133, hypotensive to 89/70, with a creatinine 6.53  Past Medical History Relevant to Rehab: Crohn's disease and colovesical fistula who underwent Exploratory laparotomy, MYA, Cystoscopy, Bilateral ileal ureter and bladder augment with right partial rectus muscle flap, and Suprapubic tube insertion on 10/27/23 with Dr. Andersen and Dr. Mckinney, who presented to ED  today. Urology is consulted for evaluation and management recommendations for dehydration, failure to thrive, nausea, vomiting.  Co-Treatment: PT  Co-Treatment Reason:  (maximize pt safety)  Prior to Session Communication: PCT/NA/CTA  Patient Position Received: Bed, 3 rail up, Alarm off, not on  at start of session  Family/Caregiver Present: No   Precautions:  Medical Precautions: Abdominal precautions, Fall precautions  Post-Surgical Precautions: Abdominal surgery precautions  Vital Signs:  Heart Rate: (!) 122  SpO2: 97 %  Pain:  Pain Assessment  Pain Assessment: 0-10  Pain Score: 6  Pain Location: Generalized  Lines/Tubes/Drains:  Nephrostomy Left 10 Fr. (Active)   Number of days: 42       Nephrostomy Right 10 Fr. (Active)   Number of days: 42       Colostomy LUQ (Active)   Number of days:        Suprapubic Catheter Non-latex 20 Fr. (Active)   Number of days: 32         Objective   Cognition:  Overall Cognitive Status: Within Functional Limits  Orientation Level: Oriented X4           Home Living:  Type of Home: House  Lives With: Spouse  Home Adaptive Equipment: Walker rolling or standard  Home Layout: One level  Home Access: Stairs to enter without rails  Entrance Stairs-Rails: None  Entrance Stairs-Number of Steps: 5  Bathroom Shower/Tub: Tub/shower unit   Prior Function:  Level of [place]:  (A with LBD and spong bathing from wife, I other ADLs)  Vocational: On disability  Hand Dominance: Right  IADL History:  IADL Comments: wife A IADLs and driving, +cats  ADL:  Eating Assistance: Independent  Grooming Assistance:  (CGA anticipated standing)  Bathing Assistance: Minimal (anticipated)  UE Dressing Assistance: Minimal  LE Dressing Assistance: Minimal  Toileting Assistance with Device:  (CGA anticipated WhW)  Activity Tolerance:  Endurance:  (good)  Balance:  Dynamic Sitting Balance  Dynamic Sitting-Balance:  (I)  Dynamic Standing Balance  Dynamic Standing-Balance:  (CGA whw)  Static Sitting Balance  Static Sitting-Level of Assistance: Independent  Static Standing Balance  Static Standing-Level of Assistance:  (CGA)  Bed Mobility/Transfers: Bed Mobility  Bed Mobility: Yes  Bed Mobility 1  Bed Mobility 1:  (sup/sit min A log roll)   and Transfer 1  Transfer Level of Assistance 1:  (sit/stand min A  whW)       IADL's:   IADL Comments: wife A IADLs and driving, +cats  Vision: Vision - Basic Assessment  Current Vision: Wears glasses only for reading   and    Sensation:  Light Touch: No apparent deficits  Strength:  Strength Comments: JIHAN 4/5       Coordination:  Movements are Fluid and Coordinated: Yes   Hand Function:  Hand Function  Gross Grasp: Functional  Extremities: RUE   RUE : Within Functional Limits, LUE   LUE: Within Functional Limits,      Standing Dynamic Balance CGA WHW  Outcome Measures: [facility] Daily Activity  Putting on and taking off regular lower body clothing: A little  Bathing (including washing, rinsing, drying): A little  Putting on and taking off regular upper body clothing: A little  Toileting, which includes using toilet, bedpan or urinal: A little  Taking care of personal grooming such as brushing teeth: A little  Eating Meals: None  Daily Activity - Total Score: 19         ,     OT Adult Other Outcome Measures  4AT: -    Education Documentation  Body Mechanics, taught by Hilda Cox OT at 11/29/2023  1:00 PM.  Learner: Patient  Readiness: Acceptance  Method: Explanation  Response: Verbalizes Understanding    Precautions, taught by Hilda Cox OT at 11/29/2023  1:00 PM.  Learner: Patient  Readiness: Acceptance  Method: Explanation  Response: Verbalizes Understanding    ADL Training, taught by Hilda Cox OT at 11/29/2023  1:00 PM.  Learner: Patient  Readiness: Acceptance  Method: Explanation  Response: Verbalizes Understanding    Education Comments  No comments found.        Goals:   Encounter Problems       Encounter Problems (Active)       ADLs       Patient will perform UB and LB bathing  with modified independent level of assistance and AE. (Progressing)       Start:  11/29/23    Expected End:  12/20/23            Patient with complete upper body dressing with independent level of assistance donning and doffing all UE clothes with no adaptive equipment  (Progressing)        Start:  11/29/23    Expected End:  12/20/23            Patient with complete lower body dressing with modified independent level of assistance donning and doffing all LE clothes  with adaptive equipment  (Progressing)       Start:  11/29/23    Expected End:  12/20/23            Patient will complete daily grooming tasks  with modified independent level of assistance and PRN adaptive equipment while LRD. (Progressing)       Start:  11/29/23    Expected End:  12/20/23            Patient will complete toileting including hygiene clothing management/hygiene with modified independent level of assistance and lrd. (Progressing)       Start:  11/29/23    Expected End:  12/20/23                 MOBILITY       Patient will perform Functional mobility mod  Household distances/Community Distances with modified independent level of assistance and least restrictive device in order to improve safety and functional mobility. (Progressing)       Start:  11/29/23    Expected End:  12/20/23                 TRANSFERS       Patient will perform bed mobility independent level of assistance and bed rails in order to improve safety and independence with mobility (Progressing)       Start:  11/29/23    Expected End:  12/20/23            Patient will complete functional transfers with least restrictive device with modified independent level of assistance. (Progressing)       Start:  11/29/23    Expected End:  12/20/23 11/29/23 at 1:01 PM   Hilda Cox OT

## 2023-11-30 PROBLEM — N39.0 URINARY TRACT INFECTION WITHOUT HEMATURIA, SITE UNSPECIFIED: Status: ACTIVE | Noted: 2023-11-30

## 2023-11-30 LAB
ADENOVIRUS RVP, VIRC: NOT DETECTED
ALBUMIN SERPL BCP-MCNC: 2.6 G/DL (ref 3.4–5)
ANION GAP SERPL CALC-SCNC: 16 MMOL/L (ref 10–20)
BUN SERPL-MCNC: 62 MG/DL (ref 6–23)
CALCIUM SERPL-MCNC: 8.5 MG/DL (ref 8.6–10.6)
CHLORIDE SERPL-SCNC: 108 MMOL/L (ref 98–107)
CO2 SERPL-SCNC: 20 MMOL/L (ref 21–32)
CREAT SERPL-MCNC: 3.43 MG/DL (ref 0.5–1.3)
ENTEROVIRUS/RHINOVIRUS RVP, VIRC: NOT DETECTED
ERYTHROCYTE [DISTWIDTH] IN BLOOD BY AUTOMATED COUNT: 15.7 % (ref 11.5–14.5)
GFR SERPL CREATININE-BSD FRML MDRD: 20 ML/MIN/1.73M*2
GLUCOSE SERPL-MCNC: 140 MG/DL (ref 74–99)
HCT VFR BLD AUTO: 27.4 % (ref 41–52)
HGB BLD-MCNC: 8.3 G/DL (ref 13.5–17.5)
HUMAN BOCAVIRUS RVP, VIRC: NOT DETECTED
HUMAN CORONAVIRUS RVP, VIRC: NOT DETECTED
INFLUENZA A , VIRC: NOT DETECTED
INFLUENZA A H1N1-09 , VIRC: NOT DETECTED
INFLUENZA B PCR, VIRC: NOT DETECTED
MCH RBC QN AUTO: 27.2 PG (ref 26–34)
MCHC RBC AUTO-ENTMCNC: 30.3 G/DL (ref 32–36)
MCV RBC AUTO: 90 FL (ref 80–100)
METAPNEUMOVIRUS , VIRC: NOT DETECTED
NRBC BLD-RTO: 0 /100 WBCS (ref 0–0)
PARAINFLUENZA PCR, VIRC: NOT DETECTED
PHOSPHATE SERPL-MCNC: 3.6 MG/DL (ref 2.5–4.9)
PLATELET # BLD AUTO: 393 X10*3/UL (ref 150–450)
POTASSIUM SERPL-SCNC: 3.7 MMOL/L (ref 3.5–5.3)
RBC # BLD AUTO: 3.05 X10*6/UL (ref 4.5–5.9)
RSV PCR, RVP, VIRC: NOT DETECTED
SODIUM SERPL-SCNC: 140 MMOL/L (ref 136–145)
STAPHYLOCOCCUS SPEC CULT: NORMAL
WBC # BLD AUTO: 6.9 X10*3/UL (ref 4.4–11.3)

## 2023-11-30 PROCEDURE — 2500000004 HC RX 250 GENERAL PHARMACY W/ HCPCS (ALT 636 FOR OP/ED): Performed by: STUDENT IN AN ORGANIZED HEALTH CARE EDUCATION/TRAINING PROGRAM

## 2023-11-30 PROCEDURE — 80069 RENAL FUNCTION PANEL: CPT

## 2023-11-30 PROCEDURE — 2500000001 HC RX 250 WO HCPCS SELF ADMINISTERED DRUGS (ALT 637 FOR MEDICARE OP)

## 2023-11-30 PROCEDURE — 1170000001 HC PRIVATE ONCOLOGY ROOM DAILY

## 2023-11-30 PROCEDURE — 96372 THER/PROPH/DIAG INJ SC/IM: CPT

## 2023-11-30 PROCEDURE — 97530 THERAPEUTIC ACTIVITIES: CPT | Mod: GP

## 2023-11-30 PROCEDURE — 2500000001 HC RX 250 WO HCPCS SELF ADMINISTERED DRUGS (ALT 637 FOR MEDICARE OP): Performed by: STUDENT IN AN ORGANIZED HEALTH CARE EDUCATION/TRAINING PROGRAM

## 2023-11-30 PROCEDURE — 85027 COMPLETE CBC AUTOMATED: CPT

## 2023-11-30 PROCEDURE — 97116 GAIT TRAINING THERAPY: CPT | Mod: GP

## 2023-11-30 PROCEDURE — 2500000004 HC RX 250 GENERAL PHARMACY W/ HCPCS (ALT 636 FOR OP/ED)

## 2023-11-30 PROCEDURE — 36415 COLL VENOUS BLD VENIPUNCTURE: CPT

## 2023-11-30 PROCEDURE — 2500000005 HC RX 250 GENERAL PHARMACY W/O HCPCS: Performed by: STUDENT IN AN ORGANIZED HEALTH CARE EDUCATION/TRAINING PROGRAM

## 2023-11-30 RX ORDER — OXYCODONE HYDROCHLORIDE 5 MG/1
5 TABLET ORAL ONCE AS NEEDED
Status: COMPLETED | OUTPATIENT
Start: 2023-11-30 | End: 2023-11-30

## 2023-11-30 RX ORDER — ACETAMINOPHEN 500 MG
5 TABLET ORAL NIGHTLY PRN
Status: DISCONTINUED | OUTPATIENT
Start: 2023-11-30 | End: 2023-12-13 | Stop reason: HOSPADM

## 2023-11-30 RX ADMIN — TAMSULOSIN HYDROCHLORIDE 0.4 MG: 0.4 CAPSULE ORAL at 08:26

## 2023-11-30 RX ADMIN — Medication 5 MG: at 01:38

## 2023-11-30 RX ADMIN — HEPARIN SODIUM 5000 UNITS: 5000 INJECTION INTRAVENOUS; SUBCUTANEOUS at 00:58

## 2023-11-30 RX ADMIN — CYCLOBENZAPRINE 5 MG: 10 TABLET, FILM COATED ORAL at 08:26

## 2023-11-30 RX ADMIN — HEPARIN SODIUM 5000 UNITS: 5000 INJECTION INTRAVENOUS; SUBCUTANEOUS at 18:24

## 2023-11-30 RX ADMIN — ACETAMINOPHEN 975 MG: 325 TABLET ORAL at 08:25

## 2023-11-30 RX ADMIN — HEPARIN SODIUM 5000 UNITS: 5000 INJECTION INTRAVENOUS; SUBCUTANEOUS at 08:26

## 2023-11-30 RX ADMIN — MEROPENEM 500 MG: 500 INJECTION, POWDER, FOR SOLUTION INTRAVENOUS at 11:56

## 2023-11-30 RX ADMIN — Medication 5 MG: at 21:15

## 2023-11-30 RX ADMIN — OXYCODONE HYDROCHLORIDE 5 MG: 5 TABLET ORAL at 18:23

## 2023-11-30 RX ADMIN — CYCLOBENZAPRINE 5 MG: 10 TABLET, FILM COATED ORAL at 15:04

## 2023-11-30 RX ADMIN — SODIUM CHLORIDE, POTASSIUM CHLORIDE, SODIUM LACTATE AND CALCIUM CHLORIDE 100 ML/HR: 600; 310; 30; 20 INJECTION, SOLUTION INTRAVENOUS at 01:38

## 2023-11-30 RX ADMIN — MEROPENEM 500 MG: 500 INJECTION, POWDER, FOR SOLUTION INTRAVENOUS at 00:58

## 2023-11-30 RX ADMIN — ACETAMINOPHEN 975 MG: 325 TABLET ORAL at 15:03

## 2023-11-30 RX ADMIN — ONDANSETRON 4 MG: 4 TABLET, ORALLY DISINTEGRATING ORAL at 18:27

## 2023-11-30 ASSESSMENT — PAIN SCALES - GENERAL
PAINLEVEL_OUTOF10: 6
PAINLEVEL_OUTOF10: 3
PAINLEVEL_OUTOF10: 6
PAINLEVEL_OUTOF10: 7
PAINLEVEL_OUTOF10: 4

## 2023-11-30 ASSESSMENT — PAIN - FUNCTIONAL ASSESSMENT
PAIN_FUNCTIONAL_ASSESSMENT: 0-10

## 2023-11-30 ASSESSMENT — PAIN DESCRIPTION - DESCRIPTORS: DESCRIPTORS: ACHING;DISCOMFORT

## 2023-11-30 ASSESSMENT — COGNITIVE AND FUNCTIONAL STATUS - GENERAL
MOBILITY SCORE: 17
DAILY ACTIVITIY SCORE: 19
TOILETING: A LITTLE
PERSONAL GROOMING: A LITTLE
DRESSING REGULAR UPPER BODY CLOTHING: A LITTLE
MOVING TO AND FROM BED TO CHAIR: A LITTLE
WALKING IN HOSPITAL ROOM: A LITTLE
MOBILITY SCORE: 17
TURNING FROM BACK TO SIDE WHILE IN FLAT BAD: A LITTLE
STANDING UP FROM CHAIR USING ARMS: A LITTLE
HELP NEEDED FOR BATHING: A LITTLE
CLIMB 3 TO 5 STEPS WITH RAILING: A LOT
WALKING IN HOSPITAL ROOM: A LITTLE
DRESSING REGULAR LOWER BODY CLOTHING: A LITTLE
CLIMB 3 TO 5 STEPS WITH RAILING: A LOT
MOVING FROM LYING ON BACK TO SITTING ON SIDE OF FLAT BED WITH BEDRAILS: A LITTLE
MOVING TO AND FROM BED TO CHAIR: A LITTLE
STANDING UP FROM CHAIR USING ARMS: A LITTLE
TURNING FROM BACK TO SIDE WHILE IN FLAT BAD: A LITTLE
MOVING FROM LYING ON BACK TO SITTING ON SIDE OF FLAT BED WITH BEDRAILS: A LITTLE

## 2023-11-30 NOTE — CARE PLAN
The patient's goals for the shift include To reduce/eliminate nausea/vomiting; improve PO fluid intake    The clinical goals for the shift include Continue maintenance rehydration fluidsn, pt c/o nausea/vomiting reduced      Problem: Pain  Goal: Takes deep breaths with improved pain control throughout the shift  Outcome: Progressing  Goal: Turns in bed with improved pain control throughout the shift  Outcome: Progressing  Goal: Walks with improved pain control throughout the shift  Outcome: Progressing  Goal: Performs ADL's with improved pain control throughout shift  Outcome: Progressing  Goal: Participates in PT with improved pain control throughout the shift  Outcome: Progressing  Goal: Free from opioid side effects throughout the shift  Outcome: Progressing  Goal: Free from acute confusion related to pain meds throughout the shift  Outcome: Progressing     Problem: Skin  Goal: Decreased wound size/increased tissue granulation at next dressing change  Outcome: Progressing  Goal: Participates in plan/prevention/treatment measures  Outcome: Progressing  Goal: Prevent/manage excess moisture  Outcome: Progressing  Goal: Prevent/minimize sheer/friction injuries  Outcome: Progressing  Goal: Promote/optimize nutrition  Outcome: Progressing  Goal: Promote skin healing  Outcome: Progressing

## 2023-11-30 NOTE — CARE PLAN
The patient's goals for the shift include  reduction in episodes of nausea/vomiting; replenishing fluids via IV maintenance fluids; encourage fluid PO intake    The clinical goals for the shift include  reduction in episodes of nausea/vomiting; replenishing fluids via IV maintenance fluids; encourage fluid PO intake      Problem: Pain  Goal: Takes deep breaths with improved pain control throughout the shift  Outcome: Progressing  Goal: Turns in bed with improved pain control throughout the shift  Outcome: Progressing  Goal: Walks with improved pain control throughout the shift  Outcome: Progressing  Goal: Performs ADL's with improved pain control throughout shift  Outcome: Progressing  Goal: Participates in PT with improved pain control throughout the shift  Outcome: Progressing  Goal: Free from opioid side effects throughout the shift  Outcome: Progressing  Goal: Free from acute confusion related to pain meds throughout the shift  Outcome: Progressing     Problem: Skin  Goal: Decreased wound size/increased tissue granulation at next dressing change  Outcome: Progressing  Goal: Participates in plan/prevention/treatment measures  Outcome: Progressing  Goal: Prevent/manage excess moisture  Outcome: Progressing  Goal: Prevent/minimize sheer/friction injuries  Outcome: Progressing  Goal: Promote/optimize nutrition  Outcome: Progressing  Goal: Promote skin healing  Outcome: Progressing

## 2023-11-30 NOTE — PROGRESS NOTES
"HPI  Jose Thornton is a 58 y.o. male with history of Crohn's disease and CKD who was hospitalized in 11/2022 with pelvic abscess 2/2 perianal and colovesical fistula and was also found to have bilateral hydronephrosis s/p b/l nephrostomy tube placement (last exchanged dec), readmitted 12/2022 for acute diverticulitis s/p anterior proctosigmoidectomy with colostomy and colovesicular fistula takedown 12/20. 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 On discharge urethral andrade was removed and SPT and PCNTs were capped with plan for timed voids and PVRs via SPT release. After discharge, patient reported resumed nausea and vomiting after zofran prescription ran out, with increasing malaise, weakness, lightheadedness, intermittent fevers, and decreased PO intake with corresponding reduction in UOP. Denied pain. At follow-up outpatient urology appointment 11/28 he was found to be tachycardic to 133, hypotensive to 89/70, with a creatinine 6.53 and was sent to ED for possible dehydration vs sepsis. While in the ED, he was febrile to 101 with continued tachycardia, and with labs significant for creatinine of 5.73 and leukocytosis to 14.4 with UA +nitrites/+LE. He was started on vanc/zosyn changed to meorpenem per ID based on his kidney function and previous history of DTR-Pseudomonas. He was admitted to urology and is now improving on RNF.    Subjective   Reports feeling  well during this am rounding. Reports no nausea, weakness and fatigue. Denies pain, current fever/chills/SOB.     Objective    Vital Signs:  Heart Rate:  []   Temp:  [36 °C (96.8 °F)-36.5 °C (97.7 °F)]   Resp:  [16-18]   BP: ()/(59-73)   Height:  [182.9 cm (6' 0.01\")]   Weight:  " [95.3 kg (210 lb 1.6 oz)]   SpO2:  [96 %-98 %]     Intake/Output Summary (Last 24 hours) at 11/30/2023 0747  Last data filed at 11/30/2023 0541  Gross per 24 hour   Intake 3590 ml   Output 2210 ml   Net 1380 ml          Results from last 72 hours   Lab Units 11/29/23  0551 11/28/23  1341   CREATININE mg/dL 4.87* 5.73*   HEMOGLOBIN g/dL 8.5* 10.7*   WBC AUTO x10*3/uL 12.4* 14.4*   GLUCOSE mg/dL 91 126*   POTASSIUM mmol/L 4.3 5.0        Physical Exam:   Constitutional: Alert, in NAD  HEENT: Normocephalic, atraumatic  Neuro: A&O x3  CV: Mild sinus tachycardia  Pulm: Non-laboured breathing on NC  GI: Abdomen soft, non-distended, non-tender  : SPT in place draining clear yellow urine. Nephrostomy tubes uncapped.  Skin: Incisions well healed. No lesions or discoloration noted.  Musculoskeletal: ADELE  Extremities: no edema or cyanosis noted    Current Medications:  heparin (porcine), 5,000 Units, subcutaneous, q8h  meropenem, 500 mg, intravenous, q12h  sennosides, 2 tablet, oral, BID  tamsulosin, 0.4 mg, oral, Daily      PRN medications: acetaminophen, cyclobenzaprine, melatonin, ondansetron ODT   Dietary Orders (From admission, onward)       Start     Ordered    11/29/23 0830  Oral nutritional supplements  Until discontinued        Question Answer Comment   Deliver with All meals    Select supplement: Ensure Clear        11/29/23 0830    11/28/23 1737  Adult diet Renal; Potassium Restricted 2 gm (50mEq); 2 - 3 grams Sodium; Phosphorus 1 gm  Diet effective now        Question Answer Comment   Diet type Renal    Potassium restriction: Potassium Restricted 2 gm (50mEq)    Sodium restriction: 2 - 3 grams Sodium    Phosphorus: Phosphorus 1 gm        11/28/23 1738                    Current Outpatient Medications   Medication Instructions    acetaminophen (Tylenol) 500 mg tablet 2 tablets, oral, Every 6 hours PRN    cyclobenzaprine (FLEXERIL) 5 mg, oral, 3 times daily PRN    diazePAM (VALIUM) 2 mg, oral, As needed, TAKE 1  TABLET BY MOUTH AN HOUR PRIOR TO CATHETER CHANGE.    inFLIXimab (Remicade) 100 mg injection INFUSE 500MG PER PROTOCOL ON WEEKS 0,2, 6 AND THEN EVERY 8 WEEKS AS MAINTENANCE.    ondansetron ODT (ZOFRAN-ODT) 4 mg, oral, Every 8 hours PRN    phenazopyridine (URINARY PAIN RELIEF) 95 mg, oral, 3 times daily PRN    senna 17.2 mg, oral, 2 times daily PRN    tamsulosin (FLOMAX) 0.4 mg, oral, Daily      Imagin/28 - Diffuse urinary bladder wall thickening c/f underlying  infectious process. Mild retraction of right-sided percutaneous nephrostomy tube from  prior position in the right renal pelvis. Recommend clinical correlation for obstruction. Mild improvement in previously seen postsurgical changes as evidenced by resolving pericystic and pelvic soft tissue stranding. Additional resolution of previously seen fluid collection posterior to the abdominal wall surgical site. Stable position of bilateral ureteral stents terminating in the augmented urinary bladder.    Assessment: UTI, KADE on CKD likely multifactorial 2/2 dehydration, hypotension, infection in the setting of possibly misplaced right nephrostomy tube    Updates:  : Afebrile, mild tachycardia, other VSS. sCr 4.87<5.73. WBC 12.4<14.4 on alejandro. UOP 1.1L. b/l PCNT (right flushed with difficulty, left flushed easily) and SPT uncapped with return of 400mL clear yellow urine.     : Reports feeling  well during this am rounding. Reports no nausea, weakness and fatigue. Denies pain, current fever/chills/SOB. His blood culture is positive for gram positive cocci, clusters.    Plan:   - Follow up CBC and RFP  - Maintain SPT and b/l PCNTs uncapped. Plan to remove stents tomorrow and change SPT tube  - Strict I/Os, monitor UOP of right nephrostomy tube  - Follow-up ID recommendations and continue meropenum.      Neuro:  - Tylenol 975 mg q6h PRN mile pain/fever, continue home flexeril 5mg PRN  - Zofran 4 mg q8h PRN for nausea    CV:   - VS q8hr    Heme:   #Chronic  normocytic anemia of CKD   - No indication for transfusion at this time  - Daily CBC    Resp:  - Aggressive pulmonary toilet and incentive spirometry 10x/hour  - Wean supplemental O2 as tolerated, maintain SpO2>92%    GI/Diet:   #hx of Chron's   - Continue regular renal diet  - Boost clear  - Bowel regimen - continue home senna    /Renal:   #KADE on CKD, downtrending 4.87<5.73<6.53 (prior to admission)  - Daily BMP  - IVF at 100 cc/hr until oral intake improves  - Continue home flomax    Endocrine:   - No current needs    MSK:  - OOB most of the day  - Ambulate at least 3x per day, more if tolerated  - PT on board    ID:   #afebrile, UA +nitrites/+LE, WBC downtrending 12.4<14.4  - Follow-up urine/blood cultures      PPx: SCDs and SQH 5000 U q8H for DVT prophylaxis    Dispo: RNF    Seen and discussed with chief resident Dr. Mckeon.       Rosa Arteaga MD  Urology Resident [facility]  Urology [id number]

## 2023-11-30 NOTE — PROGRESS NOTES
Physical Therapy    Physical Therapy Treatment    Patient Name: Jose Thornton  MRN: [id number]  Today's Date: 11/30/2023  Time Calculation  Start Time: 1130  Stop Time: 1155  Time Calculation (min): 25 min       Assessment/Plan   PT Assessment  Evaluation/Treatment Tolerance: Patient tolerated treatment well  End of Session Communication: Bedside nurse  End of Session Patient Position: Bed, 3 rail up, Alarm off, caregiver present (RN present)     PT Plan  Treatment/Interventions: Bed mobility, Transfer training, Gait training, Stair training, Balance training, Strengthening, Endurance training, Therapeutic exercise, Therapeutic activity, Home exercise program  PT Plan: Skilled PT  PT Frequency: 3 times per week  PT Discharge Recommendations: Low intensity level of continued care  PT Recommended Transfer Status: Assist x1, Assistive device  PT - OK to Discharge: Yes (Eval complete, refer to dispo)      General Visit Information:   PT  Visit  PT Received On: 11/30/23  Response to Previous Treatment: Patient with no complaints from previous session.  General  Reason for Referral: Pt. was sent to ED 2/2 c/f possible dehydration vs sepsis. On 11/28, pt. presented to OP follow up appointment, was found to be tachycardic to 133, hypotensive to 89/70, with a creatinine 6.53  Past Medical History Relevant to Rehab: Crohn's disease and colovesical fistula who underwent Exploratory laparotomy, MYA, Cystoscopy, Bilateral ileal ureter and bladder augment with right partial rectus muscle flap, and Suprapubic tube insertion on 10/27/23 with Dr. Andersen and Dr. Mckinney, who presented to ED  today. Urology is consulted for evaluation and management recommendations for dehydration, failure to thrive, nausea, vomiting.  Family/Caregiver Present: No  Prior to Session Communication: Bedside nurse  Patient Position Received: Bed, 3 rail up, Alarm off, not on at start of session  Preferred Learning Style: verbal, visual  General Comment: Pt.  agreeable to participate.    Subjective   Precautions:  Precautions  Medical Precautions: Abdominal precautions, Fall precautions, Oxygen therapy device and L/min (2L O2 via NC)  Post-Surgical Precautions: Abdominal surgery precautions    Vital Signs:  Vital Signs  Heart Rate: 106 (EOB: 103 bpm, ambulation: 122 bpm, post: 112 bpm)  Heart Rate Source: Monitor  SpO2: 95 % (ambulation: 96%, post: 97%)    Objective   Pain:  Pain Assessment  Pain Assessment: 0-10  Pain Score: 4  Pain Type: Acute pain  Pain Location: Back  Pain Orientation: Lower  Pain Descriptors: Aching, Discomfort    Cognition:  Cognition  Overall Cognitive Status: Within Functional Limits  Orientation Level: Oriented X4    Extremity/Trunk Assessments:  RLE   RLE : Within Functional Limits  LLE   LLE : Within Functional Limits    Activity Tolerance:  Activity Tolerance  Endurance:  (Pt. tolerated ambulating hallway distances, tachycardic with mobility)    Treatments:  Therapeutic Exercise  Therapeutic Exercise Performed: Yes  Therapeutic Exercise Activity 1: Seated EOB: AP x10 [place], LAQ x5 [place]    Therapeutic Activity  Therapeutic Activity Performed: Yes  Therapeutic Activity 1: Pt. tolerated sitting EOB with SBA ~5 min to assess upright tolerance prior to mobilizing EOB  Therapeutic Activity 2: Following ambulation, pt. seated in bedside chair ~5 min for rest break prior to completing additional sit<>stand for return to bed    Bed Mobility  Bed Mobility: Yes  Bed Mobility 1  Bed Mobility 1: Supine to sitting  Level of Assistance 1: Minimum assistance, Minimal verbal cues  Bed Mobility Comments 1: Log roll technique, HOB elevated, heavy use of bed rails    Ambulation/Gait Training  Ambulation/Gait Training Performed: Yes  Ambulation/Gait Training 1  Surface 1: Level tile  Device 1: Rolling walker  Assistance 1: Contact guard, Minimal verbal cues  Quality of Gait 1:  (decreased cadenced)  Comments/Distance (ft) 1: 400 ft (Pt. declined seated rest break  throughout ambulation despite tachycardia and reporting fatigue; education provided)    Ambulation/Gait Training 2  Surface 2: Level tile  Device 2: Rolling walker  Assistance 2: Contact guard, Minimal verbal cues  Quality of Gait 2: Decreased step length  Comments/Distance (ft) 2: 3 ft (from bedside chair>bed)    Transfers  Transfer: Yes  Transfer 1  Transfer From 1: Sit to  Transfer to 1: Stand  Technique 1: Sit to stand  Transfer Device 1: Walker  Transfer Level of Assistance 1: Minimum assistance, Minimal verbal cues  Trials/Comments 1: Completed 2x, once from EOB and once from bedside chair. Cues for proper UE placement and technique    Transfers 2  Transfer From 2: Stand to  Transfer to 2: Sit  Technique 2: Stand to sit  Transfer Device 2: Walker  Transfer Level of Assistance 2: Minimum assistance, Minimal verbal cues  Trials/Comments 2: Cues to reach posteriorly with unilateral UE to maximize safety with descent    Stairs  Stairs: No    Outcome Measures:  [facility] Basic Mobility  Turning from your back to your side while in a flat bed without using bedrails: A little  Moving from lying on your back to sitting on the side of a flat bed without using bedrails: A little  Moving to and from bed to chair (including a wheelchair): A little  Standing up from a chair using your arms (e.g. wheelchair or bedside chair): A little  To walk in hospital room: A little  Climbing 3-5 steps with railing: A lot  Basic Mobility - Total Score: 17    Education Documentation  Precautions, taught by Areli Brasher PT at 11/30/2023 12:48 PM.  Learner: Patient  Readiness: Acceptance  Method: Explanation, Demonstration  Response: Verbalizes Understanding    Body Mechanics, taught by Areli Brasher PT at 11/30/2023 12:48 PM.  Learner: Patient  Readiness: Acceptance  Method: Explanation, Demonstration  Response: Verbalizes Understanding    Home Exercise Program, taught by Areli Brasher PT at 11/30/2023 12:48 PM.  Learner:  Patient  Readiness: Acceptance  Method: Explanation, Demonstration  Response: Verbalizes Understanding    Mobility Training, taught by Areli Brasher PT at 11/30/2023 12:48 PM.  Learner: Patient  Readiness: Acceptance  Method: Explanation, Demonstration  Response: Verbalizes Understanding    Education Comments  No comments found.        OP EDUCATION:       Encounter Problems       Encounter Problems (Active)       Balance       Patient to demonstrate Alexis static and dynamic standing balance without LOB with change of directions, no hesitancy, appropriate TRISTA and sequencing to complete functional task.  (Progressing)       Start:  11/29/23    Expected End:  12/13/23               Mobility       STG - Patient will ambulate >/= 400 ft Alexis with LRAD (Progressing)       Start:  11/29/23    Expected End:  12/13/23            Patient to ascend/descend >/= 5 stairs with SBA to demo ability to safely traverse PATIENCE  (Progressing)       Start:  11/29/23    Expected End:  12/13/23            Patient will tolerate >/=30 minutes continuous activity with stable vital signs, RPE </=13/20, RPD </=3/10  (Progressing)       Start:  11/29/23    Expected End:  12/13/23               Safety       LTG - Patient will adhere to hip precautions during ADL's and transfers       Start:  11/29/23            LTG - Patient will demonstrate safety requirements appropriate to situation/environment       Start:  11/29/23            LTG - Patient will utilize safety techniques       Start:  11/29/23            STG - Patient locks brakes on wheelchair       Start:  11/29/23            STG - Patient uses call light consistently to request assistance with transfers       Start:  11/29/23            STG - Patient uses gait belt during all transfers       Start:  11/29/23            Goal 1       Start:  11/29/23            Goal 2       Start:  11/29/23            Goal 3       Start:  11/29/23               Transfers       STG - Patient will perform bed  mobility Alexis with HOB flat and no rails (Progressing)       Start:  11/29/23    Expected End:  12/13/23            STG - Patient will transfer sit to and from stand Alexis with LRAD (Progressing)       Start:  11/29/23    Expected End:  12/13/23

## 2023-11-30 NOTE — PROGRESS NOTES
Jose Thornton is a 58 y.o. male on day 0 of admission presenting with Dehydration.    TCC met with patient at bedside to discuss home care PT. Patient states he wants to wait until closer to discharge to decided if he needs home care for PT. TCC will continue to follow.     JACQUIE LOPEZ RN

## 2023-12-01 LAB
ALBUMIN SERPL BCP-MCNC: 2.6 G/DL (ref 3.4–5)
ANION GAP SERPL CALC-SCNC: 15 MMOL/L (ref 10–20)
BUN SERPL-MCNC: 47 MG/DL (ref 6–23)
CALCIUM SERPL-MCNC: 8.1 MG/DL (ref 8.6–10.6)
CHLORIDE SERPL-SCNC: 108 MMOL/L (ref 98–107)
CO2 SERPL-SCNC: 21 MMOL/L (ref 21–32)
CREAT SERPL-MCNC: 2.62 MG/DL (ref 0.5–1.3)
ERYTHROCYTE [DISTWIDTH] IN BLOOD BY AUTOMATED COUNT: 15.7 % (ref 11.5–14.5)
GFR SERPL CREATININE-BSD FRML MDRD: 28 ML/MIN/1.73M*2
GLUCOSE SERPL-MCNC: 90 MG/DL (ref 74–99)
HCT VFR BLD AUTO: 26.4 % (ref 41–52)
HGB BLD-MCNC: 7.8 G/DL (ref 13.5–17.5)
MCH RBC QN AUTO: 27 PG (ref 26–34)
MCHC RBC AUTO-ENTMCNC: 29.5 G/DL (ref 32–36)
MCV RBC AUTO: 91 FL (ref 80–100)
NRBC BLD-RTO: 0 /100 WBCS (ref 0–0)
PHOSPHATE SERPL-MCNC: 3.5 MG/DL (ref 2.5–4.9)
PLATELET # BLD AUTO: 392 X10*3/UL (ref 150–450)
POTASSIUM SERPL-SCNC: 4.1 MMOL/L (ref 3.5–5.3)
RBC # BLD AUTO: 2.89 X10*6/UL (ref 4.5–5.9)
SODIUM SERPL-SCNC: 140 MMOL/L (ref 136–145)
WBC # BLD AUTO: 6.4 X10*3/UL (ref 4.4–11.3)

## 2023-12-01 PROCEDURE — 2500000004 HC RX 250 GENERAL PHARMACY W/ HCPCS (ALT 636 FOR OP/ED): Performed by: STUDENT IN AN ORGANIZED HEALTH CARE EDUCATION/TRAINING PROGRAM

## 2023-12-01 PROCEDURE — 85027 COMPLETE CBC AUTOMATED: CPT

## 2023-12-01 PROCEDURE — 2500000004 HC RX 250 GENERAL PHARMACY W/ HCPCS (ALT 636 FOR OP/ED): Performed by: INTERNAL MEDICINE

## 2023-12-01 PROCEDURE — 96372 THER/PROPH/DIAG INJ SC/IM: CPT

## 2023-12-01 PROCEDURE — 80069 RENAL FUNCTION PANEL: CPT

## 2023-12-01 PROCEDURE — 2500000001 HC RX 250 WO HCPCS SELF ADMINISTERED DRUGS (ALT 637 FOR MEDICARE OP): Performed by: STUDENT IN AN ORGANIZED HEALTH CARE EDUCATION/TRAINING PROGRAM

## 2023-12-01 PROCEDURE — 2500000001 HC RX 250 WO HCPCS SELF ADMINISTERED DRUGS (ALT 637 FOR MEDICARE OP)

## 2023-12-01 PROCEDURE — 2500000004 HC RX 250 GENERAL PHARMACY W/ HCPCS (ALT 636 FOR OP/ED)

## 2023-12-01 PROCEDURE — 1170000001 HC PRIVATE ONCOLOGY ROOM DAILY

## 2023-12-01 PROCEDURE — 36415 COLL VENOUS BLD VENIPUNCTURE: CPT

## 2023-12-01 PROCEDURE — 99232 SBSQ HOSP IP/OBS MODERATE 35: CPT | Performed by: INTERNAL MEDICINE

## 2023-12-01 RX ORDER — LIDOCAINE HYDROCHLORIDE 20 MG/ML
1 JELLY TOPICAL ONCE
Status: COMPLETED | OUTPATIENT
Start: 2023-12-01 | End: 2023-12-01

## 2023-12-01 RX ORDER — HYDROMORPHONE HYDROCHLORIDE 1 MG/ML
0.2 INJECTION, SOLUTION INTRAMUSCULAR; INTRAVENOUS; SUBCUTANEOUS ONCE
Status: COMPLETED | OUTPATIENT
Start: 2023-12-01 | End: 2023-12-01

## 2023-12-01 RX ORDER — MEROPENEM 1 G/1
1000 INJECTION, POWDER, FOR SOLUTION INTRAVENOUS EVERY 12 HOURS
Status: COMPLETED | OUTPATIENT
Start: 2023-12-01 | End: 2023-12-12

## 2023-12-01 RX ORDER — HYDROMORPHONE HYDROCHLORIDE 1 MG/ML
0.4 INJECTION, SOLUTION INTRAMUSCULAR; INTRAVENOUS; SUBCUTANEOUS
Status: DISCONTINUED | OUTPATIENT
Start: 2023-12-01 | End: 2023-12-13 | Stop reason: HOSPADM

## 2023-12-01 RX ORDER — LORAZEPAM 1 MG/1
2 TABLET ORAL ONCE
Status: COMPLETED | OUTPATIENT
Start: 2023-12-01 | End: 2023-12-01

## 2023-12-01 RX ADMIN — Medication 1000 MG: at 12:45

## 2023-12-01 RX ADMIN — MEROPENEM 500 MG: 500 INJECTION, POWDER, FOR SOLUTION INTRAVENOUS at 01:02

## 2023-12-01 RX ADMIN — HEPARIN SODIUM 5000 UNITS: 5000 INJECTION INTRAVENOUS; SUBCUTANEOUS at 09:48

## 2023-12-01 RX ADMIN — LIDOCAINE HYDROCHLORIDE 1 APPLICATION: 20 JELLY TOPICAL at 11:30

## 2023-12-01 RX ADMIN — Medication 5 MG: at 21:16

## 2023-12-01 RX ADMIN — TAMSULOSIN HYDROCHLORIDE 0.4 MG: 0.4 CAPSULE ORAL at 09:48

## 2023-12-01 RX ADMIN — HYDROMORPHONE HYDROCHLORIDE 0.4 MG: 1 INJECTION, SOLUTION INTRAMUSCULAR; INTRAVENOUS; SUBCUTANEOUS at 12:23

## 2023-12-01 RX ADMIN — HYDROMORPHONE HYDROCHLORIDE 0.2 MG: 1 INJECTION, SOLUTION INTRAMUSCULAR; INTRAVENOUS; SUBCUTANEOUS at 12:10

## 2023-12-01 RX ADMIN — LORAZEPAM 2 MG: 1 TABLET ORAL at 11:42

## 2023-12-01 RX ADMIN — HEPARIN SODIUM 5000 UNITS: 5000 INJECTION INTRAVENOUS; SUBCUTANEOUS at 17:40

## 2023-12-01 RX ADMIN — CYCLOBENZAPRINE 5 MG: 10 TABLET, FILM COATED ORAL at 21:16

## 2023-12-01 RX ADMIN — HEPARIN SODIUM 5000 UNITS: 5000 INJECTION INTRAVENOUS; SUBCUTANEOUS at 01:02

## 2023-12-01 RX ADMIN — ACETAMINOPHEN 975 MG: 325 TABLET ORAL at 21:14

## 2023-12-01 ASSESSMENT — COGNITIVE AND FUNCTIONAL STATUS - GENERAL
DAILY ACTIVITIY SCORE: 19
CLIMB 3 TO 5 STEPS WITH RAILING: A LOT
STANDING UP FROM CHAIR USING ARMS: A LITTLE
MOVING TO AND FROM BED TO CHAIR: A LITTLE
TOILETING: A LITTLE
MOBILITY SCORE: 17
TURNING FROM BACK TO SIDE WHILE IN FLAT BAD: A LITTLE
PERSONAL GROOMING: A LITTLE
DRESSING REGULAR UPPER BODY CLOTHING: A LITTLE
DRESSING REGULAR LOWER BODY CLOTHING: A LITTLE
HELP NEEDED FOR BATHING: A LITTLE
MOVING FROM LYING ON BACK TO SITTING ON SIDE OF FLAT BED WITH BEDRAILS: A LITTLE
WALKING IN HOSPITAL ROOM: A LITTLE

## 2023-12-01 ASSESSMENT — PAIN - FUNCTIONAL ASSESSMENT
PAIN_FUNCTIONAL_ASSESSMENT: 0-10

## 2023-12-01 ASSESSMENT — PAIN SCALES - GENERAL
PAINLEVEL_OUTOF10: 8
PAINLEVEL_OUTOF10: 3
PAINLEVEL_OUTOF10: 6

## 2023-12-01 NOTE — HOSPITAL COURSE
Jose Thornton is a 57 y.o. male with PMH of Crohn's disease and colovesical fistula who underwent Exploratory laparotomy, MYA, Cystoscopy, bilateral ileal ureter and bladder augment with right partial rectus muscle flap, and Suprapubic tube insertion on 10/27/23 with Dr. Andersen and Dr. Rodriguez. Patient had a complicated post-op course but eventually discharged home on 11/17.    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  Blood culture was positive for gram positive cocci, clusters a. He was started on Vanc/Zosyn changed to Meropenem per ID based on his kidney function and previous history of DTR-Pseudomonas. Patient was discharged on 12/13 as he completed his antibiotic course.

## 2023-12-01 NOTE — CARE PLAN
The patient's goals for the shift include To reduce/eliminate nausea/vomiting; improve PO fluid intake    The clinical goals for the shift include pt will get sleep tonight to perpare foe procedure in AM

## 2023-12-01 NOTE — SIGNIFICANT EVENT
Notified patient this morning regarding need for bilateral ureteral stent removal and SPT change. Urology team returned this afternoon to complete procedure as described below.    Procedure:  Ureteral stent removal:  The patient was prepped and draped in the usual sterile fashion. Urojet was placed into the urethra meatus before starting the cystoscopy.  A flexible cystoscopy was inserted in the urethra under direct vision and advanced to the level of the bladder. Both ureteral stents were visualized in bladder. An alligator grasper was used to grab left sided stent and was removed through to urethral meatus. The flexible cystoscope was then re-inserted into the urethra to the level of the bladder and the second stent was removed through the urethral meatus. This concluded the stent removal portion of the procedure.     SPT Tube Exchange:  Using sterile technique, patient's SPT tube was exchanged for a 16f Turkish silicone andrade. The balloon was inflated with 10cc sterile water.     The patient tolerated both aspects of the procedure well.    Nicole Castle MD   Urology  Pager: [phone]

## 2023-12-01 NOTE — PROGRESS NOTES
"Jose Thornton is a 58 y.o. male on day 1 of admission presenting with Dehydration.    Subjective   Interval History: doing well.   Afebrile white blood cell count and kidney function improving ,  ureter stents are removed today .  SPT tube exchanged         Review of Systems  denies fever or chill    Objective   Range of Vitals (last 24 hours)  Heart Rate:  [74-98]   Temp:  [35.6 °C (96.1 °F)-36.7 °C (98.1 °F)]   Resp:  [14-18]   BP: ()/(61-73)   SpO2:  [93 %-98 %]   Daily Weight  11/29/23 : 95.3 kg (210 lb 1.6 oz)    Body mass index is 28.49 kg/m².    Physical Exam  NAD  Clear lung sound  Normal heart sound  Colostomy . Bilateral nephrostomy.  SPC.  Diffusely tender abdomen      Relevant Results  Labs  Results from last 72 hours   Lab Units 12/01/23  0635 11/30/23  1042 11/29/23  0551   WBC AUTO x10*3/uL 6.4 6.9 12.4*   HEMOGLOBIN g/dL 7.8* 8.3* 8.5*   HEMATOCRIT % 26.4* 27.4* 27.5*   PLATELETS AUTO x10*3/uL 392 393 451*     Results from last 72 hours   Lab Units 12/01/23  0635 11/30/23  1042 11/29/23  0551   SODIUM mmol/L 140 140 137   POTASSIUM mmol/L 4.1 3.7 4.3   CHLORIDE mmol/L 108* 108* 105   CO2 mmol/L 21 20* 19*   BUN mg/dL 47* 62* 77*   CREATININE mg/dL 2.62* 3.43* 4.87*   GLUCOSE mg/dL 90 140* 91   CALCIUM mg/dL 8.1* 8.5* 8.2*   ANION GAP mmol/L 15 16 17   EGFR mL/min/1.73m*2 28* 20* 13*   PHOSPHORUS mg/dL 3.5 3.6  --      Results from last 72 hours   Lab Units 12/01/23  0635 11/30/23  1042   ALBUMIN g/dL 2.6* 2.6*     Estimated Creatinine Clearance: 37.7 mL/min (A) (by C-G formula based on SCr of 2.62 mg/dL (H)).  No results found for: \"CRP\"  Microbiology  Susceptibility data from last 14 days.  Collected Specimen Info Organism Amoxicillin/Clavulanate Ampicillin Ampicillin/Sulbactam Aztreonam Cefazolin Cefazolin (uncomplicated UTIs only) Cefepime Cefotaxime Ceftazidime Ceftriaxone Ciprofloxacin Ertapenem   11/28/23 Urine from Clean Catch/Voided Klebsiella pneumoniae/variicola S R I R R R R R R R " R S     Enteric bacilli               11/28/23 Blood culture from Peripheral Venipuncture Micrococcus luteus                 Collected Specimen Info Organism Gentamicin Meropenem Nitrofurantoin Piperacillin/Tazobactam Tobramycin Trimethoprim/Sulfamethoxazole   11/28/23 Urine from Clean Catch/Voided Klebsiella pneumoniae/variicola R S S S R R     Enteric bacilli         11/28/23 Blood culture from Peripheral Venipuncture Micrococcus luteus             11/28 CT abdomen    Diffuse urinary bladder wall thickening. Recommend clinical  correlation with urinalysis as findings could represent underlying  infectious process.  2. Mild retraction of right-sided percutaneous nephrostomy tube from  prior position in the right renal pelvis. Recommend clinical  correlation for obstruction.  3. Mild improvement in previously seen postsurgical changes as  evidenced by resolving pericystic and pelvic soft tissue stranding.  Additional resolution of previously seen fluid collection posterior  to the abdominal wall surgical site.  4. Stable position of bilateral ureteral stents terminating in the  augmented urinary bladder.  5. Resolution of previously seen fluid-filled bowel dilation.      Micro  10/04 urine culture   E. Coli resistant to quinolone, TMP-SMX and ampicillin  10/25 urine culture   Pseudomonas aeruginosa   resistant to cephalosporin and piperacillin-tazobactam quinolone  11/28   blood culture Micrococcus luteus 1/4 11/28 urine culture    >[id number] Enteric bacilli.   [id number] Klebsiella pneumoniae  ESBL  11/28 MRSA screen   negative         INFECTIOUS SYNOPSIS AND ASSESSMENT  57yo male with hx of crohn disease with enterovesicular fistula with intra abdominal abscess/hydronephrosis  s/p PCNT, SPC.  Ileal ureter /bladder augmentation presents with acute pyelonephritis with SIRS.   Urine culture ESBL klebsiella and enteric bacilli  this time.  Had DTR pseudomonas last month    Acute bilateral pyelonephritis   ESBL  Klebsiella in urine culture .,  hx of DTR pseudomonas resistant to piperacillin-tazobactam and cephalosporin  S/p Ileal ureter and bladder augmentation  CKD  Micrococcus from blood culture - probably contamination      RECOMMENDATION    CONTINUE meropenem 1 gram q 12 hours       ID will follow in the morning.  The case was discussed with my attending , Dr. Ivan Min who agreed with my assessment and plan.    MARCELLUS GARRIDO.  KELLEED /  ID consult TEAM B  Infectious disease fellow PGY-4  Reach me through [employer] instead of paging if possible ( especially during noon conference )  Or page me through Team B  [id number]       I saw and evaluated the patient. I personally obtained the key and critical portions of the history and physical exam or was physically present for key and critical portions performed by the resident/fellow. I reviewed the resident/fellow's documentation and discussed the patient with the resident/fellow. I agree with the resident/fellow's medical decision making as documented in the note.    Ivan Min MD MPH

## 2023-12-01 NOTE — PROGRESS NOTES
"Jose Thornton is a 58 y.o. male on day 1 of admission presenting with Dehydration.    Subjective   NAEO. Patient has no complaints this morning. He is tolerating a diet. Denies pain, fever, chills or SOB.      Objective     Physical Exam    Constitutional: Alert, in NAD  HEENT: Normocephalic, atraumatic  Neuro: A&O x3  CV: regular rate  Pulm: Non-laboured breathing on RA  GI: Abdomen soft, non-distended, non-tender, ostomy with stool and gas in bag, ostomy pink  : SPT in place draining clear yellow urine. Nephrostomy tubes uncapped-Right tube draining well, left tube unkinked, then drained well.   Skin: Incisions well healed. No lesions or discoloration noted.  Musculoskeletal: ADELE  Extremities: no edema or cyanosis noted  Last Recorded Vitals  Blood pressure 110/70, pulse 87, temperature 35.6 °C (96.1 °F), temperature source Temporal, resp. rate 18, height 1.829 m (6' 0.01\"), weight 95.3 kg (210 lb 1.6 oz), SpO2 95 %.  Intake/Output last 3 Shifts:  I/O last 3 completed shifts:  In: 2690 (28.2 mL/kg) [P.O.:240; I.V.:2050 (21.5 mL/kg); IV Piggyback:400]  Out: 3610 (37.9 mL/kg) [Urine:3135 (0.9 mL/kg/hr); Stool:475]  Weight: 95.3 kg     Relevant Results  Scheduled medications  heparin (porcine), 5,000 Units, subcutaneous, q8h  meropenem, 500 mg, intravenous, q12h  sennosides, 2 tablet, oral, BID  tamsulosin, 0.4 mg, oral, Daily      Continuous medications  lactated Ringer's, 100 mL/hr, Last Rate: 100 mL/hr (11/30/23 0541)      PRN medications  PRN medications: acetaminophen, cyclobenzaprine, melatonin, ondansetron ODT    Results for orders placed or performed during the hospital encounter of 11/28/23 (from the past 24 hour(s))   Renal Function Panel   Result Value Ref Range    Glucose 140 (H) 74 - 99 mg/dL    Sodium 140 136 - 145 mmol/L    Potassium 3.7 3.5 - 5.3 mmol/L    Chloride 108 (H) 98 - 107 mmol/L    Bicarbonate 20 (L) 21 - 32 mmol/L    Anion Gap 16 10 - 20 mmol/L    Urea Nitrogen 62 (H) 6 - 23 mg/dL    " Creatinine 3.43 (H) 0.50 - 1.30 mg/dL    eGFR 20 (L) >60 mL/min/1.73m*2    Calcium 8.5 (L) 8.6 - 10.6 mg/dL    Phosphorus 3.6 2.5 - 4.9 mg/dL    Albumin 2.6 (L) 3.4 - 5.0 g/dL   CBC   Result Value Ref Range    WBC 6.9 4.4 - 11.3 x10*3/uL    nRBC 0.0 0.0 - 0.0 /100 WBCs    RBC 3.05 (L) 4.50 - 5.90 x10*6/uL    Hemoglobin 8.3 (L) 13.5 - 17.5 g/dL    Hematocrit 27.4 (L) 41.0 - 52.0 %    MCV 90 80 - 100 fL    MCH 27.2 26.0 - 34.0 pg    MCHC 30.3 (L) 32.0 - 36.0 g/dL    RDW 15.7 (H) 11.5 - 14.5 %    Platelets 393 150 - 450 x10*3/uL   CBC   Result Value Ref Range    WBC 6.4 4.4 - 11.3 x10*3/uL    nRBC 0.0 0.0 - 0.0 /100 WBCs    RBC 2.89 (L) 4.50 - 5.90 x10*6/uL    Hemoglobin 7.8 (L) 13.5 - 17.5 g/dL    Hematocrit 26.4 (L) 41.0 - 52.0 %    MCV 91 80 - 100 fL    MCH 27.0 26.0 - 34.0 pg    MCHC 29.5 (L) 32.0 - 36.0 g/dL    RDW 15.7 (H) 11.5 - 14.5 %    Platelets 392 150 - 450 x10*3/uL                            Assessment/Plan   Principal Problem:    Dehydration  Active Problems:    Urinary tract infection without hematuria, site unspecified    Jose Thornton is a 57 y.o. male with PMH of Crohn's disease and colovesical fistula who underwent Exploratory laparotomy, MYA, Cystoscopy, bilateral ileal ureter and bladder augment with right partial rectus muscle flap, and Suprapubic tube insertion on 10/27/23 with Dr. Andersen and Dr. Rodriguez. Patient had a complicated post-op course but eventually discharged home on 11/17.     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  Blood culture was positive for gram positive cocci, clusters.    Patient continues to improve. Vitals stable, good urine output, creatinine continues to improve, now 3.43.    Plan:   - Plan to remove stents today and change SPT tube at bedside  - Maintain SPT and b/l PCNTs uncapped. - Strict I/Os, monitor UOP of right nephrostomy tube  -  Continue regular renal diet  - Boost clear  - Daily BMP  - IVF at 100 cc/hr until oral intake improves  - Continue home flomax  - Bowel regimen - continue home senna  - Follow-up ID recommendations and continue meropenum.   - Tylenol 975 mg q6h PRN mile pain/fever, continue home flexeril 5mg PRN  - Zofran 4 mg q8h PRN for nausea  - Follow up CBC and RFP  - Encourage OOB and ambulation  -  SCDs and SQH 5000 U q8H for DVT prophylaxis     Dispo: RNF     Seen and discussed with chief resident Dr. Mckeon.     --------------------------------------------  Bonita Gould PA-C  Urology  Consult Uro: [id number]  After 5pm and Weekends: [id number]

## 2023-12-01 NOTE — SIGNIFICANT EVENT
Rapid Response RN Note    Rapid response RN at bedside for RADAR score 6 due to the following VS: T 35.9 °Celsius; HR 76 ; RR 16; BP 99/66; SPO2 95%.     Reviewed above VS with bedside RN.  VS within patient's current trends.  No interventions by rapid response team indicated at this time.      Patient denied pain, shortness of breath, dizziness or lightheadedness.      Staff to page rapid response for any concerns or acute change in condition/VS.

## 2023-12-02 LAB
ALBUMIN SERPL BCP-MCNC: 2.6 G/DL (ref 3.4–5)
ANION GAP SERPL CALC-SCNC: 13 MMOL/L (ref 10–20)
BACTERIA BLD CULT: NORMAL
BACTERIA UR CULT: ABNORMAL
BACTERIA UR CULT: ABNORMAL
BUN SERPL-MCNC: 38 MG/DL (ref 6–23)
CALCIUM SERPL-MCNC: 8.3 MG/DL (ref 8.6–10.6)
CHLORIDE SERPL-SCNC: 108 MMOL/L (ref 98–107)
CO2 SERPL-SCNC: 25 MMOL/L (ref 21–32)
CREAT SERPL-MCNC: 2.17 MG/DL (ref 0.5–1.3)
ERYTHROCYTE [DISTWIDTH] IN BLOOD BY AUTOMATED COUNT: 15.7 % (ref 11.5–14.5)
GFR SERPL CREATININE-BSD FRML MDRD: 35 ML/MIN/1.73M*2
GLUCOSE SERPL-MCNC: 84 MG/DL (ref 74–99)
HCT VFR BLD AUTO: 26.2 % (ref 41–52)
HGB BLD-MCNC: 7.9 G/DL (ref 13.5–17.5)
MCH RBC QN AUTO: 27.7 PG (ref 26–34)
MCHC RBC AUTO-ENTMCNC: 30.2 G/DL (ref 32–36)
MCV RBC AUTO: 92 FL (ref 80–100)
NRBC BLD-RTO: 0 /100 WBCS (ref 0–0)
PHOSPHATE SERPL-MCNC: 3.5 MG/DL (ref 2.5–4.9)
PLATELET # BLD AUTO: 397 X10*3/UL (ref 150–450)
POTASSIUM SERPL-SCNC: 4.5 MMOL/L (ref 3.5–5.3)
RBC # BLD AUTO: 2.85 X10*6/UL (ref 4.5–5.9)
SODIUM SERPL-SCNC: 141 MMOL/L (ref 136–145)
WBC # BLD AUTO: 6.2 X10*3/UL (ref 4.4–11.3)

## 2023-12-02 PROCEDURE — 99232 SBSQ HOSP IP/OBS MODERATE 35: CPT | Performed by: INTERNAL MEDICINE

## 2023-12-02 PROCEDURE — 96372 THER/PROPH/DIAG INJ SC/IM: CPT

## 2023-12-02 PROCEDURE — 85027 COMPLETE CBC AUTOMATED: CPT

## 2023-12-02 PROCEDURE — 2500000001 HC RX 250 WO HCPCS SELF ADMINISTERED DRUGS (ALT 637 FOR MEDICARE OP): Performed by: STUDENT IN AN ORGANIZED HEALTH CARE EDUCATION/TRAINING PROGRAM

## 2023-12-02 PROCEDURE — 36415 COLL VENOUS BLD VENIPUNCTURE: CPT

## 2023-12-02 PROCEDURE — 2500000004 HC RX 250 GENERAL PHARMACY W/ HCPCS (ALT 636 FOR OP/ED): Performed by: STUDENT IN AN ORGANIZED HEALTH CARE EDUCATION/TRAINING PROGRAM

## 2023-12-02 PROCEDURE — 1170000001 HC PRIVATE ONCOLOGY ROOM DAILY

## 2023-12-02 PROCEDURE — 2500000004 HC RX 250 GENERAL PHARMACY W/ HCPCS (ALT 636 FOR OP/ED)

## 2023-12-02 PROCEDURE — 2500000004 HC RX 250 GENERAL PHARMACY W/ HCPCS (ALT 636 FOR OP/ED): Performed by: INTERNAL MEDICINE

## 2023-12-02 PROCEDURE — 84100 ASSAY OF PHOSPHORUS: CPT

## 2023-12-02 RX ORDER — OXYBUTYNIN CHLORIDE 5 MG/1
5 TABLET ORAL 3 TIMES DAILY PRN
Status: DISCONTINUED | OUTPATIENT
Start: 2023-12-02 | End: 2023-12-13 | Stop reason: HOSPADM

## 2023-12-02 RX ADMIN — TAMSULOSIN HYDROCHLORIDE 0.4 MG: 0.4 CAPSULE ORAL at 09:45

## 2023-12-02 RX ADMIN — STANDARDIZED SENNA CONCENTRATE 17.2 MG: 8.6 TABLET ORAL at 09:44

## 2023-12-02 RX ADMIN — HEPARIN SODIUM 5000 UNITS: 5000 INJECTION INTRAVENOUS; SUBCUTANEOUS at 17:40

## 2023-12-02 RX ADMIN — HEPARIN SODIUM 5000 UNITS: 5000 INJECTION INTRAVENOUS; SUBCUTANEOUS at 09:45

## 2023-12-02 RX ADMIN — Medication 1000 MG: at 12:45

## 2023-12-02 RX ADMIN — HEPARIN SODIUM 5000 UNITS: 5000 INJECTION INTRAVENOUS; SUBCUTANEOUS at 01:28

## 2023-12-02 RX ADMIN — CYCLOBENZAPRINE 5 MG: 10 TABLET, FILM COATED ORAL at 09:44

## 2023-12-02 RX ADMIN — Medication 1000 MG: at 01:41

## 2023-12-02 RX ADMIN — OXYBUTYNIN CHLORIDE 5 MG: 5 TABLET ORAL at 01:27

## 2023-12-02 RX ADMIN — OXYBUTYNIN CHLORIDE 5 MG: 5 TABLET ORAL at 09:44

## 2023-12-02 RX ADMIN — SODIUM CHLORIDE, POTASSIUM CHLORIDE, SODIUM LACTATE AND CALCIUM CHLORIDE 50 ML/HR: 600; 310; 30; 20 INJECTION, SOLUTION INTRAVENOUS at 00:00

## 2023-12-02 NOTE — PROGRESS NOTES
"Jose Thornton is a 58 y.o. male on day 2 of admission presenting with Dehydration.    Subjective   Complained of spasms and leakage around the SP catheter last night. Flushed SPT overnight and added Oxybutynin. Denies fevers/chills, nausea/vomiting, SOB, or CP.          Objective     Physical Exam    Constitutional: Alert, in NAD  HEENT: Normocephalic, atraumatic  Neuro: A&O x3  CV: regular rate  Pulm: Non-laboured breathing on RA  GI: Abdomen soft, non-distended, non-tender, ostomy with stool and gas in bag, ostomy pink  : SPT in place draining clear yellow urine. Nephrostomy tubes uncapped-Right tube draining well, left tube with little output   Skin: Incisions well healed. No lesions or discoloration noted.  Musculoskeletal: ADELE  Extremities: no edema or cyanosis noted    Last Recorded Vitals  Blood pressure 98/63, pulse 69, temperature 36.6 °C (97.9 °F), temperature source Temporal, resp. rate 16, height 1.829 m (6' 0.01\"), weight 95.3 kg (210 lb 1.6 oz), SpO2 95 %.  Intake/Output last 3 Shifts:  I/O last 3 completed shifts:  In: 1370 (14.4 mL/kg) [P.O.:450; I.V.:520 (5.5 mL/kg); IV Piggyback:400]  Out: 3725 (39.1 mL/kg) [Urine:3515 (1 mL/kg/hr); Stool:210]  Weight: 95.3 kg     Relevant Results  Scheduled medications  heparin (porcine), 5,000 Units, subcutaneous, q8h  meropenem, 1,000 mg, intravenous, q12h  sennosides, 2 tablet, oral, BID  tamsulosin, 0.4 mg, oral, Daily      Continuous medications  lactated Ringer's, 50 mL/hr, Last Rate: 50 mL/hr (12/02/23 0657)      PRN medications  PRN medications: acetaminophen, cyclobenzaprine, HYDROmorphone, melatonin, ondansetron ODT, oxybutynin    Results for orders placed or performed during the hospital encounter of 11/28/23 (from the past 24 hour(s))   Renal Function Panel   Result Value Ref Range    Glucose 84 74 - 99 mg/dL    Sodium 141 136 - 145 mmol/L    Potassium 4.5 3.5 - 5.3 mmol/L    Chloride 108 (H) 98 - 107 mmol/L    Bicarbonate 25 21 - 32 mmol/L    " Anion Gap 13 10 - 20 mmol/L    Urea Nitrogen 38 (H) 6 - 23 mg/dL    Creatinine 2.17 (H) 0.50 - 1.30 mg/dL    eGFR 35 (L) >60 mL/min/1.73m*2    Calcium 8.3 (L) 8.6 - 10.6 mg/dL    Phosphorus 3.5 2.5 - 4.9 mg/dL    Albumin 2.6 (L) 3.4 - 5.0 g/dL   CBC   Result Value Ref Range    WBC 6.2 4.4 - 11.3 x10*3/uL    nRBC 0.0 0.0 - 0.0 /100 WBCs    RBC 2.85 (L) 4.50 - 5.90 x10*6/uL    Hemoglobin 7.9 (L) 13.5 - 17.5 g/dL    Hematocrit 26.2 (L) 41.0 - 52.0 %    MCV 92 80 - 100 fL    MCH 27.7 26.0 - 34.0 pg    MCHC 30.2 (L) 32.0 - 36.0 g/dL    RDW 15.7 (H) 11.5 - 14.5 %    Platelets 397 150 - 450 x10*3/uL                            Assessment/Plan   Principal Problem:    Dehydration  Active Problems:    Urinary tract infection without hematuria, site unspecified    Jose Thornton is a 57 y.o. male with PMH of Crohn's disease and colovesical fistula who underwent Exploratory laparotomy, MYA, Cystoscopy, bilateral ileal ureter and bladder augment with right partial rectus muscle flap, and Suprapubic tube insertion on 10/27/23 with Dr. Andersen and Dr. Rodriguez. Patient had a complicated post-op course but eventually discharged home on 11/17.     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  Blood culture was positive for gram positive cocci, clusters.        Cr improving  12/1: bilateral ureteral stents removed and SPT changed    Plan:   - will cap b/l PCNT's today.  - Maintain SPT   - Strict I/Os,  - Continue regular renal diet  - Boost clear  - Daily BMP  - IVF at 50 cc/hr   - Continue home flomax  - Bowel regimen - continue home senna  - Follow-up ID recommendations and continue meropenum.   - Tylenol 975 mg q6h PRN mile pain/fever, continue home flexeril 5mg PRN  - Zofran 4 mg q8h PRN for nausea  - Encourage OOB and ambulation  -  SCDs and SQH 5000 U q8H for DVT prophylaxis     Dispo: RNF     Seen and  discussed with chief resident Dr. Mckeon. To be discussed with     --------------------------------------------  Ivan Huddleston MD   Urology PGY-2  Pager: [phone]

## 2023-12-02 NOTE — CARE PLAN
The patient's goals for the shift include decrease of muscle spasms; adequate PO intake    The clinical goals for the shift include decrease of muscle spasms; adequate PO intake    Problem: Pain  Goal: Takes deep breaths with improved pain control throughout the shift  Outcome: Progressing  Goal: Turns in bed with improved pain control throughout the shift  Outcome: Progressing  Goal: Walks with improved pain control throughout the shift  Outcome: Progressing  Goal: Performs ADL's with improved pain control throughout shift  Outcome: Progressing  Goal: Participates in PT with improved pain control throughout the shift  Outcome: Progressing  Goal: Free from opioid side effects throughout the shift  Outcome: Progressing  Goal: Free from acute confusion related to pain meds throughout the shift  Outcome: Progressing     Problem: Skin  Goal: Decreased wound size/increased tissue granulation at next dressing change  Outcome: Progressing  Goal: Participates in plan/prevention/treatment measures  Outcome: Progressing  Goal: Prevent/manage excess moisture  Outcome: Progressing  Goal: Prevent/minimize sheer/friction injuries  Outcome: Progressing  Goal: Promote/optimize nutrition  Outcome: Progressing  Goal: Promote skin healing  Outcome: Progressing

## 2023-12-03 ENCOUNTER — APPOINTMENT (OUTPATIENT)
Dept: RADIOLOGY | Facility: HOSPITAL | Age: 58
DRG: 872 | End: 2023-12-03

## 2023-12-03 LAB
ALBUMIN SERPL BCP-MCNC: 3.1 G/DL (ref 3.4–5)
ANION GAP SERPL CALC-SCNC: 16 MMOL/L (ref 10–20)
BACTERIA BLD AEROBE CULT: ABNORMAL
BACTERIA BLD CULT: ABNORMAL
BUN SERPL-MCNC: 29 MG/DL (ref 6–23)
CALCIUM SERPL-MCNC: 8.8 MG/DL (ref 8.6–10.6)
CHLORIDE SERPL-SCNC: 109 MMOL/L (ref 98–107)
CO2 SERPL-SCNC: 21 MMOL/L (ref 21–32)
CREAT SERPL-MCNC: 1.89 MG/DL (ref 0.5–1.3)
ERYTHROCYTE [DISTWIDTH] IN BLOOD BY AUTOMATED COUNT: 15.3 % (ref 11.5–14.5)
GFR SERPL CREATININE-BSD FRML MDRD: 41 ML/MIN/1.73M*2
GLUCOSE SERPL-MCNC: 94 MG/DL (ref 74–99)
GRAM STN SPEC: ABNORMAL
HCT VFR BLD AUTO: 28.6 % (ref 41–52)
HGB BLD-MCNC: 8.4 G/DL (ref 13.5–17.5)
MCH RBC QN AUTO: 27.1 PG (ref 26–34)
MCHC RBC AUTO-ENTMCNC: 29.4 G/DL (ref 32–36)
MCV RBC AUTO: 92 FL (ref 80–100)
NRBC BLD-RTO: 0 /100 WBCS (ref 0–0)
PHOSPHATE SERPL-MCNC: 2.6 MG/DL (ref 2.5–4.9)
PLATELET # BLD AUTO: 444 X10*3/UL (ref 150–450)
POTASSIUM SERPL-SCNC: 3.9 MMOL/L (ref 3.5–5.3)
RBC # BLD AUTO: 3.1 X10*6/UL (ref 4.5–5.9)
SODIUM SERPL-SCNC: 142 MMOL/L (ref 136–145)
WBC # BLD AUTO: 7 X10*3/UL (ref 4.4–11.3)

## 2023-12-03 PROCEDURE — 80069 RENAL FUNCTION PANEL: CPT

## 2023-12-03 PROCEDURE — 2500000001 HC RX 250 WO HCPCS SELF ADMINISTERED DRUGS (ALT 637 FOR MEDICARE OP)

## 2023-12-03 PROCEDURE — 2500000004 HC RX 250 GENERAL PHARMACY W/ HCPCS (ALT 636 FOR OP/ED): Performed by: STUDENT IN AN ORGANIZED HEALTH CARE EDUCATION/TRAINING PROGRAM

## 2023-12-03 PROCEDURE — 36415 COLL VENOUS BLD VENIPUNCTURE: CPT

## 2023-12-03 PROCEDURE — 85027 COMPLETE CBC AUTOMATED: CPT

## 2023-12-03 PROCEDURE — 2500000004 HC RX 250 GENERAL PHARMACY W/ HCPCS (ALT 636 FOR OP/ED): Performed by: INTERNAL MEDICINE

## 2023-12-03 PROCEDURE — 36410 VNPNXR 3YR/> PHY/QHP DX/THER: CPT

## 2023-12-03 PROCEDURE — 96372 THER/PROPH/DIAG INJ SC/IM: CPT

## 2023-12-03 PROCEDURE — 1170000001 HC PRIVATE ONCOLOGY ROOM DAILY

## 2023-12-03 PROCEDURE — 2500000004 HC RX 250 GENERAL PHARMACY W/ HCPCS (ALT 636 FOR OP/ED)

## 2023-12-03 RX ORDER — POTASSIUM CHLORIDE 750 MG/1
10 TABLET, FILM COATED, EXTENDED RELEASE ORAL ONCE
Status: COMPLETED | OUTPATIENT
Start: 2023-12-03 | End: 2023-12-03

## 2023-12-03 RX ORDER — LIDOCAINE HYDROCHLORIDE 10 MG/ML
5 INJECTION INFILTRATION; PERINEURAL ONCE
Status: DISCONTINUED | OUTPATIENT
Start: 2023-12-03 | End: 2023-12-13 | Stop reason: HOSPADM

## 2023-12-03 RX ADMIN — Medication 1000 MG: at 11:49

## 2023-12-03 RX ADMIN — HEPARIN SODIUM 5000 UNITS: 5000 INJECTION INTRAVENOUS; SUBCUTANEOUS at 01:07

## 2023-12-03 RX ADMIN — HEPARIN SODIUM 5000 UNITS: 5000 INJECTION INTRAVENOUS; SUBCUTANEOUS at 08:59

## 2023-12-03 RX ADMIN — TAMSULOSIN HYDROCHLORIDE 0.4 MG: 0.4 CAPSULE ORAL at 08:59

## 2023-12-03 RX ADMIN — Medication 1000 MG: at 00:45

## 2023-12-03 RX ADMIN — HEPARIN SODIUM 5000 UNITS: 5000 INJECTION INTRAVENOUS; SUBCUTANEOUS at 17:30

## 2023-12-03 RX ADMIN — Medication 5 MG: at 22:22

## 2023-12-03 RX ADMIN — POTASSIUM CHLORIDE 10 MEQ: 750 TABLET, FILM COATED, EXTENDED RELEASE ORAL at 11:48

## 2023-12-03 RX ADMIN — SODIUM CHLORIDE, POTASSIUM CHLORIDE, SODIUM LACTATE AND CALCIUM CHLORIDE 50 ML/HR: 600; 310; 30; 20 INJECTION, SOLUTION INTRAVENOUS at 09:03

## 2023-12-03 ASSESSMENT — COGNITIVE AND FUNCTIONAL STATUS - GENERAL
STANDING UP FROM CHAIR USING ARMS: A LITTLE
MOBILITY SCORE: 17
MOVING TO AND FROM BED TO CHAIR: A LITTLE
DRESSING REGULAR LOWER BODY CLOTHING: A LITTLE
TOILETING: A LITTLE
HELP NEEDED FOR BATHING: A LITTLE
DRESSING REGULAR UPPER BODY CLOTHING: A LITTLE
PERSONAL GROOMING: A LITTLE
MOVING FROM LYING ON BACK TO SITTING ON SIDE OF FLAT BED WITH BEDRAILS: A LITTLE
DAILY ACTIVITIY SCORE: 19
WALKING IN HOSPITAL ROOM: A LITTLE
CLIMB 3 TO 5 STEPS WITH RAILING: A LOT
TURNING FROM BACK TO SIDE WHILE IN FLAT BAD: A LITTLE

## 2023-12-03 ASSESSMENT — PAIN - FUNCTIONAL ASSESSMENT: PAIN_FUNCTIONAL_ASSESSMENT: 0-10

## 2023-12-03 ASSESSMENT — PAIN SCALES - GENERAL: PAINLEVEL_OUTOF10: 2

## 2023-12-03 NOTE — PROGRESS NOTES
"Jose Thornton is a 58 y.o. male on day 3 of admission presenting with Dehydration.    Subjective   No complaints. No flank pain after PCNTs capped. Tolerating diet.       Objective         Constitutional: Alert, in NAD  HEENT: Normocephalic, atraumatic  Neuro: A&O x3  CV: regular rate  Pulm: Non-laboured breathing on RA  GI: Abdomen soft, non-distended, non-tender, ostomy with stool and gas in bag, ostomy pink  : SPT in place draining clear yellow urine. Nephrostomy tubes capped   Skin: Incisions well healed. No lesions or discoloration noted.  Musculoskeletal: ADELE  Extremities: no edema or cyanosis noted    Last Recorded Vitals  Blood pressure 114/73, pulse 75, temperature 36.6 °C (97.9 °F), temperature source Temporal, resp. rate 18, height 1.829 m (6' 0.01\"), weight 95.3 kg (210 lb 1.6 oz), SpO2 95 %.  Intake/Output last 3 Shifts:  I/O last 3 completed shifts:  In: 2220 (23.3 mL/kg) [P.O.:1150; I.V.:1070 (11.2 mL/kg)]  Out: 4470 (46.9 mL/kg) [Urine:3615 (1.1 mL/kg/hr); Stool:855]  Weight: 95.3 kg     Relevant Results  Scheduled medications  heparin (porcine), 5,000 Units, subcutaneous, q8h  meropenem, 1,000 mg, intravenous, q12h  potassium chloride CR, 10 mEq, oral, Once  sennosides, 2 tablet, oral, BID  tamsulosin, 0.4 mg, oral, Daily      Continuous medications  lactated Ringer's, 50 mL/hr, Last Rate: 50 mL/hr (12/03/23 0903)      PRN medications  PRN medications: acetaminophen, cyclobenzaprine, HYDROmorphone, melatonin, ondansetron ODT, oxybutynin    Results for orders placed or performed during the hospital encounter of 11/28/23 (from the past 24 hour(s))   Renal Function Panel   Result Value Ref Range    Glucose 94 74 - 99 mg/dL    Sodium 142 136 - 145 mmol/L    Potassium 3.9 3.5 - 5.3 mmol/L    Chloride 109 (H) 98 - 107 mmol/L    Bicarbonate 21 21 - 32 mmol/L    Anion Gap 16 10 - 20 mmol/L    Urea Nitrogen 29 (H) 6 - 23 mg/dL    Creatinine 1.89 (H) 0.50 - 1.30 mg/dL    eGFR 41 (L) >60 mL/min/1.73m*2    " Calcium 8.8 8.6 - 10.6 mg/dL    Phosphorus 2.6 2.5 - 4.9 mg/dL    Albumin 3.1 (L) 3.4 - 5.0 g/dL   CBC   Result Value Ref Range    WBC 7.0 4.4 - 11.3 x10*3/uL    nRBC 0.0 0.0 - 0.0 /100 WBCs    RBC 3.10 (L) 4.50 - 5.90 x10*6/uL    Hemoglobin 8.4 (L) 13.5 - 17.5 g/dL    Hematocrit 28.6 (L) 41.0 - 52.0 %    MCV 92 80 - 100 fL    MCH 27.1 26.0 - 34.0 pg    MCHC 29.4 (L) 32.0 - 36.0 g/dL    RDW 15.3 (H) 11.5 - 14.5 %    Platelets 444 150 - 450 x10*3/uL              Assessment/Plan   Principal Problem:    Dehydration  Active Problems:    Urinary tract infection without hematuria, site unspecified    Jose Thornton is a 57 y.o. male with PMH of Crohn's disease and colovesical fistula who underwent Exploratory laparotomy, MYA, Cystoscopy, bilateral ileal ureter and bladder augment with right partial rectus muscle flap, and Suprapubic tube insertion on 10/27/23 with Dr. Andersen and Dr. Rodriguez. Patient had a complicated post-op course but eventually discharged home on 11/17.     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  Blood culture was positive for gram positive cocci, clusters.        Cr improving  12/1: bilateral ureteral stents removed and SPT changed  12/2: b/l PCNTs capped    Plan:   - continue cap b/l PCNT's .  - Cap SPT today. Check PVR post void by uncapping SPT and keeping diary of PVR drained from SPT  - Strict I/Os,  - Continue regular renal diet  - Boost clear  - Daily BMP  - IVF at 50 cc/hr   - Continue home flomax  - Bowel regimen - continue home senna  - Follow-up ID recommendations and continue meropenum.   - Tylenol 975 mg q6h PRN mile pain/fever, continue home flexeril 5mg PRN  - Zofran 4 mg q8h PRN for nausea  - Encourage OOB and ambulation  -  SCDs and SQH 5000 U q8H for DVT prophylaxis     Dispo: RNF     Seen and discussed with chief resident Dr. Mckeon. discussed with   Nathaly    --------------------------------------------  Avinash Siddiqi MD  Urology Resident (PGY-4)  Adult Pager [phone]  Pediatric Pager [phone]

## 2023-12-03 NOTE — PROGRESS NOTES
"Jose Thornton is a 58 y.o. male on day 2 of admission presenting with Dehydration.    Subjective   Interval History: Doing well.  Abdominal pain and tenderness significantly improved.        Review of Systems denies fever, chills, nausea    Objective   Range of Vitals (last 24 hours)  Heart Rate:  []   Temp:  [36 °C (96.8 °F)-36.8 °C (98.2 °F)]   Resp:  [16-18]   BP: ()/(63-75)   SpO2:  [95 %-98 %]   Daily Weight  11/29/23 : 95.3 kg (210 lb 1.6 oz)    Body mass index is 28.49 kg/m².    Physical Exam  Not in acute distress  Regular heart sound without murmur  Bilateral nephrostomy, suprapubic catheter, colostomy bag.  Mild tenderness on left lower quadrant.        Relevant Results  Labs  Results from last 72 hours   Lab Units 12/02/23  0608 12/01/23 0635 11/30/23  1042   WBC AUTO x10*3/uL 6.2 6.4 6.9   HEMOGLOBIN g/dL 7.9* 7.8* 8.3*   HEMATOCRIT % 26.2* 26.4* 27.4*   PLATELETS AUTO x10*3/uL 397 392 393     Results from last 72 hours   Lab Units 12/02/23  0608 12/01/23  0635 11/30/23  1042   SODIUM mmol/L 141 140 140   POTASSIUM mmol/L 4.5 4.1 3.7   CHLORIDE mmol/L 108* 108* 108*   CO2 mmol/L 25 21 20*   BUN mg/dL 38* 47* 62*   CREATININE mg/dL 2.17* 2.62* 3.43*   GLUCOSE mg/dL 84 90 140*   CALCIUM mg/dL 8.3* 8.1* 8.5*   ANION GAP mmol/L 13 15 16   EGFR mL/min/1.73m*2 35* 28* 20*   PHOSPHORUS mg/dL 3.5 3.5 3.6     Results from last 72 hours   Lab Units 12/02/23  0608 12/01/23  0635 11/30/23  1042   ALBUMIN g/dL 2.6* 2.6* 2.6*     Estimated Creatinine Clearance: 45.5 mL/min (A) (by C-G formula based on SCr of 2.17 mg/dL (H)).  No results found for: \"CRP\"  Microbiology  Susceptibility data from last 14 days.  Collected Specimen Info Organism Amoxicillin/Clavulanate Ampicillin Ampicillin/Sulbactam Aztreonam Cefazolin Cefazolin (uncomplicated UTIs only) Cefepime Cefotaxime Ceftazidime Ceftriaxone Ciprofloxacin Ertapenem   11/28/23 Urine from Clean Catch/Voided Pseudomonas aeruginosa    R   S  S  R      " Klebsiella pneumoniae/variicola S R I R R R R R R R R S   11/28/23 Blood culture from Peripheral Venipuncture Micrococcus luteus                 Collected Specimen Info Organism Gentamicin Meropenem Nitrofurantoin Piperacillin/Tazobactam Tobramycin Trimethoprim/Sulfamethoxazole   11/28/23 Urine from Clean Catch/Voided Pseudomonas aeruginosa R   I I      Klebsiella pneumoniae/variicola R S S S R R   11/28/23 Blood culture from Peripheral Venipuncture Micrococcus luteus             11/28 CT abdomen    Diffuse urinary bladder wall thickening. Recommend clinical  correlation with urinalysis as findings could represent underlying  infectious process.  2. Mild retraction of right-sided percutaneous nephrostomy tube from  prior position in the right renal pelvis. Recommend clinical  correlation for obstruction.  3. Mild improvement in previously seen postsurgical changes as  evidenced by resolving pericystic and pelvic soft tissue stranding.  Additional resolution of previously seen fluid collection posterior  to the abdominal wall surgical site.  4. Stable position of bilateral ureteral stents terminating in the  augmented urinary bladder.  5. Resolution of previously seen fluid-filled bowel dilation.        Micro  10/04 urine culture   E. Coli resistant to quinolone, TMP-SMX and ampicillin  10/25 urine culture   Pseudomonas aeruginosa   resistant to cephalosporin and piperacillin-tazobactam quinolone  11/28   blood culture Micrococcus luteus 1/4 11/28 urine culture    >[id number] Pseudomonas aeruginosa .   [id number] Klebsiella pneumoniae  ESBL  Urine Culture >100,000 Pseudomonas aeruginosa Abnormal       20,000 - 80,000 Klebsiella pneumoniae/variicola Abnormal    Extended Spectrum Beta Lactamase (ESBL) producing organism        Resulting Agency: [facility]     Susceptibility     Pseudomonas aeruginosa Klebsiella pneumoniae/variicola     MICROSCAN MICROSCAN    $ Amoxicillin/Clavulanate  Susceptible    $$ Ampicillin  Resistant     $$$ Ampicillin/Sulbactam  Intermediate    $ Aztreonam Resistant Resistant    $ Cefazolin  Resistant     Cefazolin (uncomplicated UTIs only)  Resistant    $$ Cefepime Susceptible Resistant     Cefotaxime  Resistant    $$ Ceftazidime Susceptible Resistant     Ceftriaxone  Resistant    $ Ciprofloxacin Resistant Resistant    $$$$ Ertapenem  Susceptible    $ Gentamicin Resistant Resistant    $$$ Meropenem  Susceptible    $ Nitrofurantoin  Susceptible    $$ Piperacillin/Tazobactam Intermediate Susceptible    $ Tobramycin Intermediate Resistant    $ Trimethoprim/Sulfamethoxazole  Resistant               11/28 MRSA screen   negative            INFECTIOUS SYNOPSIS AND ASSESSMENT  57yo male with hx of crohn disease with enterovesicular fistula with intra abdominal abscess/hydronephrosis  s/p PCNT, SPC.  Ileal ureter /bladder augmentation presents with acute pyelonephritis with SIRS.   Urine culture ESBL klebsiella and enteric bacilli  this time.  Had DTR pseudomonas last month     Acute bilateral pyelonephritis   ESBL Klebsiella and DTR Pseudomonas aeruginosa in urine culture   S/p Ileal ureter and bladder augmentation  CKD  Micrococcus from blood culture - probably contamination        RECOMMENDATION     CONTINUE meropenem 1 gram q 12 hours x 14 days  If discharging sooner can put a midline in  On discharge please send a 1 time CBC with diff and CMP and fax reprtto [phone] attn Dr Min  Does not need additional ID follow up.        ID will SIGN OFF, please call/text with any questions.    The case was discussed with my attending , Dr. Ivan Min who agreed with my assessment and plan.     MARCELLUS ARVIZU M.D /  ID consult TEAM B  Infectious disease fellow PGY-4  Reach me through [employer] instead of paging if possible ( especially during noon conference )  Or page me through Team B  [phone]

## 2023-12-03 NOTE — CARE PLAN
The patient's goals for the shift include To reduce/eliminate nausea/vomiting; improve PO fluid intake    The clinical goals for the shift include Pt to remain free from injury    Over the shift, the patient did make progress toward the following goals.     Problem: Pain  Goal: Takes deep breaths with improved pain control throughout the shift  Outcome: Progressing  Goal: Turns in bed with improved pain control throughout the shift  Outcome: Progressing  Goal: Walks with improved pain control throughout the shift  Outcome: Progressing  Goal: Performs ADL's with improved pain control throughout shift  Outcome: Progressing  Goal: Participates in PT with improved pain control throughout the shift  Outcome: Progressing  Goal: Free from opioid side effects throughout the shift  Outcome: Progressing  Goal: Free from acute confusion related to pain meds throughout the shift  Outcome: Progressing     Problem: Skin  Goal: Decreased wound size/increased tissue granulation at next dressing change  Outcome: Progressing  Goal: Participates in plan/prevention/treatment measures  Outcome: Progressing  Goal: Prevent/manage excess moisture  Outcome: Progressing  Goal: Prevent/minimize sheer/friction injuries  Outcome: Progressing  Goal: Promote/optimize nutrition  Outcome: Progressing  Goal: Promote skin healing  Outcome: Progressing

## 2023-12-04 ENCOUNTER — TELEPHONE (OUTPATIENT)
Dept: RADIOLOGY | Facility: HOSPITAL | Age: 58
End: 2023-12-04

## 2023-12-04 LAB
ALBUMIN SERPL BCP-MCNC: 3.3 G/DL (ref 3.4–5)
ALP SERPL-CCNC: 85 U/L (ref 33–120)
ALT SERPL W P-5'-P-CCNC: 60 U/L (ref 10–52)
ANION GAP SERPL CALC-SCNC: 19 MMOL/L (ref 10–20)
AST SERPL W P-5'-P-CCNC: 39 U/L (ref 9–39)
BASOPHILS # BLD AUTO: 0.03 X10*3/UL (ref 0–0.1)
BASOPHILS NFR BLD AUTO: 0.5 %
BILIRUB SERPL-MCNC: 0.3 MG/DL (ref 0–1.2)
BUN SERPL-MCNC: 27 MG/DL (ref 6–23)
CALCIUM SERPL-MCNC: 8.7 MG/DL (ref 8.6–10.6)
CHLORIDE SERPL-SCNC: 109 MMOL/L (ref 98–107)
CO2 SERPL-SCNC: 17 MMOL/L (ref 21–32)
CREAT SERPL-MCNC: 1.72 MG/DL (ref 0.5–1.3)
EOSINOPHIL # BLD AUTO: 0.22 X10*3/UL (ref 0–0.7)
EOSINOPHIL NFR BLD AUTO: 3.4 %
ERYTHROCYTE [DISTWIDTH] IN BLOOD BY AUTOMATED COUNT: 15.2 % (ref 11.5–14.5)
GFR SERPL CREATININE-BSD FRML MDRD: 46 ML/MIN/1.73M*2
GLUCOSE SERPL-MCNC: 92 MG/DL (ref 74–99)
HCT VFR BLD AUTO: 27.8 % (ref 41–52)
HGB BLD-MCNC: 8.5 G/DL (ref 13.5–17.5)
IMM GRANULOCYTES # BLD AUTO: 0.08 X10*3/UL (ref 0–0.7)
IMM GRANULOCYTES NFR BLD AUTO: 1.2 % (ref 0–0.9)
LYMPHOCYTES # BLD AUTO: 1.58 X10*3/UL (ref 1.2–4.8)
LYMPHOCYTES NFR BLD AUTO: 24.6 %
MCH RBC QN AUTO: 27.7 PG (ref 26–34)
MCHC RBC AUTO-ENTMCNC: 30.6 G/DL (ref 32–36)
MCV RBC AUTO: 91 FL (ref 80–100)
MONOCYTES # BLD AUTO: 0.52 X10*3/UL (ref 0.1–1)
MONOCYTES NFR BLD AUTO: 8.1 %
NEUTROPHILS # BLD AUTO: 3.98 X10*3/UL (ref 1.2–7.7)
NEUTROPHILS NFR BLD AUTO: 62.2 %
NRBC BLD-RTO: 0 /100 WBCS (ref 0–0)
PLATELET # BLD AUTO: 403 X10*3/UL (ref 150–450)
POTASSIUM SERPL-SCNC: 3.6 MMOL/L (ref 3.5–5.3)
PROT SERPL-MCNC: 6.9 G/DL (ref 6.4–8.2)
RBC # BLD AUTO: 3.07 X10*6/UL (ref 4.5–5.9)
SODIUM SERPL-SCNC: 141 MMOL/L (ref 136–145)
WBC # BLD AUTO: 6.4 X10*3/UL (ref 4.4–11.3)

## 2023-12-04 PROCEDURE — 1170000001 HC PRIVATE ONCOLOGY ROOM DAILY

## 2023-12-04 PROCEDURE — C1751 CATH, INF, PER/CENT/MIDLINE: HCPCS

## 2023-12-04 PROCEDURE — 2500000004 HC RX 250 GENERAL PHARMACY W/ HCPCS (ALT 636 FOR OP/ED): Performed by: INTERNAL MEDICINE

## 2023-12-04 PROCEDURE — 2500000005 HC RX 250 GENERAL PHARMACY W/O HCPCS: Performed by: STUDENT IN AN ORGANIZED HEALTH CARE EDUCATION/TRAINING PROGRAM

## 2023-12-04 PROCEDURE — 96372 THER/PROPH/DIAG INJ SC/IM: CPT

## 2023-12-04 PROCEDURE — 2500000004 HC RX 250 GENERAL PHARMACY W/ HCPCS (ALT 636 FOR OP/ED)

## 2023-12-04 PROCEDURE — 36415 COLL VENOUS BLD VENIPUNCTURE: CPT | Performed by: STUDENT IN AN ORGANIZED HEALTH CARE EDUCATION/TRAINING PROGRAM

## 2023-12-04 PROCEDURE — 85025 COMPLETE CBC W/AUTO DIFF WBC: CPT | Performed by: STUDENT IN AN ORGANIZED HEALTH CARE EDUCATION/TRAINING PROGRAM

## 2023-12-04 PROCEDURE — 2780000003 HC OR 278 NO HCPCS

## 2023-12-04 PROCEDURE — 2500000004 HC RX 250 GENERAL PHARMACY W/ HCPCS (ALT 636 FOR OP/ED): Performed by: STUDENT IN AN ORGANIZED HEALTH CARE EDUCATION/TRAINING PROGRAM

## 2023-12-04 PROCEDURE — 84075 ASSAY ALKALINE PHOSPHATASE: CPT | Performed by: STUDENT IN AN ORGANIZED HEALTH CARE EDUCATION/TRAINING PROGRAM

## 2023-12-04 RX ORDER — HEPARIN SODIUM,PORCINE/PF 10 UNIT/ML
5 SYRINGE (ML) INTRAVENOUS AS NEEDED
Refills: 0 | Status: SHIPPED
Start: 2023-12-04 | End: 2023-12-13 | Stop reason: HOSPADM

## 2023-12-04 RX ORDER — MEROPENEM 1 G/1
1 INJECTION, POWDER, FOR SOLUTION INTRAVENOUS EVERY 12 HOURS
Qty: 18 G | Refills: 0 | Status: SHIPPED
Start: 2023-12-05 | End: 2023-12-04 | Stop reason: SDUPTHER

## 2023-12-04 RX ORDER — SODIUM CHLORIDE 0.9 % (FLUSH) 0.9 %
10 SYRINGE (ML) INJECTION DAILY
Qty: 140 ML | Refills: 0 | Status: SHIPPED
Start: 2023-12-04 | End: 2023-12-13 | Stop reason: HOSPADM

## 2023-12-04 RX ORDER — MEROPENEM 1 G/1
1 INJECTION, POWDER, FOR SOLUTION INTRAVENOUS EVERY 12 HOURS
Qty: 16 G | Refills: 0 | Status: SHIPPED
Start: 2023-12-05 | End: 2023-12-13 | Stop reason: HOSPADM

## 2023-12-04 RX ADMIN — HEPARIN SODIUM 5000 UNITS: 5000 INJECTION INTRAVENOUS; SUBCUTANEOUS at 10:59

## 2023-12-04 RX ADMIN — Medication 1000 MG: at 12:45

## 2023-12-04 RX ADMIN — Medication 1000 MG: at 01:02

## 2023-12-04 RX ADMIN — HEPARIN SODIUM 5000 UNITS: 5000 INJECTION INTRAVENOUS; SUBCUTANEOUS at 01:01

## 2023-12-04 RX ADMIN — ONDANSETRON 4 MG: 4 TABLET, ORALLY DISINTEGRATING ORAL at 01:01

## 2023-12-04 RX ADMIN — TAMSULOSIN HYDROCHLORIDE 0.4 MG: 0.4 CAPSULE ORAL at 10:59

## 2023-12-04 RX ADMIN — HEPARIN SODIUM 5000 UNITS: 5000 INJECTION INTRAVENOUS; SUBCUTANEOUS at 21:30

## 2023-12-04 ASSESSMENT — PAIN SCALES - GENERAL
PAINLEVEL_OUTOF10: 2
PAINLEVEL_OUTOF10: 3

## 2023-12-04 ASSESSMENT — PAIN - FUNCTIONAL ASSESSMENT: PAIN_FUNCTIONAL_ASSESSMENT: 0-10

## 2023-12-04 NOTE — PROGRESS NOTES
"HPI  Jose Thornton is a 58 y.o. male with history of Crohn's disease and CKD who was hospitalized in 11/2022 with pelvic abscess 2/2 perianal and colovesical fistula and was also found to have bilateral hydronephrosis s/p b/l nephrostomy tube placement (last exchanged dec), readmitted 12/2022 for acute diverticulitis s/p anterior proctosigmoidectomy with colostomy and colovesicular fistula takedown 12/20. 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 On discharge urethral andrade was removed and SPT and PCNTs were capped with plan for timed voids and PVRs via SPT release. After discharge, patient reported resumed nausea and vomiting after zofran prescription ran out, with increasing malaise, weakness, lightheadedness, intermittent fevers, and decreased PO intake with corresponding reduction in UOP. At follow-up outpatient urology appointment 11/28 he was found to be tachycardic to 133, hypotensive to 89/70, with a creatinine 6.53 and was sent to ED for possible dehydration vs sepsis. While in the ED, he was febrile to 101 with continued tachycardia, and with labs significant for creatinine of 5.73 and leukocytosis to 14.4 with UA +nitrites/+LE. Urine culture was +pseudomonas and klebsiella, blood culture +micrococcus luteus. He was started on vanc/zosyn changed to meorpenem per ID based on his kidney function and previous history of DTR-Pseudomonas. He was admitted to urology and is now improving on RNF.    Subjective   Reports feeling well, denies nausea/vomiting, bloating improved, appetite is \"ok\", has been drinking. Would like to send paperwork to Dr. Andersen's office.    Objective    Vital Signs:  Heart Rate:  []   Temp:  [36.5 °C (97.7 °F)-37.1 °C " (98.8 °F)]   Resp:  [18]   BP: ()/(54-79)   SpO2:  [93 %-99 %]     Intake/Output Summary (Last 24 hours) at 2023 0800  Last data filed at 2023 0525  Gross per 24 hour   Intake 1303.97 ml   Output 1877 ml   Net -573.03 ml          Results from last 72 hours   Lab Units 23  0514 23  0613 23  0608   CREATININE mg/dL 1.72* 1.89* 2.17*   HEMOGLOBIN g/dL 8.5* 8.4* 7.9*   WBC AUTO x10*3/uL 6.4 7.0 6.2   GLUCOSE mg/dL 92 94 84   POTASSIUM mmol/L 3.6 3.9 4.5        Physical Exam:   Constitutional: Alert, in NAD  HEENT: Normocephalic, atraumatic  Neuro: A&O x3  CV: regular rate  Pulm: Non-laboured breathing on room air  GI: Abdomen soft, non-distended, non-tender  : SPT capped. Nephrostomy tubes capped.  Skin: Incisions well healed. No lesions or discoloration noted.  Musculoskeletal: ADELE  Extremities: no edema or cyanosis noted    Current Medications:  heparin (porcine), 5,000 Units, subcutaneous, q8h  lidocaine, 5 mL, infiltration, Once  meropenem, 1,000 mg, intravenous, q12h  sennosides, 2 tablet, oral, BID  tamsulosin, 0.4 mg, oral, Daily      PRN medications: acetaminophen, cyclobenzaprine, HYDROmorphone, melatonin, ondansetron ODT, oxybutynin      Imagin/28 - Diffuse urinary bladder wall thickening c/f underlying infectious process. Mild retraction of right-sided percutaneous nephrostomy tube from prior position in the right renal pelvis. Recommend clinical correlation for obstruction. Mild improvement in previously seen postsurgical changes as evidenced by resolving pericystic and pelvic soft tissue stranding. Additional resolution of previously seen fluid collection posterior to the abdominal wall surgical site. Stable position of bilateral ureteral stents terminating in the augmented urinary bladder.    Assessment: UTI on meropenem per ID stop date , KADE on CKD likely multifactorial 2/2 dehydration, hypotension, infection improving (scr 1.72 from peak of  5.73)    Updates:  11/29: Afebrile, mild tachycardia, other VSS. sCr 4.87<5.73. WBC 12.4<14.4 on alejandro. UOP 1.1L. b/l PCNT (right flushed with difficulty, left flushed easily) and SPT uncapped with return of 400mL clear yellow urine.   12/1: Bilateral ureteral stents removed and SPT changed  12/2: B/l PCNTs capped  12/4: SPT capped, UOP good 1L, sCr downtrending 1.72    Plan:     Neuro:  - Tylenol 975 mg q6h PRN mile pain/fever, continue home flexeril 5mg PRN  - Zofran 4 mg q8h PRN for nausea    CV:   - VS q8hr    Heme:   #Chronic normocytic anemia of CKD stable [Hgb 8.5<8.4; baseline 11]  - No indication for transfusion at this time  - Daily CBC    Resp:  - Aggressive pulmonary toilet and incentive spirometry 10x/hour    GI/Diet:   #hx of Chron's   - Continue regular renal diet  - Boost clear  - Bowel regimen - continue home senna    /Renal:   #KADE on CKD, continues to resolve 1.72<1.89<2.17  - Maintain SPT and b/l PCNTs capped, check PVR post void by uncapping SPT and keeping diary of PVR drained from SPT   - Pull PCNTs at bedside today 12/4  - Strict I/Os  - Daily BMP  - HLIVF  - Continue home flomax    Endocrine:   - No current needs    MSK:  - OOB most of the day  - Ambulate at least 3x per day, more if tolerated  - PT on board, rec low intensity at discharge    ID:   #afebrile, ESBL Klebsiella and DTR Pseudomonas aeruginosa in urine culture, micrococcus from blood culture - probably contamination, WBC now wnl 6.4  - Follow-up ID recs:  - CONTINUE meropenem 1 gram q 12 hours x 14 days (until 12/13)  - PICC line for home abx administration, patient declines home health  - On discharge please send a 1 time CBC with diff and CMP and fax reprtto [phone] attn Dr Min  - Does not need additional ID follow up.    PPx: SCDs and SQH 5000 U q8H for DVT prophylaxis    Dispo: RNF, will stay inpatient until 12/13 for IV meropenem d/t insurance issues    Seen and discussed with chief resident Dr. Ferrer. To be  discussed with attending physician Dr. Andersen.    ---  Gracie Koenig PA-C  Service Pager [phone]

## 2023-12-04 NOTE — PROCEDURES
Vascular Access Team Procedure Note     Visit Date: 12/4/2023      Patient Name: Jose Thornton         MRN: [id number]             Procedure:MIDLINE  Pre-Procedure Checklist:  Emergent Line Insertion: No  Type of Line to be Placed: midline  Consent Obtained: verbal  Emergency Medication Necessary: No  Patient Identified with 2 Independent Identifiers: Yes  Review of Allergies, Anticoagulation, Relevant Labs, ECG/Telemetry: Yes  Risks/Benefits/Alternatives Discussed with Patient/POA/Legal Representative: Yes  Stop Sign on Door: Yes  Time Out Performed: Yes  Catheter Exchange: No    Positioning Checklist:  All People, Including Patient, in the Room with Cap and Mask: Yes  Fluoroscopy Used to Identify Vessel and Guide Insertion: No   Sterile Cover Used: Yes  Full Barrier Precautions Followed (Mask, Cap, Gown, Gloves): Yes  Hands Washed: Yes  Monitors Attached with Sound Alarms On: No  Full Body Sterile Drape (Head-to-Toe) Used to Cover Patient: Yes  Trendelenburg Position (For IJ and Subclavian): No  CHG Skin Prep Used and Allowed to Air Dry to Skin Procedure: Yes    Procedure Checklist:  Blood Aspirated From All Lumens, All Ports Subsequently Flushed: Yes  Catheter Caps Placed on All Lumens; Lumens Clamped: Yes  Maintain Guidewire Control Throughout, Ensuring Guidewire Removal: Yes  Maintain Sterile Field Throughout Insertion: Yes  Catheter Secured: Yes  Confirmatory Test of Venous Placement: Non-Pulsatile Blood    Post Procedure Checklist:  Date and Time Written on Dressing: Yes  Sharp and Wire Count and Safe Disposal of all Sharps/Wires: Yes  Sterile Dressing Applied Per Protocol: Yes  X-ray Ordered or ECG Image: n/a    PICC Insertion Details:  Size (Fr): 3  Lumen Type:sl  Catheter to Vein Ratio Less Than 50%: Yes  Total Length (cm): 12  External Length (cm): 0  Orientation: right  Location: basilic  Site Prep: Chlorohexidine; Usual sterile procedure followed  Local Anesthetic:  Injectable/Subcutaneous  Indication:antiibiotics  Insertion Team Members in the Room: Nurse, LPN  Initial Extremity Circumference (cm): 33  Insertion Attempts:1  Patient Tolerance: Tolerated Well, Age Appropriate  Comfort Measures: Subcutaneous anesthetic; Verbal  Procedure Location: Bedside  Safety Measures: Patient specific safety measures addressed with RN  Estimated Blood Loss (mL): 0  Vessel Fully Compressible Proximally and Distally to Insertion Site: Yes  Brisk Blood Return Obtained and Line Draws Easily: Yes  Tip Location:right axilla  Line Confirmation: peripheral  Lot #:[id number]  : Bard  PICC Line Exp Date:10/31/2024  Securement: Stat Lock  Post Procedure Checklist: Handoff with RN; Obtain all new IV tubing prior to use; Bed at lowest level and wheels locked; Line discharge information at bedside.  Additional Details: Line was inserted using Modified Seldinger's Technique.   Placed by: Avani Morrow RN-[facility]                      Avani Morrow RN  12/4/2023  11:16 AM

## 2023-12-05 LAB
ALBUMIN SERPL BCP-MCNC: 3 G/DL (ref 3.4–5)
ANION GAP SERPL CALC-SCNC: 15 MMOL/L (ref 10–20)
BUN SERPL-MCNC: 27 MG/DL (ref 6–23)
CALCIUM SERPL-MCNC: 8.5 MG/DL (ref 8.6–10.6)
CHLORIDE SERPL-SCNC: 110 MMOL/L (ref 98–107)
CO2 SERPL-SCNC: 21 MMOL/L (ref 21–32)
CREAT SERPL-MCNC: 1.85 MG/DL (ref 0.5–1.3)
GFR SERPL CREATININE-BSD FRML MDRD: 42 ML/MIN/1.73M*2
GLUCOSE SERPL-MCNC: 87 MG/DL (ref 74–99)
PHOSPHATE SERPL-MCNC: 3.4 MG/DL (ref 2.5–4.9)
POTASSIUM SERPL-SCNC: 4.2 MMOL/L (ref 3.5–5.3)
SODIUM SERPL-SCNC: 142 MMOL/L (ref 136–145)

## 2023-12-05 PROCEDURE — 1170000001 HC PRIVATE ONCOLOGY ROOM DAILY

## 2023-12-05 PROCEDURE — 2500000004 HC RX 250 GENERAL PHARMACY W/ HCPCS (ALT 636 FOR OP/ED)

## 2023-12-05 PROCEDURE — 84132 ASSAY OF SERUM POTASSIUM: CPT | Performed by: STUDENT IN AN ORGANIZED HEALTH CARE EDUCATION/TRAINING PROGRAM

## 2023-12-05 PROCEDURE — 96372 THER/PROPH/DIAG INJ SC/IM: CPT

## 2023-12-05 PROCEDURE — 2500000001 HC RX 250 WO HCPCS SELF ADMINISTERED DRUGS (ALT 637 FOR MEDICARE OP)

## 2023-12-05 PROCEDURE — 2500000004 HC RX 250 GENERAL PHARMACY W/ HCPCS (ALT 636 FOR OP/ED): Performed by: INTERNAL MEDICINE

## 2023-12-05 PROCEDURE — 2500000004 HC RX 250 GENERAL PHARMACY W/ HCPCS (ALT 636 FOR OP/ED): Performed by: STUDENT IN AN ORGANIZED HEALTH CARE EDUCATION/TRAINING PROGRAM

## 2023-12-05 RX ADMIN — TAMSULOSIN HYDROCHLORIDE 0.4 MG: 0.4 CAPSULE ORAL at 08:50

## 2023-12-05 RX ADMIN — Medication 1000 MG: at 01:09

## 2023-12-05 RX ADMIN — Medication 1000 MG: at 13:07

## 2023-12-05 RX ADMIN — HEPARIN SODIUM 5000 UNITS: 5000 INJECTION INTRAVENOUS; SUBCUTANEOUS at 13:07

## 2023-12-05 RX ADMIN — HEPARIN SODIUM 5000 UNITS: 5000 INJECTION INTRAVENOUS; SUBCUTANEOUS at 22:21

## 2023-12-05 RX ADMIN — HEPARIN SODIUM 5000 UNITS: 5000 INJECTION INTRAVENOUS; SUBCUTANEOUS at 05:25

## 2023-12-05 RX ADMIN — Medication 5 MG: at 22:25

## 2023-12-05 ASSESSMENT — PAIN SCALES - GENERAL
PAINLEVEL_OUTOF10: 2
PAINLEVEL_OUTOF10: 0 - NO PAIN

## 2023-12-05 NOTE — PROGRESS NOTES
HPI  Jose Thornton is a 58 y.o. male with history of Crohn's disease and CKD who was hospitalized in 11/2022 with pelvic abscess 2/2 perianal and colovesical fistula and was also found to have bilateral hydronephrosis s/p b/l nephrostomy tube placement (last exchanged dec), readmitted 12/2022 for acute diverticulitis s/p anterior proctosigmoidectomy with colostomy and colovesicular fistula takedown 12/20. 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 On discharge urethral andrade was removed and SPT and PCNTs were capped with plan for timed voids and PVRs via SPT release. After discharge, patient reported resumed nausea and vomiting after zofran prescription ran out, with increasing malaise, weakness, lightheadedness, intermittent fevers, and decreased PO intake with corresponding reduction in UOP. At follow-up outpatient urology appointment 11/28 he was found to be tachycardic to 133, hypotensive to 89/70, with a creatinine 6.53 and was sent to ED for possible dehydration vs sepsis. While in the ED, he was febrile to 101 with continued tachycardia, and with labs significant for creatinine of 5.73 and leukocytosis to 14.4 with UA +nitrites/+LE. Urine culture was +pseudomonas and klebsiella, blood culture +micrococcus luteus. He was started on vanc/zosyn changed to meorpenem per ID based on his kidney function and previous history of DTR-Pseudomonas. He was admitted to urology and is now improving on RNF.    Subjective   He was seen at bedside today and denies nausea/vomiting. He reports some pain this morning. VSS, afebrile, , SPT 1175.     Objective    Vital Signs:  Heart Rate:  [77-95]   Temp:  [35.4 °C (95.7 °F)-36.3 °C (97.3 °F)]   Resp:  [16-18]    BP: ()/(65-72)   SpO2:  [96 %-98 %]     Intake/Output Summary (Last 24 hours) at 2023 0716  Last data filed at 2023 0540  Gross per 24 hour   Intake 910 ml   Output 2200 ml   Net -1290 ml          Results from last 72 hours   Lab Units 23  0514 23  0613   CREATININE mg/dL 1.72* 1.89*   HEMOGLOBIN g/dL 8.5* 8.4*   WBC AUTO x10*3/uL 6.4 7.0   GLUCOSE mg/dL 92 94   POTASSIUM mmol/L 3.6 3.9        Physical Exam:   Constitutional: Alert, in NAD  HEENT: Normocephalic, atraumatic  Neuro: A&O x3  CV: regular rate  Pulm: Non-laboured breathing on room air  GI: Abdomen soft, non-distended, non-tender  : SPT capped.  Skin: Incisions well healed. No lesions or discoloration noted.  Musculoskeletal: ADELE  Extremities: no edema or cyanosis noted    Current Medications:  heparin (porcine), 5,000 Units, subcutaneous, q8h  lidocaine, 5 mL, infiltration, Once  meropenem, 1,000 mg, intravenous, q12h  sennosides, 2 tablet, oral, BID  tamsulosin, 0.4 mg, oral, Daily      PRN medications: acetaminophen, cyclobenzaprine, HYDROmorphone, melatonin, ondansetron ODT, oxybutynin      Imagin/28 - Diffuse urinary bladder wall thickening c/f underlying infectious process. Mild retraction of right-sided percutaneous nephrostomy tube from prior position in the right renal pelvis. Recommend clinical correlation for obstruction. Mild improvement in previously seen postsurgical changes as evidenced by resolving pericystic and pelvic soft tissue stranding. Additional resolution of previously seen fluid collection posterior to the abdominal wall surgical site. Stable position of bilateral ureteral stents terminating in the augmented urinary bladder.    Assessment: UTI on meropenem per ID stop date , KADE on CKD likely multifactorial 2/2 dehydration, hypotension, infection improving (scr 1.72 from peak of 5.73)    Updates:  : Afebrile, mild tachycardia, other VSS. sCr 4.87<5.73. WBC 12.4<14.4 on alejandro. UOP  1.1L. b/l PCNT (right flushed with difficulty, left flushed easily) and SPT uncapped with return of 400mL clear yellow urine.   12/1: Bilateral ureteral stents removed and SPT changed  12/2: B/l PCNTs capped  12/4: SPT capped, UOP good 1L, sCr downtrending 1.72     12/5: , SPT 1175.     Plan:     Neuro:  - Tylenol 975 mg q6h PRN mile pain/fever, continue home flexeril 5mg PRN  - Zofran 4 mg q8h PRN for nausea    CV:   - VS q8hr    Heme:   #Chronic normocytic anemia of CKD stable [Hgb 8.5<8.4; baseline 11]  - No indication for transfusion at this time  - Daily CBC    Resp:  - Aggressive pulmonary toilet and incentive spirometry 10x/hour    GI/Diet:   #hx of Chron's   - Continue regular renal diet  - Boost clear  - Bowel regimen - continue home senna    /Renal:   #KADE on CKD, continues to resolve 1.72<1.89<2.17  - Maintain SPT and b/l PCNTs capped, check PVR post void by uncapping SPT and keeping diary of PVR drained from SPT   - Strict I/Os  - Daily BMP  - HLIVF  - Continue home flomax    Endocrine:   - No current needs    MSK:  - OOB most of the day  - Ambulate at least 3x per day, more if tolerated  - PT on board, rec low intensity at discharge    ID:   #afebrile, ESBL Klebsiella and DTR Pseudomonas aeruginosa in urine culture, micrococcus from blood culture - probably contamination, WBC now wnl 6.4  - Follow-up ID recs:  - CONTINUE meropenem 1 gram q 12 hours x 14 days (until 12/13)  - PICC line for home abx administration, patient declines home health  - On discharge please send a 1 time CBC with diff and CMP and fax reprtto [phone] attn Dr Min  - Does not need additional ID follow up.    PPx: SCDs and SQH 5000 U q8H for DVT prophylaxis    Dispo: RNF, will stay inpatient until 12/13 for IV meropenem d/t insurance issues    Seen and discussed with chief resident Dr. Ferrer. To be discussed with attending physician Dr. Andersen.    Rosa Arteaga MD  Urology Resident [facility]  Urology [id number]

## 2023-12-06 PROCEDURE — 2500000004 HC RX 250 GENERAL PHARMACY W/ HCPCS (ALT 636 FOR OP/ED)

## 2023-12-06 PROCEDURE — 96372 THER/PROPH/DIAG INJ SC/IM: CPT

## 2023-12-06 PROCEDURE — 99231 SBSQ HOSP IP/OBS SF/LOW 25: CPT | Performed by: PHYSICIAN ASSISTANT

## 2023-12-06 PROCEDURE — 2500000004 HC RX 250 GENERAL PHARMACY W/ HCPCS (ALT 636 FOR OP/ED): Performed by: STUDENT IN AN ORGANIZED HEALTH CARE EDUCATION/TRAINING PROGRAM

## 2023-12-06 PROCEDURE — 2500000001 HC RX 250 WO HCPCS SELF ADMINISTERED DRUGS (ALT 637 FOR MEDICARE OP)

## 2023-12-06 PROCEDURE — 2500000004 HC RX 250 GENERAL PHARMACY W/ HCPCS (ALT 636 FOR OP/ED): Performed by: INTERNAL MEDICINE

## 2023-12-06 PROCEDURE — 1170000001 HC PRIVATE ONCOLOGY ROOM DAILY

## 2023-12-06 PROCEDURE — 2500000001 HC RX 250 WO HCPCS SELF ADMINISTERED DRUGS (ALT 637 FOR MEDICARE OP): Performed by: STUDENT IN AN ORGANIZED HEALTH CARE EDUCATION/TRAINING PROGRAM

## 2023-12-06 RX ADMIN — STANDARDIZED SENNA CONCENTRATE 17.2 MG: 8.6 TABLET ORAL at 08:30

## 2023-12-06 RX ADMIN — HEPARIN SODIUM 5000 UNITS: 5000 INJECTION INTRAVENOUS; SUBCUTANEOUS at 04:52

## 2023-12-06 RX ADMIN — SODIUM CHLORIDE, POTASSIUM CHLORIDE, SODIUM LACTATE AND CALCIUM CHLORIDE 50 ML/HR: 600; 310; 30; 20 INJECTION, SOLUTION INTRAVENOUS at 04:52

## 2023-12-06 RX ADMIN — TAMSULOSIN HYDROCHLORIDE 0.4 MG: 0.4 CAPSULE ORAL at 08:30

## 2023-12-06 RX ADMIN — Medication 5 MG: at 21:31

## 2023-12-06 RX ADMIN — Medication 1000 MG: at 00:09

## 2023-12-06 RX ADMIN — Medication 1000 MG: at 12:50

## 2023-12-06 RX ADMIN — HEPARIN SODIUM 5000 UNITS: 5000 INJECTION INTRAVENOUS; SUBCUTANEOUS at 21:31

## 2023-12-06 RX ADMIN — HEPARIN SODIUM 5000 UNITS: 5000 INJECTION INTRAVENOUS; SUBCUTANEOUS at 12:50

## 2023-12-06 ASSESSMENT — COGNITIVE AND FUNCTIONAL STATUS - GENERAL
DAILY ACTIVITIY SCORE: 24
MOBILITY SCORE: 24

## 2023-12-06 NOTE — PROGRESS NOTES
Occupational Therapy                 Therapy Communication Note    Patient Name: Jose Thornton  MRN: [id number]  Today's Date: 12/6/2023     Discipline: Occupational Therapy    Missed Visit Reason:  Pt met in hallway, OT observed pt completing multiple bouts of functional mobility at lengthy household distances with RW and mod I.  Pt denies further needs of skilled OT at this time; will discontinue current OT orders, please re-consult should there be a change in pt status.    Missed Time: Attempt    Anna Landaverde, OT

## 2023-12-06 NOTE — PROGRESS NOTES
"Jose Thornton is a 58 y.o. male on day 6 of admission presenting with Dehydration.    Subjective     NAEO. Patient complains of bladder spasms and urine leaking around SPT.  Tolerating diet, denies nausea/vomiting.        Objective     Physical Exam  Constitutional: Alert, in NAD  HEENT: Normocephalic, atraumatic  Neuro: A&O x3  CV: regular rate  Pulm: Non-laboured breathing on room air  GI: Abdomen soft, non-distended, non-tender  : urine leaking around SPT  Skin: Incisions well healed. No lesions or discoloration noted.  Musculoskeletal: ADELE  Extremities: no edema or cyanosis noted    Last Recorded Vitals  Blood pressure 106/70, pulse 75, temperature 36 °C (96.8 °F), resp. rate 16, height 1.829 m (6' 0.01\"), weight 95.3 kg (210 lb 1.6 oz), SpO2 95 %.  Intake/Output last 3 Shifts:  I/O last 3 completed shifts:  In: 910 (9.5 mL/kg) [P.O.:120; I.V.:690 (7.2 mL/kg); IV Piggyback:100]  Out: 3445 (36.1 mL/kg) [Urine:2770 (0.8 mL/kg/hr); Stool:675]  Weight: 95.3 kg     Relevant Results  Scheduled medications  heparin (porcine), 5,000 Units, subcutaneous, q8h  lidocaine, 5 mL, infiltration, Once  meropenem, 1,000 mg, intravenous, q12h  sennosides, 2 tablet, oral, BID  tamsulosin, 0.4 mg, oral, Daily      Continuous medications  lactated Ringer's, 50 mL/hr, Last Rate: 50 mL/hr (12/06/23 0452)      PRN medications  PRN medications: acetaminophen, cyclobenzaprine, HYDROmorphone, melatonin, ondansetron ODT, oxybutynin    Results for orders placed or performed during the hospital encounter of 11/28/23 (from the past 24 hour(s))   Renal function panel   Result Value Ref Range    Glucose 87 74 - 99 mg/dL    Sodium 142 136 - 145 mmol/L    Potassium 4.2 3.5 - 5.3 mmol/L    Chloride 110 (H) 98 - 107 mmol/L    Bicarbonate 21 21 - 32 mmol/L    Anion Gap 15 10 - 20 mmol/L    Urea Nitrogen 27 (H) 6 - 23 mg/dL    Creatinine 1.85 (H) 0.50 - 1.30 mg/dL    eGFR 42 (L) >60 mL/min/1.73m*2    Calcium 8.5 (L) 8.6 - 10.6 mg/dL    " Phosphorus 3.4 2.5 - 4.9 mg/dL    Albumin 3.0 (L) 3.4 - 5.0 g/dL     Imagin/28 - Diffuse urinary bladder wall thickening c/f underlying infectious process. Mild retraction of right-sided percutaneous nephrostomy tube from prior position in the right renal pelvis. Recommend clinical correlation for obstruction. Mild improvement in previously seen postsurgical changes as evidenced by resolving pericystic and pelvic soft tissue stranding. Additional resolution of previously seen fluid collection posterior to the abdominal wall surgical site. Stable position of bilateral ureteral stents terminating in the augmented urinary bladder. UTI on meropenem per ID stop date , KADE on CKD likely multifactorial 2/2 dehydration, hypotension, infection improving.                  Assessment/Plan   Principal Problem:    Dehydration  Active Problems:    Urinary tract infection without hematuria, site unspecified    Jose Thornton is a 58 y.o. male with history of Crohn's disease and CKD who was hospitalized in 2022 with pelvic abscess 2/2 perianal and colovesical fistula and was also found to have bilateral hydronephrosis s/p b/l nephrostomy tube placement (last exchanged dec), readmitted 2022 for acute diverticulitis s/p anterior proctosigmoidectomy with colostomy and colovesicular fistula takedown . 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 On discharge urethral andrade was removed and SPT and PCNTs were capped with plan for timed voids and PVRs via SPT release. After discharge, patient reported resumed nausea and vomiting after zofran prescription ran out, with increasing malaise, weakness, lightheadedness, intermittent fevers, and decreased PO  intake with corresponding reduction in UOP. At follow-up outpatient urology appointment 11/28 he was found to be tachycardic to 133, hypotensive to 89/70, with a creatinine 6.53 and was sent to ED for possible dehydration vs sepsis. While in the ED, he was febrile to 101 with continued tachycardia, and with labs significant for creatinine of 5.73 and leukocytosis to 14.4 with UA +nitrites/+LE. Urine culture was +pseudomonas and klebsiella, blood culture +micrococcus luteus. He was started on vanc/zosyn changed to meorpenem per ID based on his kidney function and previous history of DTR-Pseudomonas. He was admitted to urology and is now improving on RNF.       Updates:  11/29: Afebrile, mild tachycardia, other VSS. sCr 4.87<5.73. WBC 12.4<14.4 on alejandro. UOP 1.1L. b/l PCNT (right flushed with difficulty, left flushed easily) and SPT uncapped with return of 400mL clear yellow urine.   12/1: Bilateral ureteral stents removed and SPT changed  12/2: B/l PCNTs capped  12/4: SPT capped, UOP good 1L, sCr downtrending 1.72  12/5: , SPT 1175.   12/6: Urine leaking around SPT. Vitals stable, afebrile, SPT 1385cc, UOP 500cc. Awaiting labs today.     Plan:      Neuro:  - Tylenol 975 mg q6h PRN mile pain/fever, continue home flexeril 5mg PRN  - Zofran 4 mg q8h PRN for nausea     CV:   - VS q8hr     Heme:   #Chronic normocytic anemia of CKD stable [Hgb 8.5<8.4; baseline 11]  - No indication for transfusion at this time  - Daily CBC     Resp:  - Aggressive pulmonary toilet and incentive spirometry 10x/hour     GI/Diet:   #hx of Chron's   - Continue regular renal diet  - Boost clear  - Bowel regimen - continue home senna     /Renal:   #KADE on CKD, continues to resolve 1.72<1.89<2.17  - Maintain SPT and b/l PCNTs capped, check PVR post void by uncapping SPT and keeping diary of PVR drained from SPT   - Strict I/Os  - Daily BMP  - HLIVF  - Continue home flomax     Endocrine:   - No current needs     MSK:  - OOB most of the  day  - Ambulate at least 3x per day, more if tolerated  - PT on board, rec low intensity at discharge     ID:   #afebrile, ESBL Klebsiella and DTR Pseudomonas aeruginosa in urine culture, micrococcus from blood culture - probably contamination, WBC now wnl 6.4  - Follow-up ID recs:  - CONTINUE meropenem 1 gram q 12 hours x 14 days (until 12/13)  - PICC line for home abx administration, patient declines home health  - On discharge please send a 1 time CBC with diff and CMP and fax reprtto [phone] attn Dr Min  - Does not need additional ID follow up.     PPx: SCDs and SQH 5000 U q8H for DVT prophylaxis     Dispo: RNF, will stay inpatient until 12/13 for IV meropenem d/t insurance issues     Seen and discussed with chief resident Dr. Ferrer. To be discussed with attending physician Dr. Andersen.      --------------------------------------------  Bonita Gould PA-C  Urology  Consult Uro: [phone]  After 5pm and Weekends: [id number]

## 2023-12-06 NOTE — CARE PLAN
The patient's goals for the shift include To reduce/eliminate nausea/vomiting; improve PO fluid intake    The clinical goals for the shift include pt will be able to monitor our urine during the shift

## 2023-12-06 NOTE — PROGRESS NOTES
Physical Therapy                 Therapy Communication Note    Patient Name: Jose Thornton  MRN: [id number]  Today's Date: 12/6/2023     Discipline: Physical Therapy    Missed Visit Reason: Missed Visit Reason:  (1020. Encountered pt. ambulating in hallway [facility] with FWW. Pt. reports ambulating 2 laps at a time without difficulty. Pt. denied further PT needs at this time. Will d/c PT order, please reconsult if pt. status changes.)    Missed Time: Attempt    Comment:

## 2023-12-07 ENCOUNTER — HOME HEALTH ADMISSION (OUTPATIENT)
Dept: HOME HEALTH SERVICES | Facility: HOME HEALTH | Age: 58
End: 2023-12-07

## 2023-12-07 ENCOUNTER — APPOINTMENT (OUTPATIENT)
Dept: RADIOLOGY | Facility: CLINIC | Age: 58
End: 2023-12-07

## 2023-12-07 PROCEDURE — 99231 SBSQ HOSP IP/OBS SF/LOW 25: CPT | Performed by: PHYSICIAN ASSISTANT

## 2023-12-07 PROCEDURE — 2500000001 HC RX 250 WO HCPCS SELF ADMINISTERED DRUGS (ALT 637 FOR MEDICARE OP)

## 2023-12-07 PROCEDURE — 2500000004 HC RX 250 GENERAL PHARMACY W/ HCPCS (ALT 636 FOR OP/ED): Performed by: INTERNAL MEDICINE

## 2023-12-07 PROCEDURE — 2500000004 HC RX 250 GENERAL PHARMACY W/ HCPCS (ALT 636 FOR OP/ED)

## 2023-12-07 PROCEDURE — 1170000001 HC PRIVATE ONCOLOGY ROOM DAILY

## 2023-12-07 PROCEDURE — 96372 THER/PROPH/DIAG INJ SC/IM: CPT

## 2023-12-07 PROCEDURE — 2500000004 HC RX 250 GENERAL PHARMACY W/ HCPCS (ALT 636 FOR OP/ED): Performed by: STUDENT IN AN ORGANIZED HEALTH CARE EDUCATION/TRAINING PROGRAM

## 2023-12-07 RX ORDER — MIRABEGRON 25 MG/1
25 TABLET, FILM COATED, EXTENDED RELEASE ORAL DAILY
Status: DISCONTINUED | OUTPATIENT
Start: 2023-12-07 | End: 2023-12-07

## 2023-12-07 RX ADMIN — HEPARIN SODIUM 5000 UNITS: 5000 INJECTION INTRAVENOUS; SUBCUTANEOUS at 20:50

## 2023-12-07 RX ADMIN — Medication 1000 MG: at 01:00

## 2023-12-07 RX ADMIN — Medication 1000 MG: at 13:43

## 2023-12-07 RX ADMIN — Medication 5 MG: at 20:50

## 2023-12-07 RX ADMIN — HEPARIN SODIUM 5000 UNITS: 5000 INJECTION INTRAVENOUS; SUBCUTANEOUS at 13:43

## 2023-12-07 RX ADMIN — TAMSULOSIN HYDROCHLORIDE 0.4 MG: 0.4 CAPSULE ORAL at 08:30

## 2023-12-07 RX ADMIN — HEPARIN SODIUM 5000 UNITS: 5000 INJECTION INTRAVENOUS; SUBCUTANEOUS at 06:26

## 2023-12-07 ASSESSMENT — PAIN SCALES - GENERAL
PAINLEVEL_OUTOF10: 0 - NO PAIN
PAINLEVEL_OUTOF10: 0 - NO PAIN

## 2023-12-07 ASSESSMENT — COGNITIVE AND FUNCTIONAL STATUS - GENERAL
MOBILITY SCORE: 24
DAILY ACTIVITIY SCORE: 24

## 2023-12-07 NOTE — CARE PLAN
Problem: Pain  Goal: Takes deep breaths with improved pain control throughout the shift  Outcome: Progressing  Goal: Turns in bed with improved pain control throughout the shift  Outcome: Progressing  Goal: Walks with improved pain control throughout the shift  Outcome: Progressing  Goal: Performs ADL's with improved pain control throughout shift  Outcome: Progressing  Goal: Participates in PT with improved pain control throughout the shift  Outcome: Progressing  Goal: Free from opioid side effects throughout the shift  Outcome: Progressing  Goal: Free from acute confusion related to pain meds throughout the shift  Outcome: Progressing     Problem: Skin  Goal: Decreased wound size/increased tissue granulation at next dressing change  Outcome: Progressing  Goal: Participates in plan/prevention/treatment measures  Outcome: Progressing  Goal: Prevent/manage excess moisture  Outcome: Progressing  Goal: Prevent/minimize sheer/friction injuries  Outcome: Progressing  Goal: Promote/optimize nutrition  Outcome: Progressing  Goal: Promote skin healing  Outcome: Progressing   The patient's goals for the shift include To reduce/eliminate nausea/vomiting; improve PO fluid intake    The clinical goals for the shift include Urine output >30 ml/ hr

## 2023-12-07 NOTE — PROGRESS NOTES
"Jose Thornton is a 58 y.o. male on day 7 of admission presenting with Dehydration.    Subjective     NAEO. Patient continues to have bladder spasms and urine leaking around SPT. Tolerating diet, denies nausea/vomiting.        Objective     Physical Exam  Constitutional: Alert, in NAD  HEENT: Normocephalic, atraumatic  Neuro: A&O x3  CV: regular rate  Pulm: Non-laboured breathing on room air  GI: Abdomen soft, non-distended, non-tender, ostomy with stool and gas in bag  : urine leaking around SPT, dressings in place, clear yellow urine in urinal next to bed  Skin: Incisions well healed. No lesions or discoloration noted.  Musculoskeletal: ADELE  Extremities: no edema or cyanosis noted    Last Recorded Vitals  Blood pressure 106/72, pulse 91, temperature 36.1 °C (97 °F), temperature source Temporal, resp. rate 16, height 1.829 m (6' 0.01\"), weight 95.3 kg (210 lb 1.6 oz), SpO2 98 %.  Intake/Output last 3 Shifts:  I/O last 3 completed shifts:  In: - (0 mL/kg)   Out: 2786 (29.2 mL/kg) [Urine:2111 (0.6 mL/kg/hr); Stool:675]  Weight: 95.3 kg     Relevant Results  Scheduled medications  heparin (porcine), 5,000 Units, subcutaneous, q8h  lidocaine, 5 mL, infiltration, Once  meropenem, 1,000 mg, intravenous, q12h  sennosides, 2 tablet, oral, BID  tamsulosin, 0.4 mg, oral, Daily      Continuous medications  lactated Ringer's, 10 mL/hr, Last Rate: 50 mL/hr (12/06/23 0452)      PRN medications  PRN medications: acetaminophen, cyclobenzaprine, HYDROmorphone, melatonin, ondansetron ODT, oxybutynin    No results found for this or any previous visit (from the past 24 hour(s)).                    Assessment/Plan   Principal Problem:    Dehydration  Active Problems:    Urinary tract infection without hematuria, site unspecified    Jose Thornton is a 58 y.o. male with history of Crohn's disease and CKD who was hospitalized in 11/2022 with pelvic abscess 2/2 perianal and colovesical fistula and was also found to have bilateral " hydronephrosis s/p b/l nephrostomy tube placement (last exchanged dec), readmitted 12/2022 for acute diverticulitis s/p anterior proctosigmoidectomy with colostomy and colovesicular fistula takedown 12/20. 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 On discharge urethral andrade was removed and SPT and PCNTs were capped with plan for timed voids and PVRs via SPT release. After discharge, patient reported resumed nausea and vomiting after zofran prescription ran out, with increasing malaise, weakness, lightheadedness, intermittent fevers, and decreased PO intake with corresponding reduction in UOP. At follow-up outpatient urology appointment 11/28 he was found to be tachycardic to 133, hypotensive to 89/70, with a creatinine 6.53 and was sent to ED for possible dehydration vs sepsis. While in the ED, he was febrile to 101 with continued tachycardia, and with labs significant for creatinine of 5.73 and leukocytosis to 14.4 with UA +nitrites/+LE. Urine culture was +pseudomonas and klebsiella, blood culture +micrococcus luteus. He was started on vanc/zosyn changed to Meropenem per ID based on his kidney function and previous history of DTR-Pseudomonas. He was admitted to urology and is now improving on RNF.       Updates:  11/29: Afebrile, mild tachycardia, other VSS. sCr 4.87<5.73. WBC 12.4<14.4 on alejandro. UOP 1.1L. b/l PCNT (right flushed with difficulty, left flushed easily) and SPT uncapped with return of 400mL clear yellow urine.   12/1: Bilateral ureteral stents removed and SPT changed  12/2: B/l PCNTs capped  12/4: SPT capped, UOP good 1L, sCr downtrending 1.72  12/5: , SPT 1175.   12/6: Urine leaking around SPT (capped) 1350cc, but increased urine  output, now 750cc recorded. Vitals stable, afebrile.    Plan:      Neuro:  - Tylenol 975 mg q6h PRN mile pain/fever, continue home flexeril 5mg PRN  - Zofran 4 mg q8h PRN for nausea     CV:   - VS q8hr     Heme:   #Chronic normocytic anemia of CKD stable [Hgb 8.5<8.4; baseline 11]  - No indication for transfusion at this time  - Daily CBC     Resp:  - Aggressive pulmonary toilet and incentive spirometry 10x/hour     GI/Diet:   #hx of Crohn's   - Continue regular renal diet  - Boost clear  - Bowel regimen - continue home senna     /Renal:   #KADE on CKD, continues to resolve 1.85     - Maintain SPT and b/l PCNTs capped, check PVR post void by uncapping SPT and keeping diary of PVR drained from SPT     - Strict I/Os  - Daily BMP  - HLIVF  - Continue home flomax     Endocrine:   - No current needs     MSK:  - OOB most of the day  - Ambulate at least 3x per day, more if tolerated  - PT on board, rec low intensity at discharge     ID:   #afebrile, ESBL Klebsiella and DTR Pseudomonas aeruginosa in urine culture, micrococcus from blood culture - probably contamination, WBC now wnl 6.4  - Follow-up ID recs:  - CONTINUE meropenem 1 gram q 12 hours x 14 days (until 12/13)  - PICC line for home abx administration, patient declines home health  - On discharge please send a 1 time CBC with diff and CMP and fax reprtto [phone] attn Dr Min  - Does not need additional ID follow up.     PPx: SCDs and SQH 5000 U q8H for DVT prophylaxis     Dispo: RNF, will stay inpatient until 12/13 for IV meropenem d/t insurance issues     Seen and discussed with chief resident Dr. Ferrer. To be discussed with attending physician Dr. Andersen.      --------------------------------------------  Bonita Gould PA-C  Urology  Consult Uro: [id number]  After 5pm and Weekends: [id number]

## 2023-12-07 NOTE — CARE PLAN
The patient's goals for the shift include To reduce/eliminate nausea/vomiting; improve PO fluid intake    The clinical goals for the shift include pt is  able to monitor urine ouput

## 2023-12-07 NOTE — CONSULTS
"Nutrition Follow Up Assessment:   Nutrition Assessment    Reason for Assessment: Dietitian discretion (Follow up)    Patient is a 58 y.o. male presenting with dehydration. Pt has crohn's disease and colovesical fistula who underwent Exploratory laparotomy, MYA, Cystoscopy, bilateral ileal ureter and bladder augment with right partial rectus muscle flap, and Suprapubic tube insertion on 10/27/23. On 11/28 he was found to be tachycardic, hypotensive, with a creatinine 6.53 and was sent to ED for possible dehydration vs sepsis.     Nutrition History:  Energy Intake: Good > 75 %  Food and Nutrient History: Spoke to pt at bedside. Pt states that he has been eating well as well as has his appetite back. Pt states that he has been ordering around 3 meals a day. Pt has been drinking around 2 Ensure Clears a day. Pt states that he was experiencing some taste changes when drinking Ensure Clears, but the drinks are starting to taste normally again. Pt is hoping to get to drink 3 Ensure Clears a day. Pt states that he has not been able to get a full nights rest due to always having to get up to use the bathroom. Pt is eager to get back home.  Food Allergies/Intolerances:  None  GI Symptoms: None  Oral Problems: None     Anthropometrics:  Height: 182.9 cm (6' 0.01\")   Weight: 95.3 kg (210 lb 1.6 oz)   BMI (Calculated): 28.49  IBW/kg (Dietitian Calculated): 80.9 kg  Percent of IBW: 118 %     Weight History:   Wt Readings from Last 5 Encounters:   11/29/23 95.3 kg (210 lb 1.6 oz)   11/28/23 99.3 kg (219 lb)   11/21/23 99.3 kg (219 lb)   11/16/23 113 kg (249 lb 1.9 oz)   10/05/23 115 kg (253 lb 4.9 oz)       Nutrition Focused Physical Exam Findings:  defer: refer to 11/29 nutrition note for findings.  Edema:  Edema: none  Physical Findings:  Hair: Negative  Eyes: Negative  Mouth: Negative  Nails: Negative  Skin: Positive (incisions)    Nutrition Significant Labs:  BMP Trend:   Results from last 7 days   Lab Units 12/05/23  0728 " 12/04/23  0514 12/03/23  0613 12/02/23  0608   GLUCOSE mg/dL 87 92 94 84   CALCIUM mg/dL 8.5* 8.7 8.8 8.3*   SODIUM mmol/L 142 141 142 141   POTASSIUM mmol/L 4.2 3.6 3.9 4.5   CO2 mmol/L 21 17* 21 25   CHLORIDE mmol/L 110* 109* 109* 108*   BUN mg/dL 27* 27* 29* 38*   CREATININE mg/dL 1.85* 1.72* 1.89* 2.17*      Nutrition Specific Medications:  Senokot    I/O:   Last BM Date: 12/07/23 (ostomy); Stool Appearance: Soft, Watery (12/07/23 0830)      Dietary Orders (From admission, onward)       Start     Ordered    11/29/23 0830  Oral nutritional supplements  Until discontinued        Question Answer Comment   Deliver with All meals    Select supplement: Ensure Clear        11/29/23 0830 11/28/23 1737  Adult diet Renal; Potassium Restricted 2 gm (50mEq); 2 - 3 grams Sodium; Phosphorus 1 gm  Diet effective now        Question Answer Comment   Diet type Renal    Potassium restriction: Potassium Restricted 2 gm (50mEq)    Sodium restriction: 2 - 3 grams Sodium    Phosphorus: Phosphorus 1 gm        11/28/23 1738                     Estimated Needs:   Total Energy Estimated Needs (kCal): 2190 kCal  Method for Estimating Needs: 1825 (REE MSJ) X 1.2-1.3  Total Protein Estimated Needs (g): 97 g  Method for Estimating Needs: 80.9 kg X 1.2 g/kg   Total Fluid Estimated Needs (mL): 2190 mL  Method for Estimating Needs: 1 mL/kcal        Nutrition Diagnosis   Malnutrition Diagnosis  Patient has Malnutrition Diagnosis: No    Nutrition Diagnosis  Patient has Nutrition Diagnosis: Yes  Diagnosis Status (1): Ongoing  Nutrition Diagnosis 1: Increased nutrient needs  Related to (1): increased metabolic demand  As Evidenced by (1): colovesical fistula s/p takedown now c/b failure to thrive post-op.       Nutrition Interventions/Recommendations         Nutrition Prescription:  Start adult regular diet.  Potassium, phosphorus, and sodium are in normal range. Can now advance diet to a regular diet which will help him meet his goals  nutritionally.  Continue to check lab values.  Continue Ensure Clears TID.      Nutrition Monitoring and Evaluation   Food/Nutrient Related History Monitoring  Monitoring and Evaluation Plan: Energy intake  Energy Intake: Estimated energy intake  Criteria: Meeting >75% of estimated needs    Body Composition/Growth/Weight History  Monitoring and Evaluation Plan: Weight  Criteria: Maintain wt    Biochemical Data, Medical Tests and Procedures  Monitoring and Evaluation Plan: Electrolyte/renal panel  Electrolyte and Renal Panel: Magnesium, Phosphorus, Potassium, Calcium, serum  Criteria: WNL      Time Spent/Follow-up Reminder:   Follow Up  Time Spent (min): 30 minutes  Last Date of Nutrition Visit: 12/07/23  Nutrition Follow-Up Needed?: Dietitian to reassess per policy

## 2023-12-08 LAB
ANION GAP SERPL CALC-SCNC: 15 MMOL/L (ref 10–20)
BUN SERPL-MCNC: 26 MG/DL (ref 6–23)
CALCIUM SERPL-MCNC: 8.7 MG/DL (ref 8.6–10.6)
CHLORIDE SERPL-SCNC: 107 MMOL/L (ref 98–107)
CO2 SERPL-SCNC: 19 MMOL/L (ref 21–32)
CREAT SERPL-MCNC: 1.5 MG/DL (ref 0.5–1.3)
ERYTHROCYTE [DISTWIDTH] IN BLOOD BY AUTOMATED COUNT: 15.3 % (ref 11.5–14.5)
GFR SERPL CREATININE-BSD FRML MDRD: 54 ML/MIN/1.73M*2
GLUCOSE SERPL-MCNC: 102 MG/DL (ref 74–99)
HCT VFR BLD AUTO: 29.6 % (ref 41–52)
HGB BLD-MCNC: 9.3 G/DL (ref 13.5–17.5)
MCH RBC QN AUTO: 27.8 PG (ref 26–34)
MCHC RBC AUTO-ENTMCNC: 31.4 G/DL (ref 32–36)
MCV RBC AUTO: 89 FL (ref 80–100)
NRBC BLD-RTO: 0 /100 WBCS (ref 0–0)
PLATELET # BLD AUTO: 395 X10*3/UL (ref 150–450)
POTASSIUM SERPL-SCNC: 4.1 MMOL/L (ref 3.5–5.3)
RBC # BLD AUTO: 3.34 X10*6/UL (ref 4.5–5.9)
SODIUM SERPL-SCNC: 137 MMOL/L (ref 136–145)
WBC # BLD AUTO: 5.4 X10*3/UL (ref 4.4–11.3)

## 2023-12-08 PROCEDURE — 85027 COMPLETE CBC AUTOMATED: CPT | Performed by: STUDENT IN AN ORGANIZED HEALTH CARE EDUCATION/TRAINING PROGRAM

## 2023-12-08 PROCEDURE — 2500000004 HC RX 250 GENERAL PHARMACY W/ HCPCS (ALT 636 FOR OP/ED): Performed by: STUDENT IN AN ORGANIZED HEALTH CARE EDUCATION/TRAINING PROGRAM

## 2023-12-08 PROCEDURE — 2500000004 HC RX 250 GENERAL PHARMACY W/ HCPCS (ALT 636 FOR OP/ED): Performed by: INTERNAL MEDICINE

## 2023-12-08 PROCEDURE — 2500000001 HC RX 250 WO HCPCS SELF ADMINISTERED DRUGS (ALT 637 FOR MEDICARE OP)

## 2023-12-08 PROCEDURE — 80048 BASIC METABOLIC PNL TOTAL CA: CPT | Performed by: STUDENT IN AN ORGANIZED HEALTH CARE EDUCATION/TRAINING PROGRAM

## 2023-12-08 PROCEDURE — 96372 THER/PROPH/DIAG INJ SC/IM: CPT

## 2023-12-08 PROCEDURE — 1170000001 HC PRIVATE ONCOLOGY ROOM DAILY

## 2023-12-08 PROCEDURE — 2500000004 HC RX 250 GENERAL PHARMACY W/ HCPCS (ALT 636 FOR OP/ED)

## 2023-12-08 RX ORDER — TAMSULOSIN HYDROCHLORIDE 0.4 MG/1
0.4 CAPSULE ORAL 2 TIMES DAILY
Status: DISCONTINUED | OUTPATIENT
Start: 2023-12-08 | End: 2023-12-11

## 2023-12-08 RX ORDER — TAMSULOSIN HYDROCHLORIDE 0.4 MG/1
0.4 CAPSULE ORAL 2 TIMES DAILY
Status: DISCONTINUED | OUTPATIENT
Start: 2023-12-08 | End: 2023-12-08

## 2023-12-08 RX ADMIN — TAMSULOSIN HYDROCHLORIDE 0.4 MG: 0.4 CAPSULE ORAL at 12:37

## 2023-12-08 RX ADMIN — TAMSULOSIN HYDROCHLORIDE 0.4 MG: 0.4 CAPSULE ORAL at 20:40

## 2023-12-08 RX ADMIN — HEPARIN SODIUM 5000 UNITS: 5000 INJECTION INTRAVENOUS; SUBCUTANEOUS at 06:19

## 2023-12-08 RX ADMIN — Medication 5 MG: at 20:40

## 2023-12-08 RX ADMIN — Medication 1000 MG: at 00:46

## 2023-12-08 RX ADMIN — HEPARIN SODIUM 5000 UNITS: 5000 INJECTION INTRAVENOUS; SUBCUTANEOUS at 20:40

## 2023-12-08 RX ADMIN — Medication 1000 MG: at 12:37

## 2023-12-08 RX ADMIN — HEPARIN SODIUM 5000 UNITS: 5000 INJECTION INTRAVENOUS; SUBCUTANEOUS at 12:36

## 2023-12-08 ASSESSMENT — COGNITIVE AND FUNCTIONAL STATUS - GENERAL
MOBILITY SCORE: 24
DAILY ACTIVITIY SCORE: 24

## 2023-12-08 NOTE — PROGRESS NOTES
"Jose Thornton is a 58 y.o. male on day 8 of admission presenting with Dehydration.    Subjective     NAEON.   Patient was seen is am.   Notes still logging UOP with PVRs from the SPT.  Added on flomax BID to aid with urination, noted hypotension as SE- patient agreeable    Objective     Physical Exam  Constitutional: Alert, in NAD  HEENT: Normocephalic, atraumatic  Neuro: A&O x3  CV: regular rate  Pulm: Non-laboured breathing on room air  GI: Abdomen soft, non-distended, non-tender, ostomy with stool and gas in bag  : urine leaking around SPT, dressings in place, clear yellow urine in urinal next to bed  Skin: Incisions well healed. No lesions or discoloration noted.  Musculoskeletal: ADELE  Extremities: no edema or cyanosis noted    Last Recorded Vitals  Blood pressure 109/76, pulse 96, temperature 36.4 °C (97.5 °F), temperature source Temporal, resp. rate 16, height 1.829 m (6' 0.01\"), weight 95.3 kg (210 lb 1.6 oz), SpO2 98 %.  Intake/Output last 3 Shifts:  I/O last 3 completed shifts:  In: 100 (1 mL/kg) [IV Piggyback:100]  Out: 2818 (29.6 mL/kg) [Urine:2193 (0.6 mL/kg/hr); Stool:625]  Weight: 95.3 kg     Relevant Results  Scheduled medications  heparin (porcine), 5,000 Units, subcutaneous, q8h  lidocaine, 5 mL, infiltration, Once  meropenem, 1,000 mg, intravenous, q12h  sennosides, 2 tablet, oral, BID  tamsulosin, 0.4 mg, oral, BID      Continuous medications  lactated Ringer's, 10 mL/hr, Last Rate: 10 mL/hr (12/07/23 0652)      PRN medications  PRN medications: acetaminophen, cyclobenzaprine, HYDROmorphone, melatonin, ondansetron ODT, oxybutynin    No results found for this or any previous visit (from the past 24 hour(s)).                    Assessment/Plan   Principal Problem:    Dehydration  Active Problems:    Urinary tract infection without hematuria, site unspecified    Jose Thornton is a 58 y.o. male with history of Crohn's disease and CKD who was hospitalized in 11/2022 with pelvic abscess 2/2 " perianal and colovesical fistula and was also found to have bilateral hydronephrosis s/p b/l nephrostomy tube placement (last exchanged dec), readmitted 12/2022 for acute diverticulitis s/p anterior proctosigmoidectomy with colostomy and colovesicular fistula takedown 12/20. 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 On discharge urethral andrade was removed and SPT and PCNTs were capped with plan for timed voids and PVRs via SPT release. After discharge, patient reported resumed nausea and vomiting after zofran prescription ran out, with increasing malaise, weakness, lightheadedness, intermittent fevers, and decreased PO intake with corresponding reduction in UOP. At follow-up outpatient urology appointment 11/28 he was found to be tachycardic to 133, hypotensive to 89/70, with a creatinine 6.53 and was sent to ED for possible dehydration vs sepsis. While in the ED, he was febrile to 101 with continued tachycardia, and with labs significant for creatinine of 5.73 and leukocytosis to 14.4 with UA +nitrites/+LE. Urine culture was +pseudomonas and klebsiella, blood culture +micrococcus luteus. He was started on vanc/zosyn changed to Meropenem per ID based on his kidney function and previous history of DTR-Pseudomonas. He was admitted to urology and is now improving on RNF.       Updates:  11/29: Afebrile, mild tachycardia, other VSS. sCr 4.87<5.73. WBC 12.4<14.4 on alejandro. UOP 1.1L. b/l PCNT (right flushed with difficulty, left flushed easily) and SPT uncapped with return of 400mL clear yellow urine.   12/1: Bilateral ureteral stents removed and SPT changed  12/2: B/l PCNTs capped  12/4: SPT capped, UOP good 1L, sCr downtrending 1.72  12/5: , SPT 1175.    12/6: Urine leaking around SPT (capped) 1350cc, but increased urine output, now 750cc recorded. Vitals stable, afebrile.  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  - Maintain SPT and b/l PCNTs capped, check PVR post void by uncapping SPT and keeping diary of PVR drained from SPT   - Strict I/Os  - Daily BMP  - HLIVF  - Continue home flomax, now BID     Endocrine:   - No current needs     MSK:  - OOB most of the day  - Ambulate at least 3x per day, more if tolerated  - PT on board, rec low intensity at discharge     ID:   #afebrile, ESBL Klebsiella and DTR Pseudomonas aeruginosa in urine culture, micrococcus from blood culture - probably contamination, WBC now wnl 6.4  - Follow-up ID recs:  - CONTINUE meropenem 1 gram q 12 hours x 14 days (until 12/13)  - PICC line for home abx administration, patient declines home health  - On discharge please send a 1 time CBC with diff and CMP and fax reprtto [phone] attn Dr Min  - Does not need additional ID follow up.     PPx: SCDs and SQH 5000 U q8H for DVT prophylaxis     Dispo: RNF, will stay inpatient until 12/13 for IV meropenem d/t insurance issues     Seen and discussed with chief resident Dr. Ferrer. To be discussed with attending physician Dr. Andersen.    Nicole Castle MD   Urology  Pager: [phone]

## 2023-12-09 PROCEDURE — 96372 THER/PROPH/DIAG INJ SC/IM: CPT

## 2023-12-09 PROCEDURE — 2500000004 HC RX 250 GENERAL PHARMACY W/ HCPCS (ALT 636 FOR OP/ED): Performed by: STUDENT IN AN ORGANIZED HEALTH CARE EDUCATION/TRAINING PROGRAM

## 2023-12-09 PROCEDURE — 1170000001 HC PRIVATE ONCOLOGY ROOM DAILY

## 2023-12-09 PROCEDURE — 2500000004 HC RX 250 GENERAL PHARMACY W/ HCPCS (ALT 636 FOR OP/ED)

## 2023-12-09 PROCEDURE — 2500000001 HC RX 250 WO HCPCS SELF ADMINISTERED DRUGS (ALT 637 FOR MEDICARE OP)

## 2023-12-09 RX ADMIN — Medication 1000 MG: at 12:47

## 2023-12-09 RX ADMIN — Medication 5 MG: at 21:59

## 2023-12-09 RX ADMIN — Medication 1000 MG: at 00:54

## 2023-12-09 RX ADMIN — TAMSULOSIN HYDROCHLORIDE 0.4 MG: 0.4 CAPSULE ORAL at 08:51

## 2023-12-09 RX ADMIN — HEPARIN SODIUM 5000 UNITS: 5000 INJECTION INTRAVENOUS; SUBCUTANEOUS at 14:33

## 2023-12-09 RX ADMIN — HEPARIN SODIUM 5000 UNITS: 5000 INJECTION INTRAVENOUS; SUBCUTANEOUS at 05:51

## 2023-12-09 RX ADMIN — HEPARIN SODIUM 5000 UNITS: 5000 INJECTION INTRAVENOUS; SUBCUTANEOUS at 21:59

## 2023-12-09 NOTE — PROGRESS NOTES
"Jose Thornton is a 58 y.o. male on day 9 of admission presenting with Dehydration.    Per  Infusion patient isn't agreeable to cost for IV ATB.  Patient would be responsible for $26.66/Day for IV ATB.        Physical Exam    Last Recorded Vitals  Blood pressure 107/74, pulse 91, temperature 36.9 °C (98.4 °F), resp. rate 18, height 1.829 m (6' 0.01\"), weight 95.3 kg (210 lb 1.6 oz), SpO2 98 %.  Intake/Output last 3 Shifts:  I/O last 3 completed shifts:  In: 300 (3.1 mL/kg) [IV Piggyback:300]  Out: 2530 (26.5 mL/kg) [Urine:1780 (0.5 mL/kg/hr); Stool:750]  Weight: 95.3 kg           Assessment/Plan   Principal Problem:    Dehydration  Active Problems:    Urinary tract infection without hematuria, site unspecified           Min Ham RN      "

## 2023-12-09 NOTE — PROGRESS NOTES
"Jose Thornton is a 58 y.o. male on day 9 of admission presenting with Dehydration.    Subjective     NAEON.   One mild episode of light-headedness after standing up last night but it self-resolved quickly. Feels his bladder emptying is improving.    Objective     Physical Exam  Constitutional: Alert, in NAD  HEENT: Normocephalic, atraumatic  Neuro: A&O x3  CV: regular rate  Pulm: Non-laboured breathing on room air  GI: Abdomen soft, non-distended, non-tender, ostomy with stool and gas in bag  : urine leaking around SPT, dressings in place, clear yellow urine in urinal next to bed  Skin: Incisions well healed. No lesions or discoloration noted.  Musculoskeletal: ADELE  Extremities: no edema or cyanosis noted    Last Recorded Vitals  Blood pressure 100/69, pulse 80, temperature 36.6 °C (97.9 °F), temperature source Temporal, resp. rate 16, height 1.829 m (6' 0.01\"), weight 95.3 kg (210 lb 1.6 oz), SpO2 97 %.  Intake/Output last 3 Shifts:  I/O last 3 completed shifts:  In: 300 (3.1 mL/kg) [IV Piggyback:300]  Out: 2530 (26.5 mL/kg) [Urine:1780 (0.5 mL/kg/hr); Stool:750]  Weight: 95.3 kg     Relevant Results  Scheduled medications  heparin (porcine), 5,000 Units, subcutaneous, q8h  lidocaine, 5 mL, infiltration, Once  meropenem, 1,000 mg, intravenous, q12h  sennosides, 2 tablet, oral, BID  tamsulosin, 0.4 mg, oral, BID      Continuous medications  lactated Ringer's, 10 mL/hr, Last Rate: 10 mL/hr (12/07/23 0652)      PRN medications  PRN medications: acetaminophen, cyclobenzaprine, HYDROmorphone, melatonin, ondansetron ODT, oxybutynin    Results for orders placed or performed during the hospital encounter of 11/28/23 (from the past 24 hour(s))   Basic metabolic panel   Result Value Ref Range    Glucose 102 (H) 74 - 99 mg/dL    Sodium 137 136 - 145 mmol/L    Potassium 4.1 3.5 - 5.3 mmol/L    Chloride 107 98 - 107 mmol/L    Bicarbonate 19 (L) 21 - 32 mmol/L    Anion Gap 15 10 - 20 mmol/L    Urea Nitrogen 26 (H) 6 - 23 " mg/dL    Creatinine 1.50 (H) 0.50 - 1.30 mg/dL    eGFR 54 (L) >60 mL/min/1.73m*2    Calcium 8.7 8.6 - 10.6 mg/dL   CBC   Result Value Ref Range    WBC 5.4 4.4 - 11.3 x10*3/uL    nRBC 0.0 0.0 - 0.0 /100 WBCs    RBC 3.34 (L) 4.50 - 5.90 x10*6/uL    Hemoglobin 9.3 (L) 13.5 - 17.5 g/dL    Hematocrit 29.6 (L) 41.0 - 52.0 %    MCV 89 80 - 100 fL    MCH 27.8 26.0 - 34.0 pg    MCHC 31.4 (L) 32.0 - 36.0 g/dL    RDW 15.3 (H) 11.5 - 14.5 %    Platelets 395 150 - 450 x10*3/uL                       Assessment/Plan   Principal Problem:    Dehydration  Active Problems:    Urinary tract infection without hematuria, site unspecified    oJse Thornton is a 58 y.o. male with history of Crohn's disease and CKD who was hospitalized in 11/2022 with pelvic abscess 2/2 perianal and colovesical fistula and was also found to have bilateral hydronephrosis s/p b/l nephrostomy tube placement (last exchanged dec), readmitted 12/2022 for acute diverticulitis s/p anterior proctosigmoidectomy with colostomy and colovesicular fistula takedown 12/20. 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 On discharge urethral andrade was removed and SPT and PCNTs were capped with plan for timed voids and PVRs via SPT release. After discharge, patient reported resumed nausea and vomiting after zofran prescription ran out, with increasing malaise, weakness, lightheadedness, intermittent fevers, and decreased PO intake with corresponding reduction in UOP. At follow-up outpatient urology appointment 11/28 he was found to be tachycardic to 133, hypotensive to 89/70, with a creatinine 6.53 and was sent to ED for possible dehydration vs sepsis. While in the ED, he was febrile to 101 with continued  tachycardia, and with labs significant for creatinine of 5.73 and leukocytosis to 14.4 with UA +nitrites/+LE. Urine culture was +pseudomonas and klebsiella, blood culture +micrococcus luteus. He was started on vanc/zosyn changed to Meropenem per ID based on his kidney function and previous history of DTR-Pseudomonas. He was admitted to urology and is now improving on RNF.       Updates:  11/29: Afebrile, mild tachycardia, other VSS. sCr 4.87<5.73. WBC 12.4<14.4 on alejandro. UOP 1.1L. b/l PCNT (right flushed with difficulty, left flushed easily) and SPT uncapped with return of 400mL clear yellow urine.   12/1: Bilateral ureteral stents removed and SPT changed  12/2: B/l PCNTs capped  12/4: SPT capped, UOP good 1L, sCr downtrending 1.72  12/5: , SPT 1175.   12/6: Urine leaking around SPT (capped) 1350cc, but increased urine output, now 750cc recorded. Vitals stable, afebrile.  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  - Maintain SPT and b/l PCNTs capped, check PVR post void by uncapping SPT and keeping diary of PVR drained from SPT   - Strict I/Os  - Daily BMP  - HLIVF  - Continue home flomax (now BID)     Endocrine:   - No current needs     MSK:  - OOB most of the day  - Ambulate at least 3x per day, more if tolerated  - PT on board, rec low intensity at discharge     ID:   #afebrile, ESBL Klebsiella and DTR Pseudomonas aeruginosa in urine culture, micrococcus from blood culture -  probably contamination, WBC now wnl 6.4  - Follow-up ID recs:  - CONTINUE meropenem 1 gram q 12 hours x 14 days (until 12/13)  - PICC line for home abx administration, patient declines home health  - On discharge please send a 1 time CBC with diff and CMP and fax reprtto [phone] attn Dr Min  - Does not need additional ID follow up.     PPx: SCDs and SQH 5000 U q8H for DVT prophylaxis     Dispo: RNF, will stay inpatient until 12/13 for IV meropenem d/t insurance issues     Seen and discussed with chief resident Dr. Ferrer. To be discussed with attending physician Dr. Andersen.    Naren Martinez MD  Urologic Surgery PGY-2  Adult Urology: [id number]    Pediatric Urology: [id number]

## 2023-12-10 PROCEDURE — 2500000004 HC RX 250 GENERAL PHARMACY W/ HCPCS (ALT 636 FOR OP/ED)

## 2023-12-10 PROCEDURE — 1170000001 HC PRIVATE ONCOLOGY ROOM DAILY

## 2023-12-10 PROCEDURE — 96372 THER/PROPH/DIAG INJ SC/IM: CPT

## 2023-12-10 PROCEDURE — 2500000004 HC RX 250 GENERAL PHARMACY W/ HCPCS (ALT 636 FOR OP/ED): Performed by: STUDENT IN AN ORGANIZED HEALTH CARE EDUCATION/TRAINING PROGRAM

## 2023-12-10 RX ADMIN — HEPARIN SODIUM 5000 UNITS: 5000 INJECTION INTRAVENOUS; SUBCUTANEOUS at 21:21

## 2023-12-10 RX ADMIN — TAMSULOSIN HYDROCHLORIDE 0.4 MG: 0.4 CAPSULE ORAL at 09:36

## 2023-12-10 RX ADMIN — Medication 1000 MG: at 00:34

## 2023-12-10 RX ADMIN — HEPARIN SODIUM 5000 UNITS: 5000 INJECTION INTRAVENOUS; SUBCUTANEOUS at 12:32

## 2023-12-10 RX ADMIN — Medication 1000 MG: at 12:32

## 2023-12-10 RX ADMIN — HEPARIN SODIUM 5000 UNITS: 5000 INJECTION INTRAVENOUS; SUBCUTANEOUS at 06:21

## 2023-12-10 ASSESSMENT — PAIN SCALES - GENERAL
PAINLEVEL_OUTOF10: 0 - NO PAIN

## 2023-12-10 ASSESSMENT — COGNITIVE AND FUNCTIONAL STATUS - GENERAL
MOBILITY SCORE: 24
DAILY ACTIVITIY SCORE: 24

## 2023-12-10 NOTE — CARE PLAN
Problem: Pain  Goal: Takes deep breaths with improved pain control throughout the shift  Outcome: Progressing  Goal: Turns in bed with improved pain control throughout the shift  Outcome: Progressing  Goal: Walks with improved pain control throughout the shift  Outcome: Progressing  Goal: Performs ADL's with improved pain control throughout shift  Outcome: Progressing  Goal: Participates in PT with improved pain control throughout the shift  Outcome: Progressing  Goal: Free from opioid side effects throughout the shift  Outcome: Progressing  Goal: Free from acute confusion related to pain meds throughout the shift  Outcome: Progressing     Problem: Skin  Goal: Decreased wound size/increased tissue granulation at next dressing change  Outcome: Progressing  Goal: Participates in plan/prevention/treatment measures  Outcome: Progressing  Goal: Prevent/manage excess moisture  Outcome: Progressing  Goal: Prevent/minimize sheer/friction injuries  Outcome: Progressing  Goal: Promote/optimize nutrition  Outcome: Progressing  Goal: Promote skin healing  Outcome: Progressing

## 2023-12-10 NOTE — CARE PLAN
The patient's goals for the shift include To reduce/eliminate nausea/vomiting; improve PO fluid intake    The clinical goals for the shift include urine output > 30 ml/hr      Problem: Pain  Goal: Takes deep breaths with improved pain control throughout the shift  Outcome: Progressing  Goal: Turns in bed with improved pain control throughout the shift  Outcome: Progressing  Goal: Walks with improved pain control throughout the shift  Outcome: Progressing

## 2023-12-10 NOTE — PROGRESS NOTES
"Jose Thornton is a 58 y.o. male on day 10 of admission presenting with Dehydration.    Subjective     NAEON.   Slept well, no new concerns.    Objective     Physical Exam  Constitutional: Alert, in NAD  HEENT: Normocephalic, atraumatic  Neuro: A&O x3  CV: regular rate  Pulm: Non-laboured breathing on room air  GI: Abdomen soft, non-distended, non-tender, ostomy with stool and gas in bag  : urine leaking around SPT, dressings in place, clear yellow urine in urinal next to bed  Skin: Incisions well healed. No lesions or discoloration noted.  Musculoskeletal: ADELE  Extremities: no edema or cyanosis noted    Last Recorded Vitals  Blood pressure 95/61, pulse 87, temperature 36 °C (96.8 °F), resp. rate 18, height 1.829 m (6' 0.01\"), weight 95.3 kg (210 lb 1.6 oz), SpO2 99 %.  Intake/Output last 3 Shifts:  I/O last 3 completed shifts:  In: 1120 (11.8 mL/kg) [P.O.:720; IV Piggyback:400]  Out: 3050 (32 mL/kg) [Urine:1950 (0.6 mL/kg/hr); Stool:1100]  Weight: 95.3 kg     Relevant Results  Scheduled medications  heparin (porcine), 5,000 Units, subcutaneous, q8h  lidocaine, 5 mL, infiltration, Once  meropenem, 1,000 mg, intravenous, q12h  sennosides, 2 tablet, oral, BID  tamsulosin, 0.4 mg, oral, BID      Continuous medications  lactated Ringer's, 10 mL/hr, Last Rate: 10 mL/hr (12/07/23 0652)      PRN medications  PRN medications: acetaminophen, cyclobenzaprine, HYDROmorphone, melatonin, ondansetron ODT, oxybutynin    No results found for this or any previous visit (from the past 24 hour(s)).                      Assessment/Plan   Principal Problem:    Dehydration  Active Problems:    Urinary tract infection without hematuria, site unspecified    Jose Thornton is a 58 y.o. male with history of Crohn's disease and CKD who was hospitalized in 11/2022 with pelvic abscess 2/2 perianal and colovesical fistula and was also found to have bilateral hydronephrosis s/p b/l nephrostomy tube placement (last exchanged dec), readmitted " 12/2022 for acute diverticulitis s/p anterior proctosigmoidectomy with colostomy and colovesicular fistula takedown 12/20. 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 On discharge urethral andrade was removed and SPT and PCNTs were capped with plan for timed voids and PVRs via SPT release. After discharge, patient reported resumed nausea and vomiting after zofran prescription ran out, with increasing malaise, weakness, lightheadedness, intermittent fevers, and decreased PO intake with corresponding reduction in UOP. At follow-up outpatient urology appointment 11/28 he was found to be tachycardic to 133, hypotensive to 89/70, with a creatinine 6.53 and was sent to ED for possible dehydration vs sepsis. While in the ED, he was febrile to 101 with continued tachycardia, and with labs significant for creatinine of 5.73 and leukocytosis to 14.4 with UA +nitrites/+LE. Urine culture was +pseudomonas and klebsiella, blood culture +micrococcus luteus. He was started on vanc/zosyn changed to Meropenem per ID based on his kidney function and previous history of DTR-Pseudomonas. He was admitted to urology and is now improving on RNF.       Updates:  11/29: Afebrile, mild tachycardia, other VSS. sCr 4.87<5.73. WBC 12.4<14.4 on alejandro. UOP 1.1L. b/l PCNT (right flushed with difficulty, left flushed easily) and SPT uncapped with return of 400mL clear yellow urine.   12/1: Bilateral ureteral stents removed and SPT changed  12/2: B/l PCNTs capped  12/4: SPT capped, UOP good 1L, sCr downtrending 1.72  12/5: , SPT 1175.   12/6: Urine leaking around SPT (capped) 1350cc, but increased urine output, now 750cc recorded. Vitals stable, afebrile.  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  - Maintain SPT and b/l PCNTs capped, check PVR post void by uncapping SPT and keeping diary of PVR drained from SPT   - Strict I/Os  - Daily BMP  - HLIVF  - Continue home flomax (now BID)     Endocrine:   - No current needs     MSK:  - OOB most of the day  - Ambulate at least 3x per day, more if tolerated  - PT on board, rec low intensity at discharge     ID:   #afebrile, ESBL Klebsiella and DTR Pseudomonas aeruginosa in urine culture, micrococcus from blood culture - probably contamination, WBC now wnl 6.4  - Follow-up ID recs:  - CONTINUE meropenem 1 gram q 12 hours x 14 days (until 12/13)  - PICC line for home abx administration, patient declines home health  - On discharge please send a 1 time CBC with diff and CMP and fax reprtto [phone] attn Dr Min  - Does not need additional ID follow up.     PPx: SCDs and SQH 5000 U q8H for DVT prophylaxis     Dispo: RNF, will stay inpatient until 12/13 for IV meropenem d/t insurance issues     Seen and discussed with chief resident Dr. Ferrer. To be discussed with attending physician Dr. Andersen.    Naren Martinez MD  Urologic Surgery PGY-2  Adult Urology: [id number]    Pediatric Urology: [id number]

## 2023-12-11 ENCOUNTER — APPOINTMENT (OUTPATIENT)
Dept: UROLOGY | Facility: CLINIC | Age: 58
End: 2023-12-11

## 2023-12-11 LAB
ANION GAP SERPL CALC-SCNC: 16 MMOL/L (ref 10–20)
BUN SERPL-MCNC: 27 MG/DL (ref 6–23)
CALCIUM SERPL-MCNC: 9.4 MG/DL (ref 8.6–10.6)
CHLORIDE SERPL-SCNC: 109 MMOL/L (ref 98–107)
CO2 SERPL-SCNC: 17 MMOL/L (ref 21–32)
CREAT SERPL-MCNC: 1.79 MG/DL (ref 0.5–1.3)
ERYTHROCYTE [DISTWIDTH] IN BLOOD BY AUTOMATED COUNT: 15.7 % (ref 11.5–14.5)
GFR SERPL CREATININE-BSD FRML MDRD: 44 ML/MIN/1.73M*2
GLUCOSE SERPL-MCNC: 122 MG/DL (ref 74–99)
HCT VFR BLD AUTO: 34.5 % (ref 41–52)
HGB BLD-MCNC: 10.4 G/DL (ref 13.5–17.5)
MCH RBC QN AUTO: 27.2 PG (ref 26–34)
MCHC RBC AUTO-ENTMCNC: 30.1 G/DL (ref 32–36)
MCV RBC AUTO: 90 FL (ref 80–100)
NRBC BLD-RTO: 0 /100 WBCS (ref 0–0)
PLATELET # BLD AUTO: 496 X10*3/UL (ref 150–450)
POTASSIUM SERPL-SCNC: 4.1 MMOL/L (ref 3.5–5.3)
RBC # BLD AUTO: 3.82 X10*6/UL (ref 4.5–5.9)
SODIUM SERPL-SCNC: 138 MMOL/L (ref 136–145)
WBC # BLD AUTO: 8.7 X10*3/UL (ref 4.4–11.3)

## 2023-12-11 PROCEDURE — 80048 BASIC METABOLIC PNL TOTAL CA: CPT | Performed by: STUDENT IN AN ORGANIZED HEALTH CARE EDUCATION/TRAINING PROGRAM

## 2023-12-11 PROCEDURE — 2500000004 HC RX 250 GENERAL PHARMACY W/ HCPCS (ALT 636 FOR OP/ED)

## 2023-12-11 PROCEDURE — 1170000001 HC PRIVATE ONCOLOGY ROOM DAILY

## 2023-12-11 PROCEDURE — 2500000004 HC RX 250 GENERAL PHARMACY W/ HCPCS (ALT 636 FOR OP/ED): Performed by: STUDENT IN AN ORGANIZED HEALTH CARE EDUCATION/TRAINING PROGRAM

## 2023-12-11 PROCEDURE — 96372 THER/PROPH/DIAG INJ SC/IM: CPT

## 2023-12-11 PROCEDURE — 85027 COMPLETE CBC AUTOMATED: CPT | Performed by: STUDENT IN AN ORGANIZED HEALTH CARE EDUCATION/TRAINING PROGRAM

## 2023-12-11 RX ORDER — GUAIFENESIN 600 MG/1
600 TABLET, EXTENDED RELEASE ORAL 2 TIMES DAILY PRN
Status: DISCONTINUED | OUTPATIENT
Start: 2023-12-11 | End: 2023-12-11

## 2023-12-11 RX ORDER — TAMSULOSIN HYDROCHLORIDE 0.4 MG/1
0.4 CAPSULE ORAL DAILY
Status: DISCONTINUED | OUTPATIENT
Start: 2023-12-12 | End: 2023-12-13 | Stop reason: HOSPADM

## 2023-12-11 RX ADMIN — Medication 1000 MG: at 00:07

## 2023-12-11 RX ADMIN — TAMSULOSIN HYDROCHLORIDE 0.4 MG: 0.4 CAPSULE ORAL at 08:05

## 2023-12-11 RX ADMIN — HEPARIN SODIUM 5000 UNITS: 5000 INJECTION INTRAVENOUS; SUBCUTANEOUS at 13:30

## 2023-12-11 RX ADMIN — HEPARIN SODIUM 5000 UNITS: 5000 INJECTION INTRAVENOUS; SUBCUTANEOUS at 21:30

## 2023-12-11 RX ADMIN — HEPARIN SODIUM 5000 UNITS: 5000 INJECTION INTRAVENOUS; SUBCUTANEOUS at 05:52

## 2023-12-11 RX ADMIN — Medication 1000 MG: at 12:46

## 2023-12-11 ASSESSMENT — COGNITIVE AND FUNCTIONAL STATUS - GENERAL
MOBILITY SCORE: 24
DAILY ACTIVITIY SCORE: 24

## 2023-12-11 ASSESSMENT — PAIN SCALES - GENERAL: PAINLEVEL_OUTOF10: 0 - NO PAIN

## 2023-12-11 NOTE — CARE PLAN
The patient's goals for the shift include To reduce/eliminate nausea/vomiting; improve PO fluid intake    The clinical goals for the shift include remain free from fall during the shift    Problem: Pain  Goal: Takes deep breaths with improved pain control throughout the shift  Outcome: Progressing  Goal: Turns in bed with improved pain control throughout the shift  Outcome: Progressing  Goal: Walks with improved pain control throughout the shift  Outcome: Progressing  Goal: Participates in PT with improved pain control throughout the shift  Outcome: Progressing

## 2023-12-11 NOTE — PROGRESS NOTES
"Jose Thornton is a 58 y.o. male on day 11 of admission presenting with Dehydration.    Subjective     NAEON.   Tolerating diet  Urine volume increasing, SPT/ PVR down    Objective     Physical Exam  Constitutional: Alert, in NAD  HEENT: Normocephalic, atraumatic  Neuro: A&O x3  CV: regular rate  Pulm: Non-laboured breathing on room air  GI: Abdomen soft, non-distended, non-tender, ostomy with stool and gas in bag  : urine leaking around SPT, dressings in place, clear yellow urine in urinal next to bed  Skin: Incisions well healed. No lesions or discoloration noted.  Musculoskeletal: ADELE  Extremities: no edema or cyanosis noted    Last Recorded Vitals  Blood pressure 104/71, pulse 84, temperature 36.4 °C (97.5 °F), resp. rate 16, height 1.829 m (6' 0.01\"), weight 95.3 kg (210 lb 1.6 oz), SpO2 98 %.  Intake/Output last 3 Shifts:  I/O last 3 completed shifts:  In: 900 (9.4 mL/kg) [P.O.:600; IV Piggyback:300]  Out: 3012 (31.6 mL/kg) [Urine:2387 (0.7 mL/kg/hr); Stool:625]  Weight: 95.3 kg     Relevant Results  Scheduled medications  heparin (porcine), 5,000 Units, subcutaneous, q8h  lidocaine, 5 mL, infiltration, Once  meropenem, 1,000 mg, intravenous, q12h  sennosides, 2 tablet, oral, BID  tamsulosin, 0.4 mg, oral, BID      Continuous medications  lactated Ringer's, 10 mL/hr, Last Rate: 10 mL/hr (12/07/23 0652)      PRN medications  PRN medications: acetaminophen, cyclobenzaprine, HYDROmorphone, melatonin, ondansetron ODT, oxybutynin    No results found for this or any previous visit (from the past 24 hour(s)).                      Assessment/Plan   Principal Problem:    Dehydration  Active Problems:    Urinary tract infection without hematuria, site unspecified    Jose Thornton is a 58 y.o. male with history of Crohn's disease and CKD who was hospitalized in 11/2022 with pelvic abscess 2/2 perianal and colovesical fistula and was also found to have bilateral hydronephrosis s/p b/l nephrostomy tube placement " (last exchanged dec), readmitted 12/2022 for acute diverticulitis s/p anterior proctosigmoidectomy with colostomy and colovesicular fistula takedown 12/20. 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 On discharge urethral andrade was removed and SPT and PCNTs were capped with plan for timed voids and PVRs via SPT release. After discharge, patient reported resumed nausea and vomiting after zofran prescription ran out, with increasing malaise, weakness, lightheadedness, intermittent fevers, and decreased PO intake with corresponding reduction in UOP. At follow-up outpatient urology appointment 11/28 he was found to be tachycardic to 133, hypotensive to 89/70, with a creatinine 6.53 and was sent to ED for possible dehydration vs sepsis. While in the ED, he was febrile to 101 with continued tachycardia, and with labs significant for creatinine of 5.73 and leukocytosis to 14.4 with UA +nitrites/+LE. Urine culture was +pseudomonas and klebsiella, blood culture +micrococcus luteus. He was started on vanc/zosyn changed to Meropenem per ID based on his kidney function and previous history of DTR-Pseudomonas. He was admitted to urology and is now improving on RNF.       Updates:  11/29: Afebrile, mild tachycardia, other VSS. sCr 4.87<5.73. WBC 12.4<14.4 on alejandro. UOP 1.1L. b/l PCNT (right flushed with difficulty, left flushed easily) and SPT uncapped with return of 400mL clear yellow urine.   12/1: Bilateral ureteral stents removed and SPT changed  12/2: B/l PCNTs capped  12/4: SPT capped, UOP good 1L, sCr downtrending 1.72  12/5: , SPT 1175.   12/6: Urine leaking around SPT (capped) 1350cc, but increased urine output, now 750cc recorded. Vitals stable,  afebrile.  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  - Maintain SPT and b/l PCNTs capped, check PVR post void by uncapping SPT and keeping diary of PVR drained from SPT   - Strict I/Os  - Daily BMP  - HLIVF  - Continue home flomax (now BID)     Endocrine:   - No current needs     MSK:  - OOB most of the day  - Ambulate at least 3x per day, more if tolerated  - PT on board, rec low intensity at discharge     ID:   #afebrile, ESBL Klebsiella and DTR Pseudomonas aeruginosa in urine culture, micrococcus from blood culture - probably contamination, WBC now wnl 6.4  - Follow-up ID recs:  - CONTINUE meropenem 1 gram q 12 hours x 14 days (until 12/13)  - PICC line for home abx administration, patient declines home health  - On discharge please send a 1 time CBC with diff and CMP and fax reprtto [phone] attn Dr Min  - Does not need additional ID follow up.     PPx: SCDs and SQH 5000 U q8H for DVT prophylaxis     Dispo: RNF, will stay inpatient until 12/13 for IV meropenem d/t insurance issues     Seen and discussed with chief resident Dr. Ferrer. To be discussed with attending physician Dr. Andersen.    Nicole Castle MD   Urology  Pager: [phone]

## 2023-12-12 PROCEDURE — 96372 THER/PROPH/DIAG INJ SC/IM: CPT

## 2023-12-12 PROCEDURE — 1170000001 HC PRIVATE ONCOLOGY ROOM DAILY

## 2023-12-12 PROCEDURE — 2500000004 HC RX 250 GENERAL PHARMACY W/ HCPCS (ALT 636 FOR OP/ED): Performed by: STUDENT IN AN ORGANIZED HEALTH CARE EDUCATION/TRAINING PROGRAM

## 2023-12-12 PROCEDURE — 2500000004 HC RX 250 GENERAL PHARMACY W/ HCPCS (ALT 636 FOR OP/ED)

## 2023-12-12 RX ORDER — MEROPENEM 1 G/1
1000 INJECTION, POWDER, FOR SOLUTION INTRAVENOUS EVERY 12 HOURS
Status: COMPLETED | OUTPATIENT
Start: 2023-12-12 | End: 2023-12-13

## 2023-12-12 RX ADMIN — Medication 1000 MG: at 12:44

## 2023-12-12 RX ADMIN — Medication 1000 MG: at 01:10

## 2023-12-12 RX ADMIN — HEPARIN SODIUM 5000 UNITS: 5000 INJECTION INTRAVENOUS; SUBCUTANEOUS at 13:17

## 2023-12-12 RX ADMIN — HEPARIN SODIUM 5000 UNITS: 5000 INJECTION INTRAVENOUS; SUBCUTANEOUS at 21:20

## 2023-12-12 RX ADMIN — HEPARIN SODIUM 5000 UNITS: 5000 INJECTION INTRAVENOUS; SUBCUTANEOUS at 05:46

## 2023-12-12 RX ADMIN — TAMSULOSIN HYDROCHLORIDE 0.4 MG: 0.4 CAPSULE ORAL at 08:46

## 2023-12-12 ASSESSMENT — PAIN SCALES - GENERAL
PAINLEVEL_OUTOF10: 0 - NO PAIN

## 2023-12-12 ASSESSMENT — PAIN - FUNCTIONAL ASSESSMENT
PAIN_FUNCTIONAL_ASSESSMENT: 0-10

## 2023-12-12 NOTE — PROGRESS NOTES
Jose Thornton is a 58 y.o. male on day 12 of admission presenting with Dehydration.    TCC Note    Plan per Medical/Surgical Team:Continue on Meropenem through tomorrow.   Status: Inpatient  Payor Source: Self pay  Discharge disposition: home no needs  Expected date of discharge: 12/13/23  Barriers:  Primary Oncologist:  Preferred home care agency:  Patient declined HC services.    Will continue to follow patient for any discharge needs.

## 2023-12-12 NOTE — PROGRESS NOTES
"Jose Thornton is a 58 y.o. male on day 12 of admission presenting with Dehydration.    Subjective     NAEON.   Tolerating diet  PVRs stable    Long conversation regarding patients new anatomy     Objective     Physical Exam  Constitutional: Alert, in NAD  HEENT: Normocephalic, atraumatic  Neuro: A&O x3  CV: regular rate  Pulm: Non-laboured breathing on room air  GI: Abdomen soft, non-distended, non-tender, ostomy with stool and gas in bag  : urine leaking around SPT, dressings in place, clear yellow urine in urinal next to bed  Skin: Incisions well healed. No lesions or discoloration noted.  Musculoskeletal: ADELE  Extremities: no edema or cyanosis noted    Last Recorded Vitals  Blood pressure 103/71, pulse 106, temperature 36 °C (96.8 °F), temperature source Skin, resp. rate 18, height 1.829 m (6' 0.01\"), weight 95.3 kg (210 lb 1.6 oz), SpO2 98 %.  Intake/Output last 3 Shifts:  I/O last 3 completed shifts:  In: 1060 (11.1 mL/kg) [P.O.:960; IV Piggyback:100]  Out: 2640 (27.7 mL/kg) [Urine:2190 (0.6 mL/kg/hr); Stool:450]  Weight: 95.3 kg     Relevant Results  Scheduled medications  heparin (porcine), 5,000 Units, subcutaneous, q8h  lidocaine, 5 mL, infiltration, Once  meropenem, 1,000 mg, intravenous, q12h  sennosides, 2 tablet, oral, BID  tamsulosin, 0.4 mg, oral, Daily      Continuous medications  lactated Ringer's, 10 mL/hr, Last Rate: 10 mL/hr (12/07/23 0652)      PRN medications  PRN medications: acetaminophen, cyclobenzaprine, HYDROmorphone, melatonin, ondansetron ODT, oxybutynin    Results for orders placed or performed during the hospital encounter of 11/28/23 (from the past 24 hour(s))   CBC   Result Value Ref Range    WBC 8.7 4.4 - 11.3 x10*3/uL    nRBC 0.0 0.0 - 0.0 /100 WBCs    RBC 3.82 (L) 4.50 - 5.90 x10*6/uL    Hemoglobin 10.4 (L) 13.5 - 17.5 g/dL    Hematocrit 34.5 (L) 41.0 - 52.0 %    MCV 90 80 - 100 fL    MCH 27.2 26.0 - 34.0 pg    MCHC 30.1 (L) 32.0 - 36.0 g/dL    RDW 15.7 (H) 11.5 - 14.5 %    " Platelets 496 (H) 150 - 450 x10*3/uL   Basic metabolic panel   Result Value Ref Range    Glucose 122 (H) 74 - 99 mg/dL    Sodium 138 136 - 145 mmol/L    Potassium 4.1 3.5 - 5.3 mmol/L    Chloride 109 (H) 98 - 107 mmol/L    Bicarbonate 17 (L) 21 - 32 mmol/L    Anion Gap 16 10 - 20 mmol/L    Urea Nitrogen 27 (H) 6 - 23 mg/dL    Creatinine 1.79 (H) 0.50 - 1.30 mg/dL    eGFR 44 (L) >60 mL/min/1.73m*2    Calcium 9.4 8.6 - 10.6 mg/dL                         Assessment/Plan   Principal Problem:    Dehydration  Active Problems:    Urinary tract infection without hematuria, site unspecified    Jose Thornton is a 58 y.o. male with history of Crohn's disease and CKD who was hospitalized in 11/2022 with pelvic abscess 2/2 perianal and colovesical fistula and was also found to have bilateral hydronephrosis s/p b/l nephrostomy tube placement (last exchanged dec), readmitted 12/2022 for acute diverticulitis s/p anterior proctosigmoidectomy with colostomy and colovesicular fistula takedown 12/20. 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 On discharge urethral andrade was removed and SPT and PCNTs were capped with plan for timed voids and PVRs via SPT release. After discharge, patient reported resumed nausea and vomiting after zofran prescription ran out, with increasing malaise, weakness, lightheadedness, intermittent fevers, and decreased PO intake with corresponding reduction in UOP. At follow-up outpatient urology appointment 11/28 he was found to be tachycardic to 133, hypotensive to 89/70, with a creatinine 6.53 and was sent to ED for possible dehydration vs sepsis. While in the ED, he was febrile to 101 with continued tachycardia, and with labs significant for  creatinine of 5.73 and leukocytosis to 14.4 with UA +nitrites/+LE. Urine culture was +pseudomonas and klebsiella, blood culture +micrococcus luteus. He was started on vanc/zosyn changed to Meropenem per ID based on his kidney function and previous history of DTR-Pseudomonas. He was admitted to urology and is now improving on RNF.       Updates:  11/29: Afebrile, mild tachycardia, other VSS. sCr 4.87<5.73. WBC 12.4<14.4 on alejandro. UOP 1.1L. b/l PCNT (right flushed with difficulty, left flushed easily) and SPT uncapped with return of 400mL clear yellow urine.   12/1: Bilateral ureteral stents removed and SPT changed  12/2: B/l PCNTs capped  12/4: SPT capped, UOP good 1L, sCr downtrending 1.72  12/5: , SPT 1175.   12/6: Urine leaking around SPT (capped) 1350cc, but increased urine output, now 750cc recorded. Vitals stable, afebrile.  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 Discussed double voiding.   - Strict I/Os  - Daily BMP  - HLIVF  - Continue home flomax (now BID)     Endocrine:   - No current needs     MSK:  - OOB most of the day  - Ambulate at least 3x per day, more if tolerated  - PT on board, rec low intensity at discharge     ID:   #afebrile, ESBL Klebsiella and DTR Pseudomonas aeruginosa in urine culture, micrococcus from blood culture - probably  contamination, WBC now wnl 6.4  - Follow-up ID recs:  - CONTINUE meropenem 1 gram q 12 hours x 14 days (until 12/13)  - PICC line for home abx administration, patient declines home health  - On discharge please send a 1 time CBC with diff and CMP and fax reprtto [phone] attn Dr Min  - Does not need additional ID follow up.     PPx: SCDs and SQH 5000 U q8H for DVT prophylaxis     Dispo: RNF, will stay inpatient until 12/13 for IV meropenem d/t insurance issues     Zachery Mckeon MD, PGY-5   Urology  Pager: [phone]

## 2023-12-13 ENCOUNTER — PHARMACY VISIT (OUTPATIENT)
Dept: PHARMACY | Facility: CLINIC | Age: 58
End: 2023-12-13

## 2023-12-13 VITALS
RESPIRATION RATE: 18 BRPM | WEIGHT: 210.1 LBS | HEIGHT: 72 IN | HEART RATE: 100 BPM | BODY MASS INDEX: 28.46 KG/M2 | TEMPERATURE: 97.9 F | DIASTOLIC BLOOD PRESSURE: 79 MMHG | OXYGEN SATURATION: 100 % | SYSTOLIC BLOOD PRESSURE: 110 MMHG

## 2023-12-13 LAB
ALBUMIN SERPL BCP-MCNC: 3.5 G/DL (ref 3.4–5)
ANION GAP SERPL CALC-SCNC: 13 MMOL/L (ref 10–20)
BUN SERPL-MCNC: 28 MG/DL (ref 6–23)
CALCIUM SERPL-MCNC: 9.2 MG/DL (ref 8.6–10.6)
CHLORIDE SERPL-SCNC: 111 MMOL/L (ref 98–107)
CO2 SERPL-SCNC: 18 MMOL/L (ref 21–32)
CREAT SERPL-MCNC: 1.55 MG/DL (ref 0.5–1.3)
ERYTHROCYTE [DISTWIDTH] IN BLOOD BY AUTOMATED COUNT: 15.9 % (ref 11.5–14.5)
GFR SERPL CREATININE-BSD FRML MDRD: 52 ML/MIN/1.73M*2
GLUCOSE SERPL-MCNC: 88 MG/DL (ref 74–99)
HCT VFR BLD AUTO: 33.7 % (ref 41–52)
HGB BLD-MCNC: 10.4 G/DL (ref 13.5–17.5)
MCH RBC QN AUTO: 27.7 PG (ref 26–34)
MCHC RBC AUTO-ENTMCNC: 30.9 G/DL (ref 32–36)
MCV RBC AUTO: 90 FL (ref 80–100)
NRBC BLD-RTO: 0 /100 WBCS (ref 0–0)
PHOSPHATE SERPL-MCNC: 3.9 MG/DL (ref 2.5–4.9)
PLATELET # BLD AUTO: 396 X10*3/UL (ref 150–450)
POTASSIUM SERPL-SCNC: 4.2 MMOL/L (ref 3.5–5.3)
RBC # BLD AUTO: 3.75 X10*6/UL (ref 4.5–5.9)
SODIUM SERPL-SCNC: 138 MMOL/L (ref 136–145)
WBC # BLD AUTO: 5.3 X10*3/UL (ref 4.4–11.3)

## 2023-12-13 PROCEDURE — 2500000004 HC RX 250 GENERAL PHARMACY W/ HCPCS (ALT 636 FOR OP/ED): Performed by: STUDENT IN AN ORGANIZED HEALTH CARE EDUCATION/TRAINING PROGRAM

## 2023-12-13 PROCEDURE — 2500000004 HC RX 250 GENERAL PHARMACY W/ HCPCS (ALT 636 FOR OP/ED)

## 2023-12-13 PROCEDURE — RXMED WILLOW AMBULATORY MEDICATION CHARGE

## 2023-12-13 PROCEDURE — 96372 THER/PROPH/DIAG INJ SC/IM: CPT

## 2023-12-13 PROCEDURE — 85027 COMPLETE CBC AUTOMATED: CPT

## 2023-12-13 PROCEDURE — 80069 RENAL FUNCTION PANEL: CPT

## 2023-12-13 RX ORDER — TAMSULOSIN HYDROCHLORIDE 0.4 MG/1
0.4 CAPSULE ORAL DAILY
Qty: 30 CAPSULE | Refills: 0 | Status: SHIPPED | OUTPATIENT
Start: 2023-12-13 | End: 2023-12-28 | Stop reason: WASHOUT

## 2023-12-13 RX ADMIN — TAMSULOSIN HYDROCHLORIDE 0.4 MG: 0.4 CAPSULE ORAL at 10:12

## 2023-12-13 RX ADMIN — MEROPENEM 1000 MG: 1 INJECTION, POWDER, FOR SOLUTION INTRAVENOUS at 12:39

## 2023-12-13 RX ADMIN — MEROPENEM 1000 MG: 1 INJECTION, POWDER, FOR SOLUTION INTRAVENOUS at 00:07

## 2023-12-13 RX ADMIN — HEPARIN SODIUM 5000 UNITS: 5000 INJECTION INTRAVENOUS; SUBCUTANEOUS at 05:41

## 2023-12-13 ASSESSMENT — COGNITIVE AND FUNCTIONAL STATUS - GENERAL
MOBILITY SCORE: 24
DAILY ACTIVITIY SCORE: 24

## 2023-12-13 ASSESSMENT — PAIN - FUNCTIONAL ASSESSMENT: PAIN_FUNCTIONAL_ASSESSMENT: 0-10

## 2023-12-13 ASSESSMENT — PAIN SCALES - GENERAL: PAINLEVEL_OUTOF10: 0 - NO PAIN

## 2023-12-13 NOTE — PROGRESS NOTES
"Jose Thornton is a 58 y.o. male on day 13 of admission presenting with Dehydration.    Subjective     He was seen at bedside today. Pain was well controlled and tolerating diet well.     Long conversation regarding patients new anatomy     Objective     Physical Exam  Constitutional: Alert, in NAD  HEENT: Normocephalic, atraumatic  Neuro: A&O x3  CV: regular rate  Pulm: Non-laboured breathing on room air  GI: Abdomen soft, non-distended, non-tender, ostomy with stool and gas in bag  : urine leaking around SPT, dressings in place, clear yellow urine in urinal next to bed  Skin: Incisions well healed. No lesions or discoloration noted.  Musculoskeletal: ADELE  Extremities: no edema or cyanosis noted    Last Recorded Vitals  Blood pressure 102/71, pulse 83, temperature 36.5 °C (97.7 °F), temperature source Temporal, resp. rate 18, height 1.829 m (6' 0.01\"), weight 95.3 kg (210 lb 1.6 oz), SpO2 99 %.  Intake/Output last 3 Shifts:  I/O last 3 completed shifts:  In: 641.7 (6.7 mL/kg) [P.O.:200; I.V.:141.7 (1.5 mL/kg); IV Piggyback:300]  Out: 1725 (18.1 mL/kg) [Urine:1400 (0.4 mL/kg/hr); Stool:325]  Weight: 95.3 kg     Relevant Results  Scheduled medications  heparin (porcine), 5,000 Units, subcutaneous, q8h  lidocaine, 5 mL, infiltration, Once  meropenem, 1,000 mg, intravenous, q12h  sennosides, 2 tablet, oral, BID  tamsulosin, 0.4 mg, oral, Daily      Continuous medications  lactated Ringer's, 10 mL/hr, Last Rate: Stopped (12/12/23 1314)      PRN medications  PRN medications: acetaminophen, cyclobenzaprine, HYDROmorphone, melatonin, ondansetron ODT, oxybutynin    No results found for this or any previous visit (from the past 24 hour(s)).                        Assessment/Plan   Principal Problem:    Dehydration  Active Problems:    Urinary tract infection without hematuria, site unspecified    Jose Thornton is a 58 y.o. male with history of Crohn's disease and CKD who was hospitalized in 11/2022 with pelvic abscess " 2/2 perianal and colovesical fistula and was also found to have bilateral hydronephrosis s/p b/l nephrostomy tube placement (last exchanged dec), readmitted 12/2022 for acute diverticulitis s/p anterior proctosigmoidectomy with colostomy and colovesicular fistula takedown 12/20. 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 On discharge urethral andrade was removed and SPT and PCNTs were capped with plan for timed voids and PVRs via SPT release. After discharge, patient reported resumed nausea and vomiting after zofran prescription ran out, with increasing malaise, weakness, lightheadedness, intermittent fevers, and decreased PO intake with corresponding reduction in UOP. At follow-up outpatient urology appointment 11/28 he was found to be tachycardic to 133, hypotensive to 89/70, with a creatinine 6.53 and was sent to ED for possible dehydration vs sepsis. While in the ED, he was febrile to 101 with continued tachycardia, and with labs significant for creatinine of 5.73 and leukocytosis to 14.4 with UA +nitrites/+LE. Urine culture was +pseudomonas and klebsiella, blood culture +micrococcus luteus. He was started on vanc/zosyn changed to Meropenem per ID based on his kidney function and previous history of DTR-Pseudomonas. He was admitted to urology and is now improving on RNF.       Updates:    11/29: Afebrile, mild tachycardia, other VSS. sCr 4.87<5.73. WBC 12.4<14.4 on alejandro. UOP 1.1L. b/l PCNT (right flushed with difficulty, left flushed easily) and SPT uncapped with return of 400mL clear yellow urine.   12/1: Bilateral ureteral stents removed and SPT changed  12/2: B/l PCNTs capped  12/4: SPT capped, UOP good 1L, sCr downtrending 1.72  12/5: , SPT  1175.   12/6: Urine leaking around SPT (capped) 1350cc, but increased urine output, now 750cc recorded. Vitals stable, afebrile.  12/8: Added on flomax BID, Cr 1.5  12/9: switched from renal to regular diet    Plan:    -Follow up am labs   - Strict I/Os  - Arrange a follow up visit with Dr. 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 Discussed double voiding.   - Daily BMP  - HLIVF  - Continue home flomax (now BID)     Endocrine:   - No current needs     MSK:  - OOB most of the day  - Ambulate at least 3x per day, more if tolerated  - PT on board, rec low intensity at discharge     ID:   #afebrile, ESBL Klebsiella and DTR Pseudomonas aeruginosa in urine culture, micrococcus from blood culture - probably contamination, WBC now wnl 6.4  - Follow-up ID recs:  - CONTINUE meropenem 1 gram q 12 hours x 14 days (until 12/13)  - PICC line for home abx administration, patient declines home health  - On discharge please send a 1 time CBC with diff and CMP and fax reprtto [phone] attn Dr Min  - Does not need additional ID follow up.     PPx: SCDs and SQH 5000 U q8H for DVT prophylaxis     Dispo: RNF, will stay inpatient until 12/13 for IV meropenem d/t insurance issues.      Rosa Arteaga MD  Urology   Urology [id number]

## 2023-12-13 NOTE — NURSING NOTE
Jose Thornton discharged at 6:43 PM  and 12/13/23   Patient discharged home via private car .  Discharge education and teaching completed with Jose Thornton  RN signature: Elidia Thomas RN

## 2023-12-13 NOTE — DISCHARGE SUMMARY
Discharge Diagnosis  Dehydration, sepsis 2/2 UTI    Issues Requiring Follow-Up  12/18/2023 Post hospital visit with Dr. Andersen    Test Results Pending At Discharge  Pending Labs       Order Current Status    Extra Urine Gray Tube Collected (11/28/23 1513)    Urinalysis with Reflex Microscopic and Culture Collected (11/28/23 1513)    Urinalysis with Reflex Microscopic and Culture Collected (11/28/23 1513)            Hospital Course  Jose Thornton is a 57 y.o. male with PMH of Crohn's disease and colovesical fistula who underwent Exploratory laparotomy, MYA, Cystoscopy, bilateral ileal ureter and bladder augment with right partial rectus muscle flap, and Suprapubic tube insertion on 10/27/23 with Dr. Andersen and Dr. Rodriguez. Patient had a complicated post-op course but eventually discharged home on 11/17.    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  Blood culture was positive for gram positive cocci, clusters a. He was started on Vanc/Zosyn changed to Meropenem per ID based on his kidney function and previous history of DTR-Pseudomonas. Patient was discharged on 12/13 as he completed his antibiotic course.      Home Medications     Medication List      CONTINUE taking these medications     acetaminophen 500 mg tablet; Commonly known as: Tylenol   cyclobenzaprine 5 mg tablet; Commonly known as: Flexeril; Take 1 tablet   (5 mg) by mouth 3 times a day as needed for muscle spasms (bladder   spasms).   diazePAM 2 mg tablet; Commonly known as: Valium; Take 1 tablet (2 mg) by   mouth if needed for anxiety. TAKE 1 TABLET BY MOUTH AN HOUR PRIOR TO   CATHETER CHANGE.   ondansetron ODT 4 mg disintegrating tablet; Commonly known as:   Zofran-ODT; Take 1 tablet (4 mg) by mouth every 8 hours if needed for   nausea or vomiting.   phenazopyridine 95 mg tablet; Commonly known as: Urinary Pain Relief;   Take 1  tablet (95 mg) by mouth 3 times a day as needed for bladder spasms.   tamsulosin 0.4 mg 24 hr capsule; Commonly known as: Flomax; Take 1   capsule (0.4 mg) by mouth once daily.     STOP taking these medications     oxyCODONE 5 mg immediate release tablet; Commonly known as: Roxicodone   Remicade 100 mg injection; Generic drug: inFLIXimab   senna 8.6 mg tablet; Generic drug: sennosides       Outpatient Follow-Up  Future Appointments   Date Time Provider Department Center   12/18/2023  8:30 AM Tomas Andersen MD [id number] [place]       Nicole Castle MD

## 2023-12-13 NOTE — DISCHARGE INSTRUCTIONS
Urology [place]  Discharge Summary    Jose Thornton    DIET:  Resume Normal Diet    ANTIBIOTICS:  Completed at hospital    PAIN MEDICATIONS:  Over the counter medications- avoid nsaids    WOUND CARE:  - Ok to shower     ACTIVITY:  - Do not drive while taking narcotic pain medications.  - Limit your activities and rest today.    FOLLOW UP APPOINTMENT:  12/18/2023, Dr. Andersen    CALL OUR OFFICE OR GO TO YOUR NEAREST EMERGENCY ROOM IF:  - You have fevers or chills at home.  - You develop shortness of breath.  - You have excessive bleeding from your wound.  - Any other concerning symptoms.    NO SMOKING. THIS WILL SIGNIFICANTLY IMPAIR YOUR HEALING PROCESS.    CATHETER CARE  Always keep the catheters tubing and drainage bag below the level of your bladder.  Avoid loops and kinks in the catheter tubing.  NOTIFY your physician if catheter falls out or catheter seems clogged and urine is not draining.   Do not wear the small leg bag to bed you should be provided with a larger overnight bag that you should wear to bed and can hang over the side of the bed.  We recommend wearing the large bag in the shower, as this is easy to dry, and you do not get your leg straps wet from your leg bag.   Your catheter should be secured to your upper thigh, do not allow it to hang or dangle.  Your catheter will be removed at your post-operative appointment.    QUESTIONS/CONCERNS?: Call Urology Office [phone]

## 2023-12-18 ENCOUNTER — PHARMACY VISIT (OUTPATIENT)
Dept: PHARMACY | Facility: CLINIC | Age: 58
End: 2023-12-18

## 2023-12-18 ENCOUNTER — OFFICE VISIT (OUTPATIENT)
Dept: UROLOGY | Facility: CLINIC | Age: 58
End: 2023-12-18

## 2023-12-18 VITALS
HEART RATE: 123 BPM | HEIGHT: 72 IN | TEMPERATURE: 97.5 F | WEIGHT: 209 LBS | SYSTOLIC BLOOD PRESSURE: 146 MMHG | BODY MASS INDEX: 28.31 KG/M2 | DIASTOLIC BLOOD PRESSURE: 73 MMHG

## 2023-12-18 DIAGNOSIS — N40.1 BENIGN LOCALIZED PROSTATIC HYPERPLASIA WITH LOWER URINARY TRACT SYMPTOMS (LUTS): ICD-10-CM

## 2023-12-18 DIAGNOSIS — N13.30 HYDRONEPHROSIS DETERMINED BY ULTRASOUND: Primary | ICD-10-CM

## 2023-12-18 PROCEDURE — 3008F BODY MASS INDEX DOCD: CPT | Performed by: UROLOGY

## 2023-12-18 PROCEDURE — 99024 POSTOP FOLLOW-UP VISIT: CPT | Performed by: UROLOGY

## 2023-12-18 PROCEDURE — 1036F TOBACCO NON-USER: CPT | Performed by: UROLOGY

## 2023-12-18 PROCEDURE — RXMED WILLOW AMBULATORY MEDICATION CHARGE

## 2023-12-18 RX ORDER — MULTIVIT-MIN/FERROUS GLUCONATE 9 MG/15 ML
15 LIQUID (ML) ORAL ONCE
Status: SHIPPED | OUTPATIENT
Start: 2023-12-18

## 2023-12-18 RX ORDER — FERROUS GLUCONATE 325 MG
38 TABLET ORAL
Qty: 30 TABLET | Refills: 11 | Status: SHIPPED | OUTPATIENT
Start: 2023-12-18 | End: 2024-03-07 | Stop reason: WASHOUT

## 2023-12-18 ASSESSMENT — ENCOUNTER SYMPTOMS
DYSURIA: 0
DIZZINESS: 1
DIFFICULTY URINATING: 0
LIGHT-HEADEDNESS: 1
ROS GI COMMENTS: ACID REFLUX

## 2023-12-27 ENCOUNTER — APPOINTMENT (OUTPATIENT)
Dept: NEPHROLOGY | Facility: CLINIC | Age: 58
End: 2023-12-27

## 2023-12-28 ENCOUNTER — TELEPHONE (OUTPATIENT)
Dept: UROLOGY | Facility: CLINIC | Age: 58
End: 2023-12-28

## 2023-12-28 NOTE — TELEPHONE ENCOUNTER
"Patient left a VM in regards to a \"medical challenge.\" Call was returned to address this issue with no answer.   "

## 2024-01-03 ENCOUNTER — OFFICE VISIT (OUTPATIENT)
Dept: UROLOGY | Facility: CLINIC | Age: 59
End: 2024-01-03

## 2024-01-03 ENCOUNTER — LAB (OUTPATIENT)
Dept: LAB | Facility: LAB | Age: 59
End: 2024-01-03

## 2024-01-03 VITALS
SYSTOLIC BLOOD PRESSURE: 115 MMHG | TEMPERATURE: 97.3 F | BODY MASS INDEX: 29.02 KG/M2 | WEIGHT: 214 LBS | DIASTOLIC BLOOD PRESSURE: 67 MMHG | HEART RATE: 82 BPM

## 2024-01-03 DIAGNOSIS — K50.913 CROHN'S DISEASE WITH FISTULA, UNSPECIFIED GASTROINTESTINAL TRACT LOCATION (MULTI): ICD-10-CM

## 2024-01-03 DIAGNOSIS — R35.1 NOCTURIA: Primary | ICD-10-CM

## 2024-01-03 DIAGNOSIS — N40.1 BENIGN LOCALIZED PROSTATIC HYPERPLASIA WITH LOWER URINARY TRACT SYMPTOMS (LUTS): ICD-10-CM

## 2024-01-03 DIAGNOSIS — N39.0 URINARY TRACT INFECTION WITHOUT HEMATURIA, SITE UNSPECIFIED: ICD-10-CM

## 2024-01-03 DIAGNOSIS — N13.30 HYDRONEPHROSIS DETERMINED BY ULTRASOUND: ICD-10-CM

## 2024-01-03 DIAGNOSIS — R43.2 DYSGEUSIA: ICD-10-CM

## 2024-01-03 LAB
ALBUMIN SERPL BCP-MCNC: 3.9 G/DL (ref 3.4–5)
ALP SERPL-CCNC: 80 U/L (ref 33–120)
ALT SERPL W P-5'-P-CCNC: 17 U/L (ref 10–52)
ANION GAP SERPL CALC-SCNC: 16 MMOL/L (ref 10–20)
AST SERPL W P-5'-P-CCNC: 12 U/L (ref 9–39)
BILIRUB SERPL-MCNC: 0.3 MG/DL (ref 0–1.2)
BUN SERPL-MCNC: 33 MG/DL (ref 6–23)
CALCIUM SERPL-MCNC: 8.7 MG/DL (ref 8.6–10.3)
CHLORIDE SERPL-SCNC: 114 MMOL/L (ref 98–107)
CO2 SERPL-SCNC: 12 MMOL/L (ref 21–32)
CREAT SERPL-MCNC: 2.25 MG/DL (ref 0.5–1.3)
ERYTHROCYTE [DISTWIDTH] IN BLOOD BY AUTOMATED COUNT: 17.4 % (ref 11.5–14.5)
GFR SERPL CREATININE-BSD FRML MDRD: 33 ML/MIN/1.73M*2
GLUCOSE SERPL-MCNC: 86 MG/DL (ref 74–99)
HCT VFR BLD AUTO: 40.8 % (ref 41–52)
HGB BLD-MCNC: 12.2 G/DL (ref 13.5–17.5)
MCH RBC QN AUTO: 28.8 PG (ref 26–34)
MCHC RBC AUTO-ENTMCNC: 29.9 G/DL (ref 32–36)
MCV RBC AUTO: 97 FL (ref 80–100)
NRBC BLD-RTO: 0 /100 WBCS (ref 0–0)
PLATELET # BLD AUTO: 257 X10*3/UL (ref 150–450)
POTASSIUM SERPL-SCNC: 4 MMOL/L (ref 3.5–5.3)
PROT SERPL-MCNC: 7.3 G/DL (ref 6.4–8.2)
RBC # BLD AUTO: 4.23 X10*6/UL (ref 4.5–5.9)
SODIUM SERPL-SCNC: 138 MMOL/L (ref 136–145)
WBC # BLD AUTO: 6.9 X10*3/UL (ref 4.4–11.3)

## 2024-01-03 PROCEDURE — 1036F TOBACCO NON-USER: CPT | Performed by: NURSE PRACTITIONER

## 2024-01-03 PROCEDURE — 36415 COLL VENOUS BLD VENIPUNCTURE: CPT

## 2024-01-03 PROCEDURE — 99024 POSTOP FOLLOW-UP VISIT: CPT | Performed by: NURSE PRACTITIONER

## 2024-01-03 PROCEDURE — 80053 COMPREHEN METABOLIC PANEL: CPT

## 2024-01-03 PROCEDURE — 85027 COMPLETE CBC AUTOMATED: CPT

## 2024-01-03 RX ORDER — OXYBUTYNIN CHLORIDE 5 MG/1
5 TABLET ORAL NIGHTLY
Qty: 30 TABLET | Refills: 3 | Status: SHIPPED | OUTPATIENT
Start: 2024-01-03 | End: 2024-02-14 | Stop reason: DRUGHIGH

## 2024-01-03 RX ORDER — DRONABINOL 2.5 MG/1
2.5 CAPSULE ORAL DAILY
Qty: 30 CAPSULE | Refills: 0 | Status: SHIPPED | OUTPATIENT
Start: 2024-01-03 | End: 2024-01-04 | Stop reason: WASHOUT

## 2024-01-03 NOTE — PROGRESS NOTES
"UROLOGIC FOLLOW-UP VISIT     PROBLEM LIST:  1. Nocturia  oxybutynin (Ditropan) 5 mg tablet      2. Dysgeusia  DISCONTINUED: dronabinol (Marinol) 2.5 mg capsule      3. Crohn's disease with fistula, unspecified gastrointestinal tract location (CMS/HCC)  Disability Placard           HISTORY OF PRESENT ILLNESS:   Jose Thornton is a 58 y.o. with Crohn's disease and associated colovesical fistula s/p B nephrostomy and Schultz catheter placement. Bilateral ureteral reimplant and fistula repair with Dr. Andersen and Dr. Rodriguez 10/27/23, discharged 11/17/23. Readmitted for dehydration, sepsis due to UTI 11/28-12/13/23.      INTERVAL HISTORY:  Returns for POV  Seen unaccompanied  + Fatigue, not sleeping well  NTF, using urinal to avoid having to get up; doesn't always work  Got condom cath, hit or miss if it works  Switched Flomax to evening, felt \"discombobulated\"  Brought log of UOP per urethra and subsequent emptying of SPT  Taste issues, eating one meal a day  Does not recall plan for KUB  No fevers or chills  No hematuria    PAST MEDICAL HISTORY:  Past Medical History:   Diagnosis Date    Crohn's disease (CMS/HCC)     Diverticulosis     GERD (gastroesophageal reflux disease)     GI (gastrointestinal bleed)     Urinary tract infection        PAST SURGICAL HISTORY:  Past Surgical History:   Procedure Laterality Date    CT GUIDED PERCUTANEOUS PERITONEAL OR RETROPERITONEAL FLUID COLLECTION DRAINAGE  9/28/2022    CT GUIDED PERCUTANEOUS PERITONEAL OR RETROPERITONEAL FLUID COLLECTION DRAINAGE 9/28/2022 [facility] [place]IENT LEGACY    IR CVC NONTUNNELED  11/6/2023    IR CVC NONTUNNELED 11/6/2023 Drew Terry MD [facility] [place] [place] ANGIO    IR NEPHROSTOMY TUBE EXCHANGE  1/31/2023    IR NEPHROSTOMY TUBE EXCHANGE 1/31/2023 DOCTOR OFFICE LEGACY    IR NEPHROSTOMY TUBE EXCHANGE  1/31/2023    IR NEPHROSTOMY TUBE EXCHANGE 1/31/2023 DOCTOR OFFICE LEGACY    IR NEPHROSTOMY TUBE EXCHANGE  3/23/2023    IR NEPHROSTOMY TUBE EXCHANGE [facility] [place] [place] [facility]GIO    IR NEPHROSTOMY TUBE " EXCHANGE  3/23/2023    IR NEPHROSTOMY TUBE EXCHANGE [facility]    IR NEPHROSTOMY TUBE EXCHANGE  3/30/2023    IR NEPHROSTOMY TUBE EXCHANGE [facility]    IR NEPHROSTOMY TUBE EXCHANGE  4/25/2023    IR NEPHROSTOMY TUBE EXCHANGE [facility]    IR NEPHROSTOMY TUBE EXCHANGE  6/30/2023    IR NEPHROSTOMY TUBE EXCHANGE 6/30/2023 [facility]    IR NEPHROSTOMY TUBE EXCHANGE  6/30/2023    IR NEPHROSTOMY TUBE EXCHANGE 6/30/2023 [facility]    IR NEPHROSTOMY TUBE EXCHANGE  8/25/2023    IR NEPHROSTOMY TUBE EXCHANGE 8/25/2023 [facility]    IR NEPHROSTOMY TUBE EXCHANGE  8/25/2023    IR NEPHROSTOMY TUBE EXCHANGE 8/25/2023 [facility]    IR NEPHROSTOMY TUBE EXCHANGE  10/18/2023    IR NEPHROSTOMY TUBE EXCHANGE 10/18/2023 Onesimo Reyes MD [facility] [place] ANGIO        ALLERGIES:   Not on File     MEDICATIONS:   Current Outpatient Medications on File Prior to Visit   Medication Sig Dispense Refill    acetaminophen (Tylenol) 500 mg tablet Take 2 tablets (1,000 mg) by mouth every 6 hours if needed.      ferrous gluconate (Fergon) 324 (38 Fe) mg tablet Take 1 tablet (38 mg of iron) by mouth once daily with breakfast. 30 tablet 11    phenazopyridine (Urinary Pain Relief) 95 mg tablet Take 1 tablet (95 mg) by mouth 3 times a day as needed for bladder spasms. 30 tablet 1    [DISCONTINUED] tamsulosin (Flomax) 0.4 mg 24 hr capsule Take 1 capsule (0.4 mg) by mouth once daily. 30 capsule 0    cyclobenzaprine (Flexeril) 5 mg tablet Take 1 tablet (5 mg) by mouth 3 times a day as needed for muscle spasms (bladder spasms). 90 tablet 0    [DISCONTINUED] diazePAM (Valium) 2 mg tablet Take 1 tablet (2 mg) by mouth if needed for anxiety. TAKE 1 TABLET BY MOUTH AN HOUR PRIOR TO CATHETER CHANGE. (Patient not taking: Reported on 12/18/2023) 1 tablet 0    [DISCONTINUED] ondansetron ODT (Zofran-ODT) 4 mg disintegrating tablet Take 1 tablet (4 mg) by mouth every 8 hours if needed for nausea or vomiting. (Patient not taking: Reported on 12/18/2023) 20 tablet 0     Current  Facility-Administered Medications on File Prior to Visit   Medication Dose Route Frequency Provider Last Rate Last Admin    multivitamin with iron-minerals 9 mg iron/15 mL liquid 15 mL  15 mL oral Once Tomas Andersen MD            SOCIAL HISTORY:  Patient  reports that he has never smoked. He has never used smokeless tobacco. He reports that he does not currently use alcohol. He reports that he does not use drugs.   Social History     Socioeconomic History    Marital status:      Spouse name: Not on file    Number of children: Not on file    Years of education: Not on file    Highest education level: Not on file   Occupational History    Not on file   Tobacco Use    Smoking status: Never    Smokeless tobacco: Never   Substance and Sexual Activity    Alcohol use: Not Currently    Drug use: Never    Sexual activity: Not on file   Other Topics Concern    Not on file   Social History Narrative    Not on file     Social Determinants of Health     Financial Resource Strain: Low Risk  (11/29/2023)    Overall Financial Resource Strain (CARDIA)     Difficulty of Paying Living Expenses: Not very hard   Recent Concern: Financial Resource Strain - Medium Risk (11/1/2023)    Overall Financial Resource Strain (CARDIA)     Difficulty of Paying Living Expenses: Somewhat hard   Food Insecurity: Not on file   Transportation Needs: No Transportation Needs (11/29/2023)    PRAPARE - Transportation     Lack of Transportation (Medical): No     Lack of Transportation (Non-Medical): No   Physical Activity: Not on file   Stress: Not on file   Social Connections: Not on file   Intimate Partner Violence: Not on file   Housing Stability: Low Risk  (11/29/2023)    Housing Stability Vital Sign     Unable to Pay for Housing in the Last Year: No     Number of Places Lived in the Last Year: 1     Unstable Housing in the Last Year: No       FAMILY HISTORY:  No family history on file.    REVIEW OF SYSTEMS:  All systems reviewed, pertinent  negatives as noted in HPI.    PHYSICAL EXAM:  Visit Vitals  /67 (BP Location: Right arm, Patient Position: Sitting, BP Cuff Size: Large adult)   Pulse 82   Temp 36.3 °C (97.3 °F) (Temporal)     Constitutional: Chronically ill-appearing. No distress.    Head: Normocephalic and atraumatic.    Neck: Normal range of motion.     Pulmonary/Chest: Effort normal. No respiratory distress.   Abdominal: Obese, ostomy in place.  : See below.  Integumentary: No rash or lesions visualized.  Musculoskeletal: Normal range of motion.    Neurological: Alert and oriented.  Psychiatric: Normal mood and affect. Thought content normal.      LABORATORY REVIEW:   Lab Results   Component Value Date    BUN 28 (H) 12/13/2023    CREATININE 1.55 (H) 12/13/2023    EGFR 52 (L) 12/13/2023     12/13/2023    K 4.2 12/13/2023     (H) 12/13/2023    CO2 18 (L) 12/13/2023    CALCIUM 9.2 12/13/2023      Lab Results   Component Value Date    WBC 5.3 12/13/2023    RBC 3.75 (L) 12/13/2023    HGB 10.4 (L) 12/13/2023    HCT 33.7 (L) 12/13/2023    MCV 90 12/13/2023    MCH 27.7 12/13/2023    MCHC 30.9 (L) 12/13/2023    RDW 15.9 (H) 12/13/2023     12/13/2023    MPV 9.2 10/30/2023           Assessment:      1. Nocturia  oxybutynin (Ditropan) 5 mg tablet      2. Dysgeusia  DISCONTINUED: dronabinol (Marinol) 2.5 mg capsule      3. Crohn's disease with fistula, unspecified gastrointestinal tract location (CMS/HCC)  Disability Alan Thornton is a 58 y.o. with Crohn's disease and associated colovesical fistula s/p B nephrostomy and Schultz catheter placement. Bilateral ureteral reimplant and fistula repair with Dr. Andersen and Dr. Rodriguez 10/27/23, discharged 11/17/23. Readmitted for dehydration, sepsis due to UTI 11/28-12/13/23.      Not doing well; nausea and poor appetite are concerning for renal dysfunction. Log of urethral UOP vs STP output not indicative of full return of bladder function although nephrostomy tubes remain  in place and haivng significant bladder spasms.      Plan:   Will get KUB and BMP today as previously planned  Increase oxybutynin, continue condom cath at night  Keep SPT in place for now, reassess at next visit  Trial of dronabinol for appetite, nausea  Rx for disability placard provided per request  RTC in 3-4 weeks for reassessment or sooner if needed  Encouraged to call in the interim with any questions, concerns

## 2024-01-04 ENCOUNTER — OFFICE VISIT (OUTPATIENT)
Dept: NEPHROLOGY | Facility: CLINIC | Age: 59
End: 2024-01-04

## 2024-01-04 ENCOUNTER — PHARMACY VISIT (OUTPATIENT)
Dept: PHARMACY | Facility: CLINIC | Age: 59
End: 2024-01-04

## 2024-01-04 VITALS
BODY MASS INDEX: 29.12 KG/M2 | DIASTOLIC BLOOD PRESSURE: 69 MMHG | HEIGHT: 72 IN | WEIGHT: 215 LBS | HEART RATE: 86 BPM | SYSTOLIC BLOOD PRESSURE: 98 MMHG

## 2024-01-04 DIAGNOSIS — N17.9 AKI (ACUTE KIDNEY INJURY) (CMS-HCC): ICD-10-CM

## 2024-01-04 PROCEDURE — RXMED WILLOW AMBULATORY MEDICATION CHARGE

## 2024-01-04 PROCEDURE — 99214 OFFICE O/P EST MOD 30 MIN: CPT | Performed by: INTERNAL MEDICINE

## 2024-01-04 PROCEDURE — 1036F TOBACCO NON-USER: CPT | Performed by: INTERNAL MEDICINE

## 2024-01-04 RX ORDER — SODIUM BICARBONATE 650 MG/1
650 TABLET ORAL 4 TIMES DAILY
Qty: 360 TABLET | Refills: 3 | Status: SHIPPED | OUTPATIENT
Start: 2024-01-04 | End: 2025-01-03

## 2024-01-04 NOTE — PROGRESS NOTES
Jose Thornton   58 y.o.    @@  [facility]/Room: [id number]/Room/bed info not found    Subjective:   The patient is being seen for a routine clinic follow-up of chronic kidney disease. Recently, the disease has been stable. Disease complications:  No hyperkalemia, no hypocalcemia, no hyperphosphatemia, no metabolic acidosis, no coagulopathy, no uremic encephalopathy, no neuropathy and no renal osteodystrophy. The patient is currently asymptomatic. No associated symptoms are reported.       Meds:   Current Outpatient Medications   Medication Sig Dispense Refill    ferrous gluconate (Fergon) 324 (38 Fe) mg tablet Take 1 tablet (38 mg of iron) by mouth once daily with breakfast. 30 tablet 11    oxybutynin (Ditropan) 5 mg tablet Take 1 tablet (5 mg) by mouth once daily at bedtime. 30 tablet 3    phenazopyridine (Urinary Pain Relief) 95 mg tablet Take 1 tablet (95 mg) by mouth 3 times a day as needed for bladder spasms. 30 tablet 1     Current Facility-Administered Medications   Medication Dose Route Frequency Provider Last Rate Last Admin    multivitamin with iron-minerals 9 mg iron/15 mL liquid 15 mL  15 mL oral Once Tomas Andersen MD              ROS:  The patient is awake and oriented. No dizziness or lightheadedness. No chills and no fever. No headaches. No nausea and no vomiting. No shortness of breath. No cough. No sputum. No chest pain. No chest tightness. No abdominal pain. No diarrhea and no constipation. No hematemesis or hemoptysis. No hematuria. No rectal bleeding. No melena. No epistaxis. No urinary symptoms. No flank pain. No leg edema. No leg pain. No weakness. No itching. Overall, the rest of the review of systems is also negative.  12 point review of systems otherwise negative as stated in HPI.        Physical Examination:        Vitals:    01/04/24 1205   BP: 98/69   Pulse: 86     General: The patient is awake, oriented, and is not in any distress.  Head and Neck: Normocephalic. No periorbital edema.  Eyes:  non-icteric  Respiratory: Symmetric air entry. Symmetric chest expansion.No respiratory distress.  Cardiovascular: Symmetric peripheral pulses.  Skin: No maculopapular rash.  Abdomen: soft, nt/nd  Musculoskeletal: No peripheral edema in both left and right upper extremities.  No edema in either left or right lower extremities.  Neuro Exam: Speech is fluent. Moves extremities.    Imaging:  === 10/24/23 ===    US RENAL COMPLETE    - Impression -  1. No evidence of hydronephrosis.  2. Normal-sized kidneys with increased renal cortical echogenicity  bilaterally and mild left renal cortical thinning, suggestive of  medical renal disease.      I personally reviewed the images/study and I agree with the findings  as stated by Radiology resident Dr. Villalpando.    MACRO:  None    Signed by: Chilo Pizano 11/4/2023 12:00 PM  Dictation workstation:   [id number]       Blood Labs:  No results found for this or any previous visit (from the past 24 hour(s)).   Lab Results   Component Value Date    PROTUR 100 (2+) (N) 11/28/2023    PHOS 3.9 12/13/2023      Lab Results   Component Value Date    GLUCOSE 86 01/03/2024    CALCIUM 8.7 01/03/2024     01/03/2024    K 4.0 01/03/2024    CO2 12 (L) 01/03/2024     (H) 01/03/2024    BUN 33 (H) 01/03/2024    CREATININE 2.25 (H) 01/03/2024         Assessment and Plan:  #1 acute kidney injury.  57 y.o. male with PMH of Crohn's disease and colovesical fistula who underwent Exploratory laparotomy, MYA, Cystoscopy, bilateral ileal ureter and bladder augment with right partial rectus muscle flap, and Suprapubic tube insertion on 10/27/23. Later, he developed KADE likely multifactorial 2/2 dehydration, hypotension, infection in the setting of possibly misplaced right nephrostomy tube.  He required few seasons of dialysis treatment.  His last creatinine level is 2.2.  Creatinine level had been up to 11.7 before he gets dialysis.  Volume status is fine.  Potassium level is fine.       2.  Metabolic acidosis.  He has metabolic acidosis probably because of GI loss.  I added sodium bicarb to his medications.    I will see him in about 2 months for follow-up.          Radhames Bella MD

## 2024-01-09 ENCOUNTER — HOSPITAL ENCOUNTER (OUTPATIENT)
Dept: RADIOLOGY | Facility: HOSPITAL | Age: 59
Discharge: HOME | End: 2024-01-09

## 2024-01-09 DIAGNOSIS — N40.1 BENIGN LOCALIZED PROSTATIC HYPERPLASIA WITH LOWER URINARY TRACT SYMPTOMS (LUTS): ICD-10-CM

## 2024-01-09 DIAGNOSIS — N13.30 HYDRONEPHROSIS DETERMINED BY ULTRASOUND: ICD-10-CM

## 2024-01-09 PROCEDURE — 76770 US EXAM ABDO BACK WALL COMP: CPT | Performed by: RADIOLOGY

## 2024-01-09 PROCEDURE — 76770 US EXAM ABDO BACK WALL COMP: CPT

## 2024-01-11 ENCOUNTER — TELEPHONE (OUTPATIENT)
Dept: SURGERY | Facility: CLINIC | Age: 59
End: 2024-01-11

## 2024-01-11 ENCOUNTER — APPOINTMENT (OUTPATIENT)
Dept: UROLOGY | Facility: CLINIC | Age: 59
End: 2024-01-11

## 2024-01-11 NOTE — TELEPHONE ENCOUNTER
Pt called to discuss Ct scan that was ordered last January and never completed. I told him I would not proceed with scheduling this unless I saw him. He agrees. He is doing ok from Dr. Rodriguez standpoint. Still working with nephrology on his kidney function.    He is not taking anything for the crohn's right now because he is feeling well. He will follow up with Dr. Yi.     He will schedule with Dr. Rodriguez when he is ready to discuss possible colostomy closure.

## 2024-01-11 NOTE — PROGRESS NOTES
UROLOGIC INITIAL EVALUATION  ***UROLOGIC FOLLOW-UP VISIT     PROBLEM LIST    PAST MEDICAL HISTORY:  Past Medical History:   Diagnosis Date    Crohn's disease (CMS/HCC)     Diverticulosis     GERD (gastroesophageal reflux disease)     GI (gastrointestinal bleed)     Urinary tract infection        PAST SURGICAL HISTORY:  Past Surgical History:   Procedure Laterality Date    CT GUIDED PERCUTANEOUS PERITONEAL OR RETROPERITONEAL FLUID COLLECTION DRAINAGE  9/28/2022    CT GUIDED PERCUTANEOUS PERITONEAL OR RETROPERITONEAL FLUID COLLECTION DRAINAGE 9/28/2022 [facility] [place] INPATIENT LEGACY    IR CVC NONTUNNELED  11/6/2023    IR CVC NONTUNNELED 11/6/2023 Drew Terry MD [facility] [place] ANGIO    IR NEPHROSTOMY TUBE EXCHANGE  1/31/2023    IR NEPHROSTOMY TUBE EXCHANGE 1/31/2023 DOCTOR OFFICE LEGACY    IR NEPHROSTOMY TUBE EXCHANGE  1/31/2023    IR NEPHROSTOMY TUBE EXCHANGE 1/31/2023 DOCTOR OFFICE LEGACY    IR NEPHROSTOMY TUBE EXCHANGE  3/23/2023    IR NEPHROSTOMY TUBE EXCHANGE [facility]    IR NEPHROSTOMY TUBE EXCHANGE  3/23/2023    IR NEPHROSTOMY TUBE EXCHANGE [facility]    IR NEPHROSTOMY TUBE EXCHANGE  3/30/2023    IR NEPHROSTOMY TUBE EXCHANGE [facility]    IR NEPHROSTOMY TUBE EXCHANGE  4/25/2023    IR NEPHROSTOMY TUBE EXCHANGE [facility]    IR NEPHROSTOMY TUBE EXCHANGE  6/30/2023    IR NEPHROSTOMY TUBE EXCHANGE 6/30/2023 [facility]    IR NEPHROSTOMY TUBE EXCHANGE  6/30/2023    IR NEPHROSTOMY TUBE EXCHANGE 6/30/2023 [facility]    IR NEPHROSTOMY TUBE EXCHANGE  8/25/2023    IR NEPHROSTOMY TUBE EXCHANGE 8/25/2023 [facility]    IR NEPHROSTOMY TUBE EXCHANGE  8/25/2023    IR NEPHROSTOMY TUBE EXCHANGE 8/25/2023 [facility]    IR NEPHROSTOMY TUBE EXCHANGE  10/18/2023    IR NEPHROSTOMY TUBE EXCHANGE 10/18/2023 Onesimo Reyes MD [facility] [place] ANGIO        ALLERGIES:   No Known Allergies     MEDICATIONS:   Current Outpatient Medications on File Prior to Visit   Medication Sig Dispense Refill    ferrous gluconate (Fergon) 324 (38 Fe) mg tablet Take 1 tablet (38 mg of  iron) by mouth once daily with breakfast. 30 tablet 11    oxybutynin (Ditropan) 5 mg tablet Take 1 tablet (5 mg) by mouth once daily at bedtime. 30 tablet 3    phenazopyridine (Urinary Pain Relief) 95 mg tablet Take 1 tablet (95 mg) by mouth 3 times a day as needed for bladder spasms. 30 tablet 1    sodium bicarbonate 650 mg tablet Take 1 tablet (650 mg) by mouth 4 times a day. 360 tablet 3     Current Facility-Administered Medications on File Prior to Visit   Medication Dose Route Frequency Provider Last Rate Last Admin    multivitamin with iron-minerals 9 mg iron/15 mL liquid 15 mL  15 mL oral Once Tomas Andersen MD            SOCIAL HISTORY:  Patient  reports that he has never smoked. He has never used smokeless tobacco. He reports that he does not currently use alcohol. He reports that he does not use drugs.   Social History     Socioeconomic History    Marital status:      Spouse name: Not on file    Number of children: Not on file    Years of education: Not on file    Highest education level: Not on file   Occupational History    Not on file   Tobacco Use    Smoking status: Never    Smokeless tobacco: Never   Substance and Sexual Activity    Alcohol use: Not Currently    Drug use: Never    Sexual activity: Not on file   Other Topics Concern    Not on file   Social History Narrative    Not on file     Social Determinants of Health     Financial Resource Strain: Low Risk  (11/29/2023)    Overall Financial Resource Strain (CARDIA)     Difficulty of Paying Living Expenses: Not very hard   Recent Concern: Financial Resource Strain - Medium Risk (11/1/2023)    Overall Financial Resource Strain (CARDIA)     Difficulty of Paying Living Expenses: Somewhat hard   Food Insecurity: Not on file   Transportation Needs: No Transportation Needs (11/29/2023)    PRAPARE - Transportation     Lack of Transportation (Medical): No     Lack of Transportation (Non-Medical): No   Physical Activity: Not on file   Stress: Not on  "file   Social Connections: Not on file   Intimate Partner Violence: Not on file   Housing Stability: Low Risk  (11/29/2023)    Housing Stability Vital Sign     Unable to Pay for Housing in the Last Year: No     Number of Places Lived in the Last Year: 1     Unstable Housing in the Last Year: No       FAMILY HISTORY:  No family history on file.    REVIEW OF SYSTEMS:***  Review of Systems    PHYSICAL EXAM:  There were no vitals taken for this visit.  Constitutional: Well-developed and well-nourished. No distress.    Head: Normocephalic and atraumatic.    Neck: Normal range of motion.     Pulmonary/Chest: Effort normal. No respiratory distress.   Abdominal: Non-distended.  : ***  Integumentary: No rash or lesions visualized.  Musculoskeletal: Normal range of motion.    Neurological: Alert and oriented.  Psychiatric: Normal mood and affect. Thought content normal.      LABORATORY REVIEW:   Lab Results   Component Value Date    BUN 33 (H) 01/03/2024    CREATININE 2.25 (H) 01/03/2024    EGFR 33 (L) 01/03/2024     01/03/2024    K 4.0 01/03/2024     (H) 01/03/2024    CO2 12 (L) 01/03/2024    CALCIUM 8.7 01/03/2024      Lab Results   Component Value Date    WBC 6.9 01/03/2024    RBC 4.23 (L) 01/03/2024    HGB 12.2 (L) 01/03/2024    HCT 40.8 (L) 01/03/2024    MCV 97 01/03/2024    MCH 28.8 01/03/2024    MCHC 29.9 (L) 01/03/2024    RDW 17.4 (H) 01/03/2024     01/03/2024    MPV 9.2 10/30/2023        No results found for: \"PSA\"     ***Urine dipstick shows {ua [id number]}.        Assessment:    No diagnosis found.    Jose Thornton is a 58 y.o. with ***     Plan:   ***      "

## 2024-01-15 ENCOUNTER — OFFICE VISIT (OUTPATIENT)
Dept: UROLOGY | Facility: CLINIC | Age: 59
End: 2024-01-15

## 2024-01-15 VITALS
HEART RATE: 74 BPM | WEIGHT: 218 LBS | SYSTOLIC BLOOD PRESSURE: 108 MMHG | BODY MASS INDEX: 29.53 KG/M2 | HEIGHT: 72 IN | DIASTOLIC BLOOD PRESSURE: 65 MMHG

## 2024-01-15 DIAGNOSIS — K50.913 CROHN'S DISEASE WITH FISTULA, UNSPECIFIED GASTROINTESTINAL TRACT LOCATION (MULTI): ICD-10-CM

## 2024-01-15 DIAGNOSIS — N32.1 COLOVESICAL FISTULA: Primary | ICD-10-CM

## 2024-01-15 PROCEDURE — 99024 POSTOP FOLLOW-UP VISIT: CPT | Performed by: NURSE PRACTITIONER

## 2024-01-15 PROCEDURE — 1036F TOBACCO NON-USER: CPT | Performed by: NURSE PRACTITIONER

## 2024-01-15 NOTE — PROGRESS NOTES
UROLOGIC FOLLOW-UP VISIT     PROBLEM LIST:  1. Colovesical fistula        2. Crohn's disease with fistula, unspecified gastrointestinal tract location (CMS/HCC)             HISTORY OF PRESENT ILLNESS:   Jose Thornton is a 58 y.o. with Crohn's disease and associated colovesical fistula s/p B nephrostomy and Schultz catheter placement. Bilateral ureteral reimplant and fistula repair with Dr. Andersen and Dr. Rodriguez 10/27/23, discharged 11/17/23. Readmitted for dehydration, sepsis due to UTI 11/28-12/13/23.     INTERVAL HISTORY:  Seen today for POV   Discomfort from SPT, requesting removal  Notes good UOP  Frequency improved with oxybutynin  Didn't  dronabinol due to cost  Appetite, fluid intake and energy improved  Occasional nausea  No fevers or chills    PAST MEDICAL HISTORY:  Past Medical History:   Diagnosis Date    Crohn's disease (CMS/HCC)     Diverticulosis     GERD (gastroesophageal reflux disease)     GI (gastrointestinal bleed)     Urinary tract infection        PAST SURGICAL HISTORY:  Past Surgical History:   Procedure Laterality Date    CT GUIDED PERCUTANEOUS PERITONEAL OR RETROPERITONEAL FLUID COLLECTION DRAINAGE  9/28/2022    CT GUIDED PERCUTANEOUS PERITONEAL OR RETROPERITONEAL FLUID COLLECTION DRAINAGE 9/28/2022 [facility] [place] INPATIENT LEGACY    IR CVC NONTUNNELED  11/6/2023    IR CVC NONTUNNELED 11/6/2023 Drew Terry MD [facility] [place] ANGIO    IR NEPHROSTOMY TUBE EXCHANGE  1/31/2023    IR NEPHROSTOMY TUBE EXCHANGE 1/31/2023 [facility]    IR NEPHROSTOMY TUBE EXCHANGE  1/31/2023    IR NEPHROSTOMY TUBE EXCHANGE 1/31/2023 [facility]GAC[facility]    IR NEPHROSTOMY TUBE EXCHANGE  3/23/2023    IR NEPHROSTOMY TUBE EXCHANGE [facility]    IR NEPHROSTOMY TUBE EXCHANGE  3/23/2023    IR NEPHROSTOMY TUBE EXCHANGE [facility]    IR NEPHROSTOMY TUBE EXCHANGE  3/30/2023    IR NEPHROSTOMY TUBE EXCHANGE [facility]    IR NEPHROSTOMY TUBE EXCHANGE  4/25/2023    IR NEPHROSTOMY TUBE EXCHANGE [facility]    IR NEPHROSTOMY TUBE EXCHANGE   6/30/2023    IR NEPHROSTOMY TUBE EXCHANGE 6/30/2023 [facility]    IR NEPHROSTOMY TUBE EXCHANGE  6/30/2023    IR NEPHROSTOMY TUBE EXCHANGE 6/30/2023 [facility]    IR NEPHROSTOMY TUBE EXCHANGE  8/25/2023    IR NEPHROSTOMY TUBE EXCHANGE 8/25/2023 [facility]    IR NEPHROSTOMY TUBE EXCHANGE  8/25/2023    IR NEPHROSTOMY TUBE EXCHANGE 8/25/2023 [facility]    IR NEPHROSTOMY TUBE EXCHANGE  10/18/2023    IR NEPHROSTOMY TUBE EXCHANGE 10/18/2023 Onesimo Reyes MD [facility] [place] [facility]        ALLERGIES:   No Known Allergies     MEDICATIONS:   Current Outpatient Medications on File Prior to Visit   Medication Sig Dispense Refill    ferrous gluconate (Fergon) 324 (38 Fe) mg tablet Take 1 tablet (38 mg of iron) by mouth once daily with breakfast. 30 tablet 11    oxybutynin (Ditropan) 5 mg tablet Take 1 tablet (5 mg) by mouth once daily at bedtime. 30 tablet 3    phenazopyridine (Urinary Pain Relief) 95 mg tablet Take 1 tablet (95 mg) by mouth 3 times a day as needed for bladder spasms. 30 tablet 1    sodium bicarbonate 650 mg tablet Take 1 tablet (650 mg) by mouth 4 times a day. 360 tablet 3     Current Facility-Administered Medications on File Prior to Visit   Medication Dose Route Frequency Provider Last Rate Last Admin    multivitamin with iron-minerals 9 mg iron/15 mL liquid 15 mL  15 mL oral Once Tomas Andersen MD            SOCIAL HISTORY:  Patient  reports that he has never smoked. He has never used smokeless tobacco. He reports that he does not currently use alcohol. He reports that he does not use drugs.   Social History     Socioeconomic History    Marital status:      Spouse name: Not on file    Number of children: Not on file    Years of education: Not on file    Highest education level: Not on file   Occupational History    Not on file   Tobacco Use    Smoking status: Never    Smokeless tobacco: Never   Substance and Sexual Activity    Alcohol use: Not Currently    Drug use: Never    Sexual activity: Not on file   Other  Topics Concern    Not on file   Social History Narrative    Not on file     Social Determinants of Health     Financial Resource Strain: Low Risk  (11/29/2023)    Overall Financial Resource Strain (CARDIA)     Difficulty of Paying Living Expenses: Not very hard   Recent Concern: Financial Resource Strain - Medium Risk (11/1/2023)    Overall Financial Resource Strain (CARDIA)     Difficulty of Paying Living Expenses: Somewhat hard   Food Insecurity: Not on file   Transportation Needs: No Transportation Needs (11/29/2023)    PRAPARE - Transportation     Lack of Transportation (Medical): No     Lack of Transportation (Non-Medical): No   Physical Activity: Not on file   Stress: Not on file   Social Connections: Not on file   Intimate Partner Violence: Not on file   Housing Stability: Low Risk  (11/29/2023)    Housing Stability Vital Sign     Unable to Pay for Housing in the Last Year: No     Number of Places Lived in the Last Year: 1     Unstable Housing in the Last Year: No       FAMILY HISTORY:  No family history on file.    REVIEW OF SYSTEMS:  All systems reviewed, pertinent negatives as noted in HPI.     PHYSICAL EXAM:  Visit Vitals  /65   Pulse 74     Constitutional: Chronically ill-appearing. No distress.    Head: Normocephalic and atraumatic.    Neck: Normal range of motion.     Pulmonary/Chest: Effort normal. No respiratory distress.   Abdominal: Obese, ostomy in place.  : See below.  Integumentary: No rash or lesions visualized.  Musculoskeletal: Normal range of motion.    Neurological: Alert and oriented.  Psychiatric: Normal mood and affect. Thought content normal.      LABORATORY REVIEW:   Lab Results   Component Value Date    BUN 33 (H) 01/03/2024    CREATININE 2.25 (H) 01/03/2024    EGFR 33 (L) 01/03/2024     01/03/2024    K 4.0 01/03/2024     (H) 01/03/2024    CO2 12 (L) 01/03/2024    CALCIUM 8.7 01/03/2024      Lab Results   Component Value Date    WBC 6.9 01/03/2024    RBC 4.23 (L)  01/03/2024    HGB 12.2 (L) 01/03/2024    HCT 40.8 (L) 01/03/2024    MCV 97 01/03/2024    MCH 28.8 01/03/2024    MCHC 29.9 (L) 01/03/2024    RDW 17.4 (H) 01/03/2024     01/03/2024    MPV 9.2 10/30/2023           Assessment:      1. Colovesical fistula        2. Crohn's disease with fistula, unspecified gastrointestinal tract location (CMS/HCC)            Jose Thornton is a 58 y.o. with Crohn's disease and associated colovesical fistula s/p B nephrostomy and Schultz catheter placement. Bilateral ureteral reimplant and fistula repair with Dr. Andersen and Dr. Rodriguez 10/27/23, discharged 11/17/23. Readmitted for dehydration, sepsis due to UTI 11/28-12/13/23.     SPT removed now without difficulty, dry dressing applied.      Plan:   Discussed wound care  Continue to monitor UOP  RTC with Dr. Andersen as needed  Encouraged to call in the interim with any questions, concerns

## 2024-01-17 ENCOUNTER — APPOINTMENT (OUTPATIENT)
Dept: RADIOLOGY | Facility: HOSPITAL | Age: 59
End: 2024-01-17

## 2024-01-23 ENCOUNTER — APPOINTMENT (OUTPATIENT)
Dept: UROLOGY | Facility: CLINIC | Age: 59
End: 2024-01-23

## 2024-02-12 ENCOUNTER — TELEPHONE (OUTPATIENT)
Dept: UROLOGY | Facility: CLINIC | Age: 59
End: 2024-02-12

## 2024-02-12 NOTE — TELEPHONE ENCOUNTER
Patient called and left a message stating that he has a medical concern that he wishes to discuss with Dhiraj.    Called to offer phone call appointment for Wednesday 2/14 - patient did not disclose his concerns but was happy enough to schedule appointment

## 2024-02-14 ENCOUNTER — PHARMACY VISIT (OUTPATIENT)
Dept: PHARMACY | Facility: CLINIC | Age: 59
End: 2024-02-14
Payer: COMMERCIAL

## 2024-02-14 ENCOUNTER — TELEMEDICINE (OUTPATIENT)
Dept: UROLOGY | Facility: CLINIC | Age: 59
End: 2024-02-14

## 2024-02-14 DIAGNOSIS — N32.89 BLADDER SPASMS: ICD-10-CM

## 2024-02-14 DIAGNOSIS — N32.81 OAB (OVERACTIVE BLADDER): Primary | ICD-10-CM

## 2024-02-14 DIAGNOSIS — N32.1 COLOVESICAL FISTULA: ICD-10-CM

## 2024-02-14 DIAGNOSIS — R35.1 NOCTURIA: ICD-10-CM

## 2024-02-14 PROCEDURE — 1036F TOBACCO NON-USER: CPT | Performed by: NURSE PRACTITIONER

## 2024-02-14 PROCEDURE — RXMED WILLOW AMBULATORY MEDICATION CHARGE

## 2024-02-14 PROCEDURE — 99214 OFFICE O/P EST MOD 30 MIN: CPT | Performed by: NURSE PRACTITIONER

## 2024-02-14 RX ORDER — OXYBUTYNIN CHLORIDE 5 MG/1
5 TABLET, EXTENDED RELEASE ORAL DAILY
Qty: 30 TABLET | Refills: 11 | Status: SHIPPED | OUTPATIENT
Start: 2024-02-14 | End: 2024-03-26 | Stop reason: DRUGHIGH

## 2024-02-14 NOTE — PROGRESS NOTES
"UROLOGIC FOLLOW-UP VISIT     PROBLEM LIST:  1. OAB (overactive bladder)  oxybutynin XL (Ditropan XL) 5 mg 24 hr tablet      2. Nocturia  oxybutynin XL (Ditropan XL) 5 mg 24 hr tablet      3. Bladder spasms        4. Colovesical fistula             HISTORY OF PRESENT ILLNESS:   Jose Thornton is a 58 y.o. with Crohn's disease and associated colovesical fistula s/p B nephrostomy and Schultz catheter placement. Bilateral ureteral reimplant and fistula repair with Dr. Andersen and Dr. Rodriguez 10/27/23, discharged 11/17/23. Readmitted for dehydration, sepsis due to UTI 11/28-12/13/23.     INTERVAL HISTORY:  Spoke with John by phone  \"Poking along,\" recovering slowly  Wondering about ongoing bladder spasms  Also with continued leakage, using condom cath at night  Feels tethered to the bathroom  Spending a large part of the day voiding or thinking about voiding  Notes triggers like running water, cold beverages  Fewer spasms when busy or distracted    PAST MEDICAL HISTORY:  Past Medical History:   Diagnosis Date    Crohn's disease (CMS/HCC)     Diverticulosis     GERD (gastroesophageal reflux disease)     GI (gastrointestinal bleed)     Urinary tract infection        PAST SURGICAL HISTORY:  Past Surgical History:   Procedure Laterality Date    CT GUIDED PERCUTANEOUS PERITONEAL OR RETROPERITONEAL FLUID COLLECTION DRAINAGE  9/28/2022    CT GUIDED PERCUTANEOUS PERITONEAL OR RETROPERITONEAL FLUID COLLECTION DRAINAGE 9/28/2022 [facility] [place] INPATIENT LEGACY    IR CVC NONTUNNELED  11/6/2023    IR CVC NONTUNNELED 11/6/2023 Drew Terry MD [facility] [place] [facility]    IR NEPHROSTOMY TUBE EXCHANGE  1/31/2023    IR NEPHROSTOMY TUBE EXCHANGE 1/31/2023 [facility]CTOR OFFICE LEGACY    IR NEPHROSTOMY TUBE EXCHANGE  1/31/2023    IR NEPHROSTOMY TUBE EXCHANGE 1/31/2023 [facility]GAC[facility]    IR NEPHROSTOMY TUBE EXCHANGE  3/23/2023    IR NEPHROSTOMY TUBE EXCHANGE [facility] [place] [facility]    IR NEPHROSTOMY TUBE EXCHANGE  3/23/2023    IR NEPHROSTOMY TUBE EXCHANGE [facility] [place] [facility]    IR NEPHROSTOMY " TUBE EXCHANGE  3/30/2023    IR NEPHROSTOMY TUBE EXCHANGE [facility]    IR NEPHROSTOMY TUBE EXCHANGE  4/25/2023    IR NEPHROSTOMY TUBE EXCHANGE [facility]    IR NEPHROSTOMY TUBE EXCHANGE  6/30/2023    IR NEPHROSTOMY TUBE EXCHANGE 6/30/2023 [facility]    IR NEPHROSTOMY TUBE EXCHANGE  6/30/2023    IR NEPHROSTOMY TUBE EXCHANGE 6/30/2023 [facility]    IR NEPHROSTOMY TUBE EXCHANGE  8/25/2023    IR NEPHROSTOMY TUBE EXCHANGE 8/25/2023 [facility]    IR NEPHROSTOMY TUBE EXCHANGE  8/25/2023    IR NEPHROSTOMY TUBE EXCHANGE 8/25/2023 [facility]    IR NEPHROSTOMY TUBE EXCHANGE  10/18/2023    IR NEPHROSTOMY TUBE EXCHANGE 10/18/2023 Onesimo Reyes MD [facility]GIO        ALLERGIES:   No Known Allergies     MEDICATIONS:   Current Outpatient Medications on File Prior to Visit   Medication Sig Dispense Refill    ferrous gluconate (Fergon) 324 (38 Fe) mg tablet Take 1 tablet (38 mg of iron) by mouth once daily with breakfast. 30 tablet 11    oxybutynin (Ditropan) 5 mg tablet Take 1 tablet (5 mg) by mouth once daily at bedtime. 30 tablet 3    phenazopyridine (Urinary Pain Relief) 95 mg tablet Take 1 tablet (95 mg) by mouth 3 times a day as needed for bladder spasms. 30 tablet 1    sodium bicarbonate 650 mg tablet Take 1 tablet (650 mg) by mouth 4 times a day. 360 tablet 3     Current Facility-Administered Medications on File Prior to Visit   Medication Dose Route Frequency Provider Last Rate Last Admin    multivitamin with iron-minerals 9 mg iron/15 mL liquid 15 mL  15 mL oral Once Tomas Andersen MD            SOCIAL HISTORY:  Patient  reports that he has never smoked. He has never used smokeless tobacco. He reports that he does not currently use alcohol. He reports that he does not use drugs.   Social History     Socioeconomic History    Marital status:      Spouse name: Not on file    Number of children: Not on file    Years of education: Not on file    Highest education level: Not on file   Occupational History    Not on file    Tobacco Use    Smoking status: Never    Smokeless tobacco: Never   Substance and Sexual Activity    Alcohol use: Not Currently    Drug use: Never    Sexual activity: Not on file   Other Topics Concern    Not on file   Social History Narrative    Not on file     Social Determinants of Health     Financial Resource Strain: Low Risk  (11/29/2023)    Overall Financial Resource Strain (CARDIA)     Difficulty of Paying Living Expenses: Not very hard   Recent Concern: Financial Resource Strain - Medium Risk (11/1/2023)    Overall Financial Resource Strain (CARDIA)     Difficulty of Paying Living Expenses: Somewhat hard   Food Insecurity: Not on file   Transportation Needs: No Transportation Needs (11/29/2023)    PRAPARE - Transportation     Lack of Transportation (Medical): No     Lack of Transportation (Non-Medical): No   Physical Activity: Not on file   Stress: Not on file   Social Connections: Not on file   Intimate Partner Violence: Not on file   Housing Stability: Low Risk  (11/29/2023)    Housing Stability Vital Sign     Unable to Pay for Housing in the Last Year: No     Number of Places Lived in the Last Year: 1     Unstable Housing in the Last Year: No       FAMILY HISTORY:  No family history on file.    REVIEW OF SYSTEMS:  All systems reviewed, pertinent negatives as noted in HPI.     PHYSICAL EXAM:  There were no vitals taken for this visit.    LABORATORY REVIEW:   Lab Results   Component Value Date    BUN 33 (H) 01/03/2024    CREATININE 2.25 (H) 01/03/2024    EGFR 33 (L) 01/03/2024     01/03/2024    K 4.0 01/03/2024     (H) 01/03/2024    CO2 12 (L) 01/03/2024    CALCIUM 8.7 01/03/2024      Lab Results   Component Value Date    WBC 6.9 01/03/2024    RBC 4.23 (L) 01/03/2024    HGB 12.2 (L) 01/03/2024    HCT 40.8 (L) 01/03/2024    MCV 97 01/03/2024    MCH 28.8 01/03/2024    MCHC 29.9 (L) 01/03/2024    RDW 17.4 (H) 01/03/2024     01/03/2024    MPV 9.2 10/30/2023           Assessment:      1. OAB  (overactive bladder)  oxybutynin XL (Ditropan XL) 5 mg 24 hr tablet      2. Nocturia  oxybutynin XL (Ditropan XL) 5 mg 24 hr tablet      3. Bladder spasms        4. Colovesical fistula            Jose Thornton is a 58 y.o. with Crohn's disease and associated colovesical fistula s/p B nephrostomy and Schultz catheter placement. Bilateral ureteral reimplant and fistula repair with Dr. Andersen and Dr. Rodriguez 10/27/23, discharged 11/17/23. Readmitted for dehydration, sepsis due to UTI 11/28-12/13/23. Having ongoing issues with bladder spasms and leakage.     Plan:   Oxybutynin XL 5 mg po daily  Trial of independent pelvic floor PT, behavioural modifications  If symptoms not improved in next 4-6 weeks will consider cysto  Encouraged to call in the interim with any questions, concerns

## 2024-03-07 ENCOUNTER — LAB (OUTPATIENT)
Dept: LAB | Facility: LAB | Age: 59
End: 2024-03-07

## 2024-03-07 ENCOUNTER — OFFICE VISIT (OUTPATIENT)
Dept: NEPHROLOGY | Facility: CLINIC | Age: 59
End: 2024-03-07

## 2024-03-07 VITALS
WEIGHT: 223 LBS | BODY MASS INDEX: 30.2 KG/M2 | DIASTOLIC BLOOD PRESSURE: 78 MMHG | HEART RATE: 89 BPM | HEIGHT: 72 IN | SYSTOLIC BLOOD PRESSURE: 111 MMHG

## 2024-03-07 DIAGNOSIS — N17.9 AKI (ACUTE KIDNEY INJURY) (CMS-HCC): ICD-10-CM

## 2024-03-07 DIAGNOSIS — N17.9 AKI (ACUTE KIDNEY INJURY) (CMS-HCC): Primary | ICD-10-CM

## 2024-03-07 LAB
ANION GAP SERPL CALC-SCNC: 16 MMOL/L (ref 10–20)
BACTERIA #/AREA URNS AUTO: ABNORMAL /HPF
BUN SERPL-MCNC: 19 MG/DL (ref 6–23)
CALCIUM SERPL-MCNC: 9.2 MG/DL (ref 8.6–10.3)
CHLORIDE SERPL-SCNC: 104 MMOL/L (ref 98–107)
CO2 SERPL-SCNC: 22 MMOL/L (ref 21–32)
CREAT SERPL-MCNC: 2.04 MG/DL (ref 0.5–1.3)
CREAT UR-MCNC: 97.2 MG/DL (ref 20–370)
EGFRCR SERPLBLD CKD-EPI 2021: 38 ML/MIN/1.73M*2
GLUCOSE SERPL-MCNC: 88 MG/DL (ref 74–99)
POTASSIUM SERPL-SCNC: 4.2 MMOL/L (ref 3.5–5.3)
PROT UR-ACNC: 125 MG/DL (ref 5–25)
PROT/CREAT UR: 1.29 MG/MG CREAT (ref 0–0.17)
RBC #/AREA URNS AUTO: >20 /HPF
SODIUM SERPL-SCNC: 138 MMOL/L (ref 136–145)
WBC #/AREA URNS AUTO: >50 /HPF

## 2024-03-07 PROCEDURE — 84156 ASSAY OF PROTEIN URINE: CPT

## 2024-03-07 PROCEDURE — 1036F TOBACCO NON-USER: CPT | Performed by: INTERNAL MEDICINE

## 2024-03-07 PROCEDURE — 80048 BASIC METABOLIC PNL TOTAL CA: CPT

## 2024-03-07 PROCEDURE — 81001 URINALYSIS AUTO W/SCOPE: CPT

## 2024-03-07 PROCEDURE — 99214 OFFICE O/P EST MOD 30 MIN: CPT | Performed by: INTERNAL MEDICINE

## 2024-03-07 PROCEDURE — 36415 COLL VENOUS BLD VENIPUNCTURE: CPT

## 2024-03-07 PROCEDURE — 82570 ASSAY OF URINE CREATININE: CPT

## 2024-03-07 NOTE — PROGRESS NOTES
Jose Thornton   58 y.o.    @@  [facility]/Room: [id number]/Room/bed info not found    Subjective:   The patient is being seen for a routine clinic follow-up of chronic kidney disease. Recently, the disease has been stable. Disease complications:  No hyperkalemia, no hypocalcemia, no hyperphosphatemia, no metabolic acidosis, no coagulopathy, no uremic encephalopathy, no neuropathy and no renal osteodystrophy. The patient is currently asymptomatic. No associated symptoms are reported.       Meds:   Current Outpatient Medications   Medication Sig Dispense Refill    oxybutynin XL (Ditropan XL) 5 mg 24 hr tablet Take 1 tablet (5 mg) by mouth once daily. Do not crush, chew, or split. 30 tablet 11    sodium bicarbonate 650 mg tablet Take 1 tablet (650 mg) by mouth 4 times a day. (Patient taking differently: Take 1 tablet (650 mg) by mouth 4 times a day. 2-3 times daily) 360 tablet 3     Current Facility-Administered Medications   Medication Dose Route Frequency Provider Last Rate Last Admin    multivitamin with iron-minerals 9 mg iron/15 mL liquid 15 mL  15 mL oral Once Tomas Andersen MD              ROS:  The patient is awake and oriented. No dizziness or lightheadedness. No chills and no fever. No headaches. No nausea and no vomiting. No shortness of breath. No cough. No sputum. No chest pain. No chest tightness. No abdominal pain. No diarrhea and no constipation. No hematemesis or hemoptysis. No hematuria. No rectal bleeding. No melena. No epistaxis. No urinary symptoms. No flank pain. No leg edema. No leg pain. No weakness. No itching. Overall, the rest of the review of systems is also negative.  12 point review of systems otherwise negative as stated in HPI.        Physical Examination:        Vitals:    03/07/24 1004   BP: 111/78   Pulse: 89     General: The patient is awake, oriented, and is not in any distress.  Head and Neck: Normocephalic. No periorbital edema.  Eyes: non-icteric  Respiratory: Symmetric air entry.  Symmetric chest expansion.No respiratory distress.  Cardiovascular: Symmetric peripheral pulses.  Skin: No maculopapular rash.  Abdomen: soft, nt/nd  Musculoskeletal: No peripheral edema in both left and right upper extremities.  No edema in either left or right lower extremities.  Neuro Exam: Speech is fluent. Moves extremities.    Imaging:  === 01/09/24 ===    US RENAL COMPLETE    - Impression -  Small left kidney with diffuse cortical thinning likely related to  remote vascular or inflammatory insult.    Partially distended lobulated, surgically altered/augmented urinary  bladder.    MACRO:  None.    Signed by: Mary Carmen Bautista 1/10/2024 4:41 PM  Dictation workstation:   [id number]       Blood Labs:  No results found for this or any previous visit (from the past 24 hour(s)).   Lab Results   Component Value Date    PROTUR 100 (2+) (N) 11/28/2023    PHOS 3.9 12/13/2023      Lab Results   Component Value Date    GLUCOSE 86 01/03/2024    CALCIUM 8.7 01/03/2024     01/03/2024    K 4.0 01/03/2024    CO2 12 (L) 01/03/2024     (H) 01/03/2024    BUN 33 (H) 01/03/2024    CREATININE 2.25 (H) 01/03/2024         Assessment and Plan:    #1 acute kidney injury.  57 y.o. male with PMH of Crohn's disease and colovesical fistula who underwent Exploratory laparotomy, MYA, Cystoscopy, bilateral ileal ureter and bladder augment with right partial rectus muscle flap, and Suprapubic tube insertion on 10/27/23. Later, he developed KADE likely multifactorial 2/2 dehydration, hypotension, infection in the setting of possibly misplaced right nephrostomy tube.  He required few seasons of dialysis treatment.  His last creatinine level from January 3rd is 2.25.  Creatinine level had been up to 11.7 before he gets dialysis.  Volume status is fine.  Potassium level is fine.  I asked for a BMP to be done today. His BP is good.      2.  Metabolic acidosis.  He has metabolic acidosis probably because of GI loss.  Last time, I added sodium  bicarb to his medications.     I will see him in about 3 months for follow-up.        Radhames Bella MD

## 2024-03-17 DIAGNOSIS — N32.89 BLADDER SPASM: Primary | ICD-10-CM

## 2024-03-17 RX ORDER — OXYBUTYNIN CHLORIDE 5 MG/1
5 TABLET, EXTENDED RELEASE ORAL DAILY
Qty: 30 TABLET | Refills: 11 | Status: SHIPPED | OUTPATIENT
Start: 2024-03-17 | End: 2024-03-26 | Stop reason: DRUGHIGH

## 2024-03-26 ENCOUNTER — PHARMACY VISIT (OUTPATIENT)
Dept: PHARMACY | Facility: CLINIC | Age: 59
End: 2024-03-26
Payer: COMMERCIAL

## 2024-03-26 ENCOUNTER — TELEMEDICINE (OUTPATIENT)
Dept: UROLOGY | Facility: CLINIC | Age: 59
End: 2024-03-26

## 2024-03-26 DIAGNOSIS — N32.89 BLADDER SPASMS: Primary | ICD-10-CM

## 2024-03-26 DIAGNOSIS — N32.81 OAB (OVERACTIVE BLADDER): ICD-10-CM

## 2024-03-26 PROCEDURE — RXMED WILLOW AMBULATORY MEDICATION CHARGE

## 2024-03-26 PROCEDURE — 1036F TOBACCO NON-USER: CPT | Performed by: NURSE PRACTITIONER

## 2024-03-26 PROCEDURE — 99213 OFFICE O/P EST LOW 20 MIN: CPT | Performed by: NURSE PRACTITIONER

## 2024-03-26 RX ORDER — OXYBUTYNIN CHLORIDE 10 MG/1
10 TABLET, EXTENDED RELEASE ORAL DAILY
Qty: 30 TABLET | Refills: 11 | Status: SHIPPED | OUTPATIENT
Start: 2024-03-26 | End: 2025-03-26

## 2024-03-26 NOTE — PROGRESS NOTES
UROLOGIC FOLLOW-UP VISIT     PROBLEM LIST:  No diagnosis found.       HISTORY OF PRESENT ILLNESS:   Jose Thornton is a 58 y.o. with Crohn's disease and associated colovesical fistula s/p B nephrostomy and Schultz catheter placement. Bilateral ureteral reimplant and fistula repair with Dr. Andersen and Dr. Rodriguez 10/27/23, discharged 11/17/23. Readmitted for dehydration, sepsis due to UTI 11/28-12/13/23.     INTERVAL HISTORY:  Spoke with John by phone  Ran out of oxybutynin, woke up with UTI symptoms  Back to normal within about a day of resuming  Bladder spasms are less intense  Voiding better, stream improved  Still using either bedside urinal during the day or external catheter at night  Doing informal pelvic floor exercises  Feels like he has better control  Planning to return to work soon on a part-time basis    PAST MEDICAL HISTORY:  Past Medical History:   Diagnosis Date    Crohn's disease (CMS/HCC)     Diverticulosis     GERD (gastroesophageal reflux disease)     GI (gastrointestinal bleed)     Urinary tract infection        PAST SURGICAL HISTORY:  Past Surgical History:   Procedure Laterality Date    CT GUIDED PERCUTANEOUS PERITONEAL OR RETROPERITONEAL FLUID COLLECTION DRAINAGE  9/28/2022    CT GUIDED PERCUTANEOUS PERITONEAL OR RETROPERITONEAL FLUID COLLECTION DRAINAGE 9/28/2022 [facility] [place] [facility]    IR CVC NONTUNNELED  11/6/2023    IR CVC NONTUNNELED 11/6/2023 Drew Terry MD [facility]    IR NEPHROSTOMY TUBE EXCHANGE  1/31/2023    IR NEPHROSTOMY TUBE EXCHANGE 1/31/2023 [facility]    IR NEPHROSTOMY TUBE EXCHANGE  1/31/2023    IR NEPHROSTOMY TUBE EXCHANGE 1/31/2023 [facility]    IR NEPHROSTOMY TUBE EXCHANGE  3/23/2023    IR NEPHROSTOMY TUBE EXCHANGE [facility]    IR NEPHROSTOMY TUBE EXCHANGE  3/23/2023    IR NEPHROSTOMY TUBE EXCHANGE [facility]    IR NEPHROSTOMY TUBE EXCHANGE  3/30/2023    IR NEPHROSTOMY TUBE EXCHANGE [facility]    IR NEPHROSTOMY TUBE EXCHANGE  4/25/2023    IR NEPHROSTOMY  TUBE EXCHANGE [facility]    IR NEPHROSTOMY TUBE EXCHANGE  6/30/2023    IR NEPHROSTOMY TUBE EXCHANGE 6/30/2023 [facility]    IR NEPHROSTOMY TUBE EXCHANGE  6/30/2023    IR NEPHROSTOMY TUBE EXCHANGE 6/30/2023 [facility]    IR NEPHROSTOMY TUBE EXCHANGE  8/25/2023    IR NEPHROSTOMY TUBE EXCHANGE 8/25/2023 [facility]    IR NEPHROSTOMY TUBE EXCHANGE  8/25/2023    IR NEPHROSTOMY TUBE EXCHANGE 8/25/2023 [facility]    IR NEPHROSTOMY TUBE EXCHANGE  10/18/2023    IR NEPHROSTOMY TUBE EXCHANGE 10/18/2023 [place]es[place] [facility] [place]        ALLERGIES:   No Known Allergies     MEDICATIONS:   Current Outpatient Medications on File Prior to Visit   Medication Sig Dispense Refill    oxybutynin XL (Ditropan XL) 5 mg 24 hr tablet Take 1 tablet (5 mg) by mouth once daily. Do not crush, chew, or split. 30 tablet 11    oxybutynin XL (Ditropan-XL) 5 mg 24 hr tablet Take 1 tablet (5 mg) by mouth once daily. Do not crush, chew, or split. 30 tablet 11    sodium bicarbonate 650 mg tablet Take 1 tablet (650 mg) by mouth 4 times a day. (Patient taking differently: Take 1 tablet (650 mg) by mouth 4 times a day. 2-3 times daily) 360 tablet 3     Current Facility-Administered Medications on File Prior to Visit   Medication Dose Route Frequency Provider Last Rate Last Admin    multivitamin with iron-minerals 9 mg iron/15 mL liquid 15 mL  15 mL oral Once Tomas Andersen MD            SOCIAL HISTORY:  Patient  reports that he has never smoked. He has never used smokeless tobacco. He reports that he does not currently use alcohol. He reports that he does not use drugs.   Social History     Socioeconomic History    Marital status:      Spouse name: Not on file    Number of children: Not on file    Years of education: Not on file    Highest education level: Not on file   Occupational History    Not on file   Tobacco Use    Smoking status: Never    Smokeless tobacco: Never   Substance and Sexual Activity    Alcohol use: Not Currently    Drug use:  Never    Sexual activity: Not on file   Other Topics Concern    Not on file   Social History Narrative    Not on file     Social Determinants of Health     Financial Resource Strain: Low Risk  (11/29/2023)    Overall Financial Resource Strain (CARDIA)     Difficulty of Paying Living Expenses: Not very hard   Recent Concern: Financial Resource Strain - Medium Risk (11/1/2023)    Overall Financial Resource Strain (CARDIA)     Difficulty of Paying Living Expenses: Somewhat hard   Food Insecurity: Not on file   Transportation Needs: No Transportation Needs (11/29/2023)    PRAPARE - Transportation     Lack of Transportation (Medical): No     Lack of Transportation (Non-Medical): No   Physical Activity: Not on file   Stress: Not on file   Social Connections: Not on file   Intimate Partner Violence: Not on file   Housing Stability: Low Risk  (11/29/2023)    Housing Stability Vital Sign     Unable to Pay for Housing in the Last Year: No     Number of Places Lived in the Last Year: 1     Unstable Housing in the Last Year: No       FAMILY HISTORY:  No family history on file.    REVIEW OF SYSTEMS:  All systems reviewed, pertinent negatives as noted in HPI.     PHYSICAL EXAM:  There were no vitals taken for this visit.    LABORATORY REVIEW:   Lab Results   Component Value Date    BUN 19 03/07/2024    CREATININE 2.04 (H) 03/07/2024    EGFR 38 (L) 03/07/2024     03/07/2024    K 4.2 03/07/2024     03/07/2024    CO2 22 03/07/2024    CALCIUM 9.2 03/07/2024      Lab Results   Component Value Date    WBC 6.9 01/03/2024    RBC 4.23 (L) 01/03/2024    HGB 12.2 (L) 01/03/2024    HCT 40.8 (L) 01/03/2024    MCV 97 01/03/2024    MCH 28.8 01/03/2024    MCHC 29.9 (L) 01/03/2024    RDW 17.4 (H) 01/03/2024     01/03/2024    MPV 9.2 10/30/2023           Assessment:      No diagnosis found.      Jose Thornton is a 58 y.o. with Crohn's disease and associated colovesical fistula s/p B nephrostomy and Schultz catheter placement.      Bilateral ureteral reimplant and fistula repair with Dr. Andersen and Dr. Rodriguez 10/27/23, discharged 11/17/23. Readmitted for dehydration, sepsis due to UTI 11/28-12/13/23.     Having ongoing issues with bladder spasms and leakage; definite improved with oxybutynin. Continues to use external catheter at night due to urgency, frequency.     Plan:   Increase oxybutynin XL to 10 mg po daily  He will reach out with update within the next few weeks  RTC in 6-8 weeks or sooner if needed  Encouraged to call in the interim with any questions, concerns

## 2024-04-24 ENCOUNTER — PHARMACY VISIT (OUTPATIENT)
Dept: PHARMACY | Facility: CLINIC | Age: 59
End: 2024-04-24
Payer: COMMERCIAL

## 2024-04-24 PROCEDURE — RXMED WILLOW AMBULATORY MEDICATION CHARGE

## 2024-05-28 ENCOUNTER — PHARMACY VISIT (OUTPATIENT)
Dept: PHARMACY | Facility: CLINIC | Age: 59
End: 2024-05-28
Payer: COMMERCIAL

## 2024-05-28 PROCEDURE — RXMED WILLOW AMBULATORY MEDICATION CHARGE

## 2024-06-10 ENCOUNTER — TELEPHONE (OUTPATIENT)
Dept: PRIMARY CARE | Facility: CLINIC | Age: 59
End: 2024-06-10

## 2024-06-10 DIAGNOSIS — N17.9 AKI (ACUTE KIDNEY INJURY) (CMS-HCC): Primary | ICD-10-CM

## 2024-06-10 NOTE — TELEPHONE ENCOUNTER
Patient is requesting ASAP lab orders from Dr. Bella, as he has a fuv on Tuesday 6/11/24 and needs to go to the lab on 06/10/24.  Patient can be reached on cell. Thank you!

## 2024-06-10 NOTE — TELEPHONE ENCOUNTER
Patient wants to know if he should reschedule his appointment at this time as his appointment is tomorrow and he is unable to get labs done, as there are no orders currently in the system for him to get it done.

## 2024-06-11 ENCOUNTER — LAB (OUTPATIENT)
Dept: LAB | Facility: LAB | Age: 59
End: 2024-06-11

## 2024-06-11 ENCOUNTER — APPOINTMENT (OUTPATIENT)
Dept: NEPHROLOGY | Facility: CLINIC | Age: 59
End: 2024-06-11

## 2024-06-11 DIAGNOSIS — N17.9 AKI (ACUTE KIDNEY INJURY) (CMS-HCC): ICD-10-CM

## 2024-06-11 LAB
ANION GAP SERPL CALC-SCNC: 12 MMOL/L (ref 10–20)
BUN SERPL-MCNC: 24 MG/DL (ref 6–23)
CALCIUM SERPL-MCNC: 8.8 MG/DL (ref 8.6–10.3)
CHLORIDE SERPL-SCNC: 110 MMOL/L (ref 98–107)
CO2 SERPL-SCNC: 20 MMOL/L (ref 21–32)
CREAT SERPL-MCNC: 2.22 MG/DL (ref 0.5–1.3)
EGFRCR SERPLBLD CKD-EPI 2021: 34 ML/MIN/1.73M*2
GLUCOSE SERPL-MCNC: 116 MG/DL (ref 74–99)
POTASSIUM SERPL-SCNC: 4.2 MMOL/L (ref 3.5–5.3)
SODIUM SERPL-SCNC: 138 MMOL/L (ref 136–145)

## 2024-06-11 PROCEDURE — 36415 COLL VENOUS BLD VENIPUNCTURE: CPT

## 2024-06-11 PROCEDURE — 80048 BASIC METABOLIC PNL TOTAL CA: CPT

## 2024-06-12 ENCOUNTER — OFFICE VISIT (OUTPATIENT)
Dept: NEPHROLOGY | Facility: CLINIC | Age: 59
End: 2024-06-12

## 2024-06-12 VITALS
DIASTOLIC BLOOD PRESSURE: 77 MMHG | SYSTOLIC BLOOD PRESSURE: 116 MMHG | HEIGHT: 72 IN | BODY MASS INDEX: 28.33 KG/M2 | HEART RATE: 94 BPM | WEIGHT: 209.2 LBS

## 2024-06-12 DIAGNOSIS — N18.32 STAGE 3B CHRONIC KIDNEY DISEASE (MULTI): Primary | ICD-10-CM

## 2024-06-12 DIAGNOSIS — N17.9 AKI (ACUTE KIDNEY INJURY) (CMS-HCC): ICD-10-CM

## 2024-06-12 PROCEDURE — 1036F TOBACCO NON-USER: CPT | Performed by: INTERNAL MEDICINE

## 2024-06-12 PROCEDURE — 99213 OFFICE O/P EST LOW 20 MIN: CPT | Performed by: INTERNAL MEDICINE

## 2024-06-12 NOTE — PROGRESS NOTES
Jose Thornton   58 y.o.    @@  [facility]/Room: [id number]/Room/bed info not found    Subjective:   The patient is being seen for a routine clinic follow-up of chronic kidney disease. Recently, the disease has been stable. Disease complications:  No hyperkalemia, no hypocalcemia, no hyperphosphatemia, no metabolic acidosis, no coagulopathy, no uremic encephalopathy, no neuropathy and no renal osteodystrophy. The patient is currently asymptomatic. No associated symptoms are reported.       Meds:   Current Outpatient Medications   Medication Sig Dispense Refill    sodium bicarbonate 650 mg tablet Take 1 tablet (650 mg) by mouth 4 times a day. (Patient taking differently: Take 1 tablet (650 mg) by mouth 4 times a day. 2-3 times daily) 360 tablet 3    oxybutynin XL (Ditropan XL) 10 mg 24 hr tablet Take 1 tablet (10 mg) by mouth once daily. Do not crush, chew, or split. (Patient not taking: Reported on 6/12/2024) 30 tablet 11     Current Facility-Administered Medications   Medication Dose Route Frequency Provider Last Rate Last Admin    multivitamin with iron-minerals 9 mg iron/15 mL liquid 15 mL  15 mL oral Once Tomas Andersen MD              ROS:  The patient is awake and oriented. No dizziness or lightheadedness. No chills and no fever. No headaches. No nausea and no vomiting. No shortness of breath. No cough. No sputum. No chest pain. No chest tightness. No abdominal pain. No diarrhea and no constipation. No hematemesis or hemoptysis. No hematuria. No rectal bleeding. No melena. No epistaxis. No urinary symptoms. No flank pain. No leg edema. No leg pain. No weakness. No itching. Overall, the rest of the review of systems is also negative.  12 point review of systems otherwise negative as stated in HPI.        Physical Examination:        Vitals:    06/12/24 1019   BP: 116/77   Pulse: 94     General: The patient is awake, oriented, and is not in any distress.  Head and Neck: Normocephalic. No periorbital edema.  Eyes:  non-icteric  Respiratory: Symmetric air entry. Symmetric chest expansion.No respiratory distress.  Cardiovascular: Symmetric peripheral pulses.  Skin: No maculopapular rash.  Abdomen: soft, nt/nd  Musculoskeletal: No peripheral edema in both left and right upper extremities.  No edema in either left or right lower extremities.  Neuro Exam: Speech is fluent. Moves extremities.    Imaging:  === 01/09/24 ===    US RENAL COMPLETE    - Impression -  Small left kidney with diffuse cortical thinning likely related to  remote vascular or inflammatory insult.    Partially distended lobulated, surgically altered/augmented urinary  bladder.    MACRO:  None.    Signed by: Mary Carmen Bautista 1/10/2024 4:41 PM  Dictation workstation:   [id number]       Blood Labs:  Results for orders placed or performed in visit on 06/11/24 (from the past 24 hour(s))   Basic Metabolic Panel   Result Value Ref Range    Glucose 116 (H) 74 - 99 mg/dL    Sodium 138 136 - 145 mmol/L    Potassium 4.2 3.5 - 5.3 mmol/L    Chloride 110 (H) 98 - 107 mmol/L    Bicarbonate 20 (L) 21 - 32 mmol/L    Anion Gap 12 10 - 20 mmol/L    Urea Nitrogen 24 (H) 6 - 23 mg/dL    Creatinine 2.22 (H) 0.50 - 1.30 mg/dL    eGFR 34 (L) >60 mL/min/1.73m*2    Calcium 8.8 8.6 - 10.3 mg/dL      Lab Results   Component Value Date    PROTUR 100 (2+) (N) 11/28/2023    PHOS 3.9 12/13/2023      Lab Results   Component Value Date    GLUCOSE 116 (H) 06/11/2024    CALCIUM 8.8 06/11/2024     06/11/2024    K 4.2 06/11/2024    CO2 20 (L) 06/11/2024     (H) 06/11/2024    BUN 24 (H) 06/11/2024    CREATININE 2.22 (H) 06/11/2024         Assessment and Plan:  58 y.o. male with PMH of Crohn's disease and colovesical fistula who underwent Exploratory laparotomy, MYA, Cystoscopy, bilateral ileal ureter and bladder augment with right partial rectus muscle flap, and Suprapubic tube insertion on 10/27/23. Later, he developed KADE likely multifactorial 2/2 dehydration, hypotension, infection in  the setting of possibly misplaced right nephrostomy tube.  He required few seasons of dialysis treatment.  Creatinine level had been up to 11.7 before he gets dialysis.  Volume status is fine.  Potassium level is fine.  I asked for a BMP to be done today. His BP is good.     1- CKD III: Residual dysfunction from episode of acute kidney injury as above.  Last Cr level is 2.2. No change in Cr level over the past few month. Normal K level. Bicarb level is fine.  Blood pressure is good.  Volume status is good.     I will see him in about 3 months for follow-up.          Radhames Bella MD  Senior Attending Physician  Director of Onco-Nephrology Program  Division of Nephrology & Hypertension  [facility]

## 2024-10-07 ENCOUNTER — PHARMACY VISIT (OUTPATIENT)
Dept: PHARMACY | Facility: CLINIC | Age: 59
End: 2024-10-07
Payer: COMMERCIAL

## 2024-10-07 ENCOUNTER — LAB (OUTPATIENT)
Dept: LAB | Facility: LAB | Age: 59
End: 2024-10-07

## 2024-10-07 DIAGNOSIS — N18.32 STAGE 3B CHRONIC KIDNEY DISEASE (MULTI): ICD-10-CM

## 2024-10-07 DIAGNOSIS — N17.9 AKI (ACUTE KIDNEY INJURY) (CMS-HCC): ICD-10-CM

## 2024-10-07 LAB
25(OH)D3 SERPL-MCNC: 88 NG/ML (ref 30–100)
ANION GAP SERPL CALC-SCNC: 10 MMOL/L (ref 10–20)
BUN SERPL-MCNC: 16 MG/DL (ref 6–23)
CALCIUM SERPL-MCNC: 9 MG/DL (ref 8.6–10.3)
CHLORIDE SERPL-SCNC: 107 MMOL/L (ref 98–107)
CO2 SERPL-SCNC: 26 MMOL/L (ref 21–32)
CREAT SERPL-MCNC: 1.77 MG/DL (ref 0.5–1.3)
EGFRCR SERPLBLD CKD-EPI 2021: 45 ML/MIN/1.73M*2
GLUCOSE SERPL-MCNC: 99 MG/DL (ref 74–99)
POTASSIUM SERPL-SCNC: 4.2 MMOL/L (ref 3.5–5.3)
SODIUM SERPL-SCNC: 139 MMOL/L (ref 136–145)

## 2024-10-07 PROCEDURE — 83970 ASSAY OF PARATHORMONE: CPT

## 2024-10-07 PROCEDURE — 82306 VITAMIN D 25 HYDROXY: CPT

## 2024-10-07 PROCEDURE — 36415 COLL VENOUS BLD VENIPUNCTURE: CPT

## 2024-10-07 PROCEDURE — RXMED WILLOW AMBULATORY MEDICATION CHARGE

## 2024-10-07 PROCEDURE — 80048 BASIC METABOLIC PNL TOTAL CA: CPT

## 2024-10-08 LAB — PTH-INTACT SERPL-MCNC: 21.4 PG/ML (ref 18.5–88)

## 2024-10-09 ENCOUNTER — APPOINTMENT (OUTPATIENT)
Dept: NEPHROLOGY | Facility: CLINIC | Age: 59
End: 2024-10-09

## 2024-10-09 VITALS
WEIGHT: 206.4 LBS | DIASTOLIC BLOOD PRESSURE: 82 MMHG | HEIGHT: 72 IN | SYSTOLIC BLOOD PRESSURE: 126 MMHG | BODY MASS INDEX: 27.96 KG/M2 | HEART RATE: 78 BPM

## 2024-10-09 DIAGNOSIS — N17.9 AKI (ACUTE KIDNEY INJURY) (CMS-HCC): ICD-10-CM

## 2024-10-09 DIAGNOSIS — N18.32 STAGE 3B CHRONIC KIDNEY DISEASE (MULTI): Primary | ICD-10-CM

## 2024-10-09 PROCEDURE — 99214 OFFICE O/P EST MOD 30 MIN: CPT | Performed by: INTERNAL MEDICINE

## 2024-10-09 PROCEDURE — 3008F BODY MASS INDEX DOCD: CPT | Performed by: INTERNAL MEDICINE

## 2024-10-09 PROCEDURE — 1036F TOBACCO NON-USER: CPT | Performed by: INTERNAL MEDICINE

## 2024-10-09 NOTE — PROGRESS NOTES
Jose Thornton   58 y.o.    @@  [facility]/Room: [id number]/Room/bed info not found    Subjective:   The patient is being seen for a routine clinic follow-up of chronic kidney disease. Recently, the disease has been stable. Disease complications:  No hyperkalemia, no hypocalcemia, no hyperphosphatemia, no metabolic acidosis, no coagulopathy, no uremic encephalopathy, no neuropathy and no renal osteodystrophy. The patient is currently asymptomatic. No associated symptoms are reported.       Meds:   Current Outpatient Medications   Medication Sig Dispense Refill    oxybutynin XL (Ditropan XL) 10 mg 24 hr tablet Take 1 tablet (10 mg) by mouth once daily. Do not crush, chew, or split. 30 tablet 11     Current Facility-Administered Medications   Medication Dose Route Frequency Provider Last Rate Last Admin    multivitamin with iron-minerals 9 mg iron/15 mL liquid 15 mL  15 mL oral Once Tomas Andersen MD              ROS:  The patient is awake and oriented. No dizziness or lightheadedness. No chills and no fever. No headaches. No nausea and no vomiting. No shortness of breath. No cough. No sputum. No chest pain. No chest tightness. No abdominal pain. No diarrhea and no constipation. No hematemesis or hemoptysis. No hematuria. No rectal bleeding. No melena. No epistaxis. No urinary symptoms. No flank pain. No leg edema. No leg pain. No weakness. No itching. Overall, the rest of the review of systems is also negative.  12 point review of systems otherwise negative as stated in HPI.        Physical Examination:        Vitals:    10/09/24 1126   BP: 126/82   Pulse: 78     General: The patient is awake, oriented, and is not in any distress.  Head and Neck: Normocephalic. No periorbital edema.  Eyes: non-icteric  Respiratory: Symmetric air entry. Symmetric chest expansion.No respiratory distress.  Skin: No maculopapular rash.  Musculoskeletal: No peripheral edema in both left and right upper extremities.  No edema in either left or  right lower extremities.  Neuro Exam: Speech is fluent. Moves extremities.    Imaging:  === 01/09/24 ===    US RENAL COMPLETE    - Impression -  Small left kidney with diffuse cortical thinning likely related to  remote vascular or inflammatory insult.    Partially distended lobulated, surgically altered/augmented urinary  bladder.    MACRO:  None.    Signed by: Mary Carmen Bautista 1/10/2024 4:41 PM  Dictation workstation:   [id number]       Blood Labs:  No results found for this or any previous visit (from the past 24 hour(s)).   Lab Results   Component Value Date    PTH 21.4 10/07/2024    PROTUR 100 (2+) (N) 11/28/2023    PHOS 3.9 12/13/2023      Lab Results   Component Value Date    GLUCOSE 99 10/07/2024    CALCIUM 9.0 10/07/2024     10/07/2024    K 4.2 10/07/2024    CO2 26 10/07/2024     10/07/2024    BUN 16 10/07/2024    CREATININE 1.77 (H) 10/07/2024         Assessment and Plan:  #1 CKD III: Residual dysfunction from previous episode of acute kidney injury.  Patient with PMH of Crohn's disease and colovesical fistula who underwent Exploratory laparotomy, MYA, Cystoscopy, bilateral ileal ureter and bladder augment with right partial rectus muscle flap, and Suprapubic tube insertion on 10/27/23. Later, he developed KADE likely multifactorial 2/2 dehydration, hypotension, infection in the setting of possibly misplaced right nephrostomy tube.  He required few seasons of dialysis treatment.  His last creatinine level is 1.77.  Creatinine level had been up to 11.7 before he gets dialysis.  Volume status is fine.  Potassium level is fine.  I asked for a BMP to be done today. His BP is good.        I will see him in about 6 months for follow-up.          Radhames Bella MD  Senior Attending Physician  Director of Onco-Nephrology Program  Division of Nephrology & Hypertension  [facility]

## 2025-04-09 ENCOUNTER — TELEPHONE (OUTPATIENT)
Dept: NEPHROLOGY | Facility: CLINIC | Age: 60
End: 2025-04-09

## 2025-04-09 ENCOUNTER — APPOINTMENT (OUTPATIENT)
Dept: NEPHROLOGY | Facility: CLINIC | Age: 60
End: 2025-04-09

## 2025-04-09 DIAGNOSIS — N18.32 STAGE 3B CHRONIC KIDNEY DISEASE (MULTI): Primary | ICD-10-CM

## 2025-04-17 ENCOUNTER — TELEPHONE (OUTPATIENT)
Dept: CARDIOLOGY | Facility: CLINIC | Age: 60
End: 2025-04-17

## 2025-04-17 LAB
25(OH)D3+25(OH)D2 SERPL-MCNC: 82 NG/ML (ref 30–100)
ANION GAP SERPL CALCULATED.4IONS-SCNC: 11 MMOL/L (CALC) (ref 7–17)
BUN SERPL-MCNC: 20 MG/DL (ref 7–25)
BUN/CREAT SERPL: 10 (CALC) (ref 6–22)
CALCIUM SERPL-MCNC: 8.8 MG/DL (ref 8.6–10.3)
CHLORIDE SERPL-SCNC: 103 MMOL/L (ref 98–110)
CO2 SERPL-SCNC: 22 MMOL/L (ref 20–32)
CREAT SERPL-MCNC: 2.08 MG/DL (ref 0.7–1.3)
EGFRCR SERPLBLD CKD-EPI 2021: 36 ML/MIN/1.73M2
GLUCOSE SERPL-MCNC: 130 MG/DL (ref 65–139)
POTASSIUM SERPL-SCNC: 4.7 MMOL/L (ref 3.5–5.3)
PTH-INTACT SERPL-MCNC: 52 PG/ML (ref 16–77)
SODIUM SERPL-SCNC: 136 MMOL/L (ref 135–146)

## 2025-04-17 NOTE — TELEPHONE ENCOUNTER
----- Message from Radhames Bella sent at 4/17/2025  9:56 AM EDT -----  No major change in kidney function.  ----- Message -----  From: [employer] Results In  Sent: 4/17/2025   6:48 AM EDT  To: Radhames Bella MD

## 2025-04-22 ENCOUNTER — APPOINTMENT (OUTPATIENT)
Dept: NEPHROLOGY | Facility: CLINIC | Age: 60
End: 2025-04-22

## 2025-04-22 VITALS
WEIGHT: 202 LBS | HEIGHT: 72 IN | HEART RATE: 84 BPM | DIASTOLIC BLOOD PRESSURE: 60 MMHG | BODY MASS INDEX: 27.36 KG/M2 | SYSTOLIC BLOOD PRESSURE: 86 MMHG

## 2025-04-22 DIAGNOSIS — N18.32 STAGE 3B CHRONIC KIDNEY DISEASE (MULTI): Primary | ICD-10-CM

## 2025-04-22 PROCEDURE — 1036F TOBACCO NON-USER: CPT | Performed by: INTERNAL MEDICINE

## 2025-04-22 PROCEDURE — 3008F BODY MASS INDEX DOCD: CPT | Performed by: INTERNAL MEDICINE

## 2025-04-22 PROCEDURE — 99214 OFFICE O/P EST MOD 30 MIN: CPT | Performed by: INTERNAL MEDICINE

## 2025-04-22 RX ORDER — ACETAMINOPHEN 325 MG/1
TABLET ORAL EVERY 6 HOURS PRN
COMMUNITY
Start: 2022-12-13

## 2025-04-22 NOTE — PROGRESS NOTES
Jose Priestmeliza   59 y.o.    @@  [facility]/Room: [id number]/Room/bed info not found    Subjective:   The patient is being seen for a routine clinic follow-up of chronic kidney disease. Recently, the disease has been stable. Disease complications:  No hyperkalemia, no hypocalcemia, no hyperphosphatemia, no metabolic acidosis, no coagulopathy, no uremic encephalopathy, no neuropathy and no renal osteodystrophy. The patient is currently asymptomatic. No associated symptoms are reported.       Meds:   Current Medications[1]         Physical Examination:        Vitals:    04/22/25 1517   BP: 86/60   Pulse: 84     General: The patient is awake, oriented, and is not in any distress.  Head and Neck: Normocephalic. No periorbital edema.  Eyes: non-icteric  Respiratory: Symmetric chest expansion. No respiratory distress.  Skin: No maculopapular rash.  Musculoskeletal: No peripheral edema.  Neuro Exam: Speech is fluent. Moves extremities.    Imaging:  === 01/09/24 ===    US RENAL COMPLETE    - Impression -  Small left kidney with diffuse cortical thinning likely related to  remote vascular or inflammatory insult.    Partially distended lobulated, surgically altered/augmented urinary  bladder.    MACRO:  None.    Signed by: Mary Carmen Bautista 1/10/2024 4:41 PM  Dictation workstation:   [id number]       Blood Labs:  No results found for this or any previous visit (from the past 24 hours).   Lab Results   Component Value Date    PTH 52 04/16/2025    PROTUR 100 (2+) (N) 11/28/2023    PHOS 3.9 12/13/2023      Lab Results   Component Value Date    GLUCOSE 130 04/16/2025    CALCIUM 8.8 04/16/2025     04/16/2025    K 4.7 04/16/2025    CO2 22 04/16/2025     04/16/2025    BUN 20 04/16/2025    CREATININE 2.08 (H) 04/16/2025         Assessment and Plan:    #1 CKD III: Residual dysfunction from previous episode of acute kidney injury.  Patient with PMH of Crohn's disease and colovesical fistula who underwent Exploratory laparotomy, MYA,  Cystoscopy, bilateral ileal ureter and bladder augment with right partial rectus muscle flap, and Suprapubic tube insertion on 10/27/23. Later, he developed KADE likely multifactorial 2/2 dehydration, hypotension, infection in the setting of possibly misplaced right nephrostomy tube.  He required few seasons of dialysis treatment.  His last creatinine level is 2.08.  Creatinine level had been up to 11.7 before he gets dialysis.  Volume status is fine.  Potassium level is fine. Normal PTH and Vit D level.    His blood pressure is on low side.  I encouraged him to keep himself hydrated.  I was considering to put him on midodrine but he does not want to be on medication.        I will see him in about 6 months for follow-up.        Radhames Bella MD  Senior Attending Physician  Director of Onco-Nephrology Program  Division of Nephrology & Hypertension  [facility]       [1]   Current Outpatient Medications   Medication Sig Dispense Refill    acetaminophen (Tylenol) 325 mg tablet Take by mouth every 6 hours if needed.       Current Facility-Administered Medications   Medication Dose Route Frequency Provider Last Rate Last Admin    multivitamin with iron-minerals 9 mg iron/15 mL liquid 15 mL  15 mL oral Once Tomas Andersen MD

## 2025-05-11 ENCOUNTER — APPOINTMENT (OUTPATIENT)
Dept: RADIOLOGY | Facility: HOSPITAL | Age: 60
End: 2025-05-11
Payer: MEDICARE

## 2025-05-11 ENCOUNTER — HOSPITAL ENCOUNTER (EMERGENCY)
Facility: HOSPITAL | Age: 60
Discharge: HOME | End: 2025-05-11
Attending: EMERGENCY MEDICINE
Payer: MEDICARE

## 2025-05-11 VITALS
DIASTOLIC BLOOD PRESSURE: 72 MMHG | OXYGEN SATURATION: 97 % | RESPIRATION RATE: 16 BRPM | HEART RATE: 86 BPM | WEIGHT: 201.94 LBS | SYSTOLIC BLOOD PRESSURE: 111 MMHG | BODY MASS INDEX: 27.35 KG/M2 | HEIGHT: 72 IN | TEMPERATURE: 98.9 F

## 2025-05-11 DIAGNOSIS — K52.9 COLITIS: ICD-10-CM

## 2025-05-11 DIAGNOSIS — K43.9 VENTRAL HERNIA WITHOUT OBSTRUCTION OR GANGRENE: Primary | ICD-10-CM

## 2025-05-11 LAB
ALBUMIN SERPL BCP-MCNC: 3.4 G/DL (ref 3.4–5)
ALP SERPL-CCNC: 35 U/L (ref 33–120)
ALT SERPL W P-5'-P-CCNC: 6 U/L (ref 10–52)
ANION GAP BLDV CALCULATED.4IONS-SCNC: 11 MMOL/L (ref 10–25)
ANION GAP SERPL CALC-SCNC: 16 MMOL/L (ref 10–20)
AST SERPL W P-5'-P-CCNC: 14 U/L (ref 9–39)
BASE EXCESS BLDV CALC-SCNC: 0.5 MMOL/L (ref -2–3)
BASOPHILS # BLD AUTO: 0.05 X10*3/UL (ref 0–0.1)
BASOPHILS NFR BLD AUTO: 0.6 %
BILIRUB SERPL-MCNC: 0.3 MG/DL (ref 0–1.2)
BODY TEMPERATURE: 37 DEGREES CELSIUS
BUN SERPL-MCNC: 22 MG/DL (ref 6–23)
CA-I BLDV-SCNC: 1.14 MMOL/L (ref 1.1–1.33)
CALCIUM SERPL-MCNC: 8.6 MG/DL (ref 8.6–10.6)
CHLORIDE BLDV-SCNC: 106 MMOL/L (ref 98–107)
CHLORIDE SERPL-SCNC: 106 MMOL/L (ref 98–107)
CO2 SERPL-SCNC: 20 MMOL/L (ref 21–32)
CREAT SERPL-MCNC: 2.27 MG/DL (ref 0.5–1.3)
EGFRCR SERPLBLD CKD-EPI 2021: 33 ML/MIN/1.73M*2
EOSINOPHIL # BLD AUTO: 0.24 X10*3/UL (ref 0–0.7)
EOSINOPHIL NFR BLD AUTO: 2.8 %
ERYTHROCYTE [DISTWIDTH] IN BLOOD BY AUTOMATED COUNT: 18.4 % (ref 11.5–14.5)
GLUCOSE BLDV-MCNC: 68 MG/DL (ref 74–99)
GLUCOSE SERPL-MCNC: 62 MG/DL (ref 74–99)
HCO3 BLDV-SCNC: 24.3 MMOL/L (ref 22–26)
HCT VFR BLD AUTO: 29.6 % (ref 41–52)
HCT VFR BLD EST: 30 % (ref 41–52)
HGB BLD-MCNC: 9.4 G/DL (ref 13.5–17.5)
HGB BLDV-MCNC: 10 G/DL (ref 13.5–17.5)
IMM GRANULOCYTES # BLD AUTO: 0.04 X10*3/UL (ref 0–0.7)
IMM GRANULOCYTES NFR BLD AUTO: 0.5 % (ref 0–0.9)
LACTATE BLDV-SCNC: 1 MMOL/L (ref 0.4–2)
LIPASE SERPL-CCNC: 41 U/L (ref 9–82)
LYMPHOCYTES # BLD AUTO: 2.03 X10*3/UL (ref 1.2–4.8)
LYMPHOCYTES NFR BLD AUTO: 24 %
MCH RBC QN AUTO: 22.9 PG (ref 26–34)
MCHC RBC AUTO-ENTMCNC: 31.8 G/DL (ref 32–36)
MCV RBC AUTO: 72 FL (ref 80–100)
MONOCYTES # BLD AUTO: 0.72 X10*3/UL (ref 0.1–1)
MONOCYTES NFR BLD AUTO: 8.5 %
NEUTROPHILS # BLD AUTO: 5.38 X10*3/UL (ref 1.2–7.7)
NEUTROPHILS NFR BLD AUTO: 63.6 %
NRBC BLD-RTO: 0 /100 WBCS (ref 0–0)
OXYHGB MFR BLDV: 80.4 % (ref 45–75)
PCO2 BLDV: 35 MM HG (ref 41–51)
PH BLDV: 7.45 PH (ref 7.33–7.43)
PLATELET # BLD AUTO: 469 X10*3/UL (ref 150–450)
PO2 BLDV: 49 MM HG (ref 35–45)
POTASSIUM BLDV-SCNC: 4 MMOL/L (ref 3.5–5.3)
POTASSIUM SERPL-SCNC: 4 MMOL/L (ref 3.5–5.3)
PROT SERPL-MCNC: 6.8 G/DL (ref 6.4–8.2)
RBC # BLD AUTO: 4.11 X10*6/UL (ref 4.5–5.9)
SAO2 % BLDV: 82 % (ref 45–75)
SODIUM BLDV-SCNC: 137 MMOL/L (ref 136–145)
SODIUM SERPL-SCNC: 138 MMOL/L (ref 136–145)
WBC # BLD AUTO: 8.5 X10*3/UL (ref 4.4–11.3)

## 2025-05-11 PROCEDURE — 99285 EMERGENCY DEPT VISIT HI MDM: CPT | Performed by: EMERGENCY MEDICINE

## 2025-05-11 PROCEDURE — 85025 COMPLETE CBC W/AUTO DIFF WBC: CPT

## 2025-05-11 PROCEDURE — 84295 ASSAY OF SERUM SODIUM: CPT | Mod: 59

## 2025-05-11 PROCEDURE — 82435 ASSAY OF BLOOD CHLORIDE: CPT

## 2025-05-11 PROCEDURE — 74176 CT ABD & PELVIS W/O CONTRAST: CPT | Mod: FOREIGN READ | Performed by: STUDENT IN AN ORGANIZED HEALTH CARE EDUCATION/TRAINING PROGRAM

## 2025-05-11 PROCEDURE — 36415 COLL VENOUS BLD VENIPUNCTURE: CPT

## 2025-05-11 PROCEDURE — 74176 CT ABD & PELVIS W/O CONTRAST: CPT

## 2025-05-11 PROCEDURE — 83690 ASSAY OF LIPASE: CPT

## 2025-05-11 PROCEDURE — 99284 EMERGENCY DEPT VISIT MOD MDM: CPT | Mod: 25 | Performed by: EMERGENCY MEDICINE

## 2025-05-11 ASSESSMENT — PAIN - FUNCTIONAL ASSESSMENT: PAIN_FUNCTIONAL_ASSESSMENT: 0-10

## 2025-05-11 ASSESSMENT — PAIN DESCRIPTION - PROGRESSION: CLINICAL_PROGRESSION: NOT CHANGED

## 2025-05-11 ASSESSMENT — PAIN SCALES - GENERAL: PAINLEVEL_OUTOF10: 0 - NO PAIN

## 2025-05-11 NOTE — ED PROVIDER NOTES
History of Present Illness     History provided by: Patient and Significant Other  Limitations to History: None  External Records Reviewed with Brief Summary: Previous note from colorectal office from 2024, no more recent follow-up    HPI:  Jose Thornton is a 59 y.o. male past medical history of Crohn's status post colectomy with ostomy as well as a reported history of renal failure after ureteral reconstruction not currently on dialysis who presents today for abdominal swelling.  Patient states that he has had a mass on the right side of his abdomen that seems to be getting bigger.  He endorses that since having this he has had some sensitivities to foods, feels that the contents in his ostomy have changed since then.  He endorses that he feels more full, as well as has some discomfort occasionally.  He denies any fevers, no vomiting, but occasional nausea.  He denies any shortness of breath or chest pain, denies any numbness weakness tingling in his arms or legs.  Denies any trauma to the abdomen.  He does endorse that he feels like his ostomy bag cannot be properly secured due to the swelling, which he finds frustrating    Physical Exam   Triage vitals:  T 37.2 °C (98.9 °F)  HR 74  /60  RR 18  O2 100 % None (Room air)    Physical Exam  Vitals and nursing note reviewed.   Constitutional:       General: He is not in acute distress.     Appearance: Normal appearance. He is not ill-appearing or diaphoretic.   HENT:      Head: Normocephalic and atraumatic.      Mouth/Throat:      Mouth: Mucous membranes are moist.      Pharynx: No oropharyngeal exudate or posterior oropharyngeal erythema.   Eyes:      General: No scleral icterus.     Extraocular Movements: Extraocular movements intact.      Pupils: Pupils are equal, round, and reactive to light.   Cardiovascular:      Rate and Rhythm: Normal rate and regular rhythm.      Pulses: Normal pulses.      Heart sounds: Normal heart sounds. No murmur heard.     No  gallop.   Pulmonary:      Effort: Pulmonary effort is normal. No respiratory distress.      Breath sounds: Normal breath sounds. No stridor. No wheezing, rhonchi or rales.   Abdominal:      General: Bowel sounds are normal. There is no distension.      Palpations: Abdomen is soft. There is no mass.      Tenderness: There is no abdominal tenderness.      Hernia: No hernia is present.      Comments: Ostomy site clean dry and intact, ventral wall hernia easily reducible, no significant abdominal tenderness   Musculoskeletal:         General: No swelling, deformity or signs of injury. Normal range of motion.      Cervical back: Normal range of motion and neck supple. No tenderness.   Skin:     General: Skin is warm.      Capillary Refill: Capillary refill takes less than 2 seconds.      Findings: No bruising, erythema, lesion or rash.   Neurological:      General: No focal deficit present.      Mental Status: He is alert and oriented to person, place, and time. Mental status is at baseline.   Psychiatric:         Mood and Affect: Mood normal.         Behavior: Behavior normal.          Medical Decision Making & ED Course   Medical Decision Makin y.o. male with a past medical history of Crohn's status post colectomy with ostomy as well as a reported history of renal failure after ureteral reconstruction not currently on dialysis who presents today for abdominal mass and discomfort.  Patient does appear to have a ventral wall hernia, which would be consistent with the patient's known previous history.  However, given his previous surgical history, we will plan to get labs as well as CT of the abdomen pelvis.  The patient was very concerned about possibly getting contrast given his history of renal failure, so given this we will get a noncontrasted scan.  I do believe that the patient does not appear to have any obvious surgical emergency that would be missed by a scan without contrast.  Patient's labs did not  demonstrate any significant abnormality from his baseline as his creatinine was 2.27, with most recent creatinine being 2.08.  He did have a mild anemia of 9.4, however, does appear to range between 9-12.  ----      Differential diagnoses considered include but are not limited to: Ventral wall hernia, rectus diastases     Social Determinants of Health which Significantly Impact Care: None identified     EKG Independent Interpretation: EKG not obtained    Independent Result Review and Interpretation: Relevant laboratory and radiographic results were reviewed and independently interpreted by myself.  As necessary, they are commented on in the ED Course.    Chronic conditions affecting the patient's care: As documented above in [facility]    The patient was discussed with the following consultants/services: I did discuss patient's case with colorectal surgery, who he used to follow with, felt that they did not need to be involved unless patient appeared to have a surgical need.    Care Considerations: As documented above in [facility]    ED Course:  Diagnoses as of 05/12/25 1805   Ventral hernia without obstruction or gangrene   Colitis     Disposition   Patient was signed out to Dr. León at 1900 pending completion of their work-up.  Please see the next provider's transition of care note for the remainder of the patient's care.     Procedures   Procedures    Patient seen and discussed with ED attending physician.    Sky Locke MD  Emergency Medicine       Sky Locke MD  Resident  05/12/25 1812

## 2025-05-11 NOTE — ED TRIAGE NOTES
"Pt reports a \"bulge\" near his belly button approx 1 yr ago while exercising. States its affecting his digestion, \"increased my sensitivity to what I'm eating.\"  "

## 2025-05-11 NOTE — PROGRESS NOTES
Emergency Department Transition of Care Note       Signout   I received Jose Thornton in signout from Dr. Tinoco.  Please see the ED Provider Note for all HPI, PE and MDM up to the time of signout at 2300.  This is in addition to the primary record.    In brief Jose Thornton is an 59 y.o. male presenting for hernia    At the time of signout we were awaiting:  Imaging studies, reevaluation, and disposition    ED Course & Medical Decision Making   Medical Decision Making:    Under my care the patient remained hemodynamically stable.  CT abdomen pelvis is remarkable for colitis and no evidence of strangulated or incarcerated hernia.  These results were relayed to the patient.  Upon my examination patient has a soft, nondistended, and nontender abdomen.  There is no evidence of incarcerated or strangulated hernia on my examination.  He was given instructions on ED return precautions given his hernia.  Referral was placed for colorectal surgery for hernia management.  He will follow-up with his primary care provider soon as possible.  Patient be discharged home in stable condition.    ED Course:  Diagnoses as of 05/13/25 0513   Ventral hernia without obstruction or gangrene   Colitis       Disposition   As a result of the work-up, the patient was discharged home.  he was informed of his diagnosis and instructed to come back with any concerns or worsening of condition.  he and was agreeable to the plan as discussed above.  he was given the opportunity to ask questions.  All of the patient's questions were answered.    Procedures   Procedures    Patient seen and discussed with ED attending physician.    Gerardo León, DO  Emergency Medicine

## 2025-05-12 NOTE — DISCHARGE INSTRUCTIONS
You were seen today in the emergency department for a ventral hernia.  The CT scan of your abdomen did not show any evidence of obstruction or incarceration of your hernia.  You were found to have an elevated kidney function called creatinine.  Please refrain from taking NSAIDs.  Please follow-up with your primary care doctor soon as possible.  Additionally I have submitted an urgent referral for colorectal surgery they will call you to schedule an appointment.  Please return to the emergency department immediately if you cannot reduce your hernia, you develop overlying skin changes on your abdomen, nausea, or vomiting.

## 2025-05-13 ENCOUNTER — TELEPHONE (OUTPATIENT)
Age: 60
End: 2025-05-13
Payer: MEDICARE

## 2025-05-13 NOTE — TELEPHONE ENCOUNTER
Returned patient call (pt called at 5:07pm on 5/12/2025), left voicemail with a tentative scheduled appointment. Left backline if that appt doesn't work for pt.

## 2025-05-13 NOTE — TELEPHONE ENCOUNTER
Patient called back, patient wants providers involved in his care to know he believes he has a hernia and may be getting surgery to repair.

## 2025-05-14 ENCOUNTER — HOSPITAL ENCOUNTER (EMERGENCY)
Facility: HOSPITAL | Age: 60
Discharge: HOME | End: 2025-05-14
Payer: MEDICARE

## 2025-05-14 VITALS
HEIGHT: 72 IN | RESPIRATION RATE: 18 BRPM | OXYGEN SATURATION: 95 % | BODY MASS INDEX: 27.36 KG/M2 | HEART RATE: 90 BPM | SYSTOLIC BLOOD PRESSURE: 129 MMHG | DIASTOLIC BLOOD PRESSURE: 73 MMHG | WEIGHT: 202 LBS | TEMPERATURE: 98.4 F

## 2025-05-14 DIAGNOSIS — N30.01 ACUTE CYSTITIS WITH HEMATURIA: ICD-10-CM

## 2025-05-14 DIAGNOSIS — R33.8 ACUTE URINARY RETENTION: Primary | ICD-10-CM

## 2025-05-14 LAB
ALBUMIN SERPL BCP-MCNC: 3.4 G/DL (ref 3.4–5)
ALP SERPL-CCNC: 35 U/L (ref 33–120)
ALT SERPL W P-5'-P-CCNC: 8 U/L (ref 10–52)
ANION GAP SERPL CALC-SCNC: 12 MMOL/L (ref 10–20)
APPEARANCE UR: ABNORMAL
AST SERPL W P-5'-P-CCNC: 9 U/L (ref 9–39)
BACTERIA #/AREA URNS AUTO: ABNORMAL /HPF
BASOPHILS # BLD AUTO: 0.05 X10*3/UL (ref 0–0.1)
BASOPHILS NFR BLD AUTO: 0.6 %
BILIRUB SERPL-MCNC: 0.2 MG/DL (ref 0–1.2)
BILIRUB UR STRIP.AUTO-MCNC: ABNORMAL MG/DL
BUN SERPL-MCNC: 24 MG/DL (ref 6–23)
CALCIUM SERPL-MCNC: 8.3 MG/DL (ref 8.6–10.3)
CHLORIDE SERPL-SCNC: 108 MMOL/L (ref 98–107)
CO2 SERPL-SCNC: 22 MMOL/L (ref 21–32)
COLOR UR: ABNORMAL
CREAT SERPL-MCNC: 2.22 MG/DL (ref 0.5–1.3)
EGFRCR SERPLBLD CKD-EPI 2021: 34 ML/MIN/1.73M*2
EOSINOPHIL # BLD AUTO: 0.32 X10*3/UL (ref 0–0.7)
EOSINOPHIL NFR BLD AUTO: 3.7 %
ERYTHROCYTE [DISTWIDTH] IN BLOOD BY AUTOMATED COUNT: 19.5 % (ref 11.5–14.5)
GLUCOSE SERPL-MCNC: 121 MG/DL (ref 74–99)
GLUCOSE UR STRIP.AUTO-MCNC: NORMAL MG/DL
HCT VFR BLD AUTO: 32.5 % (ref 41–52)
HGB BLD-MCNC: 9.9 G/DL (ref 13.5–17.5)
IMM GRANULOCYTES # BLD AUTO: 0.03 X10*3/UL (ref 0–0.7)
IMM GRANULOCYTES NFR BLD AUTO: 0.3 % (ref 0–0.9)
KETONES UR STRIP.AUTO-MCNC: NEGATIVE MG/DL
LEUKOCYTE ESTERASE UR QL STRIP.AUTO: ABNORMAL
LYMPHOCYTES # BLD AUTO: 1.24 X10*3/UL (ref 1.2–4.8)
LYMPHOCYTES NFR BLD AUTO: 14.5 %
MCH RBC QN AUTO: 23.2 PG (ref 26–34)
MCHC RBC AUTO-ENTMCNC: 30.5 G/DL (ref 32–36)
MCV RBC AUTO: 76 FL (ref 80–100)
MONOCYTES # BLD AUTO: 0.62 X10*3/UL (ref 0.1–1)
MONOCYTES NFR BLD AUTO: 7.2 %
MUCOUS THREADS #/AREA URNS AUTO: ABNORMAL /LPF
NEUTROPHILS # BLD AUTO: 6.32 X10*3/UL (ref 1.2–7.7)
NEUTROPHILS NFR BLD AUTO: 73.7 %
NITRITE UR QL STRIP.AUTO: ABNORMAL
NRBC BLD-RTO: 0 /100 WBCS (ref 0–0)
PH UR STRIP.AUTO: 6.5 [PH]
PLATELET # BLD AUTO: 448 X10*3/UL (ref 150–450)
POTASSIUM SERPL-SCNC: 4.1 MMOL/L (ref 3.5–5.3)
PROT SERPL-MCNC: 6.7 G/DL (ref 6.4–8.2)
PROT UR STRIP.AUTO-MCNC: ABNORMAL MG/DL
RBC # BLD AUTO: 4.27 X10*6/UL (ref 4.5–5.9)
RBC # UR STRIP.AUTO: ABNORMAL MG/DL
RBC #/AREA URNS AUTO: >20 /HPF
SODIUM SERPL-SCNC: 138 MMOL/L (ref 136–145)
SP GR UR STRIP.AUTO: 1.02
SQUAMOUS #/AREA URNS AUTO: ABNORMAL /HPF
TRANS CELLS #/AREA UR COMP ASSIST: ABNORMAL /HPF
UROBILINOGEN UR STRIP.AUTO-MCNC: ABNORMAL MG/DL
WBC # BLD AUTO: 8.6 X10*3/UL (ref 4.4–11.3)
WBC #/AREA URNS AUTO: >50 /HPF
WBC CLUMPS #/AREA URNS AUTO: ABNORMAL /HPF

## 2025-05-14 PROCEDURE — 36415 COLL VENOUS BLD VENIPUNCTURE: CPT | Performed by: PHYSICIAN ASSISTANT

## 2025-05-14 PROCEDURE — 81001 URINALYSIS AUTO W/SCOPE: CPT | Performed by: PHYSICIAN ASSISTANT

## 2025-05-14 PROCEDURE — 80053 COMPREHEN METABOLIC PANEL: CPT | Performed by: PHYSICIAN ASSISTANT

## 2025-05-14 PROCEDURE — 99284 EMERGENCY DEPT VISIT MOD MDM: CPT

## 2025-05-14 PROCEDURE — 87086 URINE CULTURE/COLONY COUNT: CPT | Mod: ELYLAB | Performed by: PHYSICIAN ASSISTANT

## 2025-05-14 PROCEDURE — 2500000001 HC RX 250 WO HCPCS SELF ADMINISTERED DRUGS (ALT 637 FOR MEDICARE OP): Performed by: PHYSICIAN ASSISTANT

## 2025-05-14 PROCEDURE — 85025 COMPLETE CBC W/AUTO DIFF WBC: CPT | Performed by: PHYSICIAN ASSISTANT

## 2025-05-14 PROCEDURE — 2500000004 HC RX 250 GENERAL PHARMACY W/ HCPCS (ALT 636 FOR OP/ED): Mod: JZ | Performed by: PHYSICIAN ASSISTANT

## 2025-05-14 PROCEDURE — 2500000005 HC RX 250 GENERAL PHARMACY W/O HCPCS: Performed by: PHYSICIAN ASSISTANT

## 2025-05-14 PROCEDURE — 51798 US URINE CAPACITY MEASURE: CPT

## 2025-05-14 PROCEDURE — 96372 THER/PROPH/DIAG INJ SC/IM: CPT | Performed by: PHYSICIAN ASSISTANT

## 2025-05-14 RX ORDER — OXYCODONE HYDROCHLORIDE 5 MG/1
5 TABLET ORAL ONCE
Refills: 0 | Status: COMPLETED | OUTPATIENT
Start: 2025-05-14 | End: 2025-05-14

## 2025-05-14 RX ORDER — LIDOCAINE HYDROCHLORIDE 20 MG/ML
1 JELLY TOPICAL ONCE
Status: COMPLETED | OUTPATIENT
Start: 2025-05-14 | End: 2025-05-14

## 2025-05-14 RX ORDER — MORPHINE SULFATE 4 MG/ML
4 INJECTION, SOLUTION INTRAMUSCULAR; INTRAVENOUS ONCE
Status: COMPLETED | OUTPATIENT
Start: 2025-05-14 | End: 2025-05-14

## 2025-05-14 RX ORDER — ONDANSETRON 4 MG/1
4 TABLET, ORALLY DISINTEGRATING ORAL ONCE
Status: COMPLETED | OUTPATIENT
Start: 2025-05-14 | End: 2025-05-14

## 2025-05-14 RX ORDER — TAMSULOSIN HYDROCHLORIDE 0.4 MG/1
0.4 CAPSULE ORAL DAILY
Qty: 7 CAPSULE | Refills: 0 | Status: SHIPPED | OUTPATIENT
Start: 2025-05-14 | End: 2025-05-22

## 2025-05-14 RX ORDER — CEPHALEXIN 500 MG/1
500 CAPSULE ORAL 4 TIMES DAILY
Qty: 28 CAPSULE | Refills: 0 | Status: SHIPPED | OUTPATIENT
Start: 2025-05-14 | End: 2025-05-22

## 2025-05-14 RX ORDER — CEPHALEXIN 500 MG/1
500 CAPSULE ORAL ONCE
Status: COMPLETED | OUTPATIENT
Start: 2025-05-14 | End: 2025-05-14

## 2025-05-14 RX ADMIN — CEPHALEXIN 500 MG: 500 CAPSULE ORAL at 23:02

## 2025-05-14 RX ADMIN — LIDOCAINE HYDROCHLORIDE 1 APPLICATION: 20 JELLY TOPICAL at 22:54

## 2025-05-14 RX ADMIN — MORPHINE SULFATE 4 MG: 4 INJECTION, SOLUTION INTRAMUSCULAR; INTRAVENOUS at 19:13

## 2025-05-14 RX ADMIN — ONDANSETRON 4 MG: 4 TABLET, ORALLY DISINTEGRATING ORAL at 19:13

## 2025-05-14 RX ADMIN — OXYCODONE 5 MG: 5 TABLET ORAL at 23:02

## 2025-05-14 ASSESSMENT — PAIN DESCRIPTION - PROGRESSION: CLINICAL_PROGRESSION: NOT CHANGED

## 2025-05-14 ASSESSMENT — PAIN - FUNCTIONAL ASSESSMENT
PAIN_FUNCTIONAL_ASSESSMENT: 0-10
PAIN_FUNCTIONAL_ASSESSMENT: 0-10

## 2025-05-14 ASSESSMENT — PAIN SCALES - GENERAL
PAINLEVEL_OUTOF10: 6
PAINLEVEL_OUTOF10: 6

## 2025-05-14 ASSESSMENT — PAIN DESCRIPTION - LOCATION: LOCATION: PENIS

## 2025-05-14 NOTE — ED PROVIDER NOTES
HPI   Chief Complaint   Patient presents with    Urinary Frequency       A 59-year-old male patient with history of external urinary catheter and many abdominal complications over the years with surgeries comes into the emergency department today with complaints of urinary burning and frequency that started yesterday.  States he tried to do some home remedies without any success.  States pain can flare up to an 8 out of 10 on the pain scale.  Denies any associated fevers or chills.  Denies any new flank pain but does have chronic pain.  For this purpose comes into the emergency department today for the evaluation.  Otherwise no complaints present time.              Patient History   Medical History[1]  Surgical History[2]  Family History[3]  Social History[4]    Physical Exam   ED Triage Vitals [05/14/25 1846]   Temperature Heart Rate Respirations BP   36.9 °C (98.4 °F) 89 18 121/69      Pulse Ox Temp src Heart Rate Source Patient Position   98 % -- -- --      BP Location FiO2 (%)     -- --       Physical Exam  Constitutional:       General: He is in acute distress.      Appearance: Normal appearance. He is not ill-appearing or diaphoretic.   HENT:      Head: Normocephalic and atraumatic.      Nose: Nose normal.   Eyes:      Extraocular Movements: Extraocular movements intact.      Conjunctiva/sclera: Conjunctivae normal.      Pupils: Pupils are equal, round, and reactive to light.   Cardiovascular:      Rate and Rhythm: Normal rate and regular rhythm.   Pulmonary:      Effort: Pulmonary effort is normal. No respiratory distress.      Breath sounds: Normal breath sounds. No stridor. No wheezing.   Abdominal:      Tenderness: There is no right CVA tenderness or left CVA tenderness.   Musculoskeletal:         General: Normal range of motion.      Cervical back: Normal range of motion.   Skin:     General: Skin is warm and dry.   Neurological:      General: No focal deficit present.      Mental Status: He is alert and  oriented to person, place, and time. Mental status is at baseline.   Psychiatric:         Mood and Affect: Mood normal.           ED Course & MDM                  No data recorded                                 Medical Decision Making  A 59-year-old male patient with history of external urinary catheter and many abdominal complications over the years with surgeries comes into the emergency department today with complaints of urinary burning and frequency that started yesterday.  States he tried to do some home remedies without any success.  States pain can flare up to an 8 out of 10 on the pain scale.  Denies any associated fevers or chills.  Denies any new flank pain but does have chronic pain.  For this purpose comes into the emergency department today for the evaluation.  Otherwise no complaints present time.    Laboratory studies ordered to rule leukocytosis, acute kidney injury or electrolyte abnormality, urinalysis ordered to rule out infection, IM morphine p.o. Zofran ordered for patient's pain and potential nausea secondary to narcotic pain medication.    Patient's creatinine 2.2 GFR 34 baseline for the patient.  Patient has no leukocytosis or left shift.  Hemoglobin hematocrit 9.9 and 32.5 which is baseline for the patient.    Handoff to Rufus Pimentel PA-C pending urinalysis, reevaluation and disposition          Labs Reviewed   CBC WITH AUTO DIFFERENTIAL - Abnormal       Result Value    WBC 8.6      nRBC 0.0      RBC 4.27 (*)     Hemoglobin 9.9 (*)     Hematocrit 32.5 (*)     MCV 76 (*)     MCH 23.2 (*)     MCHC 30.5 (*)     RDW 19.5 (*)     Platelets 448      Neutrophils % 73.7      Immature Granulocytes %, Automated 0.3      Lymphocytes % 14.5      Monocytes % 7.2      Eosinophils % 3.7      Basophils % 0.6      Neutrophils Absolute 6.32      Immature Granulocytes Absolute, Automated 0.03      Lymphocytes Absolute 1.24      Monocytes Absolute 0.62      Eosinophils Absolute 0.32      Basophils  Absolute 0.05     COMPREHENSIVE METABOLIC PANEL - Abnormal    Glucose 121 (*)     Sodium 138      Potassium 4.1      Chloride 108 (*)     Bicarbonate 22      Anion Gap 12      Urea Nitrogen 24 (*)     Creatinine 2.22 (*)     eGFR 34 (*)     Calcium 8.3 (*)     Albumin 3.4      Alkaline Phosphatase 35      Total Protein 6.7      AST 9      Bilirubin, Total 0.2      ALT 8 (*)    URINALYSIS WITH REFLEX CULTURE AND MICROSCOPIC    Narrative:     The following orders were created for panel order Urinalysis with Reflex Culture and Microscopic.  Procedure                               Abnormality         Status                     ---------                               -----------         ------                     Urinalysis with Reflex C...[[id number]]                                                 Extra Urine Gray Tube[[id number]]                                                         Please view results for these tests on the individual orders.   URINALYSIS WITH REFLEX CULTURE AND MICROSCOPIC   EXTRA URINE GRAY TUBE        No orders to display       Procedure  Procedures         [1]   Past Medical History:  Diagnosis Date    Crohn's disease (Multi)     Diverticulosis     GERD (gastroesophageal reflux disease)     GI (gastrointestinal bleed)     Urinary tract infection    [2]   Past Surgical History:  Procedure Laterality Date    CT GUIDED PERCUTANEOUS PERITONEAL OR RETROPERITONEAL FLUID COLLECTION DRAINAGE  9/28/2022    CT GUIDED PERCUTANEOUS PERITONEAL OR RETROPERITONEAL FLUID COLLECTION DRAINAGE 9/28/2022 [facility] [place]    IR CVC NONTUNNELED  11/6/2023    IR CVC NONTUNNELED 11/6/2023 Drew Terry MD [facility] [place] [facility]    IR NEPHROSTOMY TUBE EXCHANGE  1/31/2023    IR NEPHROSTOMY TUBE EXCHANGE 1/31/2023 [facility]    IR NEPHROSTOMY TUBE EXCHANGE  1/31/2023    [facility]    IR NEPHROSTOMY TUBE EXCHANGE  3/23/2023    IR NEPHROSTOMY TUBE EXCHANGE [facility] [place] [facility]    IR  NEPHROSTOMY TUBE EXCHANGE  3/23/2023    IR NEPHROSTOMY TUBE EXCHANGE [facility]    IR NEPHROSTOMY TUBE EXCHANGE  3/30/2023    IR NEPHROSTOMY TUBE EXCHANGE [facility]    IR NEPHROSTOMY TUBE EXCHANGE  4/25/2023    IR NEPHROSTOMY TUBE EXCHANGE [facility]    IR NEPHROSTOMY TUBE EXCHANGE  6/30/2023    IR NEPHROSTOMY TUBE EXCHANGE 6/30/2023 [facility]    IR NEPHROSTOMY TUBE EXCHANGE  6/30/2023    IR NEPHROSTOMY TUBE EXCHANGE 6/30/2023 [facility]    IR NEPHROSTOMY TUBE EXCHANGE  8/25/2023    IR NEPHROSTOMY TUBE EXCHANGE 8/25/2023 [facility]    IR NEPHROSTOMY TUBE EXCHANGE  8/25/2023    IR NEPHROSTOMY TUBE EXCHANGE 8/25/2023 [facility]    IR NEPHROSTOMY TUBE EXCHANGE  10/18/2023    IR NEPHROSTOMY TUBE EXCHANGE 10/18/2023 Onesimo Reyes MD [facility]   [3] No family history on file.  [4]   Social History  Tobacco Use    Smoking status: Never    Smokeless tobacco: Never   Substance Use Topics    Alcohol use: Not Currently     Comment: occasional    Drug use: Never        Maciej Méndez PA-C  05/14/25 2006

## 2025-05-15 ENCOUNTER — PHARMACY VISIT (OUTPATIENT)
Dept: PHARMACY | Facility: CLINIC | Age: 60
End: 2025-05-15
Payer: COMMERCIAL

## 2025-05-15 ENCOUNTER — HOSPITAL ENCOUNTER (EMERGENCY)
Facility: HOSPITAL | Age: 60
Discharge: HOME | End: 2025-05-15
Payer: MEDICARE

## 2025-05-15 VITALS
BODY MASS INDEX: 27.36 KG/M2 | OXYGEN SATURATION: 99 % | TEMPERATURE: 97.3 F | RESPIRATION RATE: 18 BRPM | HEIGHT: 72 IN | SYSTOLIC BLOOD PRESSURE: 107 MMHG | HEART RATE: 68 BPM | DIASTOLIC BLOOD PRESSURE: 66 MMHG | WEIGHT: 202 LBS

## 2025-05-15 DIAGNOSIS — N39.0 ACUTE URINARY TRACT INFECTION: Primary | ICD-10-CM

## 2025-05-15 LAB — HOLD SPECIMEN: NORMAL

## 2025-05-15 PROCEDURE — RXMED WILLOW AMBULATORY MEDICATION CHARGE

## 2025-05-15 PROCEDURE — 99283 EMERGENCY DEPT VISIT LOW MDM: CPT

## 2025-05-15 PROCEDURE — 99282 EMERGENCY DEPT VISIT SF MDM: CPT

## 2025-05-15 RX ORDER — OXYCODONE HYDROCHLORIDE 5 MG/1
5 TABLET ORAL EVERY 6 HOURS PRN
Qty: 6 TABLET | Refills: 0 | Status: SHIPPED | OUTPATIENT
Start: 2025-05-15

## 2025-05-15 RX ORDER — NALOXONE HYDROCHLORIDE 4 MG/.1ML
4 SPRAY NASAL AS NEEDED
Qty: 2 EACH | Refills: 0 | Status: SHIPPED | OUTPATIENT
Start: 2025-05-15

## 2025-05-15 ASSESSMENT — PAIN - FUNCTIONAL ASSESSMENT: PAIN_FUNCTIONAL_ASSESSMENT: 0-10

## 2025-05-15 ASSESSMENT — PAIN SCALES - GENERAL: PAINLEVEL_OUTOF10: 7

## 2025-05-15 NOTE — ED PROVIDER NOTES
HPI   Chief Complaint   Patient presents with    Urinary Frequency     Pt states still in pain, and was told to come back if he still, pain 7/10       A 59-year-old male patient comes into the emergency department today secondary to bladder pain from a urinary tract infection.  Rates the pain a 7 out of 10 on the pain scale.  Denies any associated fevers or chills.  Denies any flank pain.  He was seen in the emergency department for the same yesterday.  He states he is waiting for them to fill his antibiotic prescription.  He is just requesting a couple pills of pain medication until he can start taking his antibiotic and get relief from that.  He otherwise has no other requests.  He has no other complaints.  He states his pain wax and wanes in severity.              Patient History   Medical History[1]  Surgical History[2]  Family History[3]  Social History[4]    Physical Exam   ED Triage Vitals [05/15/25 1055]   Temperature Heart Rate Respirations BP   36.3 °C (97.3 °F) 68 18 107/66      Pulse Ox Temp src Heart Rate Source Patient Position   99 % -- -- --      BP Location FiO2 (%)     -- --       Physical Exam      ED Course & MDM   Diagnoses as of 05/15/25 1127   Acute urinary tract infection                 No data recorded     Musa Coma Scale Score: 15 (05/15/25 1057 : Erika Singh RN)                           Medical Decision Making  A 59-year-old male patient comes into the emergency department today secondary to bladder pain from a urinary tract infection.  Rates the pain a 7 out of 10 on the pain scale.  Denies any associated fevers or chills.  Denies any flank pain.  He was seen in the emergency department for the same yesterday.  He states he is waiting for them to fill his antibiotic prescription.  He is just requesting a couple pills of pain medication until he can start taking his antibiotic and get relief from that.  He otherwise has no other requests.  He has no other complaints.  He states  his pain wax and wanes in severity.    I care for this patient yesterday did hand the patient off prior to complete results.  On review of patient's prior charting from yesterday at handoff patient ended up getting a bladder scan was retaining urine and they did have to perform a straight cath and got out 100 cc.  I did discuss this with the patient today.  He refuses any type of bladder scan or straight catheterization today.  He states typically he gets an infection and it does slow down his urine and once he is able to relieve the infection and then he never has these issues.  He declines any further intervention or testing in this regard.  Will discharge patient home with prescription for some pain medication.  Patient agrees with this plan expressed verbal understanding.  All questions were answered.    Historian is the patient    Diagnosis: Cystitis      Labs Reviewed - No data to display     No orders to display       Procedure  Procedures       [1]   Past Medical History:  Diagnosis Date    Crohn's disease (Multi)     Diverticulosis     GERD (gastroesophageal reflux disease)     GI (gastrointestinal bleed)     Urinary tract infection    [2]   Past Surgical History:  Procedure Laterality Date    CT GUIDED PERCUTANEOUS PERITONEAL OR RETROPERITONEAL FLUID COLLECTION DRAINAGE  9/28/2022    CT GUIDED PERCUTANEOUS PERITONEAL OR RETROPERITONEAL FLUID COLLECTION DRAINAGE 9/28/2022 [facility] [place] [place] [facility]GAC[facility]    IR CVC NONTUNNELED  11/6/2023    IR CVC NONTUNNELED 11/6/2023 Drew Terry MD [facility] [place] [place] [facility]IO    IR NEPHROSTOMY TUBE EXCHANGE  1/31/2023    [facility]    [facility] NEPHROSTOMY TUBE EXCHANGE  1/31/2023    IR NE[facility]ROST[facility]Y [facility]    IR NEPHROSTOMY TUBE EXCHANGE  3/23/2023    IR NEPHROSTOMY TUBE EXCHANGE [facility] [place] [place] [facility]    IR NEPHROSTOMY TUBE EXCHANGE  3/23/2023    IR NEPHROSTOMY TUBE EXCHANGE [facility] [place] [place] [facility]    IR NEPHROSTOMY TUBE EXCHANGE  3/30/2023    IR  NEPHROSTOMY TUBE EXCHANGE [facility]    IR NEPHROSTOMY TUBE EXCHANGE  4/25/2023    IR NEPHROSTOMY TUBE EXCHANGE [facility]    IR NEPHROSTOMY TUBE EXCHANGE  6/30/2023    IR NEPHROSTOMY TUBE EXCHANGE 6/30/2023 [facility]    IR NEPHROSTOMY TUBE EXCHANGE  6/30/2023    IR NEPHROSTOMY TUBE EXCHANGE 6/30/2023 [facility]    IR NEPHROSTOMY TUBE EXCHANGE  8/25/2023    IR NEPHROSTOMY TUBE EXCHANGE 8/25/2023 [facility]    IR NEPHROSTOMY TUBE EXCHANGE  8/25/2023    IR NEPHROSTOMY TUBE EXCHANGE 8/25/2023 [facility]    IR NEPHROSTOMY TUBE EXCHANGE  10/18/2023    IR NEPHROSTOMY TUBE EXCHANGE 10/18/2023 Onesimo Reyes MD [place] [facility] [place] [facility]   [3] No family history on file.  [4]   Social History  Tobacco Use    Smoking status: Never    Smokeless tobacco: Never   Substance Use Topics    Alcohol use: Not Currently     Comment: occasional    Drug use: Never        Maciej Méndez PA-C  05/15/25 112

## 2025-05-15 NOTE — PROGRESS NOTES
Emergency Medicine Transition of Care Note.    I received Jose Thornton in signout from Maciej Méndez PA-C.  Please see the previous ED provider note for all HPI, PE and MDM up to the time of signout at 2000 hrs. this is in addition to the primary record.    In brief Jose Thornton is an 59 y.o. male presenting for   Chief Complaint   Patient presents with    Urinary Frequency     At the time of signout we were awaiting: Urinalysis    Diagnoses as of 05/14/25 8495   Acute urinary retention   Acute cystitis with hematuria       Medical Decision Making  Patient's care turned over to me by the outgoing SHALINI.  Case was discussed and reviewed the patient's chart.  Waiting results of the urinalysis  CBC white count 8.6 hemoglobin 9.9 Salgado crit 32.5 platelet count was 448 metabolic glucose 121 chloride 108 BUN 24 creatinine 2.22 GFR 34.  We did a bladder scan which showed over 400 cc of urine.  I did recommend a Schultz catheter and the patient declined.  After discussing risk benefits he did consent to a straight cath.  Prior to straight cath he was able to void about 100 cc of urine which was sent to the lab.  Postvoid residual shows still over 347 cc of urine.  A straight catheter was performed in over 800 cc of urine was drained from the bladder.  Patient states he feels much better.  I did discuss a Schultz catheter for his retention however the patient states that he had issues with Schultz catheters in the past that it somehow damaged his bladder and he was not comfortable with a Schultz catheter at this time.  He states that he has a urologist he is on a call in the morning.  Patient was advised to return should he change his mind have any concerns or worsening of any symptoms.  He was given Keflex 500 mg p.o. and 1 oxycodone 5 mg p.o.  He was discharged home on Keflex and Flomax.  Return precautions were discussed all questions answered prior to discharge        Final diagnoses:   None            Procedure  Procedures    Rufus Pimentel PA-C

## 2025-05-16 LAB — BACTERIA UR CULT: NORMAL

## 2025-05-19 ENCOUNTER — TELEPHONE (OUTPATIENT)
Age: 60
End: 2025-05-19
Payer: MEDICARE

## 2025-05-19 NOTE — TELEPHONE ENCOUNTER
Application for equipment for ostomy care, [employer] will be sent, patient calling in requesting Dhiraj to sign as soon as possible.

## 2025-05-19 NOTE — TELEPHONE ENCOUNTER
Milton Lopez has a colostomy, I have not previously ordered supplies for this. His PCP (or whoever has been signing off on his supplies for the past year) would be the one to help with this. It sounds like he has been having urinary issues recently so if that's the case he should follow up with Dr. Andersen. Thanks!

## 2025-05-22 ENCOUNTER — OFFICE VISIT (OUTPATIENT)
Dept: SURGERY | Facility: CLINIC | Age: 60
End: 2025-05-22
Payer: MEDICARE

## 2025-05-22 VITALS
HEART RATE: 86 BPM | HEIGHT: 72 IN | OXYGEN SATURATION: 98 % | WEIGHT: 201 LBS | BODY MASS INDEX: 27.22 KG/M2 | DIASTOLIC BLOOD PRESSURE: 64 MMHG | SYSTOLIC BLOOD PRESSURE: 91 MMHG

## 2025-05-22 DIAGNOSIS — Z93.3 COLOSTOMY IN PLACE (MULTI): Primary | ICD-10-CM

## 2025-05-22 PROCEDURE — 99203 OFFICE O/P NEW LOW 30 MIN: CPT | Performed by: SURGERY

## 2025-05-22 PROCEDURE — 3008F BODY MASS INDEX DOCD: CPT | Performed by: SURGERY

## 2025-05-22 PROCEDURE — 1036F TOBACCO NON-USER: CPT | Performed by: SURGERY

## 2025-05-22 ASSESSMENT — ENCOUNTER SYMPTOMS
ABDOMINAL DISTENTION: 1
ABDOMINAL PAIN: 1

## 2025-05-22 NOTE — PROGRESS NOTES
Subjective   Patient ID: Jose Thornton is a 59 y.o. male who presents for New Patient Visit (Patient is here today for colostomy care and supply order. ).  HPI  59-year-old male complex surgical history history of Crohn's status post colon resection with colostomy status post bilateral ureteral reimplantation repair of rectal vesicle fistula on 10/23 at [facility] [place], had a colonoscopy by GI on 6/23 he has an appointment to see colorectal, complaining of a bulge in the right side of the abdomen with minor discomfort    Review of Systems   Gastrointestinal:  Positive for abdominal distention and abdominal pain.   All other systems reviewed and are negative.      Objective   Physical Exam  Abdominal:      Comments: Abdomen soft colostomy left side of the abdomen in the upper quadrant viable and functional, soft right-sided abdominal wall defect     Physical Exam  Constitutional:       Appearance: Normal appearance.   HENT:      Head: Normocephalic and atraumatic.      Mouth/Throat:      Pharynx: Oropharynx is clear.   Eyes:      Pupils: Pupils are equal, round, and reactive to light.   Cardiovascular:      Rate and Rhythm: Normal rate and regular rhythm.   Pulmonary:      Effort: Pulmonary effort is normal.      Breath sounds: Normal breath sounds.   Abdominal:      General: Abdomen is flat. Bowel sounds are normal.      Palpations: Abdomen is soft.   Musculoskeletal:         General: Normal range of motion.      Cervical back: Normal range of motion.   Skin:     General: Skin is warm.   Neurological:      General: No focal deficit present.      Mental Status: She is alert. Mental status is at baseline.   Psychiatric:         Mood and Affect: Mood normal.       Assessment/Plan   : Patient with complex colorectal and urologic surgery is to see colorectal surgery for possible reversal with deferring hernia repair at this time.  His primary colorectal surgeon has moved.  Prescription given for supplies and physical therapy will  see us as needed       Xavier Lechuga MD 05/22/25 2:49 PM

## 2025-05-29 ENCOUNTER — APPOINTMENT (OUTPATIENT)
Age: 60
End: 2025-05-29
Payer: MEDICARE

## 2025-05-29 ENCOUNTER — TELEPHONE (OUTPATIENT)
Age: 60
End: 2025-05-29

## 2025-05-29 DIAGNOSIS — N20.0 NEPHROLITHIASIS: ICD-10-CM

## 2025-05-29 DIAGNOSIS — N30.01 ACUTE CYSTITIS WITH HEMATURIA: ICD-10-CM

## 2025-05-29 DIAGNOSIS — R33.8 ACUTE URINARY RETENTION: Primary | ICD-10-CM

## 2025-05-29 PROCEDURE — 99215 OFFICE O/P EST HI 40 MIN: CPT | Performed by: NURSE PRACTITIONER

## 2025-05-29 PROCEDURE — RXMED WILLOW AMBULATORY MEDICATION CHARGE

## 2025-05-29 PROCEDURE — G2211 COMPLEX E/M VISIT ADD ON: HCPCS | Performed by: NURSE PRACTITIONER

## 2025-05-29 RX ORDER — TAMSULOSIN HYDROCHLORIDE 0.4 MG/1
0.4 CAPSULE ORAL DAILY
Qty: 30 CAPSULE | Refills: 2 | Status: SHIPPED | OUTPATIENT
Start: 2025-05-29

## 2025-05-29 NOTE — PROGRESS NOTES
UROLOGIC FOLLOW-UP VISIT     PROBLEM LIST:  1. Acute urinary retention  Referral to Urology    tamsulosin (Flomax) 0.4 mg 24 hr capsule    Urine Culture    Urinalysis with Reflex Microscopic    Urine Culture    Urinalysis with Reflex Microscopic      2. Acute cystitis with hematuria  Referral to Urology    tamsulosin (Flomax) 0.4 mg 24 hr capsule    Urine Culture    Urinalysis with Reflex Microscopic    Urine Culture    Urinalysis with Reflex Microscopic      3. Nephrolithiasis            HISTORY OF PRESENT ILLNESS:   Jose Thornton is a 58 y.o. with Crohn's disease and associated colovesical fistula s/p B nephrostomy and Schultz catheter placement  Bilateral ureteral reimplant and fistula repair with Dr. Andersen and Dr. Rodriguez 10/27/23  Readmitted for dehydration, sepsis due to UTI in setting of SPT 11/28-12/13/23  SPT removed 1/15/24     INTERVAL HISTORY:  An interactive audio and video telecommunication system which permits real time communications between the patient (at the originating site) and provider (at the distant site) was utilized to provide this telehealth service.   Verbal consent was requested and obtained from Jose Thornton on this date, 05/29/25 for a telehealth visit and the patient's location was confirmed at the time of the visit.     Developed abdominal hernia while using inversion table  Workup in ED showed non-urgent hernia  Saw surgeon who felt repair was too complex  Has appointment next week with different surgeon  Also interested in reversing colostomy    Recently went to ED with UTI, excruciatingly painful  Felt urge and pressure, only able to void a small amount  Had a couple of episodes of less severe symptoms, managed with OTC phenazopyridine  Describes changes to glans associated with UTI symptoms, describes skin as dark, scaly  Returned to ED for ongoing pain, straight cathed  Large volume out with cath, more than on bladder scan  Also restarted on tamsulosin per ED  Read  instructions wrong & was taking multiple times a day & ran out  Flow improved  Still with intermittency, urgency  Notes kidney stone on CT    Using external catheter at night to avoid accidents while in deep sleep  Less control, urge increased when laying down  Feels this is related in part to being more sedentary  Working with PT, trying to be more active    PAST MEDICAL HISTORY:  Past Medical History:   Diagnosis Date    Crohn's disease (Multi)     Diverticulosis     GERD (gastroesophageal reflux disease)     GI (gastrointestinal bleed)     Urinary tract infection        PAST SURGICAL HISTORY:  Past Surgical History:   Procedure Laterality Date    BLADDER SURGERY N/A     COLONOSCOPY W/ POLYPECTOMY N/A     COLOSTOMY N/A     CT GUIDED PERCUTANEOUS PERITONEAL OR RETROPERITONEAL FLUID COLLECTION DRAINAGE  09/28/2022    CT GUIDED PERCUTANEOUS PERITONEAL OR RETROPERITONEAL FLUID COLLECTION DRAINAGE 9/28/2022 [facility] INPATIENT LEGACY    IR CVC NONTUNNELED  11/06/2023    IR CVC NONTUNNELED 11/6/2023 Drew Terry MD [facility] ANGIO    IR NEPHROSTOMY TUBE EXCHANGE  01/31/2023    IR NEPHROSTOMY TUBE EXCHANGE 1/31/2023 [facility]    IR NEPHROSTOMY TUBE EXCHANGE  01/31/2023    IR NEPHROSTOMY TUBE EXCHANGE 1/31/2023 [facility]GAC[facility]    IR NEPHROSTOMY TUBE EXCHANGE  03/23/2023    IR NEPHROSTOMY TUBE EXCHANGE [facility]    IR NEPHROSTOMY TUBE EXCHANGE  03/23/2023    IR NEPHROSTOMY TUBE EXCHANGE [facility]    IR NEPHROSTOMY TUBE EXCHANGE  03/30/2023    IR NEPHROSTOMY TUBE EXCHANGE [facility]    IR NEPHROSTOMY TUBE EXCHANGE  04/25/2023    IR NEPHROSTOMY TUBE EXCHANGE [facility]    IR NEPHROSTOMY TUBE EXCHANGE  06/30/2023    IR NEPHROSTOMY TUBE EXCHANGE 6/30/2023 [facility]    IR NEPHROSTOMY TUBE EXCHANGE  06/30/2023    IR NEPHROSTOMY TUBE EXCHANGE 6/30/2023 [facility]    IR NEPHROSTOMY TUBE EXCHANGE  08/25/2023    IR NEPHROSTOMY TUBE EXCHANGE 8/25/2023 [facility]    IR NEPHROSTOMY TUBE EXCHANGE  08/25/2023    IR NEPHROSTOMY TUBE  EXCHANGE 2023 [facility] [place] [facility]    IR NEPHROSTOMY TUBE EXCHANGE  10/18/2023    IR NEPHROSTOMY TUBE EXCHANGE 10/18/2023 Onesimo Reyes MD [facility] [place] [facility]        ALLERGIES:   No Known Allergies     MEDICATIONS:   Current Outpatient Medications on File Prior to Visit   Medication Sig Dispense Refill    acetaminophen (Tylenol) 325 mg tablet Take by mouth every 6 hours if needed.      [] cephalexin (Keflex) 500 mg capsule Take 1 capsule (500 mg) by mouth 4 times a day for 7 days. 28 capsule 0    naloxone (Narcan) 4 mg/0.1 mL nasal spray Administer 1 spray (4 mg) into affected nostril(s) if needed for opioid reversal for up to 1 dose. May repeat every 2-3 minutes if needed, alternating nostrils, until medical assistance becomes available. 2 each 0    oxyCODONE (Roxicodone) 5 mg immediate release tablet Take 1 tablet (5 mg) by mouth every 6 hours if needed for severe pain (7 - 10). 6 tablet 0    [] tamsulosin (Flomax) 0.4 mg 24 hr capsule Take 1 capsule (0.4 mg) by mouth once daily for 7 days. 7 capsule 0     Current Facility-Administered Medications on File Prior to Visit   Medication Dose Route Frequency Provider Last Rate Last Admin    multivitamin with iron-minerals 9 mg iron/15 mL liquid 15 mL  15 mL oral Once Tomas Andersen MD            SOCIAL HISTORY:  Patient  reports that he has never smoked. He has never used smokeless tobacco. He reports that he does not currently use alcohol. He reports that he does not use drugs.   Social History     Socioeconomic History    Marital status:      Spouse name: Not on file    Number of children: Not on file    Years of education: Not on file    Highest education level: Not on file   Occupational History    Not on file   Tobacco Use    Smoking status: Never    Smokeless tobacco: Never   Vaping Use    Vaping status: Never Used   Substance and Sexual Activity    Alcohol use: Not Currently     Comment: rare    Drug use: Never    Sexual activity: Defer   Other  Topics Concern    Not on file   Social History Narrative    Not on file     Social Drivers of Health     Financial Resource Strain: Low Risk  (11/29/2023)    Overall Financial Resource Strain (CARDIA)     Difficulty of Paying Living Expenses: Not very hard   Recent Concern: Financial Resource Strain - Medium Risk (11/1/2023)    Overall Financial Resource Strain (CARDIA)     Difficulty of Paying Living Expenses: Somewhat hard   Food Insecurity: Not on file   Transportation Needs: No Transportation Needs (11/29/2023)    PRAPARE - Transportation     Lack of Transportation (Medical): No     Lack of Transportation (Non-Medical): No   Physical Activity: Not on file   Stress: Not on file   Social Connections: Not on file   Intimate Partner Violence: Not on file   Housing Stability: Low Risk  (11/29/2023)    Housing Stability Vital Sign     Unable to Pay for Housing in the Last Year: No     Number of Places Lived in the Last Year: 1     Unstable Housing in the Last Year: No       FAMILY HISTORY:  Family History   Problem Relation Name Age of Onset    Cancer Brother         REVIEW OF SYSTEMS:  All systems reviewed, pertinent negatives as noted in HPI.     PHYSICAL EXAM:  There were no vitals taken for this visit.  Constitutional: Appears well. No distress.    Head: Normocephalic and atraumatic.    Neck: Normal range of motion.     Pulmonary/Chest: Effort normal. No respiratory distress.   Abdominal: Obese, ostomy in place.  Integumentary: No rash or lesions visualized.  Musculoskeletal: Normal range of motion.    Neurological: Alert and oriented.  Psychiatric: Normal mood and affect. Thought content normal.      LABORATORY REVIEW:   Lab Results   Component Value Date    BUN 24 (H) 05/14/2025    CREATININE 2.22 (H) 05/14/2025    EGFR 34 (L) 05/14/2025     05/14/2025    K 4.1 05/14/2025     (H) 05/14/2025    CO2 22 05/14/2025    CALCIUM 8.3 (L) 05/14/2025      Lab Results   Component Value Date    WBC 8.6  05/14/2025    RBC 4.27 (L) 05/14/2025    HGB 9.9 (L) 05/14/2025    HCT 32.5 (L) 05/14/2025    MCV 76 (L) 05/14/2025    MCH 23.2 (L) 05/14/2025    MCHC 30.5 (L) 05/14/2025    RDW 19.5 (H) 05/14/2025     05/14/2025    MPV 9.2 10/30/2023           Assessment:      1. Acute urinary retention  Referral to Urology    tamsulosin (Flomax) 0.4 mg 24 hr capsule    Urine Culture    Urinalysis with Reflex Microscopic    Urine Culture    Urinalysis with Reflex Microscopic      2. Acute cystitis with hematuria  Referral to Urology    tamsulosin (Flomax) 0.4 mg 24 hr capsule    Urine Culture    Urinalysis with Reflex Microscopic    Urine Culture    Urinalysis with Reflex Microscopic      3. Nephrolithiasis            Jose Thornton is a 58 y.o. with Crohn's disease and associated colovesical fistula  Bilateral ureteral reimplant and fistula repair with Dr. Andersen and Dr. Rodriguez 10/27/23, discharged 11/17/23   Readmitted for dehydration, sepsis due to UTI 11/28-12/13/23  SPT removed 1/15/24     Seen in ED 5/11/25 for new ventral hernia  CT A/P showed multiple R nonobstructing inferior pole calculi up to 1.6 cm, B renal atrophy and cysts; appears to have large anterior ?diverticulum    Returned to ED 5/14/25 with dysuria and frequency  UA + protein, heme, bilirubin, urobilinogen, nitrites, leuks  Micro >50 WBC, >20 RBC/HPF  Culture with clinically insignificant growth  Bladder scan x 2, 382 & 423 mL; 800 mL on straight cath  Started on cephalexin and tamsulosin  Returned to ED 5/15 with ongoing pain, had not yet started antibiotics    Continues to have urinary issues including frequency, intermittency  Recent incidental finding of nephrolithiasis  Ran out of tamsulosin  Using external catheter at night    Discussed natural hx of BPH, urinary retention due to outlet obstruction, and the potential for renal or bladder dysfunction if untreated  Reviewed options for management, including alpha 1 blocker, 5-TATI, & range of outlet  procedures  Discussed changes to glans are not a usual symptom of UTI    Discussed natural hx of nephrolithiasis, prevention, and management   Stones under 5 mm can often be effectively managed with medical expulsive therapy  Stones over 5 mm usually require intervention such as ESWL, URS, or PCNL depending on the size and location of the stone(s)  Agreeable to plan as below       Plan:   Resume tamsulosin 0.4 mg po daily  Standing orders for UA, culture  RTC in 4 weeks for reassessment or sooner if needed if symptoms recur  Anticipate referral to Dr. Ramirez for possible intervention for stones pending surgical plan for hernia repair  Encouraged to call in the interim with any questions, concerns

## 2025-05-30 ENCOUNTER — APPOINTMENT (OUTPATIENT)
Dept: PRIMARY CARE | Facility: CLINIC | Age: 60
End: 2025-05-30
Payer: MEDICARE

## 2025-05-30 ENCOUNTER — PHARMACY VISIT (OUTPATIENT)
Dept: PHARMACY | Facility: CLINIC | Age: 60
End: 2025-05-30
Payer: COMMERCIAL

## 2025-05-30 VITALS
OXYGEN SATURATION: 97 % | SYSTOLIC BLOOD PRESSURE: 100 MMHG | BODY MASS INDEX: 28.71 KG/M2 | DIASTOLIC BLOOD PRESSURE: 65 MMHG | WEIGHT: 212 LBS | HEIGHT: 72 IN | HEART RATE: 70 BPM

## 2025-05-30 DIAGNOSIS — D64.9 ANEMIA, UNSPECIFIED TYPE: ICD-10-CM

## 2025-05-30 DIAGNOSIS — K50.913 CROHN'S DISEASE WITH FISTULA, UNSPECIFIED GASTROINTESTINAL TRACT LOCATION: ICD-10-CM

## 2025-05-30 DIAGNOSIS — Z79.899 DRUG THERAPY: ICD-10-CM

## 2025-05-30 DIAGNOSIS — Z12.5 SCREENING FOR PROSTATE CANCER: ICD-10-CM

## 2025-05-30 DIAGNOSIS — N18.32 CKD STAGE 3B, GFR 30-44 ML/MIN (MULTI): ICD-10-CM

## 2025-05-30 DIAGNOSIS — Z93.3 COLOSTOMY IN PLACE (MULTI): ICD-10-CM

## 2025-05-30 DIAGNOSIS — Z00.00 ROUTINE GENERAL MEDICAL EXAMINATION AT HEALTH CARE FACILITY: ICD-10-CM

## 2025-05-30 DIAGNOSIS — Z13.9 SCREENING FOR CONDITION: ICD-10-CM

## 2025-05-30 DIAGNOSIS — T14.8XXA NONHEALING NONSURGICAL WOUND: Primary | ICD-10-CM

## 2025-05-30 PROCEDURE — G0402 INITIAL PREVENTIVE EXAM: HCPCS | Performed by: FAMILY MEDICINE

## 2025-05-30 PROCEDURE — 3008F BODY MASS INDEX DOCD: CPT | Performed by: FAMILY MEDICINE

## 2025-05-30 PROCEDURE — 99386 PREV VISIT NEW AGE 40-64: CPT | Performed by: FAMILY MEDICINE

## 2025-05-30 PROCEDURE — 99204 OFFICE O/P NEW MOD 45 MIN: CPT | Performed by: FAMILY MEDICINE

## 2025-05-30 PROCEDURE — 1036F TOBACCO NON-USER: CPT | Performed by: FAMILY MEDICINE

## 2025-05-30 ASSESSMENT — ENCOUNTER SYMPTOMS
DEPRESSION: 0
LOSS OF SENSATION IN FEET: 0
OCCASIONAL FEELINGS OF UNSTEADINESS: 0

## 2025-05-30 ASSESSMENT — PATIENT HEALTH QUESTIONNAIRE - PHQ9
1. LITTLE INTEREST OR PLEASURE IN DOING THINGS: NOT AT ALL
SUM OF ALL RESPONSES TO PHQ9 QUESTIONS 1 AND 2: 0
2. FEELING DOWN, DEPRESSED OR HOPELESS: NOT AT ALL

## 2025-05-30 ASSESSMENT — ACTIVITIES OF DAILY LIVING (ADL)
DOING_HOUSEWORK: INDEPENDENT
TAKING_MEDICATION: INDEPENDENT
MANAGING_FINANCES: INDEPENDENT
DRESSING: INDEPENDENT
BATHING: INDEPENDENT
GROCERY_SHOPPING: INDEPENDENT

## 2025-05-30 NOTE — PATIENT INSTRUCTIONS
Patient here today to establish, will do annual preventative visit, review chart and labs, plan future labs.    ------    Immunization counseling: Due for annual flu shot, latest COVID booster, Shingrix series, Tdap. -->> Check with your pharmacy to keep up-to-date on immunizations.    Screening for prostate cancer: Will do PSA screening with next labs.    Screening for colon cancer: Patient sees gastroenterology/Dr. Yi.    Screening for hepatitis C was nonreactive.    Overweight, BMI 28. -->>  Work to limit intake of simple carbohydrates, things like bread, pasta, potatoes, rice, sugars etc. patient is also increasing exercise.      -------      Regarding previous labs:    -Drug therapy, screening for conditions: Last A1c was 5.1, so no diabetes.     - Vitamin D deficiency, last vitamin D was 82, was 88, we like it between 75 and 120, so we would call it perfect.  Managed by nephrology/Dr. Bella.  He is on a vitamin D supplement, not sure of the dose. -->> Follow-up as planned.    - Chronic kidney disease, stage IIIb.  Sees nephrology/Dr. Bella.  Follow-up as planned.    - Anemia with history of iron deficiency, possibly of chronic disease. -->>  Will do anemia panel with next labs.    urinary retention, nephrolithiasis.  Is seeing urology. Has had numerous urologic surgeries/procedures.   Notes some benefit regarding urinary retention from tamsulosin. F/up with Urology/Dr. Baldwin and nephrology/Dr. Bella.    Crohn's disease, nonhealing wound, periumbilical hernia, has colostomy in place.  Nonhealing wound adjacent to ostomy site.  Been trying different powders and things and nothing working well.  Guarding Crohn's and future ostomy reversal, patient is hoping to do a bowel cleanse of some form to help lessen parasite load before attempting reversal. -->>  Will refer to wound care for dilation and treatment.  Follow-up with general surgery/Dr. Gonzalez as planned for hernia as well as in the future for ostomy  reversal.  Follow-up with GI/Dr. Yi as planned.       - Patient will return in about 4 weeks for annual lab review and follow-up  - Patient will come in here about a week before the appointment to get fasting annual labs drawn.

## 2025-05-30 NOTE — PROGRESS NOTES
Subjective   Patient ID: Jose Thornton is a 59 y.o. male who presents for Establish Care (Pt in today to establish care).    Review of Systems  Denies N/V, no HA/S/V, has Iida for nonhealing wound near her ostomy site, no CP/SOB. Denies fevers/chills.  All other systems were negative.     Objective   /65 (BP Location: Left arm, Patient Position: Sitting, BP Cuff Size: Large adult)   Pulse 70   Ht 1.829 m (6')   Wt 96.2 kg (212 lb)   SpO2 97%   BMI 28.75 kg/m²     Physical Exam  Gen: NAD  eyes: EOMI, PERRLA  ENT: hearing grossly intact, no nasal discharge  resp: CTABL, without R/R  heart: RRR without MRG  GI: abd: S/ND/NT, BS+  lymph: no axillary, cervical, supraclavicular lymphadenopathy noted   MS: gait grossly WNL,  derm: no rashes or lesions noted  neuro: CN II-XII grossly intact  psych: A&Ox3    Assessment/Plan   Problem List Items Addressed This Visit           ICD-10-CM    Anemia D64.9    Relevant Orders    Iron and TIBC    Ferritin    Vitamin B12    Folate    Colostomy in place (Multi) Z93.3    Crohn's disease (Multi) K50.90    Nonhealing nonsurgical wound - Primary T14.8XXA    Relevant Orders    Referral to Wound Clinic    Drug therapy Z79.899    Relevant Orders    CBC and Auto Differential    Comprehensive Metabolic Panel    Magnesium    Screening for condition Z13.9    Relevant Orders    TSH with reflex to Free T4 if abnormal    Lipid Panel    Hemoglobin A1C    Screening for prostate cancer Z12.5    Relevant Orders    Prostate Spec.Ag,Screen    CKD stage 3b, GFR 30-44 ml/min (Multi) N18.32     Patient here today to establish, will do annual preventative visit, review chart and labs, plan future labs.    ------    Immunization counseling: Due for annual flu shot, latest COVID booster, Shingrix series, Tdap. -->> Check with your pharmacy to keep up-to-date on immunizations.    Screening for prostate cancer: Will do PSA screening with next labs.    Screening for colon cancer: Patient sees  gastroenterology/Dr. Yi.    Screening for hepatitis C was nonreactive.    Overweight, BMI 28. -->>  Work to limit intake of simple carbohydrates, things like bread, pasta, potatoes, rice, sugars etc. patient is also increasing exercise.      -------      Regarding previous labs:    -Drug therapy, screening for conditions: Last A1c was 5.1, so no diabetes.     - Vitamin D deficiency, last vitamin D was 82, was 88, we like it between 75 and 120, so we would call it perfect.  Managed by nephrology/Dr. Bella.  He is on a vitamin D supplement, not sure of the dose. -->> Follow-up as planned.    - Chronic kidney disease, stage IIIb.  Sees nephrology/Dr. Bella.  Follow-up as planned.    - Anemia with history of iron deficiency, possibly of chronic disease. -->>  Will do anemia panel with next labs.    urinary retention, nephrolithiasis.  Is seeing urology. Has had numerous urologic surgeries/procedures.   Notes some benefit regarding urinary retention from tamsulosin. F/up with Urology/Dr. Baldwin and nephrology/Dr. Bella.    Crohn's disease, nonhealing wound, periumbilical hernia, has colostomy in place.  Nonhealing wound adjacent to ostomy site.  Been trying different powders and things and nothing working well.  Guarding Crohn's and future ostomy reversal, patient is hoping to do a bowel cleanse of some form to help lessen parasite load before attempting reversal. -->>  Will refer to wound care for dilation and treatment.  Follow-up with general surgery/Dr. Gonzalez as planned for hernia as well as in the future for ostomy reversal.  Follow-up with GI/Dr. Yi as planned.       - Patient will return in about 4 weeks for annual lab review and follow-up  - Patient will come in here about a week before the appointment to get fasting annual labs drawn.

## 2025-06-02 NOTE — PROGRESS NOTES
"HPI    Jose Thornton is a 59 y.o. male with a hx of Urinary retention, Hydronephrosis, Crohn's disease, CKD-III, D-diff , and colovesical fistula s/p Anterior proctosigmoidectomy, colovesicular fistula takedown, end colostomy creation with Dr. Warren Rodriguez (12/20/22) c/b left ureter injury s/p Double J stent placement with Dr. Tomas Andersen (12/28/22), he then underwent an ex lap and Bilateral ureteral reimplant, rectovesical fistula repair with Dr. Tomas Andersen and Dr. 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 He was then hospitalized 10/28/23 to 11/17/23 at [facility] for gentamicin toxicity, requiring 3 days of hemodialysis, nephrostomy tubes were capped prior to discharge. A second hospitalization from 11/28/23 to 12/13/23 at [facility] for Right ureter UTI, KADE on CKD, hypotension, and infection in the setting of possible misplaced right nephrostomy tube. He was tx with IV antibiotics and discharged home. He was noted to not be taking any treatment for Crohn's with CNP Anuradha 1/11/24, as he was \"feeling well.\" Nephrostomy tubes removed 1/15/24. He was referred by Dr. Alex for evaluation of colostomy reversal and periumbilical hernia.     Patient states he is here to discuss reversal of colostomy.  Reports abdominal pain that is localized to a palpable hernia right of midline.  Pain is described as an aching sensation and is aggravated by eating/drinking, partially relieved by passage of gas and stool per colostomy.  Experiences the pain daily.  It is impacting his QoL and ability to eat/drink.  Reports that he has had bright red blood per colostomy recently.  Otherwise the colostomy primarily output liquid brown stool on a daily basis.  Changes the ostomy appliance every 2-3 days.  Has had some difficulties with pouching the colostomy.  He attributes this to change in abdominal wall contour due to hernia and some peristomal skin changes/healing wound.  He is scheduled to see [facility] nurse this week.  Endorses " mucous drainage per rectum.  Reports urinary incontinence and UTIs for which he is to see urology in follow-up.  Patient has an appointment with Dr. Yi for 8/2025.  Patient is not interested in conventional therapy for his Crohn's disease and is planning on pursuing anti-parasitic therapies.  Plans to discuss this with his PCP soon.    CT a/p wo contrast 5/11/25  Impression:   - Findings suggestive of a colitis of the transverse colon which may be of either infectious or inflammatory etiology.  - Mild asymmetric urinary bladder wall thickening, however this may be chronic due to sequela of postsurgical changes.  Clinical correlation advised.  - Right sided nonobstructing nephrolithiasis measuring up to 1.6 cm.     CT a/p wo contrast 11/28/23  Impression:   1. Diffuse urinary bladder wall thickening. Recommend clinical correlation with urinalysis as findings could represent underlying infectious process.  2. Mild retraction of right-sided percutaneous nephrostomy tube from prior position in the right renal pelvis. Recommend clinical correlation for obstruction.  3. Mild improvement in previously seen postsurgical changes as evidenced by resolving pericystic and pelvic soft tissue stranding. Additional resolution of previously seen fluid collection posterior to the abdominal wall surgical site.  4. Stable position of bilateral ureteral stents terminating in the augmented urinary bladder.  5. Resolution of previously seen fluid-filled bowel dilation.    Colonoscopy 6/14/23 (Kd)[Descending colostomy to 5 cm into the Ileum]:  Impression:   - Aphtha in the terminal ileum. Biopsied. Path demonstrating focal active ileitis.   - One 3 mm polyp in the ascending colon, removed with a cold biopsy forceps. Resected and retrieved.  Path demonstrating colonic mucosa with prominent lymphoid aggregate and focal active colitis.    - Two ulcers in the area at 20 cm proximal to the stoma. Biopsied. Path demonstrating descending  colon: ulcer and active colitis.   - Biopsies were taken with a cold forceps from the rectum and entire colon for evaluation of microscopic colitis. Path demonstrating random colon and rectum: colonic mucosa with no significant pathologic findings x2.     Non-smoker/No ETOH/No Illicit drug use  PMH: Urinary retention, Hydronephrosis, Crohn's Disease, C-Diff, Left Ureter injury, Colovesical fistula, Vesicointestinal fistula,   PSH: Bilateral nephrostomy tube placement (11/2022), Anterior proctosigmoidectomy, colovesicular fistula takedown, end colostomy creation with Dr. Rodriguez (12/20/22),  Double J stent placement with Dr. Tomas Andersen (12/28/22), Ex lap and Bilateral ureteral reimplant, rectovesical fistula repair with Dr. Tomas Andersen and Dr. Rodriguez (10/27/23),   No family history of CRC or IBD  Employment:     Medical History[1]    Surgical History[2]    RX Allergies[3]    Review of Systems   Constitutional:  Positive for appetite change. Negative for activity change, chills, diaphoresis, fatigue, fever and unexpected weight change.   Respiratory:  Negative for cough, chest tightness and shortness of breath.    Cardiovascular:  Negative for chest pain, palpitations and leg swelling.   Gastrointestinal:  Positive for abdominal pain and blood in stool. Negative for anal bleeding, constipation, diarrhea, nausea, rectal pain and vomiting.   Genitourinary:  Positive for dysuria. Negative for difficulty urinating and hematuria.   Neurological:  Negative for dizziness, weakness and light-headedness.   All other systems reviewed and are negative.      Medications Ordered Prior to Encounter[4]    Physical Exam  Vitals reviewed. Exam conducted with a chaperone present.   Constitutional:       Appearance: Normal appearance.   HENT:      Head: Normocephalic.   Eyes:      Pupils: Pupils are equal, round, and reactive to light.   Cardiovascular:      Rate and Rhythm: Normal rate and regular rhythm.      Pulses: Normal pulses.       Heart sounds: Normal heart sounds. No murmur heard.  Pulmonary:      Effort: Pulmonary effort is normal. No respiratory distress.      Breath sounds: Normal breath sounds. No stridor. No wheezing, rhonchi or rales.   Chest:      Chest wall: No tenderness.   Abdominal:      General: There is no distension.      Palpations: Abdomen is soft. There is no mass.      Tenderness: There is no abdominal tenderness. There is no guarding or rebound.      Hernia: A hernia is present.      Comments: LUQ pink colostomy with green/brown liquid stool contents. Right of midline ventral hernia without obstruction or gangrene, reducible.    Musculoskeletal:         General: No swelling or deformity. Normal range of motion.      Cervical back: Normal range of motion.      Right lower leg: No edema.      Left lower leg: No edema.   Skin:     General: Skin is warm and dry.      Capillary Refill: Capillary refill takes less than 2 seconds.   Neurological:      General: No focal deficit present.      Mental Status: He is alert and oriented to person, place, and time. Mental status is at baseline.   Psychiatric:         Mood and Affect: Mood normal.         Behavior: Behavior normal.         Thought Content: Thought content normal.         Judgment: Judgment normal.         Assessment and Plan:   #Crohn's disease complicated by colovesical fistula S/P complex operative history including primary resection with end colostomy complicated by bilateral ureteral injury for which he received bilateral nephrostomy tubes followed by delayed repair with reimplantation of bilateral ureters and augmentation of bladder with SB; now with colitis of transverse colon on CT imaging 5/2025  -  I discussed with patient that I am concerned that the colitis of the transverse colon may represent active Crohn's disease and have recommended that he follow-up with gastroenterology; of note biopsies from most recent colonoscopy are significant for active  colitis  -  Additional review of the CT from 5/2025 demonstrates that the rectal stump appears to be at the level of the anterior peritoneal reflection (~11-12cm from anorectal junction); given its location and his surgical history, would expect a reversal of colostomy to be technically challenging (ie dissection of the rectal stump, reach of his colon down to the remaining rectum for creation of a colorectal anastomosis)  -  Do no recommend colostomy reversal at this time until his colitis has been addressed first  -  Have recommended patient follow-up after GI appt  -  If attempt at colostomy reversal, probably more appropriate for this to be at [facility] for possible urologic involvement given complex urologic surgical history    #Ventral hernia  -  Will be seeing Dr. Corrigan next week    Jett Gonzalez MD   6/3/2025  5:45 PM     Over 40 minutes was spent reviewing patient's prior history, examining, and counseling patient, including answering his questions         [1]   Past Medical History:  Diagnosis Date    Anemia 3/9/2019    Chronic kidney disease 5/7/2020    Colon polyp 2023    Crohn's disease (Multi)     Diverticulosis     Fissure, anal 8/20/21    GERD (gastroesophageal reflux disease)     GI (gastrointestinal bleed)     PONV (postoperative nausea and vomiting) 2023    Long surgery, took a minute for intestines to wake up.    Urinary tract infection    [2]   Past Surgical History:  Procedure Laterality Date    AMPUTATION      BLADDER SURGERY N/A     COLON SURGERY  2022    COLONOSCOPY W/ POLYPECTOMY N/A     COLOSTOMY N/A     CT GUIDED PERCUTANEOUS PERITONEAL OR RETROPERITONEAL FLUID COLLECTION DRAINAGE  09/28/2022    CT GUIDED PERCUTANEOUS PERITONEAL OR RETROPERITONEAL FLUID COLLECTION DRAINAGE 9/28/2022 [facility] INPATIENT LEGACY    IR CVC NONTUNNELED  11/06/2023    IR CVC NONTUNNELED 11/6/2023 Drew Terry MD [facility] ANGIO    IR NEPHROSTOMY TUBE EXCHANGE  01/31/2023    IR NEPHROSTOMY TUBE EXCHANGE 1/31/2023 DOCTOR OFFICE  LEGACY    IR NEPHROSTOMY TUBE EXCHANGE  01/31/2023    IR NEPHROSTOMY TUBE EXCHANGE 1/31/2023 DOCTOR OFFICE LEGACY    IR NEPHROSTOMY TUBE EXCHANGE  03/23/2023    IR NEPHROSTOMY TUBE EXCHANGE [facility] ANGIO    IR NEPHROSTOMY TUBE EXCHANGE  03/23/2023    IR NEPHROSTOMY TUBE EXCHANGE [facility]GIO    IR NEPHROSTOMY TUBE EXCHANGE  03/30/2023    IR NEPHROSTOMY TUBE EXCHANGE [facility]GIO    IR NEPHROSTOMY TUBE EXCHANGE  04/25/2023    IR NEPHROSTOMY TUBE EXCHANGE [facility]GIO    IR NEPHROSTOMY TUBE EXCHANGE  06/30/2023    IR NEPHROSTOMY TUBE EXCHANGE 6/30/2023 [facility]    IR NEPHROSTOMY TUBE EXCHANGE  06/30/2023    IR NEPHROSTOMY TUBE EXCHANGE 6/30/2023 [facility]GIO    IR NEPHROSTOMY TUBE EXCHANGE  08/25/2023    IR NEPHROSTOMY TUBE EXCHANGE 8/25/2023 [facility]IO    IR NEPHROSTOMY TUBE EXCHANGE  08/25/2023    IR NEPHROSTOMY TUBE EXCHANGE 8/25/2023 [facility]    IR NEPHROSTOMY TUBE EXCHANGE  10/18/2023    IR NEPHROSTOMY TUBE EXCHANGE 10/18/2023 Onesimo Reyes MD [facility] ANGIO    SMALL INTESTINE SURGERY  2022 / 2023   [3] No Known Allergies  [4]   Current Outpatient Medications on File Prior to Visit   Medication Sig Dispense Refill    tamsulosin (Flomax) 0.4 mg 24 hr capsule Take 1 capsule (0.4 mg) by mouth once daily. 30 capsule 2    [DISCONTINUED] acetaminophen (Tylenol) 325 mg tablet Take by mouth every 6 hours if needed.      [DISCONTINUED] naloxone (Narcan) 4 mg/0.1 mL nasal spray Administer 1 spray (4 mg) into affected nostril(s) if needed for opioid reversal for up to 1 dose. May repeat every 2-3 minutes if needed, alternating nostrils, until medical assistance becomes available. 2 each 0    [DISCONTINUED] oxyCODONE (Roxicodone) 5 mg immediate release tablet Take 1 tablet (5 mg) by mouth every 6 hours if needed for severe pain (7 - 10). 6 tablet 0    [DISCONTINUED] tamsulosin (Flomax) 0.4 mg 24 hr capsule Take 1 capsule (0.4 mg) by mouth once daily for 7 days. 7 capsule 0     Current Facility-Administered Medications on File Prior to  Visit   Medication Dose Route Frequency Provider Last Rate Last Admin    multivitamin with iron-minerals 9 mg iron/15 mL liquid 15 mL  15 mL oral Once Tomas Andersen MD

## 2025-06-03 ENCOUNTER — APPOINTMENT (OUTPATIENT)
Facility: CLINIC | Age: 60
End: 2025-06-03
Payer: MEDICARE

## 2025-06-03 VITALS — HEIGHT: 72 IN | WEIGHT: 212 LBS | BODY MASS INDEX: 28.71 KG/M2

## 2025-06-03 DIAGNOSIS — K52.9 COLITIS: ICD-10-CM

## 2025-06-03 DIAGNOSIS — K43.9 VENTRAL HERNIA WITHOUT OBSTRUCTION OR GANGRENE: ICD-10-CM

## 2025-06-03 PROCEDURE — 3008F BODY MASS INDEX DOCD: CPT | Performed by: STUDENT IN AN ORGANIZED HEALTH CARE EDUCATION/TRAINING PROGRAM

## 2025-06-03 PROCEDURE — 99215 OFFICE O/P EST HI 40 MIN: CPT | Performed by: STUDENT IN AN ORGANIZED HEALTH CARE EDUCATION/TRAINING PROGRAM

## 2025-06-03 ASSESSMENT — ENCOUNTER SYMPTOMS
WEAKNESS: 0
UNEXPECTED WEIGHT CHANGE: 0
NAUSEA: 0
ABDOMINAL PAIN: 1
DIZZINESS: 0
APPETITE CHANGE: 1
FEVER: 0
CONSTIPATION: 0
DIFFICULTY URINATING: 0
ACTIVITY CHANGE: 0
SHORTNESS OF BREATH: 0
FATIGUE: 0
LIGHT-HEADEDNESS: 0
DIARRHEA: 0
HEMATURIA: 0
RECTAL PAIN: 0
COUGH: 0
VOMITING: 0
CHILLS: 0
DYSURIA: 1
PALPITATIONS: 0
DIAPHORESIS: 0
ANAL BLEEDING: 0
BLOOD IN STOOL: 1
CHEST TIGHTNESS: 0

## 2025-06-04 ENCOUNTER — PREP FOR PROCEDURE (OUTPATIENT)
Dept: SURGERY | Facility: HOSPITAL | Age: 60
End: 2025-06-04
Payer: MEDICARE

## 2025-06-05 ENCOUNTER — PHARMACY VISIT (OUTPATIENT)
Dept: PHARMACY | Facility: CLINIC | Age: 60
End: 2025-06-05
Payer: COMMERCIAL

## 2025-06-05 ENCOUNTER — OFFICE VISIT (OUTPATIENT)
Dept: WOUND CARE | Facility: CLINIC | Age: 60
End: 2025-06-05
Payer: MEDICARE

## 2025-06-05 DIAGNOSIS — L03.90 WOUND CELLULITIS: Primary | ICD-10-CM

## 2025-06-05 DIAGNOSIS — T14.8XXA NONHEALING NONSURGICAL WOUND: ICD-10-CM

## 2025-06-05 PROCEDURE — 99213 OFFICE O/P EST LOW 20 MIN: CPT

## 2025-06-05 PROCEDURE — 11042 DBRDMT SUBQ TIS 1ST 20SQCM/<: CPT

## 2025-06-05 PROCEDURE — RXMED WILLOW AMBULATORY MEDICATION CHARGE

## 2025-06-05 RX ORDER — GENTAMICIN SULFATE 1 MG/G
CREAM TOPICAL DAILY
Qty: 30 G | Refills: 4 | OUTPATIENT
Start: 2025-06-05

## 2025-06-06 NOTE — CONSULTS
Wound Care Consult     Visit Date: 6/6/2025      Patient Name: Jose Thornton         MRN: [id number]             Reason for Consult: Assist with colostomy pouching issues        Wound History: Open abdominal wound, with colostomy and hernia between wound and stoma     Pertinent Labs:       Assessment: Pt referred to me by Dr Gonzalez's office. Played phone tag with pt for a while. Finally had long conversation last evening. Seems to have a complex situation. He said things have been better since he was put in Coloplast convex pouch. It sounds like pt has tried multiple things to fix his leak issue. He sees Dr Reyes at the [facility]. He seemed a little hesitant to come see me, has an appointment with a physician today 6/6/25, to see if he can get his colostomy reversed. Pt agreeable to see J Carlos at [facility]. I spoke to J Carlos this am, he will call [facility] to get date and time of the appointment next week. Text message sent to pt regarding this.        Colostomy LUQ (Active)   Placement Date/Time: (c)  (c)    Location: LUQ   Number of days:       Colostomy LUQ (Active)                 Wound Plan: Will follow as necessary.      Mirtha Roque, RN  6/6/2025  11:54 AM

## 2025-06-08 LAB
BACTERIA SPEC AEROBE CULT: NORMAL
BACTERIA SPEC ANAEROBE CULT: NORMAL

## 2025-06-09 ENCOUNTER — APPOINTMENT (OUTPATIENT)
Age: 60
End: 2025-06-09
Payer: MEDICARE

## 2025-06-09 VITALS — DIASTOLIC BLOOD PRESSURE: 80 MMHG | SYSTOLIC BLOOD PRESSURE: 121 MMHG | HEART RATE: 84 BPM

## 2025-06-09 DIAGNOSIS — N39.0 URINARY TRACT INFECTION WITHOUT HEMATURIA, SITE UNSPECIFIED: ICD-10-CM

## 2025-06-09 DIAGNOSIS — N20.0 NEPHROLITHIASIS: Primary | ICD-10-CM

## 2025-06-09 PROCEDURE — G2211 COMPLEX E/M VISIT ADD ON: HCPCS | Performed by: NURSE PRACTITIONER

## 2025-06-09 PROCEDURE — 99214 OFFICE O/P EST MOD 30 MIN: CPT | Performed by: NURSE PRACTITIONER

## 2025-06-09 ASSESSMENT — ENCOUNTER SYMPTOMS
HEMATURIA: 0
BLOOD IN STOOL: 0
CHILLS: 0
APPETITE CHANGE: 0
FATIGUE: 0
RECTAL PAIN: 0
DIARRHEA: 0
VOMITING: 0
ACTIVITY CHANGE: 0
PALPITATIONS: 0
CONSTIPATION: 0
SHORTNESS OF BREATH: 0
DYSURIA: 0
DIAPHORESIS: 0
DIFFICULTY URINATING: 0
ABDOMINAL PAIN: 0
CHEST TIGHTNESS: 0
UNEXPECTED WEIGHT CHANGE: 0
FEVER: 0
DIZZINESS: 0
LIGHT-HEADEDNESS: 0
ANAL BLEEDING: 0
COUGH: 0
NAUSEA: 0
WEAKNESS: 0

## 2025-06-09 NOTE — PATIENT INSTRUCTIONS
"Patient Education     Abdominal wall hernias   The Basics   Written by the doctors and editors at [facility]   What is an abdominal wall hernia? -- The internal organs and tissues in the belly are held in place by a tough outer wall of tissue called the \"abdominal wall.\" An abdominal hernia is an area in that wall that is weak or torn. Often, when there is a hernia, organs or tissues that are normally held in place by the abdominal wall bulge or stick out through the weak or torn spot.  The size of the bulge can change depending on how much pressure is put on the abdominal wall. For example, straining when coughing or having a bowel movement can make a hernia look bigger. Lying down overnight can make the hernia look smaller, or even disappear, in the morning.  There are many different kinds of abdominal wall hernias (figure 1).  What are the symptoms of abdominal wall hernias? -- Abdominal wall hernias do not always cause symptoms. When they do, they can cause some or all of these symptoms:  A bulge somewhere on the trunk of the body - This bulge can be so small that you don't even realize it's there.  Pain, especially when coughing, straining, or using nearby muscles  A pulling sensation around the bulge  Nausea or vomiting if part of the intestine is blocked in the hernia  Abdominal wall hernias can balloon out and form a sac. That sac can hold a loop of intestine or a piece of fat that should normally be tucked inside of the belly. This can be painful and even dangerous if the tissue in the hernia gets trapped and unable to slide back into the belly. When this happens, the tissue does not get enough blood, so it can become swollen or even die (figure 2).  Should I see a doctor or nurse? -- Yes. See a doctor or nurse if you have any of the symptoms of a hernia. In most cases, doctors can diagnose a hernia just by doing an exam. During the exam, the doctor might ask you to cough or bear down while pressing on your " "hernia. This might be uncomfortable, but it is necessary to find the source of the problem.  Most of the time, the contents of the hernia can be \"reduced,\" or gently pushed back into the belly. But sometimes, the hernia gets trapped and won't go back in. If that happens, the tissue that is trapped can get damaged.  If you develop severe pain around a hernia bulge or feel sick, call your doctor or surgeon right away.  How are hernias treated? -- Not all hernias need treatment right away. But many do need to be repaired with surgery.  Surgeons can repair most hernias in 1 of 2 ways. The right surgery for you depends on the size of your hernia, where on the abdominal wall it is, whether this is the first time it is getting repaired, what your general health is like, and your surgeon's experience.  The 2 types of surgery are:  Open surgery - During an open surgery, the doctor makes 1 large cut near the hernia to repair it.  Minimally invasive surgery - \"Minimally invasive\" surgery lets the doctor make smaller cuts in the skin. They insert long, thin tools through the cuts. One of the tools has a camera (called a \"laparoscope\") on the end, which sends pictures to a TV screen. The doctor can look at the screen to see inside the body. Then, they use the long tools to do the surgery. They can control the tools directly, or with the help of a robot (this is called \"robot-assisted\" surgery).  If your hernia has reduced the blood supply to a loop of intestine, your doctor might need to remove that piece of intestine. Usually, they will then sew the intestine back together.  The recovery and aftercare for each type of hernia repair is different. Your doctor or nurse can tell you what to expect after your surgery.  All topics are updated as new evidence becomes available and our peer review process is complete.  This topic retrieved from [employer] on: Jan 11, 2024.  Topic 16136 Version 10.0  Release: 31.6.4 - C32.10  © 2024 " UpToDate, Inc. and/or its affiliates. All rights reserved.  figure 1: Abdominal wall hernias     Abdominal wall hernias can happen in different parts of the torso:  Incisional hernias happen along incisions from surgery.  Umbilical hernias happen at the belly button.  Epigastric hernias happen in the midline above the belly button.  Spigelian hernias happen to the left or right of the midline, where 2 layers of muscle meet.  Lumbar hernias (not shown) happen at the back.  Inguinal hernias happen in the groin area.  Femoral hernias happen where the thigh joins the torso.  Graphic 29254 Version 4.0  figure 2: Intestines bulging through hernia     In some cases, a loop of intestine can poke through a hernia. Often, surgeons can push the loop of intestine back in. But if the loop gets trapped or does not get enough blood, surgeons sometimes have to remove it and reconnect the 2 ends of intestine that are left.  Graphic 01463 Version 3.0  Consumer Information Use and Disclaimer   Disclaimer: This generalized information is a limited summary of diagnosis, treatment, and/or medication information. It is not meant to be comprehensive and should be used as a tool to help the user understand and/or assess potential diagnostic and treatment options. It does NOT include all information about conditions, treatments, medications, side effects, or risks that may apply to a specific patient. It is not intended to be medical advice or a substitute for the medical advice, diagnosis, or treatment of a health care provider based on the health care provider's examination and assessment of a patient's specific and unique circumstances. Patients must speak with a health care provider for complete information about their health, medical questions, and treatment options, including any risks or benefits regarding use of medications. This information does not endorse any treatments or medications as safe, effective, or approved for treating a  "specific patient. UpToDate, Inc. and its affiliates disclaim any warranty or liability relating to this information or the use thereof.The use of this information is governed by the Terms of Use, available at https://www.woltersWintegrauwer.com/en/know/clinical-effectiveness-terms. 2024© UpToDate, Inc. and its affiliates and/or licensors. All rights reserved.  Copyright   © 2024 Up[employer]Date, Inc. and/or its affiliates. All rights reserved.          INFORMATION FOR PATIENTS  Dr. Corrigan is a member of the Abdominal Core Health Quality Collaborative ([facility]) registry and routinely follows all of his hernia patients in this way.  Please see the information below. If you receive any email surveys from the [facility], please respond to these so that we can follow how you are doing after surgery.    Please take advantage of the information on the [facility] website regarding rehabilitation before and after abdominal core surgery. This information can be found by going to [email or web address], then by clicking the \"Patients\" heading, then clicking \"Abdominal Core Surgery Rehab,\" or by going directly to [email or web address]    Additionally, there is an [facility] mobile marlena that provides a wealth of information regarding postoperative recovery including stretching, modification for activities of daily living, and exercises for core strength. This can be found by going to the [employer] Store or Google Play store and searching \"[facility]\".    This information sheet tells you how your surgeon’s participation in the Abdominal Core Health Quality  Collaborative ([facility]) aims to improve care for you and many other patients with hernia disease and diseases of  the abdominal wall or abdominal core. Your surgeon will include your personal health information (such as your  name, birthdate, address, and health care treatment) in the Abdominal [facility] Quality Collaborative  confidential, secure data registry unless you request (as " explained below) that your personal health information  not be included. We hope you will allow your information to be included because doing so will help improve the  quality of care for patients with abdominal core health issues such as hernia, tumors, and infections and help  improve the ways surgeons prevent hernias from forming.     What is the Abdominal Core Health Quality Collaborative ([facility])?  ->The [facility] is a large group of surgeons committed to improving the quality of care for patients who have  abdominal core health problems and often undergo surgery for those problems. These surgeons are also  committed to reducing the chance of abdominal core health problems from forming in the first place.     How does it work?  -> During the routine care of your abdominal core health problem, your surgeon collects information about you  and how you do after surgery as part of your confidential health care records.  -> Your surgeon will input information about you and your surgery into a confidential, secure registry maintained  by the [facility]. Only your surgeon, the [facility] , and, in very limited circumstances, the [place] Food and  Drug Administration (FDA) will have access to any information that identifies you.  -> Your identifiable personal health information (such as your name, birthdate, address, and health care treatment  information) must be entered into the registry in order to make it useful for your surgeon’s and the [facility]’s  quality improvement efforts and so that the scientific validity of registry data can be tested. If available, your  surgeon will also enter your email address, cellular phone number, and mailing address. Your personal health  information, in an anonymous form (meaning your identity cannot be matched to your information), is combined  with anonymous information from hundreds (or thousands) of hernia patients like you so that it can be used by  those interested in improving hernia  care other than your surgeon and the [facility].  -> Your data may be provided to the FDA for the purpose of informing the use of particular medical devices used to  care for patients with abdominal core problems and the prevention of those problems. Data provided to the FDA is  typically submitted in a de-identified form, although occasionally FDA may request access to your confidential  health information under its regulatory authority to monitor public health. This information may not be  completely anonymous; however, it will be kept in a secure location and any public reports or publications that are  generated from the registry for FDA purposes will not contain information that will allow you to be personally  identified. Any release of patient protected health information will only be performed consistent with applicable  local, state and federal laws, regulations and institutional policies.  -> [facility] information, in completely anonymous form, may be used by multiple groups interested in abdominal  core health problems including health care providers, hospitals, industry, insurance companies and researchers.  -> There is no monetary compensation to you for having your information included in the [facility] registry.  -> We anticipate about 9,000 to 12,000 patients’ information being entered into the registry each year, although  the number of patients may increase as more surgeons participate in the registry.  -> The [facility] will keep your personal health information in the registry for at least 15 years to help know what  happens after your hernia operation or other surgery or treatment on a long term basis. When your information is  no longer maintained as part of the registry, it will be deleted in a secure manner consistent with applicable  privacy and patient information requirements. Your personal health information in a de-identified (anonymous)  form may be maintained indefinitely by those who have used the  anonymous registry data.     How does this benefit patients like me?  -> The information gathered from many surgeons and patients is much better than information a single surgeon has  about abdominal core health problems with regards to best technique and best use of mesh for particular patients  and particular abdominal core health issues.  -> This information is used to improve the quality of care by finding the best ways to minimize complications, pain,  and the chance of your problem coming back.  -> Although the [facility] is not a research study, having your information included in the [facility] registry can make  you eligible for future research studies. You may be contacted by your surgeon or the [facility] if you are eligible for  future research studies, and you can find more information on [email or web address].     What are the risks involved?  -> Only your surgeon, the facility where your surgery occurred, the [facility] and in rare instances FDA will have  access to your personal health information input into the registry in an identifiable form. The main risk to you for  having your information entered into the [facility] registry is that your personal health information is  unintentionally disclosed to persons who are not authorized to know your information. Multiple industry-standard  safeguards are in place to prevent this, similar to the safeguards hospitals use to protect confidential health  information during routine care of patients.  -> No system that protects confidential personal health information is 100% effective. In the unlikely event that  your information is exposed, you will be personally notified of the incident and appropriate actions will be taken to  limit potential risks to you.  -> Your participation in the registry is completely voluntary. Not including your information in the [facility] registry  will not have an impact on your care.  -> If you do not wish to have your information included in the [facility] registry  for quality improvement in your  care, please inform your surgeon or contact [facility] (see information, below; If information already has been  collected, it will remain in the registry, but no new information will be collected after 30 days of your notifying  your surgeon or [facility] of your decision not to participate).     What can I do to help?  -> A very important part of your surgeon’s care of you and of the [facility] is finding out how your abdominal core  affects your life before and after surgery.  -> In addition to clinic visits with your surgeon to follow your progress, your surgeon and the [facility] may use  different ways to follow your progress after surgery including phone calls, email, text messages, or regular mail. If  you provide your email address or cellular telephone number, your surgeon and the [facility] may send you emails  or a text messages with a link to a confidential questionnaire for you to complete. You can choose not to receive  emails or text messages as described below. Completed questionnaires become part of the anonymous data that  [facility] uses to improve the quality of care for hernia patients. The email will come from [email or web address]; please don’t  delete or ‘junk filter’ this email.  -> You may be asked to complete a follow up questionnaire a few times over the next several years which helps  your surgeon and the [facility] see how you are doing.     Will I receive email and other electronic communication?  -> Unless you choose not to participate in the [facility] (see below), emails and text messages asking you to complete  an [facility] questionnaire may be sent to you in an unencrypted format and will have no additional personal information  about you, other than possibly that you are a patient of your surgeon (the questionnaire itself will be completed on a  secure portal to the [facility] registry).  -> In deciding if you want unencrypted emails or unencrypted text messages to be sent to you, you should consider  that  communication of protected health information by unencrypted email or unencrypted text message involves  potential privacy and security risks. Specifically, unencrypted email and text message transmissions can be  intercepted in transmission or misdirected, allowing third parties to view the information. You should also consider  that your cellular telephone service provider may impose a data or other charge on you to receive a text message.  You should consider asking that any communication of sensitive information be completed by telephone or mail. If  you do not wish to receive email or text messages concerning the [facility] and questionnaires about your hernia,  please inform your surgeon or contact [facility] (see information, below).     What if I have questions or choose not to participate in the [facility]?  -> Should you have any questions, your surgeon can help provide answers for you. General information, registry  updates, and progress of the [facility] can be found at [email or web address]. You can also send an email to  [email or web address] or call [phone] any time with questions about the registry.  -> If you do not want your personal health information included in the [facility] database or you do not want to  receive emails, text messages, or other communications from [facility], please inform your surgeon or contact  [facility] (by email to [email or web address] or call [phone]).

## 2025-06-09 NOTE — PROGRESS NOTES
Jose Thornton  [id number]   06/09/25  4:16 PM    HPI/Subjective:  Jose Thornton is a 59 y.o. male with history of Crohn's disease complicated by colovesical fistula s/p primary resection with end colostomy (12/20/2022), c/b bilateral ureteral injury s/p bilateral nephrostomy tubes followed by delayed repair with ex lap, bilateral ileal ureter and bladder augment with right partial rectus muscle flap, suprapubic tube insertion, omental flap, component separation, use of kerecis (7x20cm) for abdominal wall reinforcement (10/27/2023), now with colitis on recent CT imaging, who is referred to clinic by Jett Gonzalez MD for evaluation of a/an ventral incisional hernia(s).    They note a bulge in his lower abdomen, more on the right    Symptomatically, this patient experiences pain more frequently, which is gradually worsening. States pain is affecting his quality of life significantly. Pain worse after eating.    They have had chronic episode(s) of incarceration.    They have had no episode(s) of obstructive symptoms attributed to the hernia.    They have had no prior repairs.    Past surgical history is otherwise notable for:  Anterior proctosigmoidectomy, Takedown of colovesical fistula, Creation of end colostomy (2022), Exploratory laparotomy, Cystoscopy, Bilateral ileal ureter and bladder augment with right partial rectus muscle flap, Suprapubic tube insertion, Omental flap, Component separation with layered closure, Use of kerecis for abdominal wall reinforcement (2023)    From a pain history standpoint,  Behavioral health history - None  Opioid substance use - Recent opioid use (within 30 days) secondary to UTI    From a functional/activity standpoint,  Ability to perform ADLs in 30 days prior to surgery - Independent  Employment - Light physical labor -   Sporting Activity - Sporadic (once/month) - physical therapy  BMI - Body mass index is 29.02 kg/m².    Past medical history is significant for:   anemia, CKD, colon polyp, Crohn's disease, diverticulosis, anal fissure, GERD, GI bleed, UTI    Hepatic insufficiency or liver failure - No  Ascites - No  Hypertension - No  Diabetes mellitus - No   Dialysis (acute or chronic renal failure requiring dialysis within 2 weeks prior to surgery) - None  COPD - No  Dyspnea - No  Antiplatelet medications excluding aspirin - No  Anticoagulation medications - No  Aspirin - No  Immunosuppression - No  Nicotine use in relation to OR date - No  History of AAA - No  Collagen vascular disorder - No  Congestive heart failure - No  Active pregnancy - No    Review of Systems   Constitutional:  Negative for activity change, appetite change, chills, diaphoresis, fatigue, fever and unexpected weight change.   Respiratory:  Negative for cough, chest tightness and shortness of breath.    Cardiovascular:  Negative for palpitations and leg swelling.   Gastrointestinal:  Negative for abdominal pain, anal bleeding, blood in stool, constipation, diarrhea, nausea, rectal pain and vomiting.   Genitourinary:  Negative for difficulty urinating, dysuria and hematuria.   Neurological:  Negative for dizziness, weakness and light-headedness.          Objective:  Body mass index is 29.02 kg/m².  Physical Exam  Vitals reviewed. Exam conducted with a chaperone present.   Constitutional:       General: He is not in acute distress.     Appearance: Normal appearance. He is not ill-appearing.   HENT:      Head: Normocephalic.      Mouth/Throat:      Mouth: Mucous membranes are moist.   Eyes:      Extraocular Movements: Extraocular movements intact.      Pupils: Pupils are equal, round, and reactive to light.   Cardiovascular:      Rate and Rhythm: Normal rate and regular rhythm.      Pulses: Normal pulses.      Heart sounds: Normal heart sounds. No murmur heard.  Pulmonary:      Effort: Pulmonary effort is normal. No respiratory distress.      Breath sounds: Normal breath sounds. No wheezing, rhonchi or  rales.   Chest:      Chest wall: No tenderness.   Abdominal:      General: There is no distension.      Palpations: There is no mass.      Tenderness: There is no abdominal tenderness. There is no guarding or rebound.      Hernia: A hernia is present.      Comments: Colostomy with ostomy appliance in place   Musculoskeletal:         General: No swelling or deformity.      Cervical back: Neck supple. No rigidity.      Right lower leg: No edema.      Left lower leg: No edema.   Skin:     General: Skin is warm.      Coloration: Skin is not jaundiced or pale.   Neurological:      General: No focal deficit present.      Mental Status: He is alert and oriented to person, place, and time. Mental status is at baseline.   Psychiatric:         Mood and Affect: Mood normal.         Behavior: Behavior normal.         Thought Content: Thought content normal.         Judgment: Judgment normal.     Imaging consisting of CT Abdomen / Pelvis (5/11/25) was reviewed personally and was notable for 7cm midline defect, some absence of right rectus muscle.    Assessment/Plan:  Jose Thornton is a 59 y.o. male with history of Crohn's disease complicated by colovesical fistula s/p primary resection with end colostomy (12/20/2022), c/b bilateral ureteral injury s/p bilateral nephrostomy tubes followed by delayed repair with ex lap, bilateral ileal ureter and bladder augment with right partial rectus muscle flap, suprapubic tube insertion, omental flap, component separation, use of kerecis (7x20cm) for abdominal wall reinforcement (10/27/2023) who presents with a/an symptomatic ventral hernia(s), having undergone no prior repairs.    We will plan to refer the patient for assessment by gastroenterology and colorectal surgery.  On his most recent CT scan, this gentleman appears to have active colitis.  He states that he is currently on track to begin a natural regimen to try to address his underlying Crohn's disease.  He states he has not seen  a gastroenterologist for approximately a year, although I do not see any record of this on brief review of the medical record.  He says the last time that he spoke with the gastroenterologist regarding his Crohn's disease, they recommended that he be started on a chemotherapeutic regimen to control the disease.  He does state that he does not want to have a colostomy for the rest of his life and would be interested in stoma reversal.  I discussed with him that I would be happy to refer him to see a colorectal surgeon for discussion of stoma reversal, and I also think that stoma reversal would likely be best performed in the setting in which his colitis is under control.  We had an extensive discussion about why I would prefer to do a staged approach to reverse his colostomy and 1 operation and then perform a definitive abdominal wall reconstruction at the second operation.  He did have a lot of questions about what if I did a definitive hernia operation and then he wanted to get his stoma reversed after, and I explained that I thought this would be significantly higher morbidity as well as increasing the complexity of stoma reversal.  He seemed to be understanding.    He will follow-up with gastroenterology and colorectal surgery and we will follow along to see what the results of these discussions are and when we can potentially perform an operation for him.    The patient did provide consent for surgery and participation in the [facility] at this clinic visit.    Portions of medical record reviewed for pertinent issues including active problem list, medication list, allergies, social history, health maintenance, notes from previous encounters, lab results, and imaging.     Of note, I am requesting a  modifier for this visit, given that I am going to be the person providing longitudinal management of this patient's abdominal wall problems, which will require multiple visits and multiple surgeries in a staged  víctor.    Mike Corrigan MD  Assistant Professor of Surgery  Division of General Surgery  Department of Surgery        12/20/2022 OPERATION/PROCEDURE:  1. Anterior proctosigmoidectomy.  2. Takedown of colovesical fistula.  3. Creation of end colostomy.  SURGEON:  Warren Rodriguez MD.  INDICATIONS:  The patient is a 57-year-old male with a history of diverticular  disease, complicated by colovesical fistula and Clostridium difficile  colitis.  The patient also has a history of perianal disease, raising  suspicion for Crohn's colitis as the origin of his colovesical  fistula rather than diverticular disease.  The patient was brought to  the operating room today for takedown of the colovesical fistula.   TECHNIQUE:  The patient was brought to the operating room and was placed on  operating table in supine position.  After the induction of general  endotracheal anesthesia, legs were elevated into the modified  lithotomy position using Yellofins stirrups.  The skin of the abdomen  was prepped and draped in usual sterile fashion.  Surgical time-out  was performed.  The peritoneal cavity was then entered through a  midline incision and the abdomen was explored.  The small bowel was  run from the ligament of Treitz to the ileocecal valve and was  grossly normal.  Loops of distal small bowel were densely adherent to  a large chronic pelvic abscess cavity situated between the anterior  wall of the rectum and the posterior wall of the bladder.  These  bowel loops were secondarily involved, however, and displayed no  evidence of primary pathology.  The ascending, transverse, and  descending colons were also grossly normal.  The sigmoid colon,  however, was severely diseased, it was edematous and fibrotic with  fat wrapping of the mesentery.  There was clinical suspicion for  segmental Crohn's colitis.  This sigmoid colon was densely adherent  to the posterior wall of the bladder and to the previously mentioned  large  pelvic abscess cavity.  Lateral attachments of the descending  colon were first mobilized using electrocautery.  Care was taken to  identify and preserve the left ureter.  Lateral attachments of the  proximal sigmoid colon were taken down as well.  Once the colon had  been mobilized to the midline, the distal descending colon was then  divided between clamps.  The mesenteric dissection was then performed  in a retrograde fashion.  The marginal artery was encountered.  It  was divided between Shonda clamps and was ligated after demonstrating  good pulsatile arterial bleeding from its cut end.  Dissection was  then carried downward towards the sacral promontory.  We did preserve  the inferior mesenteric artery.  We then entered a plane between the  presacral fascia and the fascia of the upper mesorectum.  This  allowed us to orient ourselves for what was a very difficult pelvic  dissection due to the dense fibrosis, distortion of anatomy, and the  large chronic abscess cavity.  Nonetheless, we were able to mobilize  the distal sigmoid colon off the posterior wall of the bladder.  Much  of this dissection ended up being in the muscular plane of the  bladder, but at no point did we enter the lumen of the bladder  itself.  We did consult Urology intraoperatively to make sure that  they would be available in the event that a complex bladder repair  was needed, but this was not necessary at the end of the day.  Once  the sigmoid colon had been mobilized off the bladder, the chronic  abscess cavity was drained of pus.  We then developed a plane between  the upper aspect of the rectum and the posterior wall of the bladder  and we dissected this down into the pelvis in the mesorectal plane  until we were below the segment of diseased bowel that allowed us to  then divide the mid rectum with a TA60 stapler and then to complete  division of the mesorectum again in a retrograde fashion.  The  specimen was then removed from the  operative field and was sent to  Pathology.  Inspection was made for hemostasis and this was found to  be adequate.  The decision was made to not restore intestinal  continuity due to the likely diagnosis of Crohn's colitis as well as  the large chronic pelvic abscess cavity.  Granulation tissue was  gently curetted from the abscess cavity, but we were not overly  aggressive so as not to damage any of the underlying pelvic  structures or the bladder.  A stoma aperture was then created in the  left abdomen at the previously marked site and the end colostomy was  brought through this.  The abdomen was irrigated with saline  solution, was aspirated dry.  Inspection was made for hemostasis and  this was found to be adequate.  All sponge and instrument counts were  confirmed as correct.  A 10 mm Florentin-Trevino drain was placed in the  pelvis and this was brought out through a stab incision in the right  lower quadrant.  The midline fascia was then closed with a running #1  looped PDS suture.  The wound was irrigated and the skin was closed  with staples.  Sterile dressings were applied.  The patient tolerated  the above procedure and was taken to recovery room in stable  condition.       10/27/2023 Procedures  Exploratory laparotomy   Cystoscopy  Bilateral ileal ureter and bladder augment with right partial rectus muscle flap  Suprapubic tube insertion   Omental flap  Component separation with layered closure  Use of kerecis for abdominal wall reinforcement  Surgeons   Panel 1:     * Tomas Andersen - Primary  Panel 2:     * Warren Rodriguez - Primary     With Dr. Rodriguez the patients midline was opened along the prior scar with a scalpel and continued down to fascia with bovie cautery. A small defect was make in the fascia with a section of small bowel directly underlying. There was no clear area of injury of the bowel at was injured but the spot was tagged with a silk. The remained of the fascia was opened from 2 cm above the  umbilicus to the pubic symphysis. A belfor retractor was placed. The tagged portion of the bowel was imbricated with inturrupted vicryl. Several adhesions were taken down sharply. There was one section of the small bowel that was adherent to the pelvis. This was removed with finger dissection. There was a raw surface of serosa which was over sewn with vicryl. A small hole was inspected under the bladder thought to lead to the rectal stump and was probed with a meghan. The bladder was filled with saline and there was no extravasation of irrigation our of the defect. This conclude Dr. Rodriguez's portion of the case.      With Dr. Andersen the retractor was replaced with a bookwalter. The bowel contents were packed superiorly and the posterior peritoneum was incised at the level of the pelvic inlet over the iliac vessels, exposing both ureters. This was more difficult on the left due to increased fibrosis.  The ureters were carefully mobilized, preserving their blood supply. The ureters distal ureters were clipped, tagged and transected.      We then backfilled the patients andrade but was only able to instill about 50cc due to his poor compliance. We carefully dissected to  the bladder though a thick rind eventually reaching the bladder and creating a cystotomy. The bladder was inspected and we could not see an area suspicious for a fistula. A flexible cystoscope was used to inspected the urethra and prostate which were free from defects as well. The area behind the bladder was opened where we previously believed to the rectal stump but this was actually anterior to the stump. The tissue in the area appeared unhealthily and gray.      The bowel packing was removed, and attention was focused on isolating an appropriate ileal segment. The distal ileum was inspected and a 30 cm segment of ileum was chosen, 15-20cm from the ileocecal valve and marked with a silk suture. The longer segment was chosen to accommodate possible  transition to a ileal conduit in the future if needed. With the aid the ileocolic and right colic arteries were identified. An avascular mesenteric window was opened on each side of the desired ileal segment using LigaSure to incise and ligate the mesentery on both sides, avoiding injury to the main intestinal vasculature.  Sufficient  mesenteric length was obtained to allow the distal end of the ileal conduit to reach the stomal site without tension. The isolated ileal segment was transected at its proximal and distal antimesenteric borders, using a stapler.      The continuity of the ileum was restored using a stapled technique as follows: The antimesenteric corners of the proximal and distal ileal segments were identified, and a small segment of tissue was resected off each end of the stapled suture lines. One limb of the  stapler was inserted into the proximal and the other into the distal ileal segment with care taken not to injure the bowel mesentery. The ileal segments were rotated ensuring that the antimesenteric bowel walls faced each other prior to firing the stapler. Four small clamps were then placed on the ends of the transected bowel. The two clamps on the lines of the original bowel transection were held together, while the others were spread apart, creating a wide opening. A stapler was used to complete the functional end-to-end ileoileal anastomosis. The staple line was checked  confirming its integrity, and was additionally reinforced with interrupted 3-0 silk suture.     The left ureter was identified with its tagged suture and mobilized cephalad taking care not to injure the ureteral vessels. Both ureters were spatulated on the medial surface. The ileal segment was irrigated with sterile normal saline until the returns were clear. A 1cm seromuscular incision  Each ureteroileal anastomosis was completed using full-thickness 5-0 running PDS sutures. Additional vicryl sutures were placed in the  crotch of the anastomosis initially to reinforce the anastomosis. The ureteroileal anastomosis was  completed over  stents.These were guided through the segment.  The left was anastamosed to the proximal end of the ileal segment while the right was an end to side anastomosis. After each anastomosis the ileum was irrigated and found to be watertight.       We moved our attention to developing a right rectum abdominus flap based on the right inferior epigastric artery. We dissected the muscle off the right peritoneum identifying the pedicle as it dove into the muscle. Approximately 2/3 of the muscle was dissected free, transected superior to the umbilicus using bipolar cautery and brought down to fill the space posterior to the bladder. A the time the muscle was interposed it appeared viable. The muscle was sutured to the deep cavity using 3-0 vicryl.      The distal ileum was spatulated 5 cm in order to augment the bladder. The most distal portion was sutured to the posterior bladder with PDS. Running vicryl was used to anastomose both sides of the bladder with the ilium. After the left side was complete a 20 fr silicone andrade was passed through the abdominal wall and brought through a small incision in the ilium and the balloon inflated. A chromic suture was used to secure the catheter to the ilium in the post operative period. The right side of the ilium was anastomosed to the bladder. The bladder was irrigated and was found to be water tight however the was a leak in the left ureteral anastomosis which was oversewn with vicryl.      At this time a peanut gauze was unaccounted for. An x ray was taken which did not show a clear retained object. It did show that the right  stent was no longer up in the kidney. We were able to palpate the stent across the ureteral anastomosis. We then freed up a portion of omentum and brought it over the bladder augment suturing it in place with vicryl. The omentum was  mobilized based on the left gastroepigastroepiploic artery, and cephalad mobilization was done upto the greater curvature. This omental flap would now recah the pelvis and was used to prvide coverage on the naterior aspect of the bladder augment. We then freed up the peritoneum off the fascia to facilitate closure. During this step we found the lost peanut. Counts were correct.      The peritoneum was reapproximated with 3-0 interrupted vicryl. Kerecis surgiclose (7x20cm) was divided an sutured over the closed incision in the peritoneum. A second surgical drain was placed in this area. We then reapproximated fascia with inturrupted vicryl before closing with with looped PDS in a bidirectional running fashion. Subcutaneous tissue was closed with vicryl. Skin was stapled. The incision was covered with a prevena wound vac.      The patient tolerated the procedure without difficulty and was taken to the recovery room in satisfactory condition.

## 2025-06-09 NOTE — PROGRESS NOTES
UROLOGIC FOLLOW-UP VISIT     PROBLEM LIST:  1. Nephrolithiasis  Follow Up In Urology      2. Urinary tract infection without hematuria, site unspecified             HISTORY OF PRESENT ILLNESS:   Jose Thornton is a 59 y.o. with Crohn's disease and associated colovesical fistula s/p B nephrostomy and Schultz catheter placement s/p B ureteral reimplant, bladder augmentation with R partial rectus muscle flap, and fistula repair with Dr. Andersen and Dr. Rodriguez 10/27/23    INTERVAL HISTORY:  Returns today for FUV  Seen unaccompanied   Attributes urinary retention on CT to UTI  Feels UTIs are most likely related to stones or possibly recurrence of fistula  Getting regular medical care, feeling better and better  Volume of urine and velocity improving over past few weeks  Was tethered to bed due to need for external cath  Now fully functioning, moving around    Planning parasite cleanse to help with Crohn's    PAST MEDICAL HISTORY:  Medical History[1]    PAST SURGICAL HISTORY:  Surgical History[2]     ALLERGIES:   Allergies[3]     MEDICATIONS:   Medications Ordered Prior to Encounter[4]     SOCIAL HISTORY:  Patient  reports that he has never smoked. He has never used smokeless tobacco. He reports current alcohol use of about 1.0 standard drink of alcohol per week. He reports that he does not use drugs.   Social History     Socioeconomic History    Marital status:      Spouse name: Not on file    Number of children: Not on file    Years of education: Not on file    Highest education level: Not on file   Occupational History    Not on file   Tobacco Use    Smoking status: Never    Smokeless tobacco: Never   Vaping Use    Vaping status: Never Used   Substance and Sexual Activity    Alcohol use: Yes     Alcohol/week: 1.0 standard drink of alcohol     Types: 1 Cans of beer per week     Comment: Don't drink weekly more infrequently    Drug use: Never    Sexual activity: Not Currently     Partners: Female   Other Topics  Concern    Not on file   Social History Narrative    Not on file     Social Drivers of Health     Financial Resource Strain: Low Risk  (11/29/2023)    Overall Financial Resource Strain (CARDIA)     Difficulty of Paying Living Expenses: Not very hard   Recent Concern: Financial Resource Strain - Medium Risk (11/1/2023)    Overall Financial Resource Strain (CARDIA)     Difficulty of Paying Living Expenses: Somewhat hard   Food Insecurity: Not on file   Transportation Needs: No Transportation Needs (11/29/2023)    PRAPARE - Transportation     Lack of Transportation (Medical): No     Lack of Transportation (Non-Medical): No   Physical Activity: Not on file   Stress: Not on file   Social Connections: Not on file   Intimate Partner Violence: Not on file   Housing Stability: Low Risk  (11/29/2023)    Housing Stability Vital Sign     Unable to Pay for Housing in the Last Year: No     Number of Places Lived in the Last Year: 1     Unstable Housing in the Last Year: No       FAMILY HISTORY:  Family History[5]    REVIEW OF SYSTEMS:  All systems reviewed, pertinent negatives as noted in HPI.     PHYSICAL EXAM:  Visit Vitals  /80   Pulse 84     Constitutional: Well-developed and well-nourished. No distress.    Head: Normocephalic and atraumatic.    Neck: Normal range of motion.     Pulmonary/Chest: Effort normal. No respiratory distress.   Abdominal: Non-distended.  : See below.   Integumentary: No rash or lesions visualized.  Musculoskeletal: Normal range of motion.    Neurological: Alert, grossly intact.  Psychiatric: Normal mood and affect. Thought content normal.      LABORATORY REVIEW:   Lab Results   Component Value Date    BUN 24 (H) 05/14/2025    CREATININE 2.22 (H) 05/14/2025    EGFR 34 (L) 05/14/2025     05/14/2025    K 4.1 05/14/2025     (H) 05/14/2025    CO2 22 05/14/2025    CALCIUM 8.3 (L) 05/14/2025      Lab Results   Component Value Date    WBC 8.6 05/14/2025    RBC 4.27 (L) 05/14/2025    HGB  "9.9 (L) 05/14/2025    HCT 32.5 (L) 05/14/2025    MCV 76 (L) 05/14/2025    MCH 23.2 (L) 05/14/2025    MCHC 30.5 (L) 05/14/2025    RDW 19.5 (H) 05/14/2025     05/14/2025    MPV 9.2 10/30/2023        No results found for: \"PSA\"          Assessment:      1. Nephrolithiasis  Follow Up In Urology      2. Urinary tract infection without hematuria, site unspecified            Jose Thornton is a 59 y.o. with Crohn's disease and associated colovesical fistula  Bilateral ureteral reimplant, bladder augmentation with R partial rectus muscle flap and fistula repair with Dr. Andersen and Dr. Rodriguez 10/27/23, discharged 11/17/23   Readmitted for dehydration, sepsis due to UTI 11/28-12/13/23  SPT removed 1/15/24      Seen in ED 5/11/25 for new ventral hernia  CT A/P showed multiple R nonobstructing inferior pole calculi up to 1.6 cm, B renal atrophy and cysts, large volume of urine in augmented bladder     Returned to ED 5/14/25 with dysuria and frequency  UA + protein, heme, bilirubin, urobilinogen, nitrites, leuks  Micro >50 WBC, >20 RBC/HPF  Culture with clinically insignificant growth  Bladder scan x 2, 382 & 423 mL; 800 mL on straight cath  Started on cephalexin and tamsulosin  Returned to ED 5/15 with ongoing pain, had not yet started antibiotics     Continues to have urinary issues including frequency, intermittency  Recent incidental finding of nephrolithiasis  Had run out of tamsulosin as of last visit 5/29/25  Using external catheter at night    Unable to provide urine specimen, bladder scan volume not recorded  Discussed recommendation for CIC given large volume on CT  Doesn't feel this is necessary given ongoing improvement in voiding  Also recalls severe bladder spasms with catheter, only recently resolved  Plan as below     Plan:   Go to lab for UA, urine culture  Refer to Dr. Ramirez for management of nephrolithiasis  Reorder external catheter supplies  RTC for reassessment in 3 weeks, PVR  Encouraged to " contact us in the interim with any questions, concerns            [1]   Past Medical History:  Diagnosis Date    Anemia 3/9/2019    Chronic kidney disease 5/7/2020    Colon polyp 2023    Crohn's disease (Multi)     Diverticulosis     Fissure, anal 8/20/21    GERD (gastroesophageal reflux disease)     GI (gastrointestinal bleed)     PONV (postoperative nausea and vomiting) 2023    Long surgery, took a minute for intestines to wake up.    Urinary tract infection    [2]   Past Surgical History:  Procedure Laterality Date    AMPUTATION      BLADDER SURGERY N/A     COLON SURGERY  2022    COLONOSCOPY W/ POLYPECTOMY N/A     COLOSTOMY N/A     CT GUIDED PERCUTANEOUS PERITONEAL OR RETROPERITONEAL FLUID COLLECTION DRAINAGE  09/28/2022    CT GUIDED PERCUTANEOUS PERITONEAL OR RETROPERITONEAL FLUID COLLECTION DRAINAGE 9/28/2022 [facility]IENT [facility]GAC[facility]    IR CVC NONTUNNELED  11/06/2023    IR CVC NONTUNNELED 11/6/2023 Drew Terry MD [facility] [place] ANGIO    IR NEPHROSTOMY TUBE EXCHANGE  01/31/2023    IR NEPHROSTOMY TUBE EXCHANGE 1/31/2023 [facility] OFFICE LEGACY    IR NEPHROSTOMY TUBE EXCHANGE  01/31/2023    IR NEPHROSTOMY TUBE EXCHANGE 1/31/2023 [facility]CTOR OFFICE LEGACY    IR NEPHROSTOMY TUBE EXCHANGE  03/23/2023    IR NEPHROSTOMY TUBE EXCHANGE [facility]    IR NEPHROSTOMY TUBE EXCHANGE  03/23/2023    IR NEPHROSTOMY TUBE EXCHANGE [facility]    IR NEPHROSTOMY TUBE EXCHANGE  03/30/2023    IR NEPHROSTOMY TUBE EXCHANGE [facility]    IR NEPHROSTOMY TUBE EXCHANGE  04/25/2023    IR NEPHROSTOMY TUBE EXCHANGE [facility]    IR NEPHROSTOMY TUBE EXCHANGE  06/30/2023    IR NEPHROSTOMY TUBE EXCHANGE 6/30/2023 [facility]    IR NEPHROSTOMY TUBE EXCHANGE  06/30/2023    IR NEPHROSTOMY TUBE EXCHANGE 6/30/2023 [facility]    IR NEPHROSTOMY TUBE EXCHANGE  08/25/2023    IR NEPHROSTOMY TUBE EXCHANGE 8/25/2023 [facility]    IR NEPHROSTOMY TUBE EXCHANGE  08/25/2023    IR NEPHROSTOMY TUBE EXCHANGE 8/25/2023 [facility]    IR NEPHROSTOMY TUBE EXCHANGE  10/18/2023    IR NEPHROSTOMY TUBE  EXCHANGE 10/18/2023 Onesimo Reyes MD [facility] [place] [facility]GIO    SMALL INTESTINE SURGERY  2022 / 2023   [3] No Known Allergies  [4]   Current Outpatient Medications on File Prior to Visit   Medication Sig Dispense Refill    gentamicin (Garamycin) 0.1 % cream Apply topically to affected area(s) once daily. 30 g 4    tamsulosin (Flomax) 0.4 mg 24 hr capsule Take 1 capsule (0.4 mg) by mouth once daily. 30 capsule 2     Current Facility-Administered Medications on File Prior to Visit   Medication Dose Route Frequency Provider Last Rate Last Admin    multivitamin with iron-minerals 9 mg iron/15 mL liquid 15 mL  15 mL oral Once Tomas Andersen MD       [5]   Family History  Problem Relation Name Age of Onset    Cancer Brother Ok     Alcohol abuse Maternal Grandfather Jelani Thornton     Cancer Brother Scott Thornton

## 2025-06-11 LAB
BACTERIA SPEC AEROBE CULT: NORMAL
BACTERIA SPEC ANAEROBE CULT: NORMAL

## 2025-06-12 ENCOUNTER — OFFICE VISIT (OUTPATIENT)
Dept: WOUND CARE | Facility: CLINIC | Age: 60
End: 2025-06-12
Payer: MEDICARE

## 2025-06-12 PROCEDURE — 11042 DBRDMT SUBQ TIS 1ST 20SQCM/<: CPT | Performed by: PLASTIC SURGERY

## 2025-06-12 PROCEDURE — 11042 DBRDMT SUBQ TIS 1ST 20SQCM/<: CPT

## 2025-06-13 ENCOUNTER — PREP FOR PROCEDURE (OUTPATIENT)
Dept: SURGERY | Facility: HOSPITAL | Age: 60
End: 2025-06-13

## 2025-06-13 ENCOUNTER — APPOINTMENT (OUTPATIENT)
Dept: SURGERY | Facility: CLINIC | Age: 60
End: 2025-06-13
Payer: MEDICARE

## 2025-06-13 VITALS
WEIGHT: 214 LBS | SYSTOLIC BLOOD PRESSURE: 111 MMHG | HEIGHT: 72 IN | HEART RATE: 66 BPM | BODY MASS INDEX: 28.99 KG/M2 | DIASTOLIC BLOOD PRESSURE: 74 MMHG

## 2025-06-13 DIAGNOSIS — Z93.3 COLOSTOMY PRESENT (MULTI): ICD-10-CM

## 2025-06-13 DIAGNOSIS — K52.9 COLITIS: ICD-10-CM

## 2025-06-13 DIAGNOSIS — K50.119 CROHN'S DISEASE OF COLON WITH COMPLICATION (MULTI): ICD-10-CM

## 2025-06-13 DIAGNOSIS — Z93.3 COLOSTOMY IN PLACE (MULTI): ICD-10-CM

## 2025-06-13 DIAGNOSIS — K43.9 VENTRAL HERNIA WITHOUT OBSTRUCTION OR GANGRENE: Primary | ICD-10-CM

## 2025-06-13 PROCEDURE — 3008F BODY MASS INDEX DOCD: CPT | Performed by: SURGERY

## 2025-06-13 PROCEDURE — 1036F TOBACCO NON-USER: CPT | Performed by: SURGERY

## 2025-06-13 PROCEDURE — 99214 OFFICE O/P EST MOD 30 MIN: CPT | Performed by: SURGERY

## 2025-06-13 PROCEDURE — G2211 COMPLEX E/M VISIT ADD ON: HCPCS | Performed by: SURGERY

## 2025-06-16 ENCOUNTER — HOSPITAL ENCOUNTER (EMERGENCY)
Facility: HOSPITAL | Age: 60
Discharge: HOME | End: 2025-06-16
Payer: MEDICARE

## 2025-06-16 ENCOUNTER — APPOINTMENT (OUTPATIENT)
Dept: RADIOLOGY | Facility: HOSPITAL | Age: 60
End: 2025-06-16
Payer: MEDICARE

## 2025-06-16 VITALS
SYSTOLIC BLOOD PRESSURE: 128 MMHG | HEIGHT: 72 IN | RESPIRATION RATE: 17 BRPM | HEART RATE: 75 BPM | BODY MASS INDEX: 28.44 KG/M2 | OXYGEN SATURATION: 97 % | TEMPERATURE: 99.1 F | WEIGHT: 210 LBS | DIASTOLIC BLOOD PRESSURE: 65 MMHG

## 2025-06-16 DIAGNOSIS — S61.412A LACERATION OF LEFT HAND WITHOUT FOREIGN BODY, INITIAL ENCOUNTER: Primary | ICD-10-CM

## 2025-06-16 PROCEDURE — 73130 X-RAY EXAM OF HAND: CPT | Mod: LEFT SIDE | Performed by: RADIOLOGY

## 2025-06-16 PROCEDURE — 73130 X-RAY EXAM OF HAND: CPT | Mod: LT

## 2025-06-16 PROCEDURE — 90471 IMMUNIZATION ADMIN: CPT

## 2025-06-16 PROCEDURE — 2500000001 HC RX 250 WO HCPCS SELF ADMINISTERED DRUGS (ALT 637 FOR MEDICARE OP)

## 2025-06-16 PROCEDURE — 96372 THER/PROPH/DIAG INJ SC/IM: CPT

## 2025-06-16 PROCEDURE — 99283 EMERGENCY DEPT VISIT LOW MDM: CPT | Mod: 25

## 2025-06-16 PROCEDURE — 12001 RPR S/N/AX/GEN/TRNK 2.5CM/<: CPT

## 2025-06-16 PROCEDURE — 90715 TDAP VACCINE 7 YRS/> IM: CPT

## 2025-06-16 PROCEDURE — 2500000005 HC RX 250 GENERAL PHARMACY W/O HCPCS

## 2025-06-16 PROCEDURE — 2500000004 HC RX 250 GENERAL PHARMACY W/ HCPCS (ALT 636 FOR OP/ED)

## 2025-06-16 RX ORDER — ACETAMINOPHEN 325 MG/1
975 TABLET ORAL ONCE
Status: DISCONTINUED | OUTPATIENT
Start: 2025-06-16 | End: 2025-06-17 | Stop reason: HOSPADM

## 2025-06-16 RX ORDER — BACITRACIN 500 [USP'U]/G
OINTMENT TOPICAL ONCE
Status: COMPLETED | OUTPATIENT
Start: 2025-06-16 | End: 2025-06-16

## 2025-06-16 RX ORDER — LIDOCAINE HYDROCHLORIDE 10 MG/ML
10 INJECTION, SOLUTION INFILTRATION; PERINEURAL ONCE
Status: COMPLETED | OUTPATIENT
Start: 2025-06-16 | End: 2025-06-16

## 2025-06-16 RX ORDER — KETOROLAC TROMETHAMINE 30 MG/ML
15 INJECTION, SOLUTION INTRAMUSCULAR; INTRAVENOUS ONCE
Status: COMPLETED | OUTPATIENT
Start: 2025-06-16 | End: 2025-06-16

## 2025-06-16 RX ADMIN — BACITRACIN: 500 OINTMENT TOPICAL at 21:52

## 2025-06-16 RX ADMIN — LIDOCAINE HYDROCHLORIDE 10 ML: 10 INJECTION, SOLUTION INFILTRATION; PERINEURAL at 21:52

## 2025-06-16 RX ADMIN — TETANUS TOXOID, REDUCED DIPHTHERIA TOXOID AND ACELLULAR PERTUSSIS VACCINE, ADSORBED 0.5 ML: 5; 2.5; 8; 8; 2.5 SUSPENSION INTRAMUSCULAR at 21:51

## 2025-06-16 RX ADMIN — KETOROLAC TROMETHAMINE 15 MG: 30 INJECTION, SOLUTION INTRAMUSCULAR at 22:15

## 2025-06-16 ASSESSMENT — PAIN DESCRIPTION - ORIENTATION
ORIENTATION: LEFT
ORIENTATION: LEFT

## 2025-06-16 ASSESSMENT — PAIN - FUNCTIONAL ASSESSMENT
PAIN_FUNCTIONAL_ASSESSMENT: 0-10
PAIN_FUNCTIONAL_ASSESSMENT: 0-10

## 2025-06-16 ASSESSMENT — PAIN SCALES - GENERAL
PAINLEVEL_OUTOF10: 7
PAINLEVEL_OUTOF10: 7

## 2025-06-16 ASSESSMENT — PAIN DESCRIPTION - LOCATION
LOCATION: HAND
LOCATION: HAND

## 2025-06-16 ASSESSMENT — PAIN DESCRIPTION - DESCRIPTORS: DESCRIPTORS: THROBBING

## 2025-06-16 ASSESSMENT — PAIN DESCRIPTION - PAIN TYPE: TYPE: ACUTE PAIN

## 2025-06-17 ENCOUNTER — APPOINTMENT (OUTPATIENT)
Age: 60
End: 2025-06-17
Payer: MEDICARE

## 2025-06-17 VITALS — SYSTOLIC BLOOD PRESSURE: 114 MMHG | TEMPERATURE: 98.4 F | DIASTOLIC BLOOD PRESSURE: 58 MMHG | HEART RATE: 77 BPM

## 2025-06-17 DIAGNOSIS — N20.0 NEPHROLITHIASIS: ICD-10-CM

## 2025-06-17 LAB
POC APPEARANCE, URINE: CLEAR
POC BILIRUBIN, URINE: NEGATIVE
POC BLOOD, URINE: ABNORMAL
POC COLOR, URINE: YELLOW
POC GLUCOSE, URINE: NEGATIVE MG/DL
POC KETONES, URINE: NEGATIVE MG/DL
POC LEUKOCYTES, URINE: ABNORMAL
POC NITRITE,URINE: NEGATIVE
POC PH, URINE: 6.5 PH
POC PROTEIN, URINE: ABNORMAL MG/DL
POC SPECIFIC GRAVITY, URINE: 1.01
POC UROBILINOGEN, URINE: 0.2 EU/DL

## 2025-06-17 PROCEDURE — 99215 OFFICE O/P EST HI 40 MIN: CPT

## 2025-06-17 PROCEDURE — 81003 URINALYSIS AUTO W/O SCOPE: CPT

## 2025-06-17 NOTE — DISCHARGE INSTRUCTIONS
Please keep sutures dry and covered for 24 hours.  In 24 hours, gentle soap and water wash daily to your laceration and apply over-the-counter bacitracin/neosporin antibiotic ointment once a day.  Do not submerge in water until the sutures have been removed (i.e. baths, swimming), but it is OK to shower.  If you develop redness, red streaking, fever, chills, or swelling, please return to ER immediately.  Please have your sutures removed in 10 days.  Suture removal likely can be performed by your doctor or at an urgent care clinic.  You can always return to the ER as well (there may be associated charges wherever you go to have your sutures removed, including the ER).

## 2025-06-17 NOTE — PROGRESS NOTES
Chief complaint:  No chief complaint on file.  Referring physician:  No ref. provider found     SUBJECTIVE:  HPI:  Jose Thornton is a 59 y.o. male with a history of Chron's disease who presents for follow up of right kidney stones.  He had in 2023 a bladder augmentation and ureteral neoimplantation in the ileal segment that augmented the bladder. No fistula was identified during that surgery. No he is referred due to lower pole kidney stone of 16 x 14 mm 250 mean HU in a recent NCCT (5/2025). The stone was not present in the CT performed 11/2023. He has a kidney US performed in 2024 that doesn't demonstrate hydronephrosis nor the stone.  He was recommended to perform CIC but he doesn't want to.    Medical history:   has a past medical history of Anemia (3/9/2019), Chronic kidney disease (5/7/2020), Colon polyp (2023), Crohn's disease (Multi), Diverticulosis, Fissure, anal (8/20/21), GERD (gastroesophageal reflux disease), GI (gastrointestinal bleed), PONV (postoperative nausea and vomiting) (2023), and Urinary tract infection.   Surgical history:   has a past surgical history that includes IR nephrostomy tube exchange (01/31/2023); IR nephrostomy tube exchange (01/31/2023); CT guided percutaneous peritoneal or retroperitoneal fluid collection drainage (09/28/2022); IR nephrostomy tube exchange (03/23/2023); IR nephrostomy tube exchange (03/23/2023); IR nephrostomy tube exchange (03/30/2023); IR nephrostomy tube exchange (04/25/2023); IR nephrostomy tube exchange (06/30/2023); IR nephrostomy tube exchange (06/30/2023); IR nephrostomy tube exchange (08/25/2023); IR nephrostomy tube exchange (08/25/2023); IR nephrostomy tube exchange (10/18/2023); IR CVC nontunneled (11/06/2023); Colonoscopy w/ polypectomy (N/A); Colostomy (N/A); Bladder surgery (N/A); Small intestine surgery (2022 / 2023); Amputation; and Colon surgery (2022).  Family history:  family history includes Alcohol abuse in his maternal grandfather;  "Cancer in his brother and brother.  Social history:   reports that he has never smoked. He has never used smokeless tobacco. He reports current alcohol use of about 1.0 standard drink of alcohol per week. He reports that he does not use drugs.    Medications:    Current Outpatient Medications   Medication Instructions    gentamicin (Garamycin) 0.1 % cream Apply topically to affected area(s) once daily.    tamsulosin (FLOMAX) 0.4 mg, oral, Daily      Allergies:    RX Allergies[1]     ROS:  14-point review of systems negative except as noted above.    OBJECTIVE:  There were no vitals taken for this visit.There is no height or weight on file to calculate BMI.    Physical exam  General:  No acute distress  HEENT:  EOMI  CV:  Regular rate  Pulm:  Nonlabored respirations  Abd:  Soft, non-distended  :  No suprapubic or CVA tenderness  MSK:  No contractures  Neuro:  Motor intact  Psych:  Appropriate affect    Labs:    Lab Results   Component Value Date    WBC 8.6 05/14/2025    HGB 9.9 (L) 05/14/2025    HCT 32.5 (L) 05/14/2025     05/14/2025    ALT 8 (L) 05/14/2025    AST 9 05/14/2025     05/14/2025    K 4.1 05/14/2025     (H) 05/14/2025    CREATININE 2.22 (H) 05/14/2025    BUN 24 (H) 05/14/2025    CO2 22 05/14/2025    INR 1.0 10/05/2023    HGBA1C 5.1 11/09/2023     Urine Culture (no units)   Date Value   05/14/2025     Clinically insignificant growth based on current clinical standards.    No results found for: \"PSA\"    Imaging:  All imaging discussed in HPI was independently reviewed.    ASSESSMENT:  Discussed about his stone and the high probability to be related to infection. Due to his previous bladder augmentation + neoimplantation surgery I think the best approach is percutaneously and also because I have the suspicious the it can be a matrix stone. His last CT demonstrated moderate hydronephrosis but probably due to overdistention of his bladder/augment. The approach depends on the availability " of a good US window to gain access otherwise fURS will be performed to try to engage the ureter understanding that it is not easy in augmented bladders with neoimplants. Benefits and risks were discussed. Patient understand and agrees.   We agree on having a new Kidney US to evaluate hydronephrosis/window to acces the lower pole.  He is waiting for a hernia repair surgery. He prefers to have that perform before the stone. He will come back after the hernia surgery.    PLAN:  Kidney US    Follow-up after US    Brown Argueta MD    Problem List Items Addressed This Visit    None  Visit Diagnoses         Nephrolithiasis        Relevant Orders    POCT UA Automated manually resulted                  [1] No Known Allergies

## 2025-06-18 ENCOUNTER — OFFICE VISIT (OUTPATIENT)
Dept: GASTROENTEROLOGY | Facility: CLINIC | Age: 60
End: 2025-06-18
Payer: MEDICARE

## 2025-06-18 VITALS
RESPIRATION RATE: 16 BRPM | BODY MASS INDEX: 28.48 KG/M2 | HEIGHT: 72 IN | HEART RATE: 85 BPM | SYSTOLIC BLOOD PRESSURE: 120 MMHG | OXYGEN SATURATION: 94 % | DIASTOLIC BLOOD PRESSURE: 80 MMHG

## 2025-06-18 DIAGNOSIS — Z93.3 COLOSTOMY IN PLACE (MULTI): ICD-10-CM

## 2025-06-18 DIAGNOSIS — N18.32 CKD STAGE 3B, GFR 30-44 ML/MIN (MULTI): ICD-10-CM

## 2025-06-18 DIAGNOSIS — K50.813 CROHN'S DISEASE OF BOTH SMALL AND LARGE INTESTINE WITH FISTULA (MULTI): Primary | ICD-10-CM

## 2025-06-18 PROCEDURE — 99213 OFFICE O/P EST LOW 20 MIN: CPT | Performed by: INTERNAL MEDICINE

## 2025-06-18 PROCEDURE — 1036F TOBACCO NON-USER: CPT | Performed by: INTERNAL MEDICINE

## 2025-06-18 PROCEDURE — RXMED WILLOW AMBULATORY MEDICATION CHARGE

## 2025-06-18 NOTE — PROGRESS NOTES
"Gastroenterology Office Visit     History of Present Illness:   Jose Thornton is a 59 y.o. male who presents to GI clinic for follow up of Crohn's disease.  Since last OV in 8/23, patient has had MR enterography; see below for results.      The plan was to start IFX after his surgery in 10/23, but he has been lost to GI follow up since.  He has not been on tx for Crohn's.      Also since last visit,  (per colorectal surgery notes) he underwent an ex lap and Bilateral ureteral reimplant, rectovesical fistula repair with Dr. Tomas Andersen and Dr. 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 He was then hospitalized 10/28/23 to 11/17/23 at [facility] for gentamicin toxicity, requiring 3 days of hemodialysis, nephrostomy tubes were capped prior to discharge. A second hospitalization from 11/28/23 to 12/13/23 at [facility] for Right ureter UTI, KADE on CKD, hypotension, and infection in the setting of possible misplaced right nephrostomy tube. He was tx with IV antibiotics and discharged home. He was noted to not be taking any treatment for Crohn's with CNP Anuradha 1/11/24, as he was \"feeling well.\" Nephrostomy tubes removed 1/15/24. He was referred by Dr. Alex for evaluation of colostomy reversal and periumbilical hernia.  Dr. Gonzalez is recommending against reversal until his Crohn's is addressed medically.  He has seen general surgery as well about the ventral hernia.     He reports emptying his ostomy 12x/d, but he empties it every time he urinates, and he never lets the stoma get full.  Had 3 episodes of blood in the stoma 3 weeks ago-twice very little, but once quite a bit. This alarmed his wife.  Has not happened again.  Wt has been stable.    OV in 8/23 with me:  Has surgery scheduled for 10/11/23. Since his last visit with me, some of his symptoms have returned. He gets a sharp/stabbing pain around the stoma intermittently throughout the day. He gets generalized abdominal pain after eating. He reports more frequent " discharge/drainage from the rectal area, and occasionally sees blood when wiping this. He complains of chronic fatigue, low-grade fever and chills mouth sores and joint pains. His appetite has decreased.     OV in 7/23:  Since last OV with me, he underwent colonoscopy through the stoma; see below for results. Has appt with Dr. Rodriguez in colorectal surgery on 7/25. Has urologic reconstruction scheduled for 10/11/23. Still having occasional crampy abdominal pain.         OV with me in 6/23:  This is a 57 year old M who was initially admitted to [facility] in 9/22 for a colovesical fistula and intra-abdominal abscess- transferred to [facility]. Crohn's disease was suspected due to a concomitant perianal fistula. He underwent percutaneous drainage of the abscess by IR and placement of b/l nephrostomy tubes. Subsequently, he underwent elective anterior proctosigmoidectomy, takedown of a colovesical fistula, and end colostomy in 12/22 with Dr. Rodriguez. Surgical pathology confirmed Crohn's disease (transmural inflammation of the sigmoid colon with CAC, no obvious diverticula in the segment). He has a colonoscopy scheduled with me next week to evaluate the rest of the intestine, and wants to discuss the prep.     His sx's began > 5 years ago. He noticed drainage from a perianal fistula. He had diarrhea, wt loss and bleeding. He tried to manage his sx's with homeopathic medicine, and diet. He would improve for a short time, then his sx's would worsen. He had significant diarrhea, and wt loss when he presented in 9/22. Wt decreased from baseline of 200-210 to 150. His wt has increased to 237 after surgery. He had pneumaturia when he presented.     Review of Systems  Constitutional: denies fever/ chills, night sweats, wt loss and fatigue  Respiratory: denies SOB, BOSS  CV: denies chest pain and LE edema  Neuro: denies weakness and difficulty walking      Past Medical History   has a past medical history of Anemia (3/9/2019), Chronic  kidney disease (5/7/2020), Colon polyp (2023), Crohn's disease (Multi), Diverticulosis, Fissure, anal (8/20/21), GERD (gastroesophageal reflux disease), GI (gastrointestinal bleed), PONV (postoperative nausea and vomiting) (2023), and Urinary tract infection.     Problem List  Problem List[1]    Past Surgical History  Surgical History[2]    Social History   reports that he has never smoked. He has never used smokeless tobacco. He reports current alcohol use of about 1.0 standard drink of alcohol per week. He reports that he does not use drugs.     Family History  family history includes Alcohol abuse in his maternal grandfather; Cancer in his brother and brother.       Allergies  RX Allergies[3]    Medications  Current Outpatient Medications   Medication Instructions    gentamicin (Garamycin) 0.1 % cream Apply topically to affected area(s) once daily.    tamsulosin (FLOMAX) 0.4 mg, oral, Daily        Objective   Blood pressure 120/80, pulse 85, resp. rate 16, height 1.829 m (6'), SpO2 94%.          LABS  Component      Latest Ref Rng 5/14/2025   WBC      4.4 - 11.3 x10*3/uL 8.6    nRBC      0.0 - 0.0 /100 WBCs 0.0    RBC      4.50 - 5.90 x10*6/uL 4.27 (L)    HEMOGLOBIN      13.5 - 17.5 g/dL 9.9 (L)    HEMATOCRIT      41.0 - 52.0 % 32.5 (L)    MCV      80 - 100 fL 76 (L)    MCH      26.0 - 34.0 pg 23.2 (L)    MCHC      32.0 - 36.0 g/dL 30.5 (L)    RED CELL DISTRIBUTION WIDTH      11.5 - 14.5 % 19.5 (H)    Platelets      150 - 450 x10*3/uL 448    Neutrophils %      40.0 - 80.0 % 73.7    Immature Granulocytes %, Automated      0.0 - 0.9 % 0.3    Lymphocytes %      13.0 - 44.0 % 14.5    Monocytes %      2.0 - 10.0 % 7.2    Eosinophils %      0.0 - 6.0 % 3.7    Basophils %      0.0 - 2.0 % 0.6    Neutrophils Absolute      1.20 - 7.70 x10*3/uL 6.32    Immature Granulocytes Absolute, Automated      0.00 - 0.70 x10*3/uL 0.03    Lymphocytes Absolute      1.20 - 4.80 x10*3/uL 1.24    Monocytes Absolute      0.10 - 1.00  x10*3/uL 0.62    Eosinophils Absolute      0.00 - 0.70 x10*3/uL 0.32    Basophils Absolute      0.00 - 0.10 x10*3/uL 0.05    GLUCOSE      74 - 99 mg/dL 121 (H)    SODIUM      136 - 145 mmol/L 138    POTASSIUM      3.5 - 5.3 mmol/L 4.1    CHLORIDE      98 - 107 mmol/L 108 (H)    Bicarbonate      21 - 32 mmol/L 22    Anion Gap      10 - 20 mmol/L 12    Blood Urea Nitrogen      6 - 23 mg/dL 24 (H)    Creatinine      0.50 - 1.30 mg/dL 2.22 (H)    EGFR      >60 mL/min/1.73m*2 34 (L)    Calcium      8.6 - 10.3 mg/dL 8.3 (L)    Albumin      3.4 - 5.0 g/dL 3.4    Alkaline Phosphatase      33 - 120 U/L 35    Total Protein      6.4 - 8.2 g/dL 6.7    AST      9 - 39 U/L 9    Bilirubin Total      0.0 - 1.2 mg/dL 0.2    ALT      10 - 52 U/L 8 (L)           Radiology  MR enterography 10/23:   1.  Persistent appearance of enhancing tract extending inferiorly  from tethered small bowel loops within the lower pelvis indicative of  likely persistent entero vesicular fistula.  2.  Mild wall thickening enhancement of multiple small bowel loops as  well as the distal colon remnant immediately proximal to the left  lower quadrant ostomy site indicative of active component of  patient's reported inflammatory bowel disease.    CT A/P 5/25: Findings suggestive of a colitis of the transverse colon which may be  of either infectious or inflammatory etiology.  Mild asymmetric urinary bladder wall thickening, however this may be  chronic due to sequela of postsurgical changes.    Endoscopy    Colonoscopy 6/23: scattered aphthous ulcers in TI; two 10-mm linear ulcers 20 cm from stoma, possible fistula opening; rectum normal; path: ulcer showed CAC, rectum normal     Assessment/Plan   Jose Thornton is a 59 y.o. male who presents to GI clinic for ileocolonic Crohn's disease c/b internal fistulas.  He is biologic and immunomodulator naive.  S/p elective anterior proctosigmoidectomy, takedown of a colovesical fistula, and end colostomy in 12/22.   He is contemplating ventral hernia repair, and reversal of his colostomy.  He has active disease by CT in 5/25, and colonoscopy in 6/23.  He would benefit from a biologic, especially IFX. He wants to try anti-parasitic (ivermectin) and naturopathic/biofilm therapies.  I offered doing a repeat colonoscopy now to stage his disease.  He wants to try his therapies first.     Crohn's disease (Multi)  Keep appt with me in August 2025.           Nick Yi MD            [1]   Patient Active Problem List  Diagnosis    Stage 1 chronic kidney disease    Diverticulitis, colon    Colovesical fistula    Anemia    Anxiety due to invasive procedure    Bladder spasms    Colostomy in place (Multi)    Crohn's disease (Multi)    Insomnia    Ureter injury    Dehydration    Urinary tract infection without hematuria, site unspecified    Nonhealing nonsurgical wound    Drug therapy    Screening for condition    Screening for prostate cancer    CKD stage 3b, GFR 30-44 ml/min (Multi)   [2]   Past Surgical History:  Procedure Laterality Date    AMPUTATION      BLADDER SURGERY N/A     COLON SURGERY  2022    COLONOSCOPY W/ POLYPECTOMY N/A     COLOSTOMY N/A     CT GUIDED PERCUTANEOUS PERITONEAL OR RETROPERITONEAL FLUID COLLECTION DRAINAGE  09/28/2022    CT GUIDED PERCUTANEOUS PERITONEAL OR RETROPERITONEAL FLUID COLLECTION DRAINAGE 9/28/2022 [facility] INPATIENT LEGACY    IR CVC NONTUNNELED  11/06/2023    IR CVC NONTUNNELED 11/6/2023 Drew Terry MD [facility] ANGIO    IR NEPHROSTOMY TUBE EXCHANGE  01/31/2023    IR [facility]OST[facility]    [facility] NEPHROSTOMY TUBE EXCHANGE  01/31/2023    IR NEPHROSTOMY [facility]    IR NEPHROSTOMY TUBE EXCHANGE  03/23/2023    IR NEPHROSTOMY TUBE EXCHANGE [facility]    IR NEPHROSTOMY TUBE EXCHANGE  03/23/2023    IR NEPHROSTOMY TUBE EXCHANGE [facility]    IR NEPHROSTOMY TUBE EXCHANGE  03/30/2023    IR NEPHROSTOMY TUBE EXCHANGE [facility]    IR NEPHROSTOMY TUBE  EXCHANGE  04/25/2023    IR NEPHROSTOMY TUBE EXCHANGE [facility]    IR NEPHROSTOMY TUBE EXCHANGE  06/30/2023    IR NEPHROSTOMY TUBE EXCHANGE 6/30/2023 [facility]    IR NEPHROSTOMY TUBE EXCHANGE  06/30/2023    IR NEPHROSTOMY TUBE EXCHANGE 6/30/2023 [facility]    IR NEPHROSTOMY TUBE EXCHANGE  08/25/2023    IR NEPHROSTOMY TUBE EXCHANGE 8/25/2023 [facility]    IR NEPHROSTOMY TUBE EXCHANGE  08/25/2023    IR NEPHROSTOMY TUBE EXCHANGE 8/25/2023 [facility]    IR NEPHROSTOMY TUBE EXCHANGE  10/18/2023    IR NEPHROSTOMY TUBE EXCHANGE 10/18/2023 Onesimo [place]yes [place] [facility] [place]    SMALL INTESTINE SURGERY  2022 / 2023   [3] No Known Allergies

## 2025-06-19 ENCOUNTER — OFFICE VISIT (OUTPATIENT)
Dept: WOUND CARE | Facility: CLINIC | Age: 60
End: 2025-06-19
Payer: MEDICARE

## 2025-06-19 ENCOUNTER — PHARMACY VISIT (OUTPATIENT)
Dept: PHARMACY | Facility: CLINIC | Age: 60
End: 2025-06-19
Payer: COMMERCIAL

## 2025-06-19 PROCEDURE — 11042 DBRDMT SUBQ TIS 1ST 20SQCM/<: CPT | Performed by: PLASTIC SURGERY

## 2025-06-19 PROCEDURE — 11042 DBRDMT SUBQ TIS 1ST 20SQCM/<: CPT

## 2025-06-20 ENCOUNTER — APPOINTMENT (OUTPATIENT)
Dept: PRIMARY CARE | Facility: CLINIC | Age: 60
End: 2025-06-20
Payer: MEDICARE

## 2025-06-20 ENCOUNTER — PHARMACY VISIT (OUTPATIENT)
Dept: PHARMACY | Facility: CLINIC | Age: 60
End: 2025-06-20
Payer: COMMERCIAL

## 2025-06-20 DIAGNOSIS — Z79.899 DRUG THERAPY: ICD-10-CM

## 2025-06-20 DIAGNOSIS — Z12.5 SCREENING FOR PROSTATE CANCER: ICD-10-CM

## 2025-06-20 DIAGNOSIS — R33.8 ACUTE URINARY RETENTION: ICD-10-CM

## 2025-06-20 DIAGNOSIS — D64.9 ANEMIA, UNSPECIFIED TYPE: ICD-10-CM

## 2025-06-20 DIAGNOSIS — Z13.9 SCREENING FOR CONDITION: ICD-10-CM

## 2025-06-20 DIAGNOSIS — N30.01 ACUTE CYSTITIS WITH HEMATURIA: ICD-10-CM

## 2025-06-20 PROCEDURE — RXMED WILLOW AMBULATORY MEDICATION CHARGE

## 2025-06-20 NOTE — PROGRESS NOTES
Subjective   Patient ID: Jose Thornton is a 59 y.o. male who presents for Labs Only (Pt in today for lab draw).    HPI     Review of Systems    Objective   There were no vitals taken for this visit.    Physical Exam    Assessment/Plan

## 2025-06-21 LAB
ALBUMIN SERPL-MCNC: 3.5 G/DL (ref 3.6–5.1)
ALP SERPL-CCNC: 44 U/L (ref 35–144)
ALT SERPL-CCNC: 12 U/L (ref 9–46)
ANION GAP SERPL CALCULATED.4IONS-SCNC: 8 MMOL/L (CALC) (ref 7–17)
AST SERPL-CCNC: 13 U/L (ref 10–35)
BASOPHILS # BLD AUTO: 36 CELLS/UL (ref 0–200)
BASOPHILS NFR BLD AUTO: 0.4 %
BILIRUB SERPL-MCNC: 0.3 MG/DL (ref 0.2–1.2)
BUN SERPL-MCNC: 17 MG/DL (ref 7–25)
CALCIUM SERPL-MCNC: 8.7 MG/DL (ref 8.6–10.3)
CHLORIDE SERPL-SCNC: 104 MMOL/L (ref 98–110)
CHOLEST SERPL-MCNC: 116 MG/DL
CHOLEST/HDLC SERPL: 2 (CALC)
CO2 SERPL-SCNC: 27 MMOL/L (ref 20–32)
CREAT SERPL-MCNC: 2.15 MG/DL (ref 0.7–1.3)
EGFRCR SERPLBLD CKD-EPI 2021: 35 ML/MIN/1.73M2
EOSINOPHIL # BLD AUTO: 198 CELLS/UL (ref 15–500)
EOSINOPHIL NFR BLD AUTO: 2.2 %
ERYTHROCYTE [DISTWIDTH] IN BLOOD BY AUTOMATED COUNT: 17.7 % (ref 11–15)
EST. AVERAGE GLUCOSE BLD GHB EST-MCNC: 111 MG/DL
EST. AVERAGE GLUCOSE BLD GHB EST-SCNC: 6.2 MMOL/L
FERRITIN SERPL-MCNC: 21 NG/ML (ref 38–380)
FOLATE SERPL-MCNC: 17.3 NG/ML
GLUCOSE SERPL-MCNC: 98 MG/DL (ref 65–99)
HBA1C MFR BLD: 5.5 %
HCT VFR BLD AUTO: 35.7 % (ref 38.5–50)
HDLC SERPL-MCNC: 57 MG/DL
HGB BLD-MCNC: 10.2 G/DL (ref 13.2–17.1)
IRON SATN MFR SERPL: 6 % (CALC) (ref 20–48)
IRON SERPL-MCNC: 15 MCG/DL (ref 50–180)
LDLC SERPL CALC-MCNC: 45 MG/DL (CALC)
LYMPHOCYTES # BLD AUTO: 1719 CELLS/UL (ref 850–3900)
LYMPHOCYTES NFR BLD AUTO: 19.1 %
MAGNESIUM SERPL-MCNC: 2.2 MG/DL (ref 1.5–2.5)
MCH RBC QN AUTO: 23 PG (ref 27–33)
MCHC RBC AUTO-ENTMCNC: 28.6 G/DL (ref 32–36)
MCV RBC AUTO: 80.6 FL (ref 80–100)
MONOCYTES # BLD AUTO: 738 CELLS/UL (ref 200–950)
MONOCYTES NFR BLD AUTO: 8.2 %
NEUTROPHILS # BLD AUTO: 6309 CELLS/UL (ref 1500–7800)
NEUTROPHILS NFR BLD AUTO: 70.1 %
NONHDLC SERPL-MCNC: 59 MG/DL (CALC)
PLATELET # BLD AUTO: 462 THOUSAND/UL (ref 140–400)
PMV BLD REES-ECKER: 9 FL (ref 7.5–12.5)
POTASSIUM SERPL-SCNC: 4.8 MMOL/L (ref 3.5–5.3)
PROT SERPL-MCNC: 6.7 G/DL (ref 6.1–8.1)
PSA SERPL-MCNC: 1.42 NG/ML
RBC # BLD AUTO: 4.43 MILLION/UL (ref 4.2–5.8)
SODIUM SERPL-SCNC: 139 MMOL/L (ref 135–146)
TIBC SERPL-MCNC: 256 MCG/DL (CALC) (ref 250–425)
TRIGL SERPL-MCNC: 67 MG/DL
TSH SERPL-ACNC: 1.94 MIU/L (ref 0.4–4.5)
VIT B12 SERPL-MCNC: >2000 PG/ML (ref 200–1100)
WBC # BLD AUTO: 9 THOUSAND/UL (ref 3.8–10.8)

## 2025-06-22 NOTE — ED PROVIDER NOTES
HPI   Chief Complaint   Patient presents with    Laceration     Pt states cut hand with razor blade near index finger on left side 1 hr ago., Bleeding controlled, not on thinners       59-year-old male presents to the emergency department for a laceration he received just prior to arrival of the left first webspace of his left hand.  Patient states he was using a  to cut through a plastic gutter when it slipped and cut his hand.  Denies paresthesias, decreased sensation, decreased range of motion.  Unsure of his last tetanus and was agreeable to receive 1 today.  Denies any fever, chills, body aches.  Denies chest pain, shortness of breath.  Denies any other concerns or symptoms at this time.      History provided by:  Patient   used: No      Patient History   Medical History[1]  Surgical History[2]  Family History[3]  Social History[4]    Physical Exam   ED Triage Vitals [06/16/25 2055]   Temperature Heart Rate Respirations BP   37.4 °C (99.3 °F) 87 18 121/69      Pulse Ox Temp Source Heart Rate Source Patient Position   96 % Temporal Monitor Sitting      BP Location FiO2 (%)     Right arm --       Physical Exam  Nursing notes reviewed and confirmed by me.  Chart review performed including medications, allergies, and medical, surgical, and family history    Constitutional: Vital signs per nursing notes.  Well developed, well nourished.  No acute distress.    Psychiatric: no abnormalities of mood or affect   Eyes: PERRL; conjunctivae and lids normal; EOMI  HENT: Head is normocephalic, atraumatic. External ears normal in appearance without drainage.  Nose is without deformity or drainage.  Posterior oropharynx without edema or erythema.  Moist mucous membranes.  Neck: neck supple, no meningismus signs or rigidity.  trachea midline without deviation.   Respiratory: Breath sounds clear bilaterally no wheezes rales or rhonchi.  No respiratory distress.  Normal respiratory rate/effort.     Cardiovascular: regular rate and rhythm; no murmurs.   distal pulses intact b/l radial  Neurological: normal speech patient is alert and oriented x3.  Patient able to move extremities.  Grossly intact strength and sensation of upper and lower extremities.  No focal neurologic deficits appreciated on exam.  GI: Abdomen is soft nontender.  No rebound, rigidity, or guarding.  No masses or hernias appreciated.  No organomegaly.  Lymphatic: no significant lymphadenopathy appreciated  Musculoskeletal: Patient able to move all extremities.  No deformities or swelling appreciated.  No calf tenderness or edema.  Skin:  no rash or erythema.  2.5 cm laceration to the dorsum of the left hand in the webspace between the 1st and 2nd digit. Normal capillary refill.      ED Course & MDM   Diagnoses as of 06/22/25 0725   Laceration of left hand without foreign body, initial encounter     [place]a Coma Scale Score: 15 (06/16/25 2058 : Amelia Lerma RN)                   Labs Reviewed - No data to display     XR hand left 3+ views   Final Result   1. No radiographic evidence for acute fracture.   2. Demineralized bones, unexpected finding for the patient's age.   Evaluation with DEXA scan as clinically warranted.                  MACRO:   None.        Signed by: Maye Urbano 6/16/2025 10:26 PM   Dictation workstation:   [id number]          Medical Decision Making  59-year-old male presents to the emergency department for a laceration he received just prior to arrival of the left first webspace of his left hand.  On presentation /69, afebrile, HR 87, RR 18, SpO2 96%.  On exam patient is nontoxic-appearing, no acute distress.  Heart sounds normal with regular rate and rhythm.  Abdomen soft and nontender.  Lungs clear to auscultation bilaterally with no adventitious sounds.  Patient does have a 2.5 cm laceration to the dorsum of the left hand in the webspace between the 1st and 2nd digit.  He is neurovascularly intact.  Brisk  capillary refill.  Strong and equal palpable pulses throughout.  Sensation is intact.  Wound was thoroughly cleaned with chlorhexidine and sterile saline and examined through full range of motion with no evidence of foreign body.  This was confirmed with an x-ray of the left hand which also showed no fracture or dislocation.  X-ray did show demineralization of the bones which I did extensively discussed with the patient informing him that he would need to follow-up with his primary care provider so that he can undergo a DEXA scan.  Following thorough cleansing of the wound sutures were placed followed by bacitracin ointment, nonadherent dressing and finger was jina taped to the adjacent finger given where the laceration is.  I did extensively educate the patient on wound care instructions while at home, patient does report that he follows up with the wound care specialist I advised that he follow-up within the next couple days to ensure that it is healing properly.  He received a tetanus vaccination today.  He was also given strict return precautions with any new or worsening symptoms or evidence of localized or systemic infection, and educated on what would warrant return to the emergency department.  I did advise that the sutures should be removed in 10 days either by coming to the emergency department, urgent care, primary care or the wound care center.    As a result of the work-up, the patient was discharged home.  he was informed of his diagnosis, educated on imaging findings, I explained reasons for the patient to return to the Emergency Department and instructed to come back with any concerns or worsening of condition.  he demonstrated verbal understanding and were in agreement with the plan of care.  I emphasized the importance of follow up in the timeframe recommended.  he was given the opportunity to ask questions.  All of the patient's questions were answered.  Patient discharged in good stable  condition.    Amount and/or Complexity of Data Reviewed  Radiology: ordered. Decision-making details documented in ED Course.        Procedure  Laceration Repair    Performed by: Maciej White PA-C  Authorized by: Maciej White PA-C    Consent:     Consent obtained:  Verbal    Consent given by:  Patient    Risks, benefits, and alternatives were discussed: yes      Risks discussed:  Infection, poor cosmetic result, pain and poor wound healing    Alternatives discussed:  No treatment  Anesthesia:     Anesthesia method:  Local infiltration    Local anesthetic:  Lidocaine 1% w/o epi  Laceration details:     Location:  Finger    Finger location:  L index finger    Length (cm):  2.5    Depth (mm):  10  Pre-procedure details:     Preparation:  Patient was prepped and draped in usual sterile fashion and imaging obtained to evaluate for foreign bodies  Exploration:     Limited defect created (wound extended): no      Hemostasis achieved with:  Direct pressure    Imaging obtained: x-ray      Imaging outcome: foreign body not noted      Wound exploration: wound explored through full range of motion and entire depth of wound visualized      Contaminated: no    Treatment:     Area cleansed with:  Chlorhexidine and saline    Amount of cleaning:  Standard    Irrigation solution:  Sterile saline    Irrigation volume:  .5 L    Irrigation method:  Syringe    Debridement:  None  Skin repair:     Repair method:  Sutures    Suture size:  4-0    Suture material:  Prolene    Suture technique:  Simple interrupted    Number of sutures:  5  Approximation:     Approximation:  Close  Repair type:     Repair type:  Simple  Post-procedure details:     Dressing:  Antibiotic ointment, non-adherent dressing and bulky dressing    Procedure completion:  Tolerated well, no immediate complications         [1]   Past Medical History:  Diagnosis Date    Anemia 3/9/2019    Chronic kidney disease 5/7/2020    Colon polyp 2023    Crohn's disease (Multi)      Diverticulosis     Fissure, anal 8/20/21    GERD (gastroesophageal reflux disease)     GI (gastrointestinal bleed)     PONV (postoperative nausea and vomiting) 2023    Long surgery, took a minute for intestines to wake up.    Urinary tract infection    [2]   Past Surgical History:  Procedure Laterality Date    AMPUTATION      BLADDER SURGERY N/A     COLON SURGERY  2022    COLONOSCOPY W/ POLYPECTOMY N/A     COLOSTOMY N/A     CT GUIDED PERCUTANEOUS PERITONEAL OR RETROPERITONEAL FLUID COLLECTION DRAINAGE  09/28/2022    CT GUIDED PERCUTANEOUS PERITONEAL OR RETROPERITONEAL FLUID COLLECTION DRAINAGE 9[place]/2022 [facility] [place]    IR CVC NONTUNNELED  11/06/2023    IR CVC NONTUNNELED 11/6/2023 Drew Terry [place] [facility] [place] [facility]    IR NEPHROSTOMY TUBE EXCHANGE  01/31/2023    IR NEPHROSTOMY TUBE EXCHANGE 1/31/2023 DOCTOR OFFICE LEGACY    IR NEPHROSTOMY TUBE EXCHANGE  01/31/2023    IR NEPHROSTOMY TUBE EXCHANGE 1/31/2023 DOCTOR OFFICE [facility]GAC[facility]    IR NEPHROSTOMY TUBE EXCHANGE  03/23/2023    IR NEPHROSTOMY TUBE EXCHANGE [facility]    IR NEPHROSTOMY TUBE EXCHANGE  03/23/2023    IR NEPHROSTOMY TUBE EXCHANGE [facility]    IR NEPHROSTOMY TUBE EXCHANGE  03/30/2023    IR NEPHROSTOMY TUBE EXCHANGE [facility]    IR NEPHROSTOMY TUBE EXCHANGE  04/25/2023    IR NEPHROSTOMY TUBE EXCHANGE [facility]    IR NEPHROSTOMY TUBE EXCHANGE  06/30/2023    IR NEPHROSTOMY TUBE EXCHANGE 6/30/2023 [facility]    IR NEPHROSTOMY TUBE EXCHANGE  06/30/2023    IR NEPHROSTOMY TUBE EXCHANGE 6/30/2023 [facility]    IR NEPHROSTOMY TUBE EXCHANGE  08/25/2023    IR NEPHROSTOMY TUBE EXCHANGE 8/25/2023 [facility]    IR NEPHROSTOMY TUBE EXCHANGE  08/25/2023    IR NEPHROSTOMY TUBE EXCHANGE 8/25/2023 [facility]    IR NEPHROSTOMY TUBE EXCHANGE  10/18/2023    IR NEPHROSTOMY TUBE EXCHANGE 10/18/2023 Onesimo Reyes [place] [facility] [place] [facility]    SMALL INTESTINE SURGERY  2022 / 2023   [3]   Family History  Problem Relation Name Age of Onset    Cancer Brother Ok     Alcohol abuse Maternal  Grandfather Jelani Thornton     Cancer Brother Scott Thornton    [4]   Social History  Tobacco Use    Smoking status: Never    Smokeless tobacco: Never   Vaping Use    Vaping status: Never Used   Substance Use Topics    Alcohol use: Yes     Alcohol/week: 1.0 standard drink of alcohol     Types: 1 Cans of beer per week     Comment: Don't drink weekly more infrequently    Drug use: Never        Maciej White PA-C  06/22/25 0748

## 2025-06-24 ENCOUNTER — PREP FOR PROCEDURE (OUTPATIENT)
Dept: SURGERY | Facility: HOSPITAL | Age: 60
End: 2025-06-24

## 2025-06-24 ENCOUNTER — HOSPITAL ENCOUNTER (EMERGENCY)
Facility: HOSPITAL | Age: 60
Discharge: HOME | End: 2025-06-24
Payer: MEDICARE

## 2025-06-24 ENCOUNTER — PHARMACY VISIT (OUTPATIENT)
Dept: PHARMACY | Facility: CLINIC | Age: 60
End: 2025-06-24
Payer: COMMERCIAL

## 2025-06-24 VITALS
HEART RATE: 87 BPM | SYSTOLIC BLOOD PRESSURE: 112 MMHG | RESPIRATION RATE: 17 BRPM | OXYGEN SATURATION: 97 % | DIASTOLIC BLOOD PRESSURE: 67 MMHG | WEIGHT: 215 LBS | TEMPERATURE: 97.7 F | BODY MASS INDEX: 29.12 KG/M2 | HEIGHT: 72 IN

## 2025-06-24 DIAGNOSIS — N39.0 UTI (URINARY TRACT INFECTION), BACTERIAL: Primary | ICD-10-CM

## 2025-06-24 DIAGNOSIS — A49.9 UTI (URINARY TRACT INFECTION), BACTERIAL: Primary | ICD-10-CM

## 2025-06-24 LAB
APPEARANCE UR: ABNORMAL
BACTERIA #/AREA URNS HPF: ABNORMAL /HPF
BACTERIA UR CULT: NORMAL
BILIRUB UR QL STRIP: NEGATIVE
COLOR UR: YELLOW
GLUCOSE UR QL STRIP: NEGATIVE
HGB UR QL STRIP: NEGATIVE
HYALINE CASTS #/AREA URNS LPF: ABNORMAL /LPF
KETONES UR QL STRIP: ABNORMAL
LEUKOCYTE ESTERASE UR QL STRIP: ABNORMAL
NITRITE UR QL STRIP: POSITIVE
PH UR STRIP: ABNORMAL [PH] (ref 5–8)
PROT UR QL STRIP: ABNORMAL
RBC #/AREA URNS HPF: ABNORMAL /HPF
SERVICE CMNT-IMP: ABNORMAL
SP GR UR STRIP: 1.02 (ref 1–1.03)
SQUAMOUS #/AREA URNS HPF: ABNORMAL /HPF
TRI-PHOS CRY #/AREA URNS HPF: ABNORMAL /HPF
WBC #/AREA URNS HPF: ABNORMAL /HPF

## 2025-06-24 PROCEDURE — RXMED WILLOW AMBULATORY MEDICATION CHARGE

## 2025-06-24 PROCEDURE — 99283 EMERGENCY DEPT VISIT LOW MDM: CPT

## 2025-06-24 PROCEDURE — 2500000001 HC RX 250 WO HCPCS SELF ADMINISTERED DRUGS (ALT 637 FOR MEDICARE OP): Performed by: NURSE PRACTITIONER

## 2025-06-24 RX ORDER — HYDROCODONE BITARTRATE AND ACETAMINOPHEN 5; 325 MG/1; MG/1
1 TABLET ORAL EVERY 6 HOURS PRN
Qty: 6 TABLET | Refills: 0 | Status: SHIPPED | OUTPATIENT
Start: 2025-06-24

## 2025-06-24 RX ORDER — AMOXICILLIN AND CLAVULANATE POTASSIUM 875; 125 MG/1; MG/1
1 TABLET, FILM COATED ORAL EVERY 12 HOURS
Qty: 20 TABLET | Refills: 0 | Status: SHIPPED | OUTPATIENT
Start: 2025-06-24 | End: 2025-07-04

## 2025-06-24 RX ORDER — OXYCODONE AND ACETAMINOPHEN 5; 325 MG/1; MG/1
1 TABLET ORAL ONCE
Refills: 0 | Status: COMPLETED | OUTPATIENT
Start: 2025-06-24 | End: 2025-06-24

## 2025-06-24 RX ADMIN — OXYCODONE HYDROCHLORIDE AND ACETAMINOPHEN 1 TABLET: 5; 325 TABLET ORAL at 08:22

## 2025-06-24 ASSESSMENT — PAIN DESCRIPTION - LOCATION: LOCATION: PENIS

## 2025-06-24 ASSESSMENT — LIFESTYLE VARIABLES
EVER HAD A DRINK FIRST THING IN THE MORNING TO STEADY YOUR NERVES TO GET RID OF A HANGOVER: NO
TOTAL SCORE: 0
HAVE YOU EVER FELT YOU SHOULD CUT DOWN ON YOUR DRINKING: NO
EVER FELT BAD OR GUILTY ABOUT YOUR DRINKING: NO
HAVE PEOPLE ANNOYED YOU BY CRITICIZING YOUR DRINKING: NO

## 2025-06-24 ASSESSMENT — PAIN SCALES - GENERAL: PAINLEVEL_OUTOF10: 5 - MODERATE PAIN

## 2025-06-24 ASSESSMENT — PAIN DESCRIPTION - PAIN TYPE: TYPE: ACUTE PAIN

## 2025-06-24 ASSESSMENT — PAIN - FUNCTIONAL ASSESSMENT: PAIN_FUNCTIONAL_ASSESSMENT: 0-10

## 2025-06-24 ASSESSMENT — PAIN DESCRIPTION - FREQUENCY: FREQUENCY: INTERMITTENT

## 2025-06-24 NOTE — ED PROVIDER NOTES
HPI   Chief Complaint   Patient presents with    Urinary Frequency     Patient states he has a UTI, its burning when he urinates. States he had reconstructive surgery       59-year-old male presents emergency departments, patient states history of chronic UTIs, states he has had multiple surgeries performed, follows with urology.  Has standing order for UA's, states he started with some urinary symptoms couple days ago, increased discomfort yesterday so he did drop off a sample.  Today is having more pain, especially with urination.  Denies any nausea/vomiting or fever/chills.    Tells me he is scheduled for ultrasounds of the bladder and kidneys this week and follow-up next week with his urologist      History provided by:  Patient   used: No            Patient History   Medical History[1]  Surgical History[2]  Family History[3]  Social History[4]    Physical Exam   ED Triage Vitals [06/24/25 0754]   Temperature Heart Rate Respirations BP   36.5 °C (97.7 °F) (!) 109 20 115/66      Pulse Ox Temp Source Heart Rate Source Patient Position   97 % Temporal Monitor --      BP Location FiO2 (%)     -- --       Physical Exam  Constitutional: Vitals noted, no distress. Afebrile.   Cardiovascular: Regular, rate, rhythm, no murmur.   Pulmonary: Lungs clear bilaterally with good aeration. No adventitious breath sounds.   Gastrointestinal: Soft, nonsurgical. Nontender. No peritoneal signs. Normoactive bowel sounds.   Musculoskeletal: No peripheral edema. Negative Homans bilaterally, no cords.   Skin: No rash.   Neuro: No focal neurologic deficits, NIH score of 0.      ED Course & MDM   Diagnoses as of 06/24/25 0845   UTI (urinary tract infection), bacterial                 No data recorded                         Medical Decision Making  Reviewed urinalysis from outpatient lab yesterday, positive for nitrites, leukocyte esterase, white cells.    Postvoid bladder scan about 150 cc.    Reviewed the patient's  previous urine cultures, high-level resistance, although did so sensitivity to Augmentin so we will start him on Augmentin, although possible that he will require hospitalization for this urinary tract infection if the culture comes back without any p.o. options.    Patient describes pain, given p.o. pain medication here, will discharge him home on a short course as well.    Not exhibiting any fevers, chills, nausea or vomiting so I do not feel that he needs to be hospitalized at this time.  Discussed follow-up with urology as scheduled, imaging is scheduled later this week, discussed return with any worsening symptoms or additional concerns.    Procedure  Procedures       [1]   Past Medical History:  Diagnosis Date    Anemia 3/9/2019    Chronic kidney disease 5/7/2020    Colon polyp 2023    Crohn's disease (Multi)     Diverticulosis     Fissure, anal 8/20/21    GERD (gastroesophageal reflux disease)     GI (gastrointestinal bleed)     PONV (postoperative nausea and vomiting) 2023    Long surgery, took a minute for intestines to wake up.    Urinary tract infection    [2]   Past Surgical History:  Procedure Laterality Date    AMPUTATION      BLADDER SURGERY N/A     COLON SURGERY  2022    COLONOSCOPY W/ POLYPECTOMY N/A     COLOSTOMY N/A     CT GUIDED PERCUTANEOUS PERITONEAL OR RETROPERITONEAL FLUID COLLECTION DRAINAGE  09/28/2022    CT GUIDED PERCUTANEOUS PERITONEAL OR RETROPERITONEAL FLUID COLLECTION DRAINAGE [place] [facility] [facility]    IR CVC NONTUNNELED  11/06/2023    IR CVC NONTUNNELED 11/6/2023 [place] [facility] [facility]    IR NEPHROSTOMY TUBE EXCHANGE  01/31/2023    [facility]    [facility] NEPHROSTOMY TUBE EXCHANGE  01/31/2023    IR NEPHROSTOMY TUBE EXCHANGE 1/31/2023 [facility]    IR NEPHROSTOMY TUBE EXCHANGE  03/23/2023    IR NEPHROSTOMY TUBE EXCHANGE [facility]    IR NEPHROSTOMY TUBE EXCHANGE  03/23/2023    IR NEPHROSTOMY TUBE EXCHANGE [facility]    IR  NEPHROSTOMY TUBE EXCHANGE  03/30/2023    IR NEPHROSTOMY TUBE EXCHANGE [facility]    IR NEPHROSTOMY TUBE EXCHANGE  04/25/2023    IR NEPHROSTOMY TUBE EXCHANGE [facility]    IR NEPHROSTOMY TUBE EXCHANGE  06/30/2023    IR NEPHROSTOMY TUBE EXCHANGE 6/30/2023 [facility]    IR NEPHROSTOMY TUBE EXCHANGE  06/30/2023    IR NEPHROSTOMY TUBE EXCHANGE 6/30/2023 [facility]    IR NEPHROSTOMY TUBE EXCHANGE  08/25/2023    IR NEPHROSTOMY TUBE EXCHANGE 8/25/2023 [facility]    IR NEPHROSTOMY TUBE EXCHANGE  08/25/2023    IR NEPHROSTOMY TUBE EXCHANGE 8/25/2023 [facility]    IR NEPHROSTOMY TUBE EXCHANGE  10/18/2023    IR NEPHROSTOMY TUBE EXCHANGE 10/18/2023 Onesimo Reyes MD [facility] [place] [facility]    SMALL INTESTINE SURGERY  2022 / 2023   [3]   Family History  Problem Relation Name Age of Onset    Cancer Brother Ok     Alcohol abuse Maternal Grandfather Jelani Thornton     Cancer Brother Scott Thornton    [4]   Social History  Tobacco Use    Smoking status: Never    Smokeless tobacco: Never   Vaping Use    Vaping status: Never Used   Substance Use Topics    Alcohol use: Yes     Alcohol/week: 1.0 standard drink of alcohol     Types: 1 Cans of beer per week     Comment: Don't drink weekly more infrequently    Drug use: Never        FARTUN Baxter-TANJA  06/24/25 0851

## 2025-06-24 NOTE — PROGRESS NOTES
"Jose Thornton is a 59 year old male referred by Mike Corrigan MD for evaluation of colostomy reversal.    Pt developed a fistula in 2019 and 2020 - He developed loose stools and \"swelling\".   Pt was diagnosed with a fistula in 2021 (recurrent UTI's, air in the bladder)   Had never a colonoscopy prior to surgery.      History of Urinary retention, Hydronephrosis, Crohn's disease, CKD-III, C-diff , and colovesical fistula s/p Anterior proctosigmoidectomy, colovesicular fistula takedown, end colostomy creation with Dr. Warren Rodriguez (12/20/22) c/b left ureter injury s/p Double J stent placement with Dr. Tomas Andersen (12/28/22), he then underwent an ex lap and Bilateral ureteral reimplant, rectovesical fistula repair with Dr. Tomas Andersen and Dr. 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 He was then hospitalized 10/28/23 to 11/17/23 at [facility] for gentamicin toxicity, requiring 3 days of hemodialysis, nephrostomy tubes were capped prior to discharge. A second hospitalization from 11/28/23 to 12/13/23 at [facility] for Right ureter UTI, KADE on CKD, hypotension, and infection in the setting of possible misplaced right nephrostomy tube. He was tx with IV antibiotics and discharged home. He was noted to not be taking any treatment for Crohn's with CNP Anuradha 1/11/24, as he was \"feeling well.\" Nephrostomy tubes removed 1/15/24.      Currently his bladder is working well -  He did have a couple of UTI's recently.  No urinary stents - andrade was in for a year - that has been out for the past 2 years.   He is using external catheter at night.      He had a colonoscopy in 2023 - he had active TI disease, active colitis, normal rectum - has rectum at the peritoneal reflection.    He has a wound around his stoma. Having stoma - emptying the pouch 12 x/day - mostly liquid.    Dr. Yi would like for him to do biologics - pt would like to try ivermectin and biofilm/bacteria and parasites - he has started the proteolytic enzymes, " but not the ivermectin.       Camilla 3, 2025 Office visit with Dr. Golden Gonzalez for colostomy reversal. Advised not to reverse stoma until his Crohn's disease was under control with medical management.    June 18, 2025 Office visit with GI, Dr. Andersen. He would benefit from a biologic, especially IFX. Patient wants to try anti-parasitic (ivermectin) and naturopathic/biofilm therapies. Was offered doing a repeat colonoscopy now to stage his disease. He wants to try his therapies first.  No colonoscopy scheduled.      6/14/2023 Colonoscopy to TI  The terminal ileum contained a few scattered non-bleeding aphthae. No stigmata of recent bleeding were seen. Biopsies were taken with a cold forceps for histology.  A 3 mm polyp was found in the ascending colon. The polyp was sessile. The polyp was removed with a cold biopsy forceps. Resection and retrieval were complete.  There appeared to be a fistula opening at 40 cm from the stoma.  Two linear longitudinal ten mm ulcers were found at 20 cm proximal to the stoma. No bleeding was present. No stigmata of recent bleeding were seen. Biopsies were taken with a cold forceps for histology.  The scope was then inserted in the rectum. The rectal mucosa appeared normal. Biopsies for histology were taken with a cold forceps from the rectum and entire colon for evaluation of microscopic colitis.  Pathology:  A.  COLON, ILEOCECAL, BIOPSY:    --FOCAL ACTIVE ILEITIS     B.  ASCENDING COLON, POLYP, BIOPSY:    --COLONIC MUCOSA WITH PROMINENT LYMPHOID AGGREGATE AND FOCAL ACTIVE COLITIS     C.  RANDOM COLON, BIOPSY:    --COLONIC MUCOSA WITH NO SIGNIFICANT PATHOLOGIC FINDINGS     D.  DESCENDING COLON, BIOPSY:    --ULCER AND CHRONIC ACTIVE COLITIS.     E.  RECTUM, BIOPSY:    --COLONIC MUCOSA WITH NO SIGNIFICANT PATHOLOGIC FINDINGS     5/11/2025 CT Abd/Pelvis wo contrast  Findings suggestive of a colitis of the transverse colon which may be of either infectious or inflammatory etiology.  Mild  asymmetric urinary bladder wall thickening, however this may be chronic due to sequela of postsurgical changes.  Clinical correlation advised.  Right sided nonobstructing nephrolithiasis measuring up to 1.6 cm.  Additional findings as above.      Past Medical History  Crohn's disease  CKD  Kidney stones  GERD    Surgical History  Anterior proctosigmoidectomy, colovesicular fistula takedown, end colostomy creation  Exp laparotomy and Bilateral ureteral reimplant, rectovesical fistula repair      Social History  Smoking:  Never   ETOH: Minimal   Work:      Family History  Brother - pancreatic CA  GF - ETOH   Nephew - Crohn's disease   Sister - Crohn's disease      Review of Systems  Constitutional: Negative for fever, chills, anorexia, weight loss, malaise     ENMT: Negative for nasal discharge, congestion, ear pain, mouth pain, throat pain     Respiratory: Negative for cough, hemoptysis, wheezing, shortness of breath     Cardiac: Negative for chest pain, dyspnea on exertion, orthopnea, palpitations, syncope     Gastrointestinal: Negative for nausea, vomiting, diarrhea, constipation, abdominal pain, (+)CROHN'S    Genitourinary: Negative for discharge, dysuria, flank pain, frequency, hematuria, (+)CKD, (+)KIDNEY STONES    Musculoskeletal: Negative for decreased ROM, pain, swelling, weakness     Neurological: Negative for dizziness, confusion, headache, seizures, syncope     Psychiatric: Negative for mood changes, anxiety, hallucinations, sleep changes, suicidal ideas     Skin: Negative for mass, pain, itching, rash, ulcer     Endocrine: Negative for heat intolerance, cold intolerance, excessive sweating, polyuria, excess thirst     Hematologic/Lymph: Negative for anemia, bruising, easy bleeding, night sweats, petechiae, history of DVT/PE or cancer     Allergic/Immunologic: Negative for anaphylaxis, itchy/ teary eyes, itching, sneezing, swelling    Physical Exam  Constitutional: Well developed,  awake/alert/oriented x3, no distress, alert and cooperative             Eyes: Sclera anicteric, no conjunctival inflammation, conjugate gaze    ENMT: mucous membranes moist, no apparent injury,            Head/Neck: Neck supple, no apparent injury, No JVD, trachea midline, no bruits              Respiratory/Thorax: Patent airways, CTAB, normal breath sounds with good chest expansion, thorax symmetric         Cardiovascular: Regular, rate and rhythm, no murmurs, normal S1 and S2         Gastrointestinal: Nondistended, soft, non-tender, no rebound tenderness or guarding, no masses palpable, no organomegaly, +BS,    midline hernia 4 cm soft and reducible, stoma flat and pink.  There is a 4 cm area of pyoderma with heaped edges in the inferior lateral position.          Extremities: normal extremities, no cyanosis edema, contusions or wounds, 2+ femoral pulses B/L              Neurological: alert and oriented x3, normal strength, Normal gait          Lymphatic: No palpable inguinal lymphadenopathy   Psychological: Appropriate mood and behavior         Skin: Warm and dry, no lesions, no rashes                  General    Date/Time: 7/9/2025 8:51 AM    Performed by: Bonnie Rainey MD  Authorized by: Bonnie Rainey MD    Consent:     Consent obtained:  Verbal    Consent given by:  Patient    Risks, benefits, and alternatives were discussed: yes    Universal protocol:     Patient identity confirmed:  Verbally with patient  Indications:     Indications:  Pyoderma ulcer  Pre-procedure details:     Skin preparation:  Povidone-iodine  Sedation:     Sedation type:  None  Anesthesia:     Anesthesia method:  Local infiltration  Procedure specific details:      The area was prepped using betadine.  40 mg of kenalog was mixed in lidocaine 1% and 10 ml was injected in the subcuntaneous tissues in under the area of the ulcer.    Post-procedure details:     Procedure completion:  Tolerated        Impression:   Pt with Crohn's  disease of the  colon s/p resection for fistula - continued active TI, colon disease and now pyoderma   Pyoderma Gangrenosum - injected with kenolog today  Plan:    Can repeat Kenalog injection in 4 weeks if not healed  Nasocort topical application  We discussed the importance of  getting his Crohn's under control proximally prior to any surgery.  I have discussed that if he does not get control fistulae could happen again - he is aware and understands    He would like to try the ivermectin - I have told him to schedule  colonoscopy for as soon as the ivermectin should be working   Once we have a medication that is working we could consider takedown of stoma, hernia repair would want urology on call and he would have a DLI.

## 2025-06-25 ENCOUNTER — HOSPITAL ENCOUNTER (OUTPATIENT)
Dept: RADIOLOGY | Facility: HOSPITAL | Age: 60
Discharge: HOME | End: 2025-06-25
Payer: MEDICARE

## 2025-06-25 ENCOUNTER — HOSPITAL ENCOUNTER (EMERGENCY)
Facility: HOSPITAL | Age: 60
Discharge: STILL ADMITTED OR ADMINISTRATIVE | End: 2025-06-25
Payer: MEDICARE

## 2025-06-25 ENCOUNTER — TELEPHONE (OUTPATIENT)
Age: 60
End: 2025-06-25
Payer: MEDICARE

## 2025-06-25 DIAGNOSIS — N20.0 NEPHROLITHIASIS: ICD-10-CM

## 2025-06-25 PROCEDURE — 76770 US EXAM ABDO BACK WALL COMP: CPT

## 2025-06-25 PROCEDURE — 76770 US EXAM ABDO BACK WALL COMP: CPT | Performed by: RADIOLOGY

## 2025-06-25 PROCEDURE — 4500999001 HC ED NO CHARGE

## 2025-06-25 NOTE — TELEPHONE ENCOUNTER
Spoke to patient over the phone about rescheduling appointment. Patient does not want to reschedule. I emphasized the importance of having the ordered Ultrasounds back from the radiologist for decision making. Patient would like to keep appointment tomorrow virtually. No changes were made to the appointment.

## 2025-06-26 ENCOUNTER — APPOINTMENT (OUTPATIENT)
Age: 60
End: 2025-06-26
Payer: MEDICARE

## 2025-06-26 ENCOUNTER — OFFICE VISIT (OUTPATIENT)
Dept: WOUND CARE | Facility: CLINIC | Age: 60
End: 2025-06-26
Payer: MEDICARE

## 2025-06-26 DIAGNOSIS — R33.9 URINARY RETENTION: ICD-10-CM

## 2025-06-26 DIAGNOSIS — N39.0 URINARY TRACT INFECTION WITHOUT HEMATURIA, SITE UNSPECIFIED: Primary | ICD-10-CM

## 2025-06-26 DIAGNOSIS — N20.0 NEPHROLITHIASIS: ICD-10-CM

## 2025-06-26 PROCEDURE — 11042 DBRDMT SUBQ TIS 1ST 20SQCM/<: CPT | Performed by: PLASTIC SURGERY

## 2025-06-26 PROCEDURE — 99214 OFFICE O/P EST MOD 30 MIN: CPT | Performed by: NURSE PRACTITIONER

## 2025-06-26 PROCEDURE — 11042 DBRDMT SUBQ TIS 1ST 20SQCM/<: CPT

## 2025-06-26 PROCEDURE — G2211 COMPLEX E/M VISIT ADD ON: HCPCS | Performed by: NURSE PRACTITIONER

## 2025-06-26 NOTE — PROGRESS NOTES
UROLOGIC FOLLOW-UP VISIT     PROBLEM LIST:  1. Urinary tract infection without hematuria, site unspecified        2. Nephrolithiasis        3. Urinary retention             HISTORY OF PRESENT ILLNESS:   Jose Thornton is a 59 y.o. with Crohn's disease and associated colovesical fistula s/p B nephrostomy and Schultz catheter placement s/p B ureteral reimplant, bladder augmentation with R partial rectus muscle flap, and fistula repair with Dr. Andersen and Dr. Rodriguez 10/27/23    INTERVAL HISTORY:  Returns today for [facility]UV  Seen unaccompanied   Rough couple of days  Was at the hospital for ultrasound  Stopped by ED to make sure he was  Antibiotic seems to be helping  Was told at US that PVR was 150 mL   Small frequent voiding during active UTI  UOP 40-60 oz/day, not tracking intake  Emptying ostomy bag up to 12 times a day, mostly liquid  Not eager to start CIC, worries about pain  Wants to be clear that surgery is necessary for stone  Will be meeting with [facility] next week; stoma reversal is higher priority right now    Asking about MRI to assess for recurrence of fistula  Look for other causes of UTIs    Check in after visits with [facility]  Also seeing wound care for peristomal erosion    PAST MEDICAL HISTORY:  Medical History[1]    PAST SURGICAL HISTORY:  Surgical History[2]     ALLERGIES:   Allergies[3]     MEDICATIONS:   Medications Ordered Prior to Encounter[4]     SOCIAL HISTORY:  Patient  reports that he has never smoked. He has never used smokeless tobacco. He reports current alcohol use of about 1.0 standard drink of alcohol per week. He reports that he does not use drugs.   Social History     Socioeconomic History    Marital status:      Spouse name: Not on file    Number of children: Not on file    Years of education: Not on file    Highest education level: Not on file   Occupational History    Not on file   Tobacco Use    Smoking status: Never    Smokeless tobacco: Never   Vaping Use     Vaping status: Never Used   Substance and Sexual Activity    Alcohol use: Yes     Alcohol/week: 1.0 standard drink of alcohol     Types: 1 Cans of beer per week     Comment: Don't drink weekly more infrequently    Drug use: Never    Sexual activity: Not Currently     Partners: Female     Birth control/protection: None   Other Topics Concern    Not on file   Social History Narrative    Not on file     Social Drivers of Health     Financial Resource Strain: Low Risk  (11/29/2023)    Overall Financial Resource Strain (CARDIA)     Difficulty of Paying Living Expenses: Not very hard   Recent Concern: Financial Resource Strain - Medium Risk (11/1/2023)    Overall Financial Resource Strain (CARDIA)     Difficulty of Paying Living Expenses: Somewhat hard   Food Insecurity: Not on file   Transportation Needs: No Transportation Needs (11/29/2023)    PRAPARE - Transportation     Lack of Transportation (Medical): No     Lack of Transportation (Non-Medical): No   Physical Activity: Not on file   Stress: Not on file   Social Connections: Not on file   Intimate Partner Violence: Not on file   Housing Stability: Low Risk  (11/29/2023)    Housing Stability Vital Sign     Unable to Pay for Housing in the Last Year: No     Number of Places Lived in the Last Year: 1     Unstable Housing in the Last Year: No       FAMILY HISTORY:  Family History[5]    REVIEW OF SYSTEMS:  All systems reviewed, pertinent negatives as noted in HPI.     PHYSICAL EXAM:  There were no vitals taken for this visit.    Constitutional: Well-developed and well-nourished. No distress.    Head: Normocephalic and atraumatic.    Neck: Normal range of motion.     Pulmonary/Chest: Effort normal. No respiratory distress.   Abdominal: Non-distended.  : See below.   Integumentary: No rash or lesions visualized.  Musculoskeletal: Normal range of motion.    Neurological: Alert, grossly intact.  Psychiatric: Normal mood and affect. Thought content normal.      LABORATORY  REVIEW:   Lab Results   Component Value Date    BUN 17 06/20/2025    CREATININE 2.15 (H) 06/20/2025    EGFR 35 (L) 06/20/2025     06/20/2025    K 4.8 06/20/2025     06/20/2025    CO2 27 06/20/2025    CALCIUM 8.7 06/20/2025      Lab Results   Component Value Date    WBC 9.0 06/20/2025    RBC 4.43 06/20/2025    HGB 10.2 (L) 06/20/2025    HCT 35.7 (L) 06/20/2025    MCV 80.6 06/20/2025    MCH 23.0 (L) 06/20/2025    MCHC 28.6 (L) 06/20/2025    RDW 17.7 (H) 06/20/2025     (H) 06/20/2025    MPV 9.0 06/20/2025        Lab Results   Component Value Date    PSA 1.42 06/20/2025             Assessment:      1. Urinary tract infection without hematuria, site unspecified        2. Nephrolithiasis        3. Urinary retention            Jose Thornton is a 59 y.o. with Crohn's disease and associated colovesical fistula  Bilateral ureteral reimplant, bladder augmentation with R partial rectus muscle flap and fistula repair with Dr. Andersen and Dr. Rodriguez 10/27/23, discharged 11/17/23   Readmitted for dehydration, sepsis due to UTI 11/28-12/13/23  SPT removed 1/15/24      Seen in ED 5/11/25 for new ventral hernia  CT A/P showed multiple R nonobstructing inferior pole calculi up to 1.6 cm, B renal atrophy and cysts, large volume of urine in augmented bladder     Returned to ED 5/14/25 with dysuria and frequency  UA + protein, heme, bilirubin, urobilinogen, nitrites, leuks  Micro >50 WBC, >20 RBC/HPF  Culture with clinically insignificant growth  Bladder scan x 2, 382 & 423 mL; 800 mL on straight cath  Started on cephalexin and tamsulosin  Returned to ED 5/15 with ongoing pain, had not yet started antibiotics     Continues to have urinary issues including frequency, intermittency  Recent incidental finding of nephrolithiasis  Had run out of tamsulosin as of last visit 5/29/25  Using external catheter at night    ***     Plan:   Go to lab for UA, urine culture  Refer to Dr. Ramirez for management of  nephrolithiasis  Reorder external catheter supplies  RTC for reassessment in 3 weeks, PVR  Encouraged to contact us in the interim with any questions, concerns            [1]   Past Medical History:  Diagnosis Date    Anemia 3/9/2019    Chronic kidney disease 5/7/2020    Colon polyp 2023    Crohn's disease (Multi)     Diverticulosis     Fissure, anal 8/20/21    GERD (gastroesophageal reflux disease)     GI (gastrointestinal bleed)     Kidney stone 5/5/25    PONV (postoperative nausea and vomiting) 2023    Long surgery, took a minute for intestines to wake up.    Urinary tract infection 6/22/25   [2]   Past Surgical History:  Procedure Laterality Date    AMPUTATION      BLADDER SURGERY N/A 1/1 /23    COLON SURGERY  2022    COLONOSCOPY W/ POLYPECTOMY N/A     COLOSTOMY N/A     CT GUIDED PERCUTANEOUS PERITONEAL OR RETROPERITONEAL FLUID COLLECTION DRAINAGE  09/28/2022    CT GUIDED PERCUTANEOUS PERITONEAL OR RETROPERITONEAL FLUID COLLECTION DRAINAGE [place] [facility] [place] [facility]    IR CVC NONTUNNELED  11/06/2023    IR CVC NONTUNNELED 11/6/2023 Drew Terry MD [facility]    IR NEPHROSTOMY TUBE EXCHANGE  01/31/2023    IR NEPHROSTOMY TUBE EXCHANGE 1/31/2023 [facility]    IR NEPHROSTOMY TUBE EXCHANGE  01/31/2023    IR NEPHROSTOMY TUBE EXCHANGE 1/31/2023 [facility]    IR NEPHROSTOMY TUBE EXCHANGE  03/23/2023    IR NEPHROSTOMY TUBE EXCHANGE [facility]    IR NEPHROSTOMY TUBE EXCHANGE  03/23/2023    IR NEPHROSTOMY TUBE EXCHANGE [facility]    IR NEPHROSTOMY TUBE EXCHANGE  03/30/2023    IR NEPHROSTOMY TUBE EXCHANGE [facility]    IR NEPHROSTOMY TUBE EXCHANGE  04/25/2023    IR NEPHROSTOMY TUBE EXCHANGE [facility]    IR NEPHROSTOMY TUBE EXCHANGE  06/30/2023    IR NEPHROSTOMY TUBE EXCHANGE 6/30/2023 [facility]    IR NEPHROSTOMY TUBE EXCHANGE  06/30/2023    IR NEPHROSTOMY TUBE EXCHANGE 6/30/2023 [facility]    IR NEPHROSTOMY TUBE EXCHANGE  08/25/2023    IR NEPHROSTOMY TUBE EXCHANGE 8/25/2023 [facility]    IR NEPHROSTOMY  TUBE EXCHANGE  08/25/2023    IR NEPHROSTOMY TUBE EXCHANGE 8/25/2023 [facility] [place] [facility]    IR NEPHROSTOMY TUBE EXCHANGE  10/18/2023    IR NEPHROSTOMY TUBE EXCHANGE 10/18/2023 Onesimo Reyes MD [facility] [place] [facility]    SMALL INTESTINE SURGERY  2022 / 2023   [3] No Known Allergies  [4]   Current Outpatient Medications on File Prior to Visit   Medication Sig Dispense Refill    amoxicillin-clavulanate (Augmentin) 875-125 mg tablet Take 1 tablet by mouth every 12 hours for 10 days. 20 tablet 0    gentamicin (Garamycin) 0.1 % cream Apply topically to affected area(s) once daily. 30 g 4    HYDROcodone-acetaminophen (Norco) 5-325 mg tablet Take 1 tablet by mouth every 6 hours if needed for severe pain (7 - 10). 6 tablet 0    tamsulosin (Flomax) 0.4 mg 24 hr capsule Take 1 capsule (0.4 mg) by mouth once daily. 30 capsule 2     Current Facility-Administered Medications on File Prior to Visit   Medication Dose Route Frequency Provider Last Rate Last Admin    multivitamin with iron-minerals 9 mg iron/15 mL liquid 15 mL  15 mL oral Once Tomas Andersen MD       [5]   Family History  Problem Relation Name Age of Onset    Cancer Brother Ok     Alcohol abuse Maternal Grandfather Jelani Thornton     Cancer Brother Scott Norberto

## 2025-06-27 ENCOUNTER — APPOINTMENT (OUTPATIENT)
Dept: PRIMARY CARE | Facility: CLINIC | Age: 60
End: 2025-06-27
Payer: MEDICARE

## 2025-06-27 VITALS
HEART RATE: 100 BPM | DIASTOLIC BLOOD PRESSURE: 74 MMHG | SYSTOLIC BLOOD PRESSURE: 104 MMHG | BODY MASS INDEX: 27.77 KG/M2 | OXYGEN SATURATION: 96 % | WEIGHT: 205 LBS | HEIGHT: 72 IN

## 2025-06-27 DIAGNOSIS — K50.913 CROHN'S DISEASE WITH FISTULA, UNSPECIFIED GASTROINTESTINAL TRACT LOCATION: ICD-10-CM

## 2025-06-27 DIAGNOSIS — N13.30 HYDRONEPHROSIS DETERMINED BY ULTRASOUND: ICD-10-CM

## 2025-06-27 DIAGNOSIS — M85.9 LOW BONE DENSITY FOR AGE: Primary | ICD-10-CM

## 2025-06-27 DIAGNOSIS — Z79.899 DRUG THERAPY: ICD-10-CM

## 2025-06-27 DIAGNOSIS — N18.32 CKD STAGE 3B, GFR 30-44 ML/MIN (MULTI): ICD-10-CM

## 2025-06-27 DIAGNOSIS — M85.842 OTHER SPECIFIED DISORDERS OF BONE DENSITY AND STRUCTURE, LEFT HAND: ICD-10-CM

## 2025-06-27 DIAGNOSIS — D50.9 IRON DEFICIENCY ANEMIA, UNSPECIFIED IRON DEFICIENCY ANEMIA TYPE: ICD-10-CM

## 2025-06-27 DIAGNOSIS — D64.9 ANEMIA, UNSPECIFIED TYPE: ICD-10-CM

## 2025-06-27 DIAGNOSIS — Z12.5 SCREENING FOR PROSTATE CANCER: ICD-10-CM

## 2025-06-27 DIAGNOSIS — N40.1 BENIGN LOCALIZED PROSTATIC HYPERPLASIA WITH LOWER URINARY TRACT SYMPTOMS (LUTS): ICD-10-CM

## 2025-06-27 DIAGNOSIS — T14.8XXA NONHEALING NONSURGICAL WOUND: ICD-10-CM

## 2025-06-27 PROCEDURE — G2211 COMPLEX E/M VISIT ADD ON: HCPCS | Performed by: FAMILY MEDICINE

## 2025-06-27 PROCEDURE — 1036F TOBACCO NON-USER: CPT | Performed by: FAMILY MEDICINE

## 2025-06-27 PROCEDURE — 99215 OFFICE O/P EST HI 40 MIN: CPT | Performed by: FAMILY MEDICINE

## 2025-06-27 PROCEDURE — 3008F BODY MASS INDEX DOCD: CPT | Performed by: FAMILY MEDICINE

## 2025-06-27 RX ORDER — FERROUS GLUCONATE 325 MG
TABLET ORAL
Qty: 200 TABLET | Refills: 3 | Status: SHIPPED | OUTPATIENT
Start: 2025-06-27

## 2025-06-27 NOTE — PROGRESS NOTES
Subjective   Patient ID: Jose Thornton is a 59 y.o. male who presents for 4 Week FU (Pt in today for routine 4 week FU).    Review of Systems  Denies N/V, no HA/S/V, has nonhealing wound near ostomy site, no CP/SOB. Denies fevers/chills. Positive for dizzyness and weakness.   All other systems were negative.     Objective   /74 (BP Location: Right arm, Patient Position: Sitting, BP Cuff Size: Adult)   Pulse 100   Ht 1.829 m (6')   Wt 93 kg (205 lb)   SpO2 96%   BMI 27.80 kg/m²     Physical Exam  Gen: NAD  eyes: EOMI, PERRLA  ENT: hearing grossly intact, no nasal discharge  resp: CTABL, without R/R  heart: RRR without MRG  GI: abd: S/ND/NT, BS+  lymph: no axillary, cervical, supraclavicular lymphadenopathy noted   MS: gait grossly WNL,  derm: no rashes or lesions noted  neuro: CN II-XII grossly intact  psych: A&Ox3    Assessment/Plan   Problem List Items Addressed This Visit           ICD-10-CM    Anemia D64.9    Crohn's disease (Multi) K50.90    Nonhealing nonsurgical wound T14.8XXA    Drug therapy Z79.899    Screening for prostate cancer Z12.5    CKD stage 3b, GFR 30-44 ml/min (Multi) N18.32    Low bone density for age - Primary M85.9    Relevant Orders    XR DEXA bone density    Benign localized prostatic hyperplasia with lower urinary tract symptoms (LUTS) N40.1    Relevant Medications    ferrous gluconate (Fergon) 324 mg (38 mg elemental) tablet    Hydronephrosis determined by ultrasound N13.30    Relevant Medications    ferrous gluconate (Fergon) 324 mg (38 mg elemental) tablet     Other Visit Diagnoses         Codes      Other specified disorders of bone density and structure, left hand     M85.842    Relevant Orders    XR DEXA bone density            2026 will be next will be next [place] and annual preventative visit    Lab review today.    ------    Immunization counseling: Due for annual flu shot, latest COVID booster, Shingrix series, Tdap. -->> Check with your pharmacy to keep up-to-date on  immunizations.    Screening for prostate cancer: PSA 1.94, normal. Will do PSA screening annually.    Screening for colon cancer: Patient sees gastroenterology/Dr. Yi.    Screening for hepatitis C was nonreactive.    Overweight, BMI 28. -->>  Work to limit intake of simple carbohydrates, things like bread, pasta, potatoes, rice, sugars etc. patient is also increasing exercise.      -------      Regarding previous labs:    -Drug therapy, screening for conditions: Last A1c was 5.1, so no diabetes. Cholesterol numbers are excellent on no meds. -->> next annual labs around June 2026.    - Chronic kidney disease, stage IIIb.  Sees nephrology/Dr. Bella.  Follow-up as planned.    - Iron deficiency Anemia with history of iron deficiency, possibly of chronic disease. B12 and folate WNL. -->>  Eat 3-5 extra servings of iron rich foods every week. Will start on ferrous gluconate (previously well tolderate). Recheck around sept.    - UTI, on ABX from ER. Is seeing urology for f/up.    - Vitamin D deficiency, last vitamin D was 82, was 88, we like it between 75 and 120, so we would call it perfect.  Managed by nephrology/Dr. Bella.  He is on a vitamin D supplement, not sure of the dose. -->> Follow-up as planned.        urinary retention, nephrolithiasis, Frequent UTIs 2/2 retention. Is seeing Urology/Dr. Baldwin and nephrology/Dr. Bella. Has had numerous urologic surgeries/procedures. Notes some benefit regarding urinary retention from tamsulosin. They are discussing self-caths.     Crohn's disease, nonhealing wound, periumbilical hernia, has colostomy in place.  Nonhealing wound adjacent to ostomy site, improving. Seeing wound management. They are helping it heal by secondary intension. -->>  Follow-up with Dr. Choi/wound and general surgery/Dr. Gonzalez as planned for hernia as well as in the future for ostomy reversal.  Follow-up with GI/Dr. Yi as planned.     L hand laceration. Got 6 stiches. Healing well. Also  being followed by wound care.    Demineralized bones noted incidentally on hand Xray. -->> will order dexa.    - Patient will return in about 4 weeks for follow-up.

## 2025-07-03 ENCOUNTER — APPOINTMENT (OUTPATIENT)
Age: 60
End: 2025-07-03
Payer: MEDICARE

## 2025-07-03 ENCOUNTER — OFFICE VISIT (OUTPATIENT)
Dept: WOUND CARE | Facility: CLINIC | Age: 60
End: 2025-07-03
Payer: MEDICARE

## 2025-07-03 VITALS
TEMPERATURE: 98.6 F | SYSTOLIC BLOOD PRESSURE: 114 MMHG | WEIGHT: 211.8 LBS | BODY MASS INDEX: 28.73 KG/M2 | DIASTOLIC BLOOD PRESSURE: 70 MMHG | HEART RATE: 75 BPM

## 2025-07-03 DIAGNOSIS — N20.0 KIDNEY STONE: Primary | ICD-10-CM

## 2025-07-03 DIAGNOSIS — L03.90 WOUND CELLULITIS: Primary | ICD-10-CM

## 2025-07-03 PROCEDURE — 11042 DBRDMT SUBQ TIS 1ST 20SQCM/<: CPT

## 2025-07-03 PROCEDURE — 99213 OFFICE O/P EST LOW 20 MIN: CPT

## 2025-07-03 PROCEDURE — RXMED WILLOW AMBULATORY MEDICATION CHARGE

## 2025-07-03 PROCEDURE — 11042 DBRDMT SUBQ TIS 1ST 20SQCM/<: CPT | Performed by: PLASTIC SURGERY

## 2025-07-03 RX ORDER — ASCORBIC ACID 500 MG
500 TABLET ORAL DAILY
Qty: 30 TABLET | Refills: 1 | Status: SHIPPED | OUTPATIENT
Start: 2025-07-03 | End: 2025-09-01

## 2025-07-03 NOTE — PROGRESS NOTES
Jose has a right kidney stone and a bladder augmentation with neoimplantation.  We asked for an US to evaluate hydronephrosis and possible percutaneous access.    Kidney US: Mild right-sided hydronephrosis of uncertain etiology.     He wants his hernia repaired before treating the stone. He will reach out to us once he is ready.    Plan:  - Follow up once he has the hernia repaired

## 2025-07-06 ENCOUNTER — PREP FOR PROCEDURE (OUTPATIENT)
Dept: SURGERY | Facility: HOSPITAL | Age: 60
End: 2025-07-06
Payer: MEDICARE

## 2025-07-06 LAB
BACTERIA SPEC AEROBE CULT: ABNORMAL
BACTERIA SPEC ANAEROBE CULT: ABNORMAL

## 2025-07-07 ENCOUNTER — PHARMACY VISIT (OUTPATIENT)
Dept: PHARMACY | Facility: CLINIC | Age: 60
End: 2025-07-07
Payer: COMMERCIAL

## 2025-07-09 ENCOUNTER — APPOINTMENT (OUTPATIENT)
Dept: SURGERY | Facility: CLINIC | Age: 60
End: 2025-07-09
Payer: MEDICARE

## 2025-07-09 ENCOUNTER — TELEPHONE (OUTPATIENT)
Dept: GASTROENTEROLOGY | Facility: CLINIC | Age: 60
End: 2025-07-09

## 2025-07-09 VITALS
BODY MASS INDEX: 28.44 KG/M2 | OXYGEN SATURATION: 100 % | DIASTOLIC BLOOD PRESSURE: 72 MMHG | TEMPERATURE: 97.9 F | WEIGHT: 210 LBS | HEART RATE: 71 BPM | HEIGHT: 72 IN | SYSTOLIC BLOOD PRESSURE: 112 MMHG

## 2025-07-09 DIAGNOSIS — K50.813 CROHN'S DISEASE OF BOTH SMALL AND LARGE INTESTINE WITH FISTULA (MULTI): Primary | ICD-10-CM

## 2025-07-09 DIAGNOSIS — K50.119 CROHN'S DISEASE OF COLON WITH COMPLICATION (MULTI): ICD-10-CM

## 2025-07-09 DIAGNOSIS — L88: Primary | ICD-10-CM

## 2025-07-09 DIAGNOSIS — Z93.3 COLOSTOMY PRESENT (MULTI): ICD-10-CM

## 2025-07-09 DIAGNOSIS — K52.9: Primary | ICD-10-CM

## 2025-07-09 LAB
BACTERIA SPEC AEROBE CULT: ABNORMAL
BACTERIA SPEC ANAEROBE CULT: ABNORMAL

## 2025-07-09 PROCEDURE — 1036F TOBACCO NON-USER: CPT | Performed by: COLON & RECTAL SURGERY

## 2025-07-09 PROCEDURE — 11900 INJECT SKIN LESIONS </W 7: CPT | Performed by: COLON & RECTAL SURGERY

## 2025-07-09 PROCEDURE — 99215 OFFICE O/P EST HI 40 MIN: CPT | Performed by: COLON & RECTAL SURGERY

## 2025-07-09 PROCEDURE — 3008F BODY MASS INDEX DOCD: CPT | Performed by: COLON & RECTAL SURGERY

## 2025-07-09 PROCEDURE — RXMED WILLOW AMBULATORY MEDICATION CHARGE

## 2025-07-09 RX ORDER — TRIAMCINOLONE ACETONIDE 40 MG/ML
40 INJECTION, SUSPENSION INTRA-ARTICULAR; INTRAMUSCULAR ONCE
Status: SHIPPED | OUTPATIENT
Start: 2025-07-09

## 2025-07-09 RX ORDER — LIDOCAINE HYDROCHLORIDE AND EPINEPHRINE 10; 10 UG/ML; MG/ML
9 INJECTION, SOLUTION INFILTRATION; PERINEURAL ONCE
Status: SHIPPED | OUTPATIENT
Start: 2025-07-09

## 2025-07-09 RX ORDER — POLYETHYLENE GLYCOL 3350, SODIUM CHLORIDE, SODIUM BICARBONATE, POTASSIUM CHLORIDE 420; 11.2; 5.72; 1.48 G/4L; G/4L; G/4L; G/4L
4000 POWDER, FOR SOLUTION ORAL ONCE
Qty: 4000 ML | Refills: 0 | Status: SHIPPED | OUTPATIENT
Start: 2025-07-09 | End: 2025-07-12

## 2025-07-09 ASSESSMENT — PAIN SCALES - GENERAL: PAINLEVEL_OUTOF10: 0-NO PAIN

## 2025-07-09 NOTE — CONSULTS
Wound Care Consult     Visit Date: 7/9/2025      Patient Name: Jose Thornton         MRN: [id number]               [facility] nursing visit outcome: peristomal skin care and pouching      Stoma Type: Colostomy  Jossue: No  Diameter: 7/8  Location: RUQ  Protrusion: Retracted  Mucosal Condition and Color: Moist, Red  Mucocutaneous Junction: Intact  Peristomal Skin: Irritant Dermatitis/Denuded  Location of Skin Impairment: circumferential   Peristomal Contour: Flat and Shallow Concave  Supportive Tissue: Soft  Character of Output: Brown and Thick/Mushy Liquid  Emptying Frequency: as needed   Removed/Current Pouching System: removed 1 piece convex coloplast pouch.   Current Wearing Time: 2-3  Days    Recommendations:   Skin Care: applied stoma powder, dusted off excess, Aquacel with Nasacort over wound bed, Applied caulking at 4 o'clock, hollihesive triangle, and coloplast soft convex pouch with belt.       Photo: 7/9/2025       Yamilet Wang RN BSN,[facility]  [phone]   7/9/2025  2:06 PM

## 2025-07-09 NOTE — TELEPHONE ENCOUNTER
Patient called in requesting that the colonoscopy be done. Patient states he saw his colorectal surgeon and that they are in agreement that the procedure needs to be done. Patient states that he would like it done as soon as possible so that he can proceed with other treatment.  Would you like to place the order so that I can get him scheduled? Please advise. Thank you.

## 2025-07-10 ENCOUNTER — APPOINTMENT (OUTPATIENT)
Dept: WOUND CARE | Facility: CLINIC | Age: 60
End: 2025-07-10
Payer: MEDICARE

## 2025-07-11 ENCOUNTER — CLINICAL SUPPORT (OUTPATIENT)
Dept: PREADMISSION TESTING | Facility: HOSPITAL | Age: 60
End: 2025-07-11
Payer: MEDICARE

## 2025-07-11 ENCOUNTER — PHARMACY VISIT (OUTPATIENT)
Dept: PHARMACY | Facility: CLINIC | Age: 60
End: 2025-07-11
Payer: COMMERCIAL

## 2025-07-11 NOTE — PREPROCEDURE INSTRUCTIONS

## 2025-07-18 ENCOUNTER — CLINICAL SUPPORT (OUTPATIENT)
Dept: SURGERY | Facility: CLINIC | Age: 60
End: 2025-07-18
Payer: MEDICARE

## 2025-07-18 ENCOUNTER — APPOINTMENT (OUTPATIENT)
Dept: PRIMARY CARE | Facility: CLINIC | Age: 60
End: 2025-07-18
Payer: MEDICARE

## 2025-07-18 DIAGNOSIS — R33.8 ACUTE URINARY RETENTION: ICD-10-CM

## 2025-07-18 DIAGNOSIS — Z43.3 COLOSTOMY CARE: ICD-10-CM

## 2025-07-18 DIAGNOSIS — N30.01 ACUTE CYSTITIS WITH HEMATURIA: ICD-10-CM

## 2025-07-18 PROCEDURE — 99211 OFF/OP EST MAY X REQ PHY/QHP: CPT | Performed by: COLON & RECTAL SURGERY

## 2025-07-18 NOTE — NURSING NOTE
[id number]    Reason for Admission: Acute hemorrhagic stroke, left basal ganglia intraparenchymal hematoma     Cognitive/Mentation: A/Ox 3- was off on situation- Pt does wax and wane   Neuros/CMS: Intact ex Right droop, R>L pupil,slow slurred, garbled, speech word finding difficulties at times.   VS: stable.   Tele: NSR.  /GI: Continent- does use PW at night. Last BM 9/26.   Pulmonary: LS expiratory wheezes.  Pain: denied.     Drains/Lines: PIV- SL  Skin: scattered bruising   Activity: Assist x 2 with GB/W pivot to bed and chair.  Diet: Minced and moist with mildly thick liquids. 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 Takes pills whole, one at a time in apple sauce.     Therapies recs: ARU  Discharge: pending    Aggression Stoplight Tool: green    End of shift summary: Pt was having increased garbled speech, with WFD at shift change. RN requested Neuro fellow to reassess. No change to care plan.      Ostomy Nursing Note:      Visit Date: 7/18/2025      Patient Name: Jose Thornton         MRN: [id number]             WOC nursing visit outcome: Assessment of peristomal wound.      [facility] next scheduled visit/plan: Patient will follow up next week and as needed      Stoma Type: Colostomy  Jossue: No  Diameter: 7/8  Location: RUQ  Protrusion: Retracted  Mucosal Condition and Color: Moist, Red  Mucocutaneous Junction: Intact  Peristomal Skin: Irritant Dermatitis/Denuded  Location of Skin Impairment: 3-6 o'clock  Peristomal Contour: Flat and Shallow Concave  Supportive Tissue: Soft  Character of Output: Brown and Thick/Mushy Liquid  Emptying Frequency: as needed   Removed/Current Pouching System: removed 1 piece convex coloplast pouch.   Current Wearing Time: 2-3  Days     Recommendations:   Skin Care: applied stoma powder, dusted off excess, Aquacel with Nasacort over wound bed, Applied caulking at 4 o'clock, hollihesive triangle, and coloplast soft convex pouch with belt.     Photo: 7/18/2025     Comments: Peristomal wound- Measurement: 0.8x1.5x0.4 cm          Yamilet Wang RN BSN,[facility]  [phone]   7/18/2025  7:25 PM

## 2025-07-22 ENCOUNTER — PHARMACY VISIT (OUTPATIENT)
Dept: PHARMACY | Facility: CLINIC | Age: 60
End: 2025-07-22
Payer: COMMERCIAL

## 2025-07-22 PROCEDURE — RXMED WILLOW AMBULATORY MEDICATION CHARGE

## 2025-07-23 ENCOUNTER — ANESTHESIA EVENT (OUTPATIENT)
Dept: GASTROENTEROLOGY | Facility: HOSPITAL | Age: 60
End: 2025-07-23
Payer: MEDICARE

## 2025-07-23 ENCOUNTER — ANESTHESIA (OUTPATIENT)
Dept: GASTROENTEROLOGY | Facility: HOSPITAL | Age: 60
End: 2025-07-23
Payer: MEDICARE

## 2025-07-23 ENCOUNTER — HOSPITAL ENCOUNTER (OUTPATIENT)
Dept: GASTROENTEROLOGY | Facility: HOSPITAL | Age: 60
Discharge: HOME | End: 2025-07-23
Payer: MEDICARE

## 2025-07-23 VITALS
DIASTOLIC BLOOD PRESSURE: 59 MMHG | OXYGEN SATURATION: 98 % | WEIGHT: 199.52 LBS | RESPIRATION RATE: 16 BRPM | TEMPERATURE: 96.6 F | HEART RATE: 62 BPM | BODY MASS INDEX: 27.06 KG/M2 | SYSTOLIC BLOOD PRESSURE: 98 MMHG

## 2025-07-23 DIAGNOSIS — Z93.3 COLOSTOMY IN PLACE (MULTI): ICD-10-CM

## 2025-07-23 DIAGNOSIS — K50.813 CROHN'S DISEASE OF BOTH SMALL AND LARGE INTESTINE WITH FISTULA (MULTI): ICD-10-CM

## 2025-07-23 DIAGNOSIS — K50.813 CROHN'S DISEASE OF BOTH SMALL AND LARGE INTESTINE WITH FISTULA (MULTI): Primary | ICD-10-CM

## 2025-07-23 PROBLEM — N18.9 CHRONIC RENAL INSUFFICIENCY: Status: ACTIVE | Noted: 2025-07-23

## 2025-07-23 PROCEDURE — 44389 COLONOSCOPY WITH BIOPSY: CPT | Performed by: INTERNAL MEDICINE

## 2025-07-23 PROCEDURE — 7100000010 HC PHASE TWO TIME - EACH INCREMENTAL 1 MINUTE

## 2025-07-23 PROCEDURE — 7100000009 HC PHASE TWO TIME - INITIAL BASE CHARGE

## 2025-07-23 PROCEDURE — 3700000002 HC GENERAL ANESTHESIA TIME - EACH INCREMENTAL 1 MINUTE

## 2025-07-23 PROCEDURE — 3700000001 HC GENERAL ANESTHESIA TIME - INITIAL BASE CHARGE

## 2025-07-23 PROCEDURE — 2500000004 HC RX 250 GENERAL PHARMACY W/ HCPCS (ALT 636 FOR OP/ED): Performed by: NURSE ANESTHETIST, CERTIFIED REGISTERED

## 2025-07-23 RX ORDER — SODIUM CHLORIDE, SODIUM LACTATE, POTASSIUM CHLORIDE, CALCIUM CHLORIDE 600; 310; 30; 20 MG/100ML; MG/100ML; MG/100ML; MG/100ML
INJECTION, SOLUTION INTRAVENOUS CONTINUOUS PRN
Status: DISCONTINUED | OUTPATIENT
Start: 2025-07-23 | End: 2025-07-23

## 2025-07-23 RX ORDER — PROPOFOL 10 MG/ML
INJECTION, EMULSION INTRAVENOUS AS NEEDED
Status: DISCONTINUED | OUTPATIENT
Start: 2025-07-23 | End: 2025-07-23

## 2025-07-23 RX ADMIN — PROPOFOL 200 MG: 10 INJECTION, EMULSION INTRAVENOUS at 09:11

## 2025-07-23 RX ADMIN — SODIUM CHLORIDE, POTASSIUM CHLORIDE, SODIUM LACTATE AND CALCIUM CHLORIDE: 600; 310; 30; 20 INJECTION, SOLUTION INTRAVENOUS at 08:52

## 2025-07-23 RX ADMIN — PROPOFOL 200 MG: 10 INJECTION, EMULSION INTRAVENOUS at 08:57

## 2025-07-23 RX ADMIN — PROPOFOL 200 MG: 10 INJECTION, EMULSION INTRAVENOUS at 09:02

## 2025-07-23 SDOH — HEALTH STABILITY: MENTAL HEALTH: CURRENT SMOKER: 0

## 2025-07-23 ASSESSMENT — COLUMBIA-SUICIDE SEVERITY RATING SCALE - C-SSRS
2. HAVE YOU ACTUALLY HAD ANY THOUGHTS OF KILLING YOURSELF?: NO
6. HAVE YOU EVER DONE ANYTHING, STARTED TO DO ANYTHING, OR PREPARED TO DO ANYTHING TO END YOUR LIFE?: NO
1. IN THE PAST MONTH, HAVE YOU WISHED YOU WERE DEAD OR WISHED YOU COULD GO TO SLEEP AND NOT WAKE UP?: NO

## 2025-07-23 ASSESSMENT — PAIN - FUNCTIONAL ASSESSMENT
PAIN_FUNCTIONAL_ASSESSMENT: 0-10

## 2025-07-23 ASSESSMENT — PAIN SCALES - GENERAL
PAINLEVEL_OUTOF10: 0 - NO PAIN
PAIN_LEVEL: 0
PAINLEVEL_OUTOF10: 0 - NO PAIN
PAINLEVEL_OUTOF10: 0 - NO PAIN

## 2025-07-23 NOTE — NURSING NOTE
Pt stated his wife is his ride home and unable to pick him up till after 5 pm today. Charge nurse and oscopy coordinator notified. Pt stated unable to call anyone else for ride home, pt request to take an uber home, teaching done on discharge protocol and safety. Pt encouraged to try to call family or friends for a ride home.

## 2025-07-23 NOTE — Clinical Note
Ostomy bag removed.  Delaney on skin remved.  Area reddened and skin integrity broken quarter size inferior to ostomy site

## 2025-07-23 NOTE — Clinical Note
Patient tolerated procedure well. Appears comfortable with no complaints of pain. VS stable. Arousable prior to transport. Patient transported to [facility] via cart.  Report called              . Handoff completed

## 2025-07-23 NOTE — DISCHARGE INSTRUCTIONS
Patient Instructions after a Colonoscopy      The anesthetics, sedatives or narcotics which were given to you today will be acting in your body for the next 24 hours, so you might feel a little sleepy or groggy.  This feeling should slowly wear off. Carefully read and follow the instructions.     You received sedation today:  - Do not drive or operate any machinery or power tools of any kind.   - No alcoholic beverages today, not even beer or wine.  - Do not make any important decisions or sign any legal documents.  - No over the counter medications that contain alcohol or that may cause drowsiness.  - Do not make any important decisions or sign any legal documents.    While it is common to experience mild to moderate abdominal distention, gas, or belching after your procedure, if any of these symptoms occur following discharge from the GI Lab or within one week of having your procedure, call the [facility] to be advised whether a visit to your nearest Urgent Care or Emergency Department is indicated.  Take this paper with you if you go.     - If you develop an allergic reaction to the medications that were given during your procedure such as difficulty breathing, rash, hives, severe nausea, vomiting or lightheadedness.  - If you experience chest pain, shortness of breath, severe abdominal pain, fevers and chills.  -If you develop signs and symptoms of bleeding such as blood in your spit, if your stools turn black, tarry, or bloody  - If you have not urinated within 8 hours following your procedure.  - If your IV site becomes painful, red, inflamed, or looks infected.    If you received a biopsy/polypectomy/sphincterotomy the following instructions apply below:    __ Do not use Aspirin containing products, non-steroidal medications or anti-coagulants for one week following your procedure. (Examples of these types of medications are: Advil, Arthrotec, Aleve, Coumadin, Ecotrin, Heparin, Ibuprofen,  Indocin, Motrin, Naprosyn, Nuprin, Plavix, Vioxx, and Voltarin, or their generic forms.  This list is not all-inclusive.  Check with your physician or pharmacist before resuming medications.)   __ Eat a soft diet today.  Avoid foods that are poorly digested for the next 24 hours.  These foods would include: nuts, beans, lettuce, red meats, and fried foods. Start with liquids and advance your diet as tolerated, gradually work up to eating solids.   __ Do not have a Barium Study or Enema for one week.    Your physician recommends the additional following instructions:    -You have a contact number available for emergencies. The signs and symptoms of potential delayed complications were discussed with you. You may return to normal activities tomorrow.  -Resume your previous diet.  -Continue your present medications.   -We are waiting for your pathology results.  -Your physician has recommended a repeat colonoscopy (date to be determined after pending pathology results are reviewed) for surveillance based on pathology results.  -The findings and recommendations have been discussed with you.  -The findings and recommendations were discussed with your family.  - Please see Medication Reconciliation Form for new medication/medications prescribed.       If you experience any problems or have any questions following discharge from the GI Lab, please call:  Before 5p.m.  [phone]  After 5p.m.    [phone]    Nurse Signature                                                                        Date___________________                                                                            Patient/Responsible Party Signature                                        Date___________________    Physician phone number provided to patient

## 2025-07-23 NOTE — H&P
Outpatient Hospital Procedure H&P    Patient Profile-Procedures  Initial Info  Patient Demographics  Name Jose Thornton  Date of Birth 1965  MRN [id number]  Address   [address]  [facility] [id number] [address]  [place] [address]    Primary Phone Number [phone]  Secondary Phone Number    PCP Generic Provider, No Assigned Pcp    Procedure(s):  Colonoscopy through stoma  Primary contact name and number   Extended Emergency Contact Information  Primary Emergency Contact: Lizzette Thornton  Home Phone: [phone]  Relation: Spouse    General Health  Weight   Vitals:    07/23/25 0744   Weight: 90.5 kg (199 lb 8.3 oz)     BMI Body mass index is 27.06 kg/m².    Allergies  RX Allergies[1]    Past Medical History   Medical History[2]    Provider assessment  Diagnosis: Crohn's disease    Medication Reviewed - yes  Prior to Admission medications   Medication Sig Start Date End Date Taking? Authorizing Provider   ascorbic acid (Vitamin C) 500 mg tablet Take 1 tablet (500 mg) by mouth once daily. 7/3/25 9/1/25 Yes Brown Argueta MD   ferrous gluconate (Fergon) 324 mg (38 mg elemental) tablet 1 po BID with food  Patient taking differently: once daily with breakfast. 1 po BID with food 6/27/25  Yes Gopi Stoll DO   tamsulosin (Flomax) 0.4 mg 24 hr capsule Take 1 capsule (0.4 mg) by mouth once daily. 5/29/25  Yes TRAE Taylor   gentamicin (Garamycin) 0.1 % cream Apply topically to affected area(s) once daily.  Patient not taking: Reported on 7/23/2025 6/5/25   Roland J Reyes, MD   HYDROcodone-acetaminophen (Norco) 5-325 mg tablet Take 1 tablet by mouth every 6 hours if needed for severe pain (7 - 10).  Patient not taking: Reported on 7/23/2025 6/24/25   TRAE Baxter       Physical Exam  Vitals:    07/23/25 0744   BP: 103/64   Pulse: 64   Resp: 18   Temp: 36.3 °C (97.3 °F)   SpO2: 98%        General: A&Ox3, NAD.  CV: RRR. No murmur.  Resp: CTA bilaterally. No  wheezing, rhonchi or rales.   Extrem: No edema.       Oropharyngeal Classification II (hard and soft palate, upper portion of tonsils and uvula visible)  ASA PS Classification 3  Sedation Plan Deep  Procedure Plan - pre-procedural (re)assesment completed by physician:  discharge/transfer patient when discharge criteria met    Nick Yi MD  7/23/2025 7:48 AM           [1] No Known Allergies  [2]   Past Medical History:  Diagnosis Date    Anemia 3/9/2019    Autoimmune disorder (Multi)     Crohns    Chronic kidney disease 5/7/2020    Colon polyp 2023    Crohn's disease (Multi)     Diverticulosis     Dizziness     Fissure, anal 8/20/21    GERD (gastroesophageal reflux disease)     GI (gastrointestinal bleed)     Joint pain     Kidney stone 5/5/25    PONV (postoperative nausea and vomiting) 2023    Long surgery, took a minute for intestines to wake up.    Skin disorder     followed with wound care for colostomy site care    Urinary tract infection 6/22/25    Vertigo

## 2025-07-23 NOTE — Clinical Note
Surrounding ostomy area cleaned and new bag applied.  Skin barrier protectant applied.  Adhesive used to secure new bag.  Items used from patient ostomy supply bag.

## 2025-07-23 NOTE — ANESTHESIA PREPROCEDURE EVALUATION
Jose Thornton is a 59 y.o. male here for:    Colonoscopy  With Nick Yi MD  Crohn's disease of both small and large intestine with fistula (Multi)    Relevant Problems   Neuro   (+) Anxiety due to invasive procedure      GI   (+) Crohn's disease (Multi)      /Renal   (+) Benign localized prostatic hyperplasia with lower urinary tract symptoms (LUTS)   (+) Chronic renal insufficiency   (+) Hydronephrosis determined by ultrasound   (+) Urinary tract infection without hematuria, site unspecified      Hematology   (+) Anemia      ID   (+) Urinary tract infection without hematuria, site unspecified       Lab Results   Component Value Date    HGB 10.2 (L) 06/20/2025    HCT 35.7 (L) 06/20/2025    WBC 9.0 06/20/2025     (H) 06/20/2025     06/20/2025    K 4.8 06/20/2025     06/20/2025    CREATININE 2.15 (H) 06/20/2025    BUN 17 06/20/2025       Tobacco Use History[1]    RX Allergies[2]    Current Outpatient Medications   Medication Instructions   • ferrous gluconate (Fergon) 324 mg (38 mg elemental) tablet 1 po BID with food   • gentamicin (Garamycin) 0.1 % cream Apply topically to affected area(s) once daily.   • HYDROcodone-acetaminophen (Norco) 5-325 mg tablet 1 tablet, oral, Every 6 hours PRN   • tamsulosin (FLOMAX) 0.4 mg, oral, Daily   • Vitamin C 500 mg, oral, Daily       Surgical History[3]    Family History[4]    NPO Details:  No data recorded    Physical Exam  Airway  Mallampati: II  TM distance: >3 FB  Neck ROM: full    Cardiovascular - normal exam  Dental   Pulmonary   Neurological   Abdominal         Anesthesia Plan    History of general anesthesia?: yes  History of complications of general anesthesia?: no    ASA 3     MAC     The patient is not a current smoker.    intravenous induction   Anesthetic plan and risks discussed with patient.    Plan discussed with CRNA.               [1]  Social History  Tobacco Use   Smoking Status Never   Smokeless Tobacco Never   [2]  No Known  Allergies  [3]  Past Surgical History:  Procedure Laterality Date   • AMPUTATION     • BLADDER SURGERY N/A 1/1 /23   • COLON SURGERY  2022   • COLONOSCOPY     • COLONOSCOPY W/ POLYPECTOMY N/A    • COLOSTOMY N/A    • CT GUIDED PERCUTANEOUS PERITONEAL OR RETROPERITONEAL FLUID COLLECTION DRAINAGE  09/28/2022    CT GUIDED PERCUTANEOUS PERITONEAL OR RETROPERITONEAL FLUID COLLECTION DRAINAGE 9/28/20[place] [facility]   • IR CVC NONTUNNELED  11/06/2023    IR CVC NONTUNNELED 11/6/2023 Drew Terry [place] [facility]   • IR NEPHROSTOMY TUBE EXCHANGE  01/31/2023    IR NEPHROSTOMY TUBE EXCHANGE 1/31/2023 [facility]   • IR NEPHROSTOMY TUBE EXCHANGE  01/31/2023    IR NEPHROSTOMY TUBE EXCHANGE 1/31/2023 [facility] OFFICE [facility]GAC[facility]   • IR NEPHROSTOMY TUBE EXCHANGE  03/23/2023    IR NEPHROSTOMY TUBE EXCHANGE [facility]   • IR NEPHROSTOMY TUBE EXCHANGE  03/23/2023    IR NEPHROSTOMY TUBE EXCHANGE [facility]   • IR NEPHROSTOMY TUBE EXCHANGE  03/30/2023    IR NEPHROSTOMY TUBE EXCHANGE [facility]   • IR NEPHROSTOMY TUBE EXCHANGE  04/25/2023    IR NEPHROSTOMY TUBE EXCHANGE [facility]   • IR NEPHROSTOMY TUBE EXCHANGE  06/30/2023    IR NEPHROSTOMY TUBE EXCHANGE 6/30/2023 [facility]   • IR NEPHROSTOMY TUBE EXCHANGE  06/30/2023    IR NEPHROSTOMY TUBE EXCHANGE 6/30/2023 [facility]   • IR NEPHROSTOMY TUBE EXCHANGE  08/25/2023    IR NEPHROSTOMY TUBE EXCHANGE 8/25/2023 [facility]   • IR NEPHROSTOMY TUBE EXCHANGE  08/25/2023    IR NEPHROSTOMY TUBE EXCHANGE 8/25/2023 [facility]   • IR NEPHROSTOMY TUBE EXCHANGE  10/18/2023    IR NEPHROSTOMY TUBE EXCHANGE 10/18/2023 Onesimo Reyes [place] [facility] [facility]   • SMALL INTESTINE SURGERY  2022 / 2023   [4]  Family History  Problem Relation Name Age of Onset   • Cancer Brother Ok    • Alcohol abuse Maternal Grandfather Jelani Thornton    • Cancer Brother Scott Thornton

## 2025-07-23 NOTE — ANESTHESIA POSTPROCEDURE EVALUATION
Patient: Jose Thornton    Procedure Summary       Date: 07/23/25 Room / Location: [facility]    Anesthesia Start: 0852 Anesthesia Stop:     Procedure: COLONOSCOPY Diagnosis: Crohn's disease of both small and large intestine with fistula (Multi)    Scheduled Providers: Nick Yi MD; Tristian Galicia DO; Doretha Carroll RN; Geetha Malcolm Responsible Provider: Tristian Galicia DO    Anesthesia Type: MAC ASA Status: 3            Anesthesia Type: MAC    Vitals Value Taken Time   /84 07/23/25 09:20   Temp 36.5 07/23/25 09:20   Pulse 57 07/23/25 09:20   Resp 18 07/23/25 09:20   SpO2 100 07/23/25 09:20       Anesthesia Post Evaluation    Patient location during evaluation: bedside  Patient participation: complete - patient participated  Level of consciousness: awake and alert  Pain score: 0  Pain management: adequate  Airway patency: patent  Cardiovascular status: acceptable and stable  Respiratory status: acceptable and room air  Hydration status: acceptable  Postoperative Nausea and Vomiting: none        No notable events documented.

## 2025-07-23 NOTE — Clinical Note
Huddle and Timeout completed together with team. Patient wristband and MAHNAZ information verified.  Anesthesia safety check completed. Patient was alert and oriented x4

## 2025-07-25 ENCOUNTER — HOSPITAL ENCOUNTER (OUTPATIENT)
Dept: RADIOLOGY | Facility: HOSPITAL | Age: 60
Discharge: HOME | End: 2025-07-25
Payer: MEDICARE

## 2025-07-25 DIAGNOSIS — M85.9 LOW BONE DENSITY FOR AGE: ICD-10-CM

## 2025-07-25 DIAGNOSIS — M85.842 OTHER SPECIFIED DISORDERS OF BONE DENSITY AND STRUCTURE, LEFT HAND: ICD-10-CM

## 2025-07-25 PROCEDURE — 77080 DXA BONE DENSITY AXIAL: CPT | Performed by: RADIOLOGY

## 2025-07-25 PROCEDURE — 77080 DXA BONE DENSITY AXIAL: CPT

## 2025-07-29 LAB
LABORATORY COMMENT REPORT: NORMAL
PATH REPORT.FINAL DX SPEC: NORMAL
PATH REPORT.GROSS SPEC: NORMAL
PATH REPORT.RELEVANT HX SPEC: NORMAL
PATH REPORT.TOTAL CANCER: NORMAL

## 2025-07-30 PROCEDURE — RXMED WILLOW AMBULATORY MEDICATION CHARGE

## 2025-07-31 ENCOUNTER — APPOINTMENT (OUTPATIENT)
Dept: PRIMARY CARE | Facility: CLINIC | Age: 60
End: 2025-07-31
Payer: MEDICARE

## 2025-07-31 VITALS
OXYGEN SATURATION: 98 % | DIASTOLIC BLOOD PRESSURE: 70 MMHG | SYSTOLIC BLOOD PRESSURE: 113 MMHG | WEIGHT: 205 LBS | HEIGHT: 72 IN | HEART RATE: 108 BPM | BODY MASS INDEX: 27.77 KG/M2

## 2025-07-31 DIAGNOSIS — T14.8XXA NONHEALING NONSURGICAL WOUND: ICD-10-CM

## 2025-07-31 DIAGNOSIS — K50.913 CROHN'S DISEASE WITH FISTULA, UNSPECIFIED GASTROINTESTINAL TRACT LOCATION: ICD-10-CM

## 2025-07-31 DIAGNOSIS — N13.30 HYDRONEPHROSIS DETERMINED BY ULTRASOUND: ICD-10-CM

## 2025-07-31 DIAGNOSIS — D89.89 AUTOIMMUNE DISORDER (MULTI): Primary | ICD-10-CM

## 2025-07-31 DIAGNOSIS — D64.9 ANEMIA, UNSPECIFIED TYPE: ICD-10-CM

## 2025-07-31 DIAGNOSIS — Z79.899 DRUG THERAPY: ICD-10-CM

## 2025-07-31 DIAGNOSIS — N18.32 CKD STAGE 3B, GFR 30-44 ML/MIN (MULTI): ICD-10-CM

## 2025-07-31 DIAGNOSIS — D50.9 IRON DEFICIENCY ANEMIA, UNSPECIFIED IRON DEFICIENCY ANEMIA TYPE: ICD-10-CM

## 2025-07-31 DIAGNOSIS — Z93.3 COLOSTOMY IN PLACE (MULTI): ICD-10-CM

## 2025-07-31 DIAGNOSIS — Z13.9 SCREENING FOR CONDITION: ICD-10-CM

## 2025-07-31 PROCEDURE — G2211 COMPLEX E/M VISIT ADD ON: HCPCS | Performed by: FAMILY MEDICINE

## 2025-07-31 PROCEDURE — G8433 SCR FOR DEP NOT CPT DOC RSN: HCPCS | Performed by: FAMILY MEDICINE

## 2025-07-31 PROCEDURE — 99214 OFFICE O/P EST MOD 30 MIN: CPT | Performed by: FAMILY MEDICINE

## 2025-07-31 PROCEDURE — 3008F BODY MASS INDEX DOCD: CPT | Performed by: FAMILY MEDICINE

## 2025-07-31 ASSESSMENT — ENCOUNTER SYMPTOMS
DEPRESSION: 0
OCCASIONAL FEELINGS OF UNSTEADINESS: 0
LOSS OF SENSATION IN FEET: 0

## 2025-07-31 NOTE — PROGRESS NOTES
Subjective   Patient ID: Jose Thornton is a 59 y.o. male who presents for 1 Month FU (Pt in today for routine 1 month FU).    Review of Systems  Denies N/V, no HA/S/V, has nonhealing wound near ostomy site, no CP/SOB. Denies fevers/chills. Positive for dizzyness and weakness.   All other systems were negative.     Objective   /70 (BP Location: Right arm, Patient Position: Sitting, BP Cuff Size: Adult)   Pulse 108   Ht 1.829 m (6')   Wt 93 kg (205 lb)   SpO2 98%   BMI 27.80 kg/m²     Physical Exam  Gen: NAD  eyes: EOMI, PERRLA  ENT: hearing grossly intact, no nasal discharge  resp: CTABL, without R/R  heart: RRR without MRG  GI: abd: S/ND/NT, BS+  lymph: no axillary, cervical, supraclavicular lymphadenopathy noted   MS: gait grossly WNL,  derm: no rashes or lesions noted  neuro: CN II-XII grossly intact  psych: A&Ox3    Assessment/Plan   Problem List Items Addressed This Visit           ICD-10-CM    Anemia D64.9    Relevant Orders    CBC    Iron and TIBC    Ferritin    Vitamin B12    Folate    Colostomy in place (Multi) Z93.3    Crohn's disease (Multi) K50.90    Nonhealing nonsurgical wound T14.8XXA    Drug therapy Z79.899    Screening for condition Z13.9    CKD stage 3b, GFR 30-44 ml/min (Multi) N18.32    Hydronephrosis determined by ultrasound N13.30     Other Visit Diagnoses         Codes      Autoimmune disorder (Multi)    -  Primary D89.89    Relevant Orders    Referral to Immunology              2026 will be next will be next W and annual preventative visit    Lab review today.    ------    Immunization counseling: Due for annual flu shot, latest COVID booster, Shingrix series, Tdap. -->> Check with your pharmacy to keep up-to-date on immunizations.    Screening for prostate cancer: PSA 1.94, normal. Will do PSA screening annually.    Screening for colon cancer: Patient sees gastroenterology/Dr. Yi.    Screening for hepatitis C was nonreactive.    Overweight, BMI 28. -->>  Work to limit  intake of simple carbohydrates, things like bread, pasta, potatoes, rice, sugars etc. patient is also increasing exercise.      -------      Regarding previous labs:    Sept: anemia panel,     -Drug therapy, screening for conditions: Last A1c was 5.1, so no diabetes. Cholesterol numbers are excellent on no meds. -->> next annual labs around June 2026.    - Chronic kidney disease, stage IIIb. Sees nephrology/Dr. Bella.  Follow-up as planned.    - Iron deficiency Anemia with history of iron deficiency, possibly of chronic disease. B12 and folate WNL. -->>  Eat 3-5 extra servings of iron rich foods every week. Will start on ferrous gluconate (previously well tolderate). Recheck around sept.    - UTI, on ABX from ER. Is seeing urology for f/up.    - Vitamin D deficiency, last vitamin D was 82, was 88, we like it between 75 and 120, so we would call it perfect.  Managed by nephrology/Dr. Bella.  He is on a vitamin D supplement, not sure of the dose. -->> Follow-up as planned.      urinary retention, nephrolithiasis, Frequent UTIs 2/2 retention. Is seeing Urology/Dr. Baldwin and nephrology/Dr. Bella. Has had numerous urologic surgeries/procedures. Notes some benefit regarding urinary retention from tamsulosin.  Patient declines self caths.  Has improved since he has been wearing a [place] catheter and taking several herbal treatments.     Crohn's disease, nonhealing wound, periumbilical hernia, has colostomy in place.   Notes less abdominal pain and swelling/bloating, flareups since he has been on ivermectin/fenbendazole.  He is also going to be starting a black walnut pollen extract that he had previously taken with some success. Nonhealing wound adjacent to ostomy site, much improved post lidocaine/epinephrine and triamcinolone injections, along with using the nasal steroid spray as a topical. No longer seeing wound management. -->>  Follow-up with gastroenterology, and general surgery as planned.    Osteopenia,  demineralized bones noted incidentally on hand Xray.  Had DEXA scan, noted to have osteopenia.  Patient declines alendronate. -->> will defer repeating DEXA in July 2027.    Autoimmune disease, would like to discuss testing regarding immune system. -->>  Refer to allergy/immunology for further evaluation and treatment.    - Patient will return in about 2 months for continuing and follow-up.  On immunology referral, symptoms with treatments, etc.  - Patient will return here about a week before the appointment to get anemia panel drawn.

## 2025-07-31 NOTE — PATIENT INSTRUCTIONS
2026 will be next will be next [facility] and annual preventative visit    Lab review today.    ------    Immunization counseling: Due for annual flu shot, latest COVID booster, Shingrix series, Tdap. -->> Check with your pharmacy to keep up-to-date on immunizations.    Screening for prostate cancer: PSA 1.94, normal. Will do PSA screening annually.    Screening for colon cancer: Patient sees gastroenterology/Dr. Yi.    Screening for hepatitis C was nonreactive.    Overweight, BMI 28. -->>  Work to limit intake of simple carbohydrates, things like bread, pasta, potatoes, rice, sugars etc. patient is also increasing exercise.      -------      Regarding previous labs:    Sept: anemia panel,     -Drug therapy, screening for conditions: Last A1c was 5.1, so no diabetes. Cholesterol numbers are excellent on no meds. -->> next annual labs around June 2026.    - Chronic kidney disease, stage IIIb. Sees nephrology/Dr. Bella.  Follow-up as planned.    - Iron deficiency Anemia with history of iron deficiency, possibly of chronic disease. B12 and folate WNL. -->>  Eat 3-5 extra servings of iron rich foods every week. Will start on ferrous gluconate (previously well tolderate). Recheck around sept.    - UTI, on ABX from ER. Is seeing urology for f/up.    - Vitamin D deficiency, last vitamin D was 82, was 88, we like it between 75 and 120, so we would call it perfect.  Managed by nephrology/Dr. Bella.  He is on a vitamin D supplement, not sure of the dose. -->> Follow-up as planned.      urinary retention, nephrolithiasis, Frequent UTIs 2/2 retention. Is seeing Urology/Dr. Baldwin and nephrology/Dr. Bella. Has had numerous urologic surgeries/procedures. Notes some benefit regarding urinary retention from tamsulosin.  Patient declines self caths.  Has improved since he has been wearing a [place] catheter and taking several herbal treatments.     Crohn's disease, nonhealing wound, periumbilical hernia, has colostomy in place.    Notes less abdominal pain and swelling/bloating, flareups since he has been on ivermectin/fenbendazole.  He is also going to be starting a black walnut pollen extract that he had previously taken with some success. Nonhealing wound adjacent to ostomy site, much improved post lidocaine/epinephrine and triamcinolone injections, along with using the nasal steroid spray as a topical. No longer seeing wound management. -->>  Follow-up with gastroenterology, and general surgery as planned.    Osteopenia, demineralized bones noted incidentally on hand Xray.  Had DEXA scan, noted to have osteopenia.  Patient declines alendronate. -->> will defer repeating DEXA in July 2027.    Autoimmune disease, would like to discuss testing regarding immune system. -->>  Refer to allergy/immunology for further evaluation and treatment.    - Patient will return in about 2 months for continuing and follow-up.  On immunology referral, symptoms with treatments, etc.  - Patient will return here about a week before the appointment to get anemia panel drawn.

## 2025-08-04 ENCOUNTER — PHARMACY VISIT (OUTPATIENT)
Dept: PHARMACY | Facility: CLINIC | Age: 60
End: 2025-08-04
Payer: COMMERCIAL

## 2025-08-06 ENCOUNTER — TELEPHONE (OUTPATIENT)
Dept: GASTROENTEROLOGY | Facility: CLINIC | Age: 60
End: 2025-08-06
Payer: MEDICARE

## 2025-08-06 PROBLEM — R10.9 ABDOMINAL PAIN: Status: ACTIVE | Noted: 2025-08-06

## 2025-08-06 PROBLEM — N17.9 ACUTE RENAL FAILURE SUPERIMPOSED ON CHRONIC KIDNEY DISEASE: Status: ACTIVE | Noted: 2025-08-06

## 2025-08-06 PROBLEM — N30.80: Status: ACTIVE | Noted: 2025-08-06

## 2025-08-06 PROBLEM — G47.00 INSOMNIA: Status: RESOLVED | Noted: 2023-09-26 | Resolved: 2025-08-06

## 2025-08-06 PROBLEM — I80.8 THROMBOPHLEBITIS OF UPPER EXTREMITY: Status: ACTIVE | Noted: 2025-08-06

## 2025-08-06 PROBLEM — N32.2 BLADDER FISTULA: Status: ACTIVE | Noted: 2025-08-06

## 2025-08-06 PROBLEM — K61.1 PERIRECTAL ABSCESS: Status: ACTIVE | Noted: 2025-08-06

## 2025-08-06 PROBLEM — L02.91 ABSCESS: Status: ACTIVE | Noted: 2025-08-06

## 2025-08-06 PROBLEM — D50.0 ANEMIA DUE TO CHRONIC BLOOD LOSS: Status: ACTIVE | Noted: 2025-08-06

## 2025-08-06 PROBLEM — E86.0 DEHYDRATION: Status: RESOLVED | Noted: 2023-11-28 | Resolved: 2025-08-06

## 2025-08-06 PROBLEM — D64.9 ANEMIA: Status: RESOLVED | Noted: 2023-09-26 | Resolved: 2025-08-06

## 2025-08-06 PROBLEM — Z96.0 RETAINED URETERAL STENT: Status: ACTIVE | Noted: 2025-08-06

## 2025-08-06 PROBLEM — N18.9 ACUTE RENAL FAILURE SUPERIMPOSED ON CHRONIC KIDNEY DISEASE: Status: ACTIVE | Noted: 2025-08-06

## 2025-08-06 PROBLEM — A41.9 SEPSIS (MULTI): Status: ACTIVE | Noted: 2025-08-06

## 2025-08-06 PROBLEM — R19.8 RECTAL DISCHARGE: Status: ACTIVE | Noted: 2025-08-06

## 2025-08-06 PROBLEM — R50.9 FEVER: Status: ACTIVE | Noted: 2025-08-06

## 2025-08-06 PROBLEM — R30.0 DYSURIA: Status: ACTIVE | Noted: 2025-08-06

## 2025-08-06 PROBLEM — M51.26 LUMBAR HERNIATED DISC: Status: ACTIVE | Noted: 2025-08-06

## 2025-08-06 PROBLEM — N32.89 HYPERTROPHY OF BLADDER: Status: ACTIVE | Noted: 2025-08-06

## 2025-08-06 PROBLEM — G89.18 POSTOPERATIVE PAIN: Status: ACTIVE | Noted: 2025-08-06

## 2025-08-06 NOTE — TELEPHONE ENCOUNTER
Left voicemail with patient reminding them of their upcoming appointment with us scheduled for Thursday, 7 August at 11:45am with Dr. Yi.

## 2025-08-07 ENCOUNTER — APPOINTMENT (OUTPATIENT)
Dept: GASTROENTEROLOGY | Facility: CLINIC | Age: 60
End: 2025-08-07
Payer: MEDICARE

## 2025-08-07 VITALS
WEIGHT: 204 LBS | SYSTOLIC BLOOD PRESSURE: 98 MMHG | TEMPERATURE: 97.7 F | BODY MASS INDEX: 27.63 KG/M2 | DIASTOLIC BLOOD PRESSURE: 65 MMHG | HEIGHT: 72 IN | OXYGEN SATURATION: 98 % | HEART RATE: 83 BPM

## 2025-08-07 DIAGNOSIS — K50.813 CROHN'S DISEASE OF BOTH SMALL AND LARGE INTESTINE WITH FISTULA (MULTI): Primary | ICD-10-CM

## 2025-08-07 PROCEDURE — 3008F BODY MASS INDEX DOCD: CPT | Performed by: INTERNAL MEDICINE

## 2025-08-07 PROCEDURE — 1036F TOBACCO NON-USER: CPT | Performed by: INTERNAL MEDICINE

## 2025-08-07 PROCEDURE — 99213 OFFICE O/P EST LOW 20 MIN: CPT | Performed by: INTERNAL MEDICINE

## 2025-08-07 NOTE — ASSESSMENT & PLAN NOTE
Patient to call to schedule colonoscopy for disease activity/mucosal healing in January.  I suggested seeing functional medicine at [facility].

## 2025-08-07 NOTE — PROGRESS NOTES
"Gastroenterology Office Visit     History of Present Illness:   Since last OV in 6/25, patient has had a colonoscopy for disease activity; see below for complete results.     Wants to continue with natural tx, including ivermectin, fenbendazole.  Wants a prescription for niclosamide which is not commercially available in the [place].   Feels better since starting anti-parasite tx, less abdominal distention and pain.    Wants testing for SIBO, urine tests for mold, microbiome testing and CD4/CD8 counts.  He will see immunology soon.     OV in 6/25:  Jose Thornton is a 59 y.o. male who presents to GI clinic for follow up of Crohn's disease.  Since last OV in 8/23, patient has had MR enterography; see below for results.      The plan was to start IFX after his surgery in 10/23, but he has been lost to GI follow up since.  He has not been on tx for Crohn's.      Also since last visit,  (per colorectal surgery notes) he underwent an ex lap and Bilateral ureteral reimplant, rectovesical fistula repair with Dr. Tomas Andersen and Dr. 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 He was then hospitalized 10/28/23 to 11/17/23 at [facility] for gentamicin toxicity, requiring 3 days of hemodialysis, nephrostomy tubes were capped prior to discharge. A second hospitalization from 11/28/23 to 12/13/23 at [facility] for Right ureter UTI, KADE on CKD, hypotension, and infection in the setting of possible misplaced right nephrostomy tube. He was tx with IV antibiotics and discharged home. He was noted to not be taking any treatment for Crohn's with CNP Anuradha 1/11/24, as he was \"feeling well.\" Nephrostomy tubes removed 1/15/24. He was referred by Dr. Alex for evaluation of colostomy reversal and periumbilical hernia.  Dr. Gonzalez is recommending against reversal until his Crohn's is addressed medically.  He has seen general surgery as well about the ventral hernia.     He reports emptying his ostomy 12x/d, but he empties it every time he urinates, " and he never lets the stoma get full.  Had 3 episodes of blood in the stoma 3 weeks ago-twice very little, but once quite a bit. This alarmed his wife.  Has not happened again.  Wt has been stable.    OV in 8/23 with me:  Has surgery scheduled for 10/11/23. Since his last visit with me, some of his symptoms have returned. He gets a sharp/stabbing pain around the stoma intermittently throughout the day. He gets generalized abdominal pain after eating. He reports more frequent discharge/drainage from the rectal area, and occasionally sees blood when wiping this. He complains of chronic fatigue, low-grade fever and chills mouth sores and joint pains. His appetite has decreased.     OV in 7/23:  Since last OV with me, he underwent colonoscopy through the stoma; see below for results. Has appt with Dr. Rodriguez in colorectal surgery on 7/25. Has urologic reconstruction scheduled for 10/11/23. Still having occasional crampy abdominal pain.         OV with me in 6/23:  This is a 57 year old M who was initially admitted to [facility] in 9/22 for a colovesical fistula and intra-abdominal abscess- transferred to [facility]. Crohn's disease was suspected due to a concomitant perianal fistula. He underwent percutaneous drainage of the abscess by IR and placement of b/l nephrostomy tubes. Subsequently, he underwent elective anterior proctosigmoidectomy, takedown of a colovesical fistula, and end colostomy in 12/22 with Dr. Rodriguez. Surgical pathology confirmed Crohn's disease (transmural inflammation of the sigmoid colon with CAC, no obvious diverticula in the segment). He has a colonoscopy scheduled with me next week to evaluate the rest of the intestine, and wants to discuss the prep.     His sx's began > 5 years ago. He noticed drainage from a perianal fistula. He had diarrhea, wt loss and bleeding. He tried to manage his sx's with homeopathic medicine, and diet. He would improve for a short time, then his sx's would worsen. He had  significant diarrhea, and wt loss when he presented in 9/22. Wt decreased from baseline of 200-210 to 150. His wt has increased to 237 after surgery. He had pneumaturia when he presented.     Review of Systems  Constitutional: denies fever/ chills, night sweats, wt loss and fatigue  Respiratory: denies SOB, BOSS  CV: denies chest pain and LE edema  Neuro: denies weakness and difficulty walking      Past Medical History   has a past medical history of Anemia (3/9/2019), Chronic kidney disease (5/7/2020), Colon polyp (2023), Crohn's disease (Multi), Diverticulosis, Fissure, anal (8/20/21), GERD (gastroesophageal reflux disease), GI (gastrointestinal bleed), PONV (postoperative nausea and vomiting) (2023), and Urinary tract infection.     Problem List  [Problem List]    [Problem List]  Patient Active Problem List  Diagnosis    Stage 1 chronic kidney disease    Diverticulitis, colon    Colovesical fistula    Anemia    Anxiety due to invasive procedure    Bladder spasms    Colostomy in place (Multi)    Crohn's disease (Multi)    Insomnia    Ureter injury    Dehydration    Urinary tract infection without hematuria, site unspecified    Nonhealing nonsurgical wound    Drug therapy    Screening for condition    Screening for prostate cancer    CKD stage 3b, GFR 30-44 ml/min (Multi)       Past Surgical History  [Surgical History]    [Surgical History]  Past Surgical History  Procedure Laterality Date    AMPUTATION      BLADDER SURGERY N/A     COLON SURGERY  2022    COLONOSCOPY W/ POLYPECTOMY N/A     COLOSTOMY N/A     CT GUIDED PERCUTANEOUS PERITONEAL OR RETROPERITONEAL FLUID COLLECTION DRAINAGE  09/28/2022    CT GUIDED PERCUTANEOUS PERITONEAL OR RETROPERITONEAL FLUID COLLECTION DRAINAGE 9/28/2022 [facility] INPATIENT LEGACY    IR CVC NONTUNNELED  11/06/2023    IR CVC NONTUNNELED 11/6/2023 Drew Terry MD [facility] ANGIO    IR NEPHROSTOMY TUBE EXCHANGE  01/31/2023    IR NEPHROSTOMY TUBE EXCHANGE 1/31/2023 [facility] LEGAC[facility]    IR  NEPHROSTOMY TUBE EXCHANGE  01/31/2023    IR NEPHROSTOMY TUBE EXCHANGE 1/31/2023 [facility]CTOR OFFICE LEGACY    IR NEPHROSTOMY TUBE EXCHANGE  03/23/2023    IR NEPHROSTOMY TUBE EXCHANGE [facility]GIO    IR NEPHROSTOMY TUBE EXCHANGE  03/23/2023    IR NEPHROSTOMY TUBE EXCHANGE [facility]GIO    IR NEPHROSTOMY TUBE EXCHANGE  03/30/2023    IR NEPHROSTOMY TUBE EXCHANGE [facility]GIO    IR NEPHROSTOMY TUBE EXCHANGE  04/25/2023    IR NEPHROSTOMY TUBE EXCHANGE [facility]GIO    IR NEPHROSTOMY TUBE EXCHANGE  06/30/2023    IR NEPHROSTOMY TUBE EXCHANGE 6/30/2023 [facility]GIO    IR NEPHROSTOMY TUBE EXCHANGE  06/30/2023    IR NEPHROSTOMY TUBE EXCHANGE 6/30/2023 [facility]GIO    IR NEPHROSTOMY TUBE EXCHANGE  08/25/2023    IR NEPHROSTOMY TUBE EXCHANGE 8/25/2023 [facility] ANGIO    IR NEPHROSTOMY TUBE EXCHANGE  08/25/2023    IR NEPHROSTOMY TUBE EXCHANGE 8/25/2023 [facility]GIO    IR NEPHROSTOMY TUBE EXCHANGE  10/18/2023    IR NEPHROSTOMY TUBE EXCHANGE 10/18/2023 Onesimo Reyes MD [facility]GIO    SMALL INTESTINE SURGERY  2022 / 2023       Social History   reports that he has never smoked. He has never used smokeless tobacco. He reports current alcohol use of about 1.0 standard drink of alcohol per week. He reports that he does not use drugs.     Family History  family history includes Alcohol abuse in his maternal grandfather; Cancer in his brother and brother.       Allergies  [RX Allergies]    [RX Allergies]  Allergies  No Known Allergies    Medications  Medications Ordered Prior to Encounter[1]         Objective   BP 98/65   Pulse 83   Temp 36.5 °C (97.7 °F) (Temporal)   Ht 1.829 m (6')   Wt 92.5 kg (204 lb)   SpO2 98%   BMI 27.67 kg/m²           LABS  Component      Latest Ref Rng 5/14/2025   WBC      4.4 - 11.3 x10*3/uL 8.6    nRBC      0.0 - 0.0 /100 WBCs 0.0    RBC      4.50 - 5.90 x10*6/uL 4.27 (L)    HEMOGLOBIN      13.5 - 17.5 g/dL 9.9 (L)    HEMATOCRIT      41.0 - 52.0 % 32.5 (L)    MCV      80 - 100 fL 76 (L)    MCH      26.0 - 34.0 pg 23.2 (L)    MCHC       32.0 - 36.0 g/dL 30.5 (L)    RED CELL DISTRIBUTION WIDTH      11.5 - 14.5 % 19.5 (H)    Platelets      150 - 450 x10*3/uL 448    Neutrophils %      40.0 - 80.0 % 73.7    Immature Granulocytes %, Automated      0.0 - 0.9 % 0.3    Lymphocytes %      13.0 - 44.0 % 14.5    Monocytes %      2.0 - 10.0 % 7.2    Eosinophils %      0.0 - 6.0 % 3.7    Basophils %      0.0 - 2.0 % 0.6    Neutrophils Absolute      1.20 - 7.70 x10*3/uL 6.32    Immature Granulocytes Absolute, Automated      0.00 - 0.70 x10*3/uL 0.03    Lymphocytes Absolute      1.20 - 4.80 x10*3/uL 1.24    Monocytes Absolute      0.10 - 1.00 x10*3/uL 0.62    Eosinophils Absolute      0.00 - 0.70 x10*3/uL 0.32    Basophils Absolute      0.00 - 0.10 x10*3/uL 0.05    GLUCOSE      74 - 99 mg/dL 121 (H)    SODIUM      136 - 145 mmol/L 138    POTASSIUM      3.5 - 5.3 mmol/L 4.1    CHLORIDE      98 - 107 mmol/L 108 (H)    Bicarbonate      21 - 32 mmol/L 22    Anion Gap      10 - 20 mmol/L 12    Blood Urea Nitrogen      6 - 23 mg/dL 24 (H)    Creatinine      0.50 - 1.30 mg/dL 2.22 (H)    EGFR      >60 mL/min/1.73m*2 34 (L)    Calcium      8.6 - 10.3 mg/dL 8.3 (L)    Albumin      3.4 - 5.0 g/dL 3.4    Alkaline Phosphatase      33 - 120 U/L 35    Total Protein      6.4 - 8.2 g/dL 6.7    AST      9 - 39 U/L 9    Bilirubin Total      0.0 - 1.2 mg/dL 0.2    ALT      10 - 52 U/L 8 (L)           Radiology  MR enterography 10/23:   1.  Persistent appearance of enhancing tract extending inferiorly  from tethered small bowel loops within the lower pelvis indicative of  likely persistent entero vesicular fistula.  2.  Mild wall thickening enhancement of multiple small bowel loops as  well as the distal colon remnant immediately proximal to the left  lower quadrant ostomy site indicative of active component of  patient's reported inflammatory bowel disease.    CT A/P 5/25: Findings suggestive of a colitis of the transverse colon which may be  of either infectious or inflammatory  etiology.  Mild asymmetric urinary bladder wall thickening, however this may be  chronic due to sequela of postsurgical changes.    Endoscopy    Colonoscopy 6/23: scattered aphthous ulcers in TI; two 10-mm linear ulcers 20 cm from stoma, possible fistula opening; rectum normal; path: ulcer showed CAC, rectum normal     Colonoscopy 7/25:   Moderate, localized ulcerated mucosa in the ileocecal valve; performed cold forceps biopsy. The valve was also lipomatous.  I could not intubate the TI, but it appeared patent.  The inability to intubate was due to the torque on the scope.  The cecum and ascending colon appeared normal. Performed random biopsy using biopsy forceps.  Moderate, segmental erythematous, friable and ulcerated mucosa with loss of vascular pattern in the transverse colon and descending colon, suggestive of Crohn's disease. The inflammation was segmental from the stoma to 18 cm in.  Performed multiple forceps biopsies in the transverse colon and descending colon to rule out Crohn's disease  There was an anal stricture on rectal exam.  The scope was then inserted into the rectum.  The rectal mucosa was normal throughout.  Performed multiple random forceps biopsies in the rectum to rule out Crohn's disease      FINAL DIAGNOSIS   A.  Ileocecal valve, biopsy:  - Active chronic ileitis with ulceration, no viral inclusions or dysplasia identified.     B.  Ascending colon, biopsy:  -.  Colonic mucosa, no significant histopathological abnormalities.     C.  Transverse colon, biopsy:  - Ulcerated colonic mucosa, no viral inclusions or dysplasia identified.     D.  Descending colon, biopsy:  - Active chronic colitis with ulceration, no viral inclusions or dysplasia identified.     E.  Rectum, biopsy:  - Colonic mucosa, no significant histopathological abnormalities.       Assessment/Plan   Jose Thornton is a 59 y.o. male who presents to GI clinic for ileocolonic Crohn's disease c/b internal fistulas.  He is  biologic and immunomodulator naive.  S/p elective anterior proctosigmoidectomy, takedown of a colovesical fistula, and end colostomy in 12/22.  He is contemplating ventral hernia repair, and reversal of his colostomy.  He has active disease by colonoscopy in 7/25.  He would benefit from a biologic, especially IFX, for his fistulizing disease. He wants to continue with anti-parasitic (ivermectin) and naturopathic/biofilm therapies.  I have discussed that he will likely need more surgeries in the near future if he does not get his Crohn's under control.     Crohn's disease (Multi)  Patient to call to schedule colonoscopy for disease activity/mucosal healing in January.  I suggested seeing functional medicine at [facility].      Nick Yi MD          [1]   Current Outpatient Medications on File Prior to Visit   Medication Sig Dispense Refill    ascorbic acid (Vitamin C) 500 mg tablet Take 1 tablet (500 mg) by mouth once daily. 30 tablet 1    ferrous gluconate (Fergon) 324 mg (38 mg elemental) tablet 1 po BID with food 200 tablet 3    gentamicin (Garamycin) 0.1 % cream Apply topically to affected area(s) once daily. 30 g 4    tamsulosin (Flomax) 0.4 mg 24 hr capsule Take 1 capsule (0.4 mg) by mouth once daily. 30 capsule 2    HYDROcodone-acetaminophen (Norco) 5-325 mg tablet Take 1 tablet by mouth every 6 hours if needed for severe pain (7 - 10). (Patient not taking: Reported on 7/9/2025) 6 tablet 0     Current Facility-Administered Medications on File Prior to Visit   Medication Dose Route Frequency Provider Last Rate Last Admin    lidocaine-epinephrine (Xylocaine W/EPI) 1 %-1:100,000 injection 9 mL  9 mL injection Once Bonnie Rainey MD        multivitamin with iron-minerals 9 mg iron/15 mL liquid 15 mL  15 mL oral Once Tomas Andersen MD        triamcinolone acetonide (Kenalog-40) injection 40 mg  40 mg intralesional Once Bonnie Rainey MD

## 2025-08-08 ENCOUNTER — CLINICAL SUPPORT (OUTPATIENT)
Dept: SURGERY | Facility: CLINIC | Age: 60
End: 2025-08-08
Payer: MEDICARE

## 2025-08-08 ENCOUNTER — TELEPHONE (OUTPATIENT)
Dept: GASTROENTEROLOGY | Facility: CLINIC | Age: 60
End: 2025-08-08

## 2025-08-08 DIAGNOSIS — Z43.3 COLOSTOMY CARE: ICD-10-CM

## 2025-08-08 PROCEDURE — 99211 OFF/OP EST MAY X REQ PHY/QHP: CPT | Performed by: COLON & RECTAL SURGERY

## 2025-08-08 NOTE — NURSING NOTE
"[facility] nursing visit outcome: Stoma and peristomal wound were assessed. There is healing granulation and epithelial tissue noted at lateral and medial edges of the wound. There is a area of raised, moist tissue at most superior side of wound; a possible blister. Mr. Thornton was informed that this may open/drain as well within next pouch change or two.  Care was provided as detailed below. Mr. Thornton will continue this at home.      [facility] next scheduled visit/plan: is scheduled to see Dr. Rainey for additional kenalog injection    Stoma Type: End Colostomy  Jossue: No  Diameter: about 7/8\"  Location: LUQ  Protrusion: Flush - functions at/just below skin level at 3-4 o'clock  Mucosal Condition and Color: Moist, Red  Mucocutaneous Junction: Intact  Peristomal Skin: Ulceration, Location: 4 to 6 o'clock, Size: open area: 2 x 1 x 0.2 cm; with healing tissue 2 x 3.5 cm and Erythema - with slight purple hue measures 7.5 x 11 cm  Location of Skin Impairment: as noted  Peristomal Contour: Bulged and with dip from 3-6 o'clock - contour varies with position  Supportive Tissue: Semi-Soft  Character of Output: liquid, tan mucous appearance when functioning during appointment  Emptying Frequency: \"about a dozen\" per day  Removed/Current Pouching System: Coloplast Nazlini , Sensura, Deep Convex, and Drainable, Stoma Paste, Belt and Hollihesive triangular wafer (smear of paste at 4 o'clock before placing Hollihesive, then on top of Hollihesive; paper tape to edge as needed    Current Wearing Time: about 3 Days    Recommendations:   Skin Care: continue with periodic shave of peristomal skin; Nasocort to wound base and to saturate Aquacel, continue with Hollihesive - return of kenalog injection with Dr. Rainey  Pouching System: as above  Wear Time: about 3 Days  Other: n/a    Photo: 8/8/2025       Supplier: unsure at this time    Comments: Additional Hollihesive and Aquacel were provided at this time.     Time Increment: 45 minutes    Whitney " Juan BSN, RN, CWOCN

## 2025-08-15 DIAGNOSIS — R33.8 ACUTE URINARY RETENTION: ICD-10-CM

## 2025-08-15 DIAGNOSIS — N30.01 ACUTE CYSTITIS WITH HEMATURIA: ICD-10-CM

## 2025-08-27 ENCOUNTER — CLINICAL SUPPORT (OUTPATIENT)
Dept: SURGERY | Facility: CLINIC | Age: 60
End: 2025-08-27
Payer: MEDICARE

## 2025-08-27 DIAGNOSIS — Z43.2 ILEOSTOMY CARE: ICD-10-CM

## 2025-08-27 PROCEDURE — 99211 OFF/OP EST MAY X REQ PHY/QHP: CPT | Performed by: COLON & RECTAL SURGERY

## 2025-09-10 ENCOUNTER — APPOINTMENT (OUTPATIENT)
Dept: SURGERY | Facility: CLINIC | Age: 60
End: 2025-09-10
Payer: MEDICARE

## 2025-09-25 ENCOUNTER — APPOINTMENT (OUTPATIENT)
Dept: PRIMARY CARE | Facility: CLINIC | Age: 60
End: 2025-09-25
Payer: MEDICARE

## 2025-10-02 ENCOUNTER — APPOINTMENT (OUTPATIENT)
Dept: PRIMARY CARE | Facility: CLINIC | Age: 60
End: 2025-10-02
Payer: MEDICARE

## 2025-10-28 ENCOUNTER — APPOINTMENT (OUTPATIENT)
Dept: NEPHROLOGY | Facility: CLINIC | Age: 60
End: 2025-10-28

## 2025-12-10 ENCOUNTER — APPOINTMENT (OUTPATIENT)
Dept: ALLERGY | Facility: CLINIC | Age: 60
End: 2025-12-10
Payer: MEDICARE

## 2026-05-29 ENCOUNTER — APPOINTMENT (OUTPATIENT)
Dept: PRIMARY CARE | Facility: CLINIC | Age: 61
End: 2026-05-29
Payer: MEDICARE

## 2026-06-05 ENCOUNTER — APPOINTMENT (OUTPATIENT)
Dept: PRIMARY CARE | Facility: CLINIC | Age: 61
End: 2026-06-05
Payer: MEDICARE

## (undated) DEVICE — SYRINGE, LUER LOCK, 12ML

## (undated) DEVICE — SUTURE, PDSII, 1, TP-1, VIL, MONO, 48LP

## (undated) DEVICE — COVER, CART, 45 X 27 X 48 IN, CLEAR

## (undated) DEVICE — TRAY, MINOR, SINGLE BASIN, STERILE

## (undated) DEVICE — CATHETER, DRAINAGE, NASOGASTRIC, DOUBLE LUMEN, FUNNEL END, SUMP, SALEM, W/ANTI-RELAX VALVE, 16 FR, 48 IN, PVC, STERILE

## (undated) DEVICE — CLIP, LIGATING, HORIZON, MEDIUM, TITANIUM

## (undated) DEVICE — Device

## (undated) DEVICE — EP TIPS, EPPENDORF, 2 TO 200 UL TIPS, REFILL TRAYS, 10 X 96 (960/CS)

## (undated) DEVICE — WOUND SYSTEM, DEBRIDEMENT & CLEANING, O.R DUOPAK

## (undated) DEVICE — SUTURE, VICRYL, 1, 27 IN, CTX, VIOLET

## (undated) DEVICE — MANIFOLD, 4 PORT NEPTUNE STANDARD

## (undated) DEVICE — EVACUATOR, WOUND, SUCTION, CLOSED, JACKSON-PRATT, 100 CC, SILICONE

## (undated) DEVICE — STAPLER, SKIN PROXIMATE, 35 WIDE

## (undated) DEVICE — GLOVE, SURGICAL, PROTEXIS NEOPRENE, 7.0, PF, LF

## (undated) DEVICE — TOWEL, SURGICAL, NEURO, O/R, 16 X 26, BLUE, STERILE

## (undated) DEVICE — SUTURE, VICRYL, 4-0, 27IN, RB-1

## (undated) DEVICE — CATHETER, URETHRAL, FOLEY, 2 WAY, BARDEX IC, 18 FR, 5 CC, SILVER, LATEX

## (undated) DEVICE — STAPLER, LINEAR ENDO RELOAD, 60MM, BLUE, DISP

## (undated) DEVICE — HOLSTER, BOVIE, ES PENCIL, DISP

## (undated) DEVICE — COLLECTION BAG, FLUID, F/STERIS LOOP, STERILE

## (undated) DEVICE — STAPLER, ECHELON 3000, 45MM STD

## (undated) DEVICE — STAPLER, ECHELON 3000, 60MM STD

## (undated) DEVICE — LOOP, VESSEL, MAXI, BLUE

## (undated) DEVICE — GLOVE, SURGICAL, PROTEXIS,  7.5, PF, LATEX

## (undated) DEVICE — STAPLER, ENDO ECHELON 45MM RELOAD, WHITE, REUSABLE

## (undated) DEVICE — OSTOMY KIT, POSTOPERATIVE, UROSTOMY, GENTLE TOUCH, 1.75 IN FLANGE, TRANSPARENT, STERILE

## (undated) DEVICE — SUTURE, VICRYL, 3-0,18 IN, SH, UNDYED

## (undated) DEVICE — TOWEL, OR, XRAY DETECT 5 PK, WHITE, 17X26, W/DMT TAG, ST

## (undated) DEVICE — SUTURE, VICRYL, 0, 27 IN, UR-6, VIOLET

## (undated) DEVICE — BAG, DRAINAGE, URINARY, W/ANTI-REFLUX CHAMBER, INFECTION CON

## (undated) DEVICE — IRRIGATION SET, Y, LARGE BORE

## (undated) DEVICE — CATHETER, URETHRAL, FOLEY, 2 WAY, BARDEX IC, 20 FR, 30 CC, SILVER, LATEX

## (undated) DEVICE — TOWELS 4-PK

## (undated) DEVICE — DRESSING, NON-ADHERENT, TELFA, OUCHLESS, 3 X 8 IN, STERILE

## (undated) DEVICE — SUTURE, PDS II, 5-0, 30 IN, RB-1, DA VIOLET

## (undated) DEVICE — DRESSING, QUICKCLOT, HEMOSTATIC, 5 X 5

## (undated) DEVICE — TIP, SUCTION, YANKAUER, FLEXIBLE

## (undated) DEVICE — HEMOSTAT, SURGICEL POWDER 3.0GRAMS

## (undated) DEVICE — STAPLER,  LINEAR RELOAD, 60MM, WHITE, DISP

## (undated) DEVICE — SLEEVE, SUCTION, E-SEP, 125MM

## (undated) DEVICE — SPONGE, DISSECTOR, PEANUT, 3/8, STERILE 5 FOAM HOLDER"

## (undated) DEVICE — SUTURE, SILK, 3-0, 18 IN SH/CR, BLACK

## (undated) DEVICE — DRESSING, ADHESIVE, ISLAND, TELFA, 2 X 3.75 IN, LF

## (undated) DEVICE — SUTURE, VICRYL, 2-0, 54 IN, TIE, VIOLET

## (undated) DEVICE — SUTURE, SILK, 2-0, FSL, BR, 30 IN, BLACK

## (undated) DEVICE — CLIP, LIGATING, HORIZON, LARGE, TITANIUM

## (undated) DEVICE — STENT, BANDER URETERAL DIVERSION 7FR 75 CM

## (undated) DEVICE — LIGASURE IMPACT, 18CM

## (undated) DEVICE — SYRINGE, 60 CC, LUER LOCK, MONOJECT, W/O CAP, LF

## (undated) DEVICE — SPONGE, LAP, XRAY DECT, 18IN X 18IN, W/MASTER DMT, STERILE

## (undated) DEVICE — DRAIN, WOUND, ROUND, DOUBLE, HOLE PATTERN 12.25 IN, W/TROCAR, DOUBLE LUMEN, LARGE, 0.25 X 49 IN, SILICONE